# Patient Record
Sex: MALE | Race: WHITE | Employment: OTHER | URBAN - METROPOLITAN AREA
[De-identification: names, ages, dates, MRNs, and addresses within clinical notes are randomized per-mention and may not be internally consistent; named-entity substitution may affect disease eponyms.]

---

## 2017-06-18 PROBLEM — L02.91 ABSCESS: Status: ACTIVE | Noted: 2017-06-18

## 2017-06-20 PROBLEM — L02.512 ABSCESS OF LEFT HAND: Status: ACTIVE | Noted: 2017-06-20

## 2017-06-23 ENCOUNTER — TELEPHONE (OUTPATIENT)
Dept: ORTHOPEDIC SURGERY | Age: 57
End: 2017-06-23

## 2017-06-28 ENCOUNTER — OFFICE VISIT (OUTPATIENT)
Dept: ORTHOPEDIC SURGERY | Age: 57
End: 2017-06-28

## 2017-06-28 VITALS
BODY MASS INDEX: 35.12 KG/M2 | WEIGHT: 265 LBS | DIASTOLIC BLOOD PRESSURE: 72 MMHG | HEIGHT: 73 IN | HEART RATE: 82 BPM | SYSTOLIC BLOOD PRESSURE: 120 MMHG

## 2017-06-28 DIAGNOSIS — L02.512 ABSCESS OF LEFT HAND: Primary | ICD-10-CM

## 2017-06-28 PROCEDURE — 99024 POSTOP FOLLOW-UP VISIT: CPT | Performed by: ORTHOPAEDIC SURGERY

## 2017-07-06 ENCOUNTER — OFFICE VISIT (OUTPATIENT)
Dept: ORTHOPEDIC SURGERY | Age: 57
End: 2017-07-06

## 2017-07-06 VITALS — HEIGHT: 73 IN | WEIGHT: 265 LBS | BODY MASS INDEX: 35.12 KG/M2

## 2017-07-06 DIAGNOSIS — L02.512 ABSCESS OF LEFT HAND: Primary | ICD-10-CM

## 2017-07-06 PROCEDURE — 99024 POSTOP FOLLOW-UP VISIT: CPT | Performed by: ORTHOPAEDIC SURGERY

## 2017-07-20 ENCOUNTER — OFFICE VISIT (OUTPATIENT)
Dept: ORTHOPEDIC SURGERY | Age: 57
End: 2017-07-20

## 2017-07-20 VITALS — HEIGHT: 73 IN | WEIGHT: 265 LBS | BODY MASS INDEX: 35.12 KG/M2

## 2017-07-20 DIAGNOSIS — L02.512 ABSCESS OF LEFT HAND: Primary | ICD-10-CM

## 2017-07-20 PROCEDURE — 99024 POSTOP FOLLOW-UP VISIT: CPT | Performed by: ORTHOPAEDIC SURGERY

## 2017-08-10 ENCOUNTER — OFFICE VISIT (OUTPATIENT)
Dept: ORTHOPEDIC SURGERY | Age: 57
End: 2017-08-10

## 2017-08-10 VITALS — HEIGHT: 73 IN | WEIGHT: 264.99 LBS | BODY MASS INDEX: 35.12 KG/M2

## 2017-08-10 DIAGNOSIS — L02.512 ABSCESS OF LEFT HAND: Primary | ICD-10-CM

## 2017-08-10 PROCEDURE — 99024 POSTOP FOLLOW-UP VISIT: CPT | Performed by: ORTHOPAEDIC SURGERY

## 2021-08-30 ENCOUNTER — APPOINTMENT (OUTPATIENT)
Dept: GENERAL RADIOLOGY | Age: 61
DRG: 853 | End: 2021-08-30
Payer: MEDICARE

## 2021-08-30 ENCOUNTER — HOSPITAL ENCOUNTER (EMERGENCY)
Age: 61
Discharge: HOME | DRG: 853 | End: 2021-08-30
Attending: EMERGENCY MEDICINE
Payer: MEDICARE

## 2021-08-30 VITALS
DIASTOLIC BLOOD PRESSURE: 57 MMHG | RESPIRATION RATE: 16 BRPM | OXYGEN SATURATION: 95 % | HEART RATE: 70 BPM | TEMPERATURE: 98.6 F | WEIGHT: 240 LBS | HEIGHT: 73 IN | BODY MASS INDEX: 31.81 KG/M2 | SYSTOLIC BLOOD PRESSURE: 113 MMHG

## 2021-08-30 DIAGNOSIS — J02.9 ACUTE PHARYNGITIS, UNSPECIFIED ETIOLOGY: Primary | ICD-10-CM

## 2021-08-30 LAB
A/G RATIO: 0.9 (ref 1.1–2.2)
ALBUMIN SERPL-MCNC: 3.6 G/DL (ref 3.4–5)
ALP BLD-CCNC: 138 U/L (ref 40–129)
ALT SERPL-CCNC: 27 U/L (ref 10–40)
ANION GAP SERPL CALCULATED.3IONS-SCNC: 14 MMOL/L (ref 3–16)
AST SERPL-CCNC: 17 U/L (ref 15–37)
BACTERIA: ABNORMAL /HPF
BASOPHILS ABSOLUTE: 0.1 K/UL (ref 0–0.2)
BASOPHILS RELATIVE PERCENT: 0.6 %
BILIRUB SERPL-MCNC: 0.9 MG/DL (ref 0–1)
BILIRUBIN URINE: NEGATIVE
BLOOD, URINE: ABNORMAL
BUN BLDV-MCNC: 35 MG/DL (ref 7–20)
CALCIUM SERPL-MCNC: 9.3 MG/DL (ref 8.3–10.6)
CELLULAR CASTS: ABNORMAL /LPF
CHLORIDE BLD-SCNC: 104 MMOL/L (ref 99–110)
CLARITY: CLEAR
CO2: 19 MMOL/L (ref 21–32)
COLOR: YELLOW
CREAT SERPL-MCNC: 1.7 MG/DL (ref 0.8–1.3)
EOSINOPHILS ABSOLUTE: 0 K/UL (ref 0–0.6)
EOSINOPHILS RELATIVE PERCENT: 0.2 %
EPITHELIAL CELLS, UA: ABNORMAL /HPF (ref 0–5)
GFR AFRICAN AMERICAN: 49
GFR NON-AFRICAN AMERICAN: 41
GLOBULIN: 3.8 G/DL
GLUCOSE BLD-MCNC: 286 MG/DL (ref 70–99)
GLUCOSE URINE: >=1000 MG/DL
HCT VFR BLD CALC: 34.5 % (ref 40.5–52.5)
HEMOGLOBIN: 11.7 G/DL (ref 13.5–17.5)
KETONES, URINE: NEGATIVE MG/DL
LEUKOCYTE ESTERASE, URINE: NEGATIVE
LYMPHOCYTES ABSOLUTE: 1.1 K/UL (ref 1–5.1)
LYMPHOCYTES RELATIVE PERCENT: 6.2 %
MCH RBC QN AUTO: 31.6 PG (ref 26–34)
MCHC RBC AUTO-ENTMCNC: 34 G/DL (ref 31–36)
MCV RBC AUTO: 92.9 FL (ref 80–100)
MICROSCOPIC EXAMINATION: YES
MONOCYTES ABSOLUTE: 1.1 K/UL (ref 0–1.3)
MONOCYTES RELATIVE PERCENT: 6.2 %
NEUTROPHILS ABSOLUTE: 15.1 K/UL (ref 1.7–7.7)
NEUTROPHILS RELATIVE PERCENT: 86.8 %
NITRITE, URINE: NEGATIVE
PDW BLD-RTO: 14.2 % (ref 12.4–15.4)
PH UA: 5.5 (ref 5–8)
PLATELET # BLD: 215 K/UL (ref 135–450)
PMV BLD AUTO: 10.2 FL (ref 5–10.5)
POTASSIUM REFLEX MAGNESIUM: 4.4 MMOL/L (ref 3.5–5.1)
PROTEIN UA: 100 MG/DL
RBC # BLD: 3.71 M/UL (ref 4.2–5.9)
RBC UA: ABNORMAL /HPF (ref 0–4)
SARS-COV-2, NAAT: NOT DETECTED
SODIUM BLD-SCNC: 137 MMOL/L (ref 136–145)
SPECIFIC GRAVITY UA: 1.02 (ref 1–1.03)
TOTAL PROTEIN: 7.4 G/DL (ref 6.4–8.2)
URINE TYPE: ABNORMAL
UROBILINOGEN, URINE: 0.2 E.U./DL
WBC # BLD: 17.4 K/UL (ref 4–11)
WBC UA: ABNORMAL /HPF (ref 0–5)

## 2021-08-30 PROCEDURE — 87635 SARS-COV-2 COVID-19 AMP PRB: CPT

## 2021-08-30 PROCEDURE — 85025 COMPLETE CBC W/AUTO DIFF WBC: CPT

## 2021-08-30 PROCEDURE — 81001 URINALYSIS AUTO W/SCOPE: CPT

## 2021-08-30 PROCEDURE — 99283 EMERGENCY DEPT VISIT LOW MDM: CPT

## 2021-08-30 PROCEDURE — 80053 COMPREHEN METABOLIC PANEL: CPT

## 2021-08-30 PROCEDURE — 71046 X-RAY EXAM CHEST 2 VIEWS: CPT

## 2021-08-30 NOTE — ED PROVIDER NOTES
[facility]  ED    CHIEF COMPLAINT  Fever (x 5 days. up to 102. denies any chest pain or SOB)       HISTORY OF PRESENT ILLNESS  Gene Kitty Ramírez is a 59 y.o. male who presents to the ED with complaint of fever. Fever off and on for 5 days. Tmax 102. Complains of mild sore throat and occasional nonproductive cough. Denies chest pain, SOB, nausea, vomiting, diarrhea, abdominal pain, dysuria, hematuria, rash. No sick contacts or recent travel. Not vaccinated for COVID19. Denies change in taste or smell. No other complaints, modifying factors or associated symptoms.      I have reviewed the following from the nursing documentation:    Past Medical History:   Diagnosis Date    Arthritis     Chronic low back pain     Dental bridge present     upper    Diabetes ([address]     Diabetes mellitus ([address]     Emphysema of lung ([address]     Fractures     Hypertension     Neuropathy     Osteoarthritis     Prostate troubles      Past Surgical History:   Procedure Laterality Date    FOOT SURGERY      HAND SURGERY Left 06/19/2017    LEFT HAND DEBRIDEMENT INCISION AND DRAINAGE    NECK SURGERY      33s year    TOE AMPUTATION Left 2009    WRIST FRACTURE SURGERY       Family History   Problem Relation Age of Onset    Heart Failure Mother     Diabetes Mother     Cancer Father         throat cancer    Asthma Daughter         Sports induced as a child    Emphysema Neg Hx     Hypertension Neg Hx      Social History     Socioeconomic History    Marital status:      Spouse name: Not on file    Number of children: Not on file    Years of education: Not on file    Highest education level: Not on file   Occupational History    Not on file   Tobacco Use    Smoking status: Never Smoker    Smokeless tobacco: Never Used   Substance and Sexual Activity    Alcohol use: No     Alcohol/week: 0.0 standard drinks    Drug use: No    Sexual activity: Not on file   Other Topics Concern    Not on file   Social History Narrative    Not on file     Social Determinants of Health     Financial Resource Strain:     Difficulty of Paying Living Expenses:    Food Insecurity:     Worried About Running Out of Food in the Last Year:     [address] in the Last Year:    Transportation Needs:     Lack of Transportation (Medical):  Lack of Transportation (Non-Medical):    Physical Activity:     Days of Exercise per Week:     Minutes of Exercise per Session:    Stress:     Feeling of Stress :    Social Connections:     Frequency of Communication with Friends and Family:     Frequency of Social Gatherings with Friends and Family:     Attends Congregation Services:     Active Member of Clubs or Organizations:     Attends Club or Organization Meetings:     Marital Status:    Intimate Partner Violence:     Fear of Current or Ex-Partner:     Emotionally Abused:     Physically Abused:     Sexually Abused:      No current facility-administered medications for this encounter. Current Outpatient Medications   Medication Sig Dispense Refill    HYDROcodone-acetaminophen (NORCO) 5-325 MG per tablet Take 1 tablet by mouth every 6 hours as needed for Pain . 24 tablet 0    amLODIPine (NORVASC) 10 MG tablet Take 1 tablet by mouth daily 30 tablet 2    chlorthalidone (HYGROTON) 25 MG tablet Take 1 tablet by mouth daily 30 tablet 3    rivaroxaban (XARELTO) 10 MG TABS tablet Take 10 mg by mouth daily (with breakfast)      Misc.  Devices (PULSE OXIMETER) MISC 1 Device by Does not apply route daily as needed 1 each 0    mometasone-formoterol (DULERA) 200-5 MCG/ACT inhaler Inhale 2 puffs into the lungs 2 times daily 1 Inhaler 1    insulin glargine (LANTUS) 100 UNIT/ML injection vial Inject 35 Units into the skin nightly 1 vial 3    insulin lispro (HUMALOG) 100 UNIT/ML injection vial Inject 12 Units into the skin 3 times daily (before meals) 1 vial 3    pantoprazole (PROTONIX) 40 MG tablet Take 1 tablet by mouth every morning (before breakfast) 30 tablet 3    Omega-3 Fatty Acids (FISH OIL) 1000 MG CAPS Take 3,000 mg by mouth nightly.  metFORMIN (GLUCOPHAGE) 500 MG tablet TAKE TWO TABLETS BY MOUTH TWICE A DAY      Multiple Vitamins-Minerals (MULTIVITAMIN PO) Take  by mouth daily.  tamsulosin (FLOMAX) 0.4 MG capsule Take 0.4 mg by mouth daily. No Known Allergies    REVIEW OF SYSTEMS  10 systems reviewed, pertinent positives and negatives per HPI, otherwise noted to be negative. PHYSICAL EXAM  ED Triage Vitals [08/30/21 1736]   BP Temp Temp Source Pulse Resp SpO2 Height Weight   (!) 153/70 98.6 °F (37 °C) Oral 90 18 94 % 6' 1\" (1.854 m) 240 lb (108.9 kg)     General appearance: Awake and alert. Cooperative. No acute distress. HENT: Normocephalic. Atraumatic. Mucous membranes are moist.  Oropharynx is clear. No tonsillar exudate or uvular deviation. No focal erythema. Managing secretions easily. Neck: Supple. No meningismus. Eyes: PERRL. EOMI. Heart/Chest: RRR. No murmurs. Lungs: Respirations unlabored. CTAB. Good air exchange. Speaking comfortably in full sentences. Abdomen: Soft. Non-tender. Non-distended. No rebound or guarding. Musculoskeletal: No extremity edema. No deformity. No tenderness in the extremities. All extremities neurovascularly intact. Skin: Warm and dry. No acute rashes. Neurological: Alert and oriented. CN II-XII intact. Strength 5/5 bilateral upper and lower extremities. Sensation intact to light touch. Gait normal.  Psychiatric: Mood/affect: normal      LABS  I have reviewed all labs for this visit.    Results for orders placed or performed during the hospital encounter of 08/30/21   COVID-19, Rapid    Specimen: Nasopharyngeal Swab; Mouth   Result Value Ref Range    SARS-CoV-2, NAAT Not Detected Not Detected   CBC Auto Differential   Result Value Ref Range    WBC 17.4 (H) 4.0 - 11.0 K/uL    RBC 3.71 (L) 4.20 - 5.90 M/uL    Hemoglobin 11.7 (L) 13.5 - 17.5 g/dL    Hematocrit 34.5 (L) 40.5 - 52.5 %    MCV 92.9 80.0 - 100.0 fL    MCH 31.6 26.0 - 34.0 pg    MCHC 34.0 31.0 - 36.0 g/dL    RDW 14.2 12.4 - 15.4 %    Platelets 989 108 - 781 K/uL    MPV 10.2 5.0 - 10.5 fL    Neutrophils % 86.8 %    Lymphocytes % 6.2 %    Monocytes % 6.2 %    Eosinophils % 0.2 %    Basophils % 0.6 %    Neutrophils Absolute 15.1 (H) 1.7 - 7.7 K/uL    Lymphocytes Absolute 1.1 1.0 - 5.1 K/uL    Monocytes Absolute 1.1 0.0 - 1.3 K/uL    Eosinophils Absolute 0.0 0.0 - 0.6 K/uL    Basophils Absolute 0.1 0.0 - 0.2 K/uL   Comprehensive Metabolic Panel w/ Reflex to MG   Result Value Ref Range    Sodium 137 136 - 145 mmol/L    Potassium reflex Magnesium 4.4 3.5 - 5.1 mmol/L    Chloride 104 99 - 110 mmol/L    CO2 19 (L) 21 - 32 mmol/L    Anion Gap 14 3 - 16    Glucose 286 (H) 70 - 99 mg/dL    BUN 35 (H) 7 - 20 mg/dL    CREATININE 1.7 (H) 0.8 - 1.3 mg/dL    GFR Non- 41 (A) >60    GFR  49 (A) >60    Calcium 9.3 8.3 - 10.6 mg/dL    Total Protein 7.4 6.4 - 8.2 g/dL    Albumin 3.6 3.4 - 5.0 g/dL    Albumin/Globulin Ratio 0.9 (L) 1.1 - 2.2    Total Bilirubin 0.9 0.0 - 1.0 mg/dL    Alkaline Phosphatase 138 (H) 40 - 129 U/L    ALT 27 10 - 40 U/L    AST 17 15 - 37 U/L    Globulin 3.8 g/dL   Urinalysis, reflex to microscopic   Result Value Ref Range    Color, UA Yellow Straw/Yellow    Clarity, UA Clear Clear    Glucose, Ur >=1000 (A) Negative mg/dL    Bilirubin Urine Negative Negative    Ketones, Urine Negative Negative mg/dL    Specific Gravity, UA 1.020 1.005 - 1.030    Blood, Urine TRACE-INTACT (A) Negative    pH, UA 5.5 5.0 - 8.0    Protein,  (A) Negative mg/dL    Urobilinogen, Urine 0.2 <2.0 E.U./dL    Nitrite, Urine Negative Negative    Leukocyte Esterase, Urine Negative Negative    Microscopic Examination YES     Urine Type NotGiven    Microscopic Urinalysis   Result Value Ref Range    Cellular Cast, UA 1-3 WBC (A) None Seen /LPF    WBC, UA 3-5 0 - 5 /HPF    RBC, UA 3-4 0 - 4 /HPF    Epithelial Cells, UA 2-5 0 - 5 /HPF    Bacteria, UA 1+ (A) None Seen /HPF       RADIOLOGY  I have reviewed all radiographic studies for this visit. XR CHEST (2 VW)   Final Result   No acute cardiopulmonary disease. ED COURSE/MDM  Patient seen and evaluated. Old records reviewed. Labs and imaging reviewed and results discussed with patient/family to extent possible. This is a 76-year-old male who presents with fever for the last several days. On arrival the patient is afebrile and nontoxic in appearance. Appears overall well. Cardiac exam no murmur. Lungs clear. Abdomen benign. Chest x-ray nothing acute. CBC does show a leukocytosis 17.4 with a left shift however no clear source of infection can be found. Urinalysis negative. Rapid Covid negative. Patient is hyperglycemic but not in DKA. Creatinine is 1.7, slightly up from baseline of around 1.3. Likely related to mild dehydration. Encourage oral fluids. Given reassuring exam, vital signs, diagnostics believe patient is appropriate for discharged home with further management in the outpatient setting. Advised close follow-up with PCP in several days. Usual strict return cautions for new or worsening symptoms communicated. All questions were answered and the patient/family expressed understanding and agreement with the plan. PROCEDURES  None    CRITICAL CARE  N/A    CLINICAL IMPRESSION  1. Acute pharyngitis, unspecified etiology        DISPOSITION   discharge     Gigi Hutchinson MD    Note: This chart was created using voice recognition dictation software. Efforts were made by me to ensure accuracy, however some errors may be present due to limitations of this technology and occasionally words are not transcribed correctly.         Gigi Hutchinson MD  08/30/21 5038

## 2021-08-31 ENCOUNTER — APPOINTMENT (OUTPATIENT)
Dept: GENERAL RADIOLOGY | Age: 61
DRG: 853 | End: 2021-08-31
Payer: MEDICARE

## 2021-08-31 ENCOUNTER — HOSPITAL ENCOUNTER (INPATIENT)
Age: 61
LOS: 7 days | Discharge: HOME | DRG: 853 | End: 2021-09-07
Attending: EMERGENCY MEDICINE | Admitting: INTERNAL MEDICINE
Payer: MEDICARE

## 2021-08-31 ENCOUNTER — ANESTHESIA EVENT (OUTPATIENT)
Dept: OPERATING ROOM | Age: 61
DRG: 853 | End: 2021-08-31
Payer: MEDICARE

## 2021-08-31 ENCOUNTER — ANESTHESIA (OUTPATIENT)
Dept: OPERATING ROOM | Age: 61
DRG: 853 | End: 2021-08-31
Payer: MEDICARE

## 2021-08-31 VITALS
RESPIRATION RATE: 9 BRPM | SYSTOLIC BLOOD PRESSURE: 87 MMHG | DIASTOLIC BLOOD PRESSURE: 51 MMHG | OXYGEN SATURATION: 100 %

## 2021-08-31 DIAGNOSIS — L03.116 CELLULITIS OF LEFT FOOT: Primary | ICD-10-CM

## 2021-08-31 DIAGNOSIS — N17.9 ACUTE RENAL FAILURE, UNSPECIFIED ACUTE RENAL FAILURE TYPE (HCC): ICD-10-CM

## 2021-08-31 DIAGNOSIS — M72.6 NECROTIZING FASCIITIS (HCC): ICD-10-CM

## 2021-08-31 PROBLEM — A41.9 SEPSIS (HCC): Status: ACTIVE | Noted: 2021-08-31

## 2021-08-31 LAB
AMORPHOUS: ABNORMAL /HPF
ANION GAP SERPL CALCULATED.3IONS-SCNC: 16 MMOL/L (ref 3–16)
ANISOCYTOSIS: ABNORMAL
BACTERIA: ABNORMAL /HPF
BANDED NEUTROPHILS RELATIVE PERCENT: 10 % (ref 0–7)
BASOPHILS ABSOLUTE: 0 K/UL (ref 0–0.2)
BASOPHILS RELATIVE PERCENT: 0 %
BILIRUBIN URINE: NEGATIVE
BLOOD, URINE: ABNORMAL
BUN BLDV-MCNC: 42 MG/DL (ref 7–20)
CALCIUM SERPL-MCNC: 9.5 MG/DL (ref 8.3–10.6)
CHLORIDE BLD-SCNC: 101 MMOL/L (ref 99–110)
CLARITY: CLEAR
CO2: 19 MMOL/L (ref 21–32)
COLOR: YELLOW
CREAT SERPL-MCNC: 2 MG/DL (ref 0.8–1.3)
EOSINOPHILS ABSOLUTE: 0.2 K/UL (ref 0–0.6)
EOSINOPHILS RELATIVE PERCENT: 1 %
EPITHELIAL CELLS, UA: ABNORMAL /HPF (ref 0–5)
GFR AFRICAN AMERICAN: 41
GFR NON-AFRICAN AMERICAN: 34
GLUCOSE BLD-MCNC: 288 MG/DL (ref 70–99)
GLUCOSE BLD-MCNC: 316 MG/DL (ref 70–99)
GLUCOSE URINE: 100 MG/DL
HCT VFR BLD CALC: 36.3 % (ref 40.5–52.5)
HEMOGLOBIN: 12.3 G/DL (ref 13.5–17.5)
KETONES, URINE: NEGATIVE MG/DL
LACTIC ACID, SEPSIS: 1.7 MMOL/L (ref 0.4–1.9)
LEUKOCYTE ESTERASE, URINE: NEGATIVE
LYMPHOCYTES ABSOLUTE: 1.9 K/UL (ref 1–5.1)
LYMPHOCYTES RELATIVE PERCENT: 10 %
MACROCYTES: ABNORMAL
MCH RBC QN AUTO: 31.7 PG (ref 26–34)
MCHC RBC AUTO-ENTMCNC: 33.9 G/DL (ref 31–36)
MCV RBC AUTO: 93.6 FL (ref 80–100)
MICROCYTES: ABNORMAL
MICROSCOPIC EXAMINATION: YES
MONOCYTES ABSOLUTE: 1.2 K/UL (ref 0–1.3)
MONOCYTES RELATIVE PERCENT: 6 %
MUCUS: ABNORMAL /LPF
NEUTROPHILS ABSOLUTE: 15.9 K/UL (ref 1.7–7.7)
NEUTROPHILS RELATIVE PERCENT: 73 %
NITRITE, URINE: NEGATIVE
OVALOCYTES: ABNORMAL
PDW BLD-RTO: 14.5 % (ref 12.4–15.4)
PERFORMED ON: ABNORMAL
PH UA: 5.5 (ref 5–8)
PLATELET # BLD: 224 K/UL (ref 135–450)
PLATELET SLIDE REVIEW: ADEQUATE
PMV BLD AUTO: 9.9 FL (ref 5–10.5)
POIKILOCYTES: ABNORMAL
POLYCHROMASIA: ABNORMAL
POTASSIUM REFLEX MAGNESIUM: 4.9 MMOL/L (ref 3.5–5.1)
PROTEIN UA: 100 MG/DL
RBC # BLD: 3.88 M/UL (ref 4.2–5.9)
RBC UA: ABNORMAL /HPF (ref 0–4)
RENAL EPITHELIAL, UA: ABNORMAL /HPF (ref 0–1)
SCHISTOCYTES: ABNORMAL
SLIDE REVIEW: ABNORMAL
SODIUM BLD-SCNC: 136 MMOL/L (ref 136–145)
SPECIFIC GRAVITY UA: 1.02 (ref 1–1.03)
TEAR DROP CELLS: ABNORMAL
URINE REFLEX TO CULTURE: ABNORMAL
URINE TYPE: ABNORMAL
UROBILINOGEN, URINE: 0.2 E.U./DL
WBC # BLD: 19.2 K/UL (ref 4–11)
WBC UA: ABNORMAL /HPF (ref 0–5)

## 2021-08-31 PROCEDURE — 2500000003 HC RX 250 WO HCPCS: Performed by: EMERGENCY MEDICINE

## 2021-08-31 PROCEDURE — 99284 EMERGENCY DEPT VISIT MOD MDM: CPT

## 2021-08-31 PROCEDURE — 87070 CULTURE OTHR SPECIMN AEROBIC: CPT

## 2021-08-31 PROCEDURE — 3600000012 HC SURGERY LEVEL 2 ADDTL 15MIN: Performed by: PODIATRIST

## 2021-08-31 PROCEDURE — 3600000002 HC SURGERY LEVEL 2 BASE: Performed by: PODIATRIST

## 2021-08-31 PROCEDURE — 87077 CULTURE AEROBIC IDENTIFY: CPT

## 2021-08-31 PROCEDURE — 96367 TX/PROPH/DG ADDL SEQ IV INF: CPT

## 2021-08-31 PROCEDURE — 87186 SC STD MICRODIL/AGAR DIL: CPT

## 2021-08-31 PROCEDURE — 2580000003 HC RX 258: Performed by: PODIATRIST

## 2021-08-31 PROCEDURE — 96365 THER/PROPH/DIAG IV INF INIT: CPT

## 2021-08-31 PROCEDURE — 87206 SMEAR FLUORESCENT/ACID STAI: CPT

## 2021-08-31 PROCEDURE — 7100000001 HC PACU RECOVERY - ADDTL 15 MIN: Performed by: PODIATRIST

## 2021-08-31 PROCEDURE — 3700000001 HC ADD 15 MINUTES (ANESTHESIA): Performed by: PODIATRIST

## 2021-08-31 PROCEDURE — 1200000000 HC SEMI PRIVATE

## 2021-08-31 PROCEDURE — 80048 BASIC METABOLIC PNL TOTAL CA: CPT

## 2021-08-31 PROCEDURE — 83605 ASSAY OF LACTIC ACID: CPT

## 2021-08-31 PROCEDURE — 7100000000 HC PACU RECOVERY - FIRST 15 MIN: Performed by: PODIATRIST

## 2021-08-31 PROCEDURE — 87205 SMEAR GRAM STAIN: CPT

## 2021-08-31 PROCEDURE — 87075 CULTR BACTERIA EXCEPT BLOOD: CPT

## 2021-08-31 PROCEDURE — 96375 TX/PRO/DX INJ NEW DRUG ADDON: CPT

## 2021-08-31 PROCEDURE — 87076 CULTURE ANAEROBE IDENT EACH: CPT

## 2021-08-31 PROCEDURE — 6360000002 HC RX W HCPCS: Performed by: ANESTHESIOLOGY

## 2021-08-31 PROCEDURE — 81001 URINALYSIS AUTO W/SCOPE: CPT

## 2021-08-31 PROCEDURE — 87116 MYCOBACTERIA CULTURE: CPT

## 2021-08-31 PROCEDURE — 6370000000 HC RX 637 (ALT 250 FOR IP): Performed by: EMERGENCY MEDICINE

## 2021-08-31 PROCEDURE — 2580000003 HC RX 258: Performed by: ANESTHESIOLOGY

## 2021-08-31 PROCEDURE — 0JBR0ZZ EXCISION OF LEFT FOOT SUBCUTANEOUS TISSUE AND FASCIA, OPEN APPROACH: ICD-10-PCS | Performed by: INTERNAL MEDICINE

## 2021-08-31 PROCEDURE — 87040 BLOOD CULTURE FOR BACTERIA: CPT

## 2021-08-31 PROCEDURE — 2709999900 HC NON-CHARGEABLE SUPPLY: Performed by: PODIATRIST

## 2021-08-31 PROCEDURE — 85025 COMPLETE CBC W/AUTO DIFF WBC: CPT

## 2021-08-31 PROCEDURE — 2580000003 HC RX 258: Performed by: EMERGENCY MEDICINE

## 2021-08-31 PROCEDURE — 87015 SPECIMEN INFECT AGNT CONCNTJ: CPT

## 2021-08-31 PROCEDURE — 73630 X-RAY EXAM OF FOOT: CPT

## 2021-08-31 PROCEDURE — 86403 PARTICLE AGGLUT ANTBDY SCRN: CPT

## 2021-08-31 PROCEDURE — 3700000000 HC ANESTHESIA ATTENDED CARE: Performed by: PODIATRIST

## 2021-08-31 PROCEDURE — 2500000003 HC RX 250 WO HCPCS: Performed by: ANESTHESIOLOGY

## 2021-08-31 PROCEDURE — 6360000002 HC RX W HCPCS: Performed by: EMERGENCY MEDICINE

## 2021-08-31 RX ORDER — ONDANSETRON 2 MG/ML
4 INJECTION INTRAMUSCULAR; INTRAVENOUS PRN
Status: DISCONTINUED | OUTPATIENT
Start: 2021-08-31 | End: 2021-08-31 | Stop reason: HOSPADM

## 2021-08-31 RX ORDER — LISINOPRIL 10 MG/1
5 TABLET ORAL DAILY
COMMUNITY
End: 2021-11-12 | Stop reason: ALTCHOICE

## 2021-08-31 RX ORDER — MORPHINE SULFATE 2 MG/ML
2 INJECTION, SOLUTION INTRAMUSCULAR; INTRAVENOUS EVERY 5 MIN PRN
Status: DISCONTINUED | OUTPATIENT
Start: 2021-08-31 | End: 2021-08-31 | Stop reason: HOSPADM

## 2021-08-31 RX ORDER — MORPHINE SULFATE 2 MG/ML
2 INJECTION, SOLUTION INTRAMUSCULAR; INTRAVENOUS ONCE
Status: COMPLETED | OUTPATIENT
Start: 2021-08-31 | End: 2021-08-31

## 2021-08-31 RX ORDER — OXYCODONE HYDROCHLORIDE AND ACETAMINOPHEN 5; 325 MG/1; MG/1
1 TABLET ORAL PRN
Status: DISCONTINUED | OUTPATIENT
Start: 2021-08-31 | End: 2021-08-31 | Stop reason: HOSPADM

## 2021-08-31 RX ORDER — SODIUM CHLORIDE, SODIUM LACTATE, POTASSIUM CHLORIDE, CALCIUM CHLORIDE 600; 310; 30; 20 MG/100ML; MG/100ML; MG/100ML; MG/100ML
INJECTION, SOLUTION INTRAVENOUS CONTINUOUS PRN
Status: DISCONTINUED | OUTPATIENT
Start: 2021-08-31 | End: 2021-08-31 | Stop reason: SDUPTHER

## 2021-08-31 RX ORDER — LABETALOL HYDROCHLORIDE 5 MG/ML
5 INJECTION, SOLUTION INTRAVENOUS EVERY 10 MIN PRN
Status: DISCONTINUED | OUTPATIENT
Start: 2021-08-31 | End: 2021-08-31 | Stop reason: HOSPADM

## 2021-08-31 RX ORDER — OXYCODONE HYDROCHLORIDE 5 MG/1
5 TABLET ORAL EVERY 4 HOURS PRN
Status: DISCONTINUED | OUTPATIENT
Start: 2021-08-31 | End: 2021-09-08 | Stop reason: HOSPADM

## 2021-08-31 RX ORDER — MORPHINE SULFATE 2 MG/ML
1 INJECTION, SOLUTION INTRAMUSCULAR; INTRAVENOUS EVERY 5 MIN PRN
Status: DISCONTINUED | OUTPATIENT
Start: 2021-08-31 | End: 2021-08-31 | Stop reason: HOSPADM

## 2021-08-31 RX ORDER — LIDOCAINE HYDROCHLORIDE 20 MG/ML
INJECTION, SOLUTION INFILTRATION; PERINEURAL PRN
Status: DISCONTINUED | OUTPATIENT
Start: 2021-08-31 | End: 2021-08-31 | Stop reason: SDUPTHER

## 2021-08-31 RX ORDER — MORPHINE SULFATE 4 MG/ML
4 INJECTION, SOLUTION INTRAMUSCULAR; INTRAVENOUS ONCE
Status: DISCONTINUED | OUTPATIENT
Start: 2021-08-31 | End: 2021-08-31

## 2021-08-31 RX ORDER — ROCURONIUM BROMIDE 10 MG/ML
INJECTION, SOLUTION INTRAVENOUS PRN
Status: DISCONTINUED | OUTPATIENT
Start: 2021-08-31 | End: 2021-08-31 | Stop reason: SDUPTHER

## 2021-08-31 RX ORDER — MORPHINE SULFATE 4 MG/ML
10 INJECTION, SOLUTION INTRAMUSCULAR; INTRAVENOUS ONCE
Status: COMPLETED | OUTPATIENT
Start: 2021-08-31 | End: 2021-08-31

## 2021-08-31 RX ORDER — OXYCODONE HYDROCHLORIDE 5 MG/1
10 TABLET ORAL EVERY 4 HOURS PRN
Status: COMPLETED | OUTPATIENT
Start: 2021-08-31 | End: 2021-09-01

## 2021-08-31 RX ORDER — INSULIN GLARGINE 100 [IU]/ML
20 INJECTION, SOLUTION SUBCUTANEOUS NIGHTLY
Status: DISCONTINUED | OUTPATIENT
Start: 2021-09-01 | End: 2021-09-08 | Stop reason: HOSPADM

## 2021-08-31 RX ORDER — FENTANYL CITRATE 50 UG/ML
INJECTION, SOLUTION INTRAMUSCULAR; INTRAVENOUS PRN
Status: DISCONTINUED | OUTPATIENT
Start: 2021-08-31 | End: 2021-08-31 | Stop reason: SDUPTHER

## 2021-08-31 RX ORDER — PROMETHAZINE HYDROCHLORIDE 25 MG/ML
6.25 INJECTION, SOLUTION INTRAMUSCULAR; INTRAVENOUS
Status: DISCONTINUED | OUTPATIENT
Start: 2021-08-31 | End: 2021-08-31 | Stop reason: HOSPADM

## 2021-08-31 RX ORDER — 0.9 % SODIUM CHLORIDE 0.9 %
1000 INTRAVENOUS SOLUTION INTRAVENOUS ONCE
Status: COMPLETED | OUTPATIENT
Start: 2021-08-31 | End: 2021-08-31

## 2021-08-31 RX ORDER — TAMSULOSIN HYDROCHLORIDE 0.4 MG/1
0.4 CAPSULE ORAL DAILY
Status: DISCONTINUED | OUTPATIENT
Start: 2021-09-01 | End: 2021-09-08 | Stop reason: HOSPADM

## 2021-08-31 RX ORDER — ACETAMINOPHEN 500 MG
1000 TABLET ORAL ONCE
Status: COMPLETED | OUTPATIENT
Start: 2021-08-31 | End: 2021-08-31

## 2021-08-31 RX ORDER — HYDRALAZINE HYDROCHLORIDE 20 MG/ML
5 INJECTION INTRAMUSCULAR; INTRAVENOUS EVERY 10 MIN PRN
Status: DISCONTINUED | OUTPATIENT
Start: 2021-08-31 | End: 2021-08-31 | Stop reason: HOSPADM

## 2021-08-31 RX ORDER — ATORVASTATIN CALCIUM 40 MG/1
40 TABLET, FILM COATED ORAL DAILY
COMMUNITY

## 2021-08-31 RX ORDER — MEPERIDINE HYDROCHLORIDE 50 MG/ML
12.5 INJECTION INTRAMUSCULAR; INTRAVENOUS; SUBCUTANEOUS EVERY 5 MIN PRN
Status: DISCONTINUED | OUTPATIENT
Start: 2021-08-31 | End: 2021-08-31 | Stop reason: HOSPADM

## 2021-08-31 RX ORDER — PROPOFOL 10 MG/ML
INJECTION, EMULSION INTRAVENOUS PRN
Status: DISCONTINUED | OUTPATIENT
Start: 2021-08-31 | End: 2021-08-31 | Stop reason: SDUPTHER

## 2021-08-31 RX ORDER — OXYCODONE HYDROCHLORIDE AND ACETAMINOPHEN 5; 325 MG/1; MG/1
2 TABLET ORAL PRN
Status: DISCONTINUED | OUTPATIENT
Start: 2021-08-31 | End: 2021-08-31 | Stop reason: HOSPADM

## 2021-08-31 RX ORDER — ATORVASTATIN CALCIUM 40 MG/1
40 TABLET, FILM COATED ORAL DAILY
Status: DISCONTINUED | OUTPATIENT
Start: 2021-09-01 | End: 2021-09-08 | Stop reason: HOSPADM

## 2021-08-31 RX ORDER — DULAGLUTIDE 1.5 MG/.5ML
1.5 INJECTION, SOLUTION SUBCUTANEOUS WEEKLY
COMMUNITY
End: 2022-05-16

## 2021-08-31 RX ORDER — CLINDAMYCIN PHOSPHATE 900 MG/50ML
900 INJECTION INTRAVENOUS ONCE
Status: COMPLETED | OUTPATIENT
Start: 2021-08-31 | End: 2021-08-31

## 2021-08-31 RX ORDER — DIPHENHYDRAMINE HYDROCHLORIDE 50 MG/ML
12.5 INJECTION INTRAMUSCULAR; INTRAVENOUS
Status: DISCONTINUED | OUTPATIENT
Start: 2021-08-31 | End: 2021-08-31 | Stop reason: HOSPADM

## 2021-08-31 RX ORDER — MAGNESIUM HYDROXIDE 1200 MG/15ML
LIQUID ORAL CONTINUOUS PRN
Status: COMPLETED | OUTPATIENT
Start: 2021-08-31 | End: 2021-08-31

## 2021-08-31 RX ORDER — CLINDAMYCIN PHOSPHATE 600 MG/50ML
600 INJECTION INTRAVENOUS EVERY 8 HOURS
Status: DISCONTINUED | OUTPATIENT
Start: 2021-09-01 | End: 2021-09-01

## 2021-08-31 RX ADMIN — PROPOFOL 160 MG: 10 INJECTION, EMULSION INTRAVENOUS at 21:43

## 2021-08-31 RX ADMIN — CLINDAMYCIN PHOSPHATE 900 MG: 900 INJECTION, SOLUTION INTRAVENOUS at 21:43

## 2021-08-31 RX ADMIN — SUGAMMADEX 200 MG: 100 INJECTION, SOLUTION INTRAVENOUS at 22:07

## 2021-08-31 RX ADMIN — MORPHINE SULFATE 2 MG: 2 INJECTION, SOLUTION INTRAMUSCULAR; INTRAVENOUS at 19:56

## 2021-08-31 RX ADMIN — PIPERACILLIN SODIUM AND TAZOBACTAM SODIUM 4500 MG: 4; .5 INJECTION, POWDER, LYOPHILIZED, FOR SOLUTION INTRAVENOUS at 15:20

## 2021-08-31 RX ADMIN — LIDOCAINE HYDROCHLORIDE 60 MG: 20 INJECTION, SOLUTION INFILTRATION; PERINEURAL at 21:43

## 2021-08-31 RX ADMIN — ROCURONIUM BROMIDE 50 MG: 10 SOLUTION INTRAVENOUS at 21:43

## 2021-08-31 RX ADMIN — SODIUM CHLORIDE 1000 ML: 9 INJECTION, SOLUTION INTRAVENOUS at 19:54

## 2021-08-31 RX ADMIN — FENTANYL CITRATE 50 MCG: 50 INJECTION INTRAMUSCULAR; INTRAVENOUS at 21:49

## 2021-08-31 RX ADMIN — MORPHINE SULFATE 10 MG: 4 INJECTION, SOLUTION INTRAMUSCULAR; INTRAVENOUS at 15:39

## 2021-08-31 RX ADMIN — ACETAMINOPHEN 1000 MG: 500 TABLET ORAL at 15:39

## 2021-08-31 RX ADMIN — SODIUM CHLORIDE, SODIUM LACTATE, POTASSIUM CHLORIDE, AND CALCIUM CHLORIDE: .6; .31; .03; .02 INJECTION, SOLUTION INTRAVENOUS at 21:38

## 2021-08-31 RX ADMIN — VANCOMYCIN HYDROCHLORIDE 2500 MG: 1 INJECTION, POWDER, LYOPHILIZED, FOR SOLUTION INTRAVENOUS at 18:22

## 2021-08-31 ASSESSMENT — ENCOUNTER SYMPTOMS
VOMITING: 0
COUGH: 0
COLOR CHANGE: 1
SORE THROAT: 1
SHORTNESS OF BREATH: 0
EYE DISCHARGE: 0
ABDOMINAL PAIN: 0

## 2021-08-31 ASSESSMENT — PULMONARY FUNCTION TESTS
PIF_VALUE: 25
PIF_VALUE: 25
PIF_VALUE: 27
PIF_VALUE: 13
PIF_VALUE: 27
PIF_VALUE: 2
PIF_VALUE: 28
PIF_VALUE: 27
PIF_VALUE: 2
PIF_VALUE: 27
PIF_VALUE: 2
PIF_VALUE: 27
PIF_VALUE: 25
PIF_VALUE: 27
PIF_VALUE: 1
PIF_VALUE: 27
PIF_VALUE: 0
PIF_VALUE: 3
PIF_VALUE: 28
PIF_VALUE: 27
PIF_VALUE: 27
PIF_VALUE: 31
PIF_VALUE: 0
PIF_VALUE: 28
PIF_VALUE: 27
PIF_VALUE: 0
PIF_VALUE: 25
PIF_VALUE: 0
PIF_VALUE: 0

## 2021-08-31 ASSESSMENT — PAIN SCALES - GENERAL
PAINLEVEL_OUTOF10: 8
PAINLEVEL_OUTOF10: 4
PAINLEVEL_OUTOF10: 0
PAINLEVEL_OUTOF10: 0
PAINLEVEL_OUTOF10: 7
PAINLEVEL_OUTOF10: 7

## 2021-08-31 ASSESSMENT — PAIN DESCRIPTION - ORIENTATION: ORIENTATION: LEFT

## 2021-08-31 ASSESSMENT — PAIN DESCRIPTION - FREQUENCY: FREQUENCY: CONTINUOUS

## 2021-08-31 ASSESSMENT — PAIN DESCRIPTION - PAIN TYPE
TYPE: ACUTE PAIN
TYPE: ACUTE PAIN;SURGICAL PAIN

## 2021-08-31 ASSESSMENT — PAIN DESCRIPTION - LOCATION: LOCATION: FOOT

## 2021-08-31 ASSESSMENT — PAIN DESCRIPTION - DESCRIPTORS: DESCRIPTORS: ACHING

## 2021-08-31 ASSESSMENT — PAIN DESCRIPTION - ONSET: ONSET: ON-GOING

## 2021-08-31 ASSESSMENT — PAIN - FUNCTIONAL ASSESSMENT: PAIN_FUNCTIONAL_ASSESSMENT: ACTIVITIES ARE NOT PREVENTED

## 2021-08-31 NOTE — ED PROVIDER NOTES
Emergency Department Provider Note  Location: [address] [place] [address]  ED  8/31/2021     Patient Identification  Gene Ira Hope is a 59 y.o. male    Chief Complaint  Foot Laceration (pt hit left foot on his rocking chair. states he has a \"gross\" wound on left foot. ) and Wound Infection (open wound on left foot red and swollen. )      Mode of Arrival  private car    HPI  (History provided by patient and wife)  This is a 59 y.o. male with a PMH significant for DM, left big toe amputation presented today for fever since Thursday and wound on left foot. Patient said he has had fever and chills since Thursday. T-max at home was 102. He did not know why he was having fever so he came to emergency room yesterday. He denies cough but complained of some sore throat. He was tested for COVID and the result was negative. Last night, after he went home, he actually kicked a chair and caused significant pain in the left foot. His wife then took his socks off and take a look and noticed significant redness on his foot, surrounding the previously amputated left big toe. Wife states when she changed patient socks on Sunday, the skin was normal without any redness. ROS  Review of Systems   Constitutional: Positive for chills, fatigue and fever. HENT: Positive for sore throat. Negative for congestion. Eyes: Negative for discharge. Respiratory: Negative for cough and shortness of breath. Cardiovascular: Negative for chest pain. Gastrointestinal: Negative for abdominal pain and vomiting. Genitourinary: Negative for dysuria and frequency. Musculoskeletal: Positive for arthralgias (left foot pain). Skin: Positive for color change and wound. Neurological: Negative for syncope and speech difficulty. Psychiatric/Behavioral: Negative for confusion.        I have reviewed the following nursing documentation:  Allergies: No Known Allergies    Past medical history:  has a past medical history of Arthritis, Historical Provider, MD       Social history:  reports that he has never smoked. He has never used smokeless tobacco. He reports that he does not drink alcohol and does not use drugs. Family history:    Family History   Problem Relation Age of Onset    Heart Failure Mother     Diabetes Mother     Cancer Father         throat cancer    Asthma Daughter         Sports induced as a child    Emphysema Neg Hx     Hypertension Neg Hx        Exam  ED Triage Vitals   BP Temp Temp Source Pulse Resp SpO2 Height Weight   08/31/21 1225 08/31/21 1225 08/31/21 1225 08/31/21 1225 08/31/21 1225 08/31/21 1225 08/31/21 1749 08/31/21 1624   (!) 148/79 99 °F (37.2 °C) Oral 76 17 99 % 6' 1\" (1.854 m) 240 lb (108.9 kg)   Physical Exam  Vitals and nursing note reviewed. Constitutional:       General: He is not in acute distress. Appearance: He is well-developed. He is not diaphoretic. HENT:      Head: Normocephalic and atraumatic. Eyes:      General: No scleral icterus. Right eye: No discharge. Left eye: No discharge. Conjunctiva/sclera: Conjunctivae normal.   Neck:      Trachea: No tracheal deviation. Cardiovascular:      Rate and Rhythm: Normal rate and regular rhythm. Heart sounds: Normal heart sounds. No murmur heard. Comments: Palpable pedal pulses on the left  Pulmonary:      Effort: Pulmonary effort is normal. No respiratory distress. Breath sounds: Normal breath sounds. No stridor. No wheezing. Abdominal:      General: There is no distension. Palpations: Abdomen is soft. Tenderness: There is no abdominal tenderness. There is no guarding or rebound. Musculoskeletal:         General: No deformity. Cervical back: Neck supple. Right lower leg: No edema. Left lower leg: Edema (pitting edema on the left foot and left lower leg) present. Skin:     General: Skin is warm and dry.       Findings: Erythema and wound (on the left foot, at the big toe amputation site) present. No rash. Comments: Lymphangitis noted up the medial aspect of the left lower leg   Neurological:      Mental Status: He is alert and oriented to person, place, and time. Cranial Nerves: No dysarthria or facial asymmetry. Sensory: Sensory deficit (decreased sensation to light touch in the left foot) present. Psychiatric:         Mood and Affect: Mood normal.         Speech: Speech normal.         Behavior: Behavior normal. Behavior is cooperative.          MDM/ED Course  ED Medication Orders (From admission, onward)    Start Ordered     Status Ordering Provider    08/31/21 2015 08/31/21 2003  clindamycin (CLEOCIN) 900 mg in dextrose 5 % 50 mL IVPB  ONCE     Question:  Antimicrobial Indications  Answer:  Skin and Soft Tissue Infection    Last MAR action: Given - by Agustina Vera on 08/31/21 at [facility]    08/31/21 1945 08/31/21 1942  morphine (PF) injection 2 mg  ONCE      Last MAR action: Given - by Adelaide Garg on 08/31/21 at [address] [place] [address]    08/31/21 1945 08/31/21 1942  morphine (PF) injection 2 mg  ONCE      Last MAR action: Given - by Adelaide Garg on 08/31/21 at [address] [place] [address]    08/31/21 1930 08/31/21 1920  0.9 % sodium chloride bolus  ONCE      Last MAR action: Stopped - by Ade Beard on 08/31/21 at [facility]    08/31/21 1730 08/31/21 1447  vancomycin (VANCOCIN) 2,500 mg in dextrose 5 % 500 mL IVPB  ONCE     Question Answer Comment   Antimicrobial Indications Other    Other Abx Indication Suspected Sepsis of Skin or Soft Tissue Origin        Last MAR action: New Bag - by Ade Beard on 08/31/21 at [facility]    08/31/21 1545 08/31/21 1535  acetaminophen (TYLENOL) tablet 1,000 mg  ONCE      Last MAR action: Given - by Ade Beard on 08/31/21 at [facility]    08/31/21 1545 08/31/21 1535  morphine (PF) injection 10 mg  ONCE      Last MAR action: Given - by Ade Beard on projecting in the stump of the great toe. Scattered vascular calcification. Soft tissue swelling with soft tissue gas adjacent to the head of the 1st metatarsal.  No convincing plain film evidence of osteomyelitis. No acute fracture identified. Probable nonacute corner fracture involving the base of the 2nd proximal phalanx.          Labs  Results for orders placed or performed during the hospital encounter of 08/31/21   CBC Auto Differential   Result Value Ref Range    WBC 19.2 (H) 4.0 - 11.0 K/uL    RBC 3.88 (L) 4.20 - 5.90 M/uL    Hemoglobin 12.3 (L) 13.5 - 17.5 g/dL    Hematocrit 36.3 (L) 40.5 - 52.5 %    MCV 93.6 80.0 - 100.0 fL    MCH 31.7 26.0 - 34.0 pg    MCHC 33.9 31.0 - 36.0 g/dL    RDW 14.5 12.4 - 15.4 %    Platelets 269 416 - 275 K/uL    MPV 9.9 5.0 - 10.5 fL    PLATELET SLIDE REVIEW Adequate     SLIDE REVIEW see below     Neutrophils % 73.0 %    Lymphocytes % 10.0 %    Monocytes % 6.0 %    Eosinophils % 1.0 %    Basophils % 0.0 %    Neutrophils Absolute 15.9 (H) 1.7 - 7.7 K/uL    Lymphocytes Absolute 1.9 1.0 - 5.1 K/uL    Monocytes Absolute 1.2 0.0 - 1.3 K/uL    Eosinophils Absolute 0.2 0.0 - 0.6 K/uL    Basophils Absolute 0.0 0.0 - 0.2 K/uL    Bands Relative 10 (H) 0 - 7 %    Anisocytosis 1+ (A)     Macrocytes Occasional (A)     Microcytes Occasional (A)     Polychromasia Occasional (A)     Poikilocytes 1+ (A)     Schistocytes Occasional (A)     Ovalocytes Occasional (A)     Tear Drop Cells Occasional (A)    Basic Metabolic Panel w/ Reflex to MG   Result Value Ref Range    Sodium 136 136 - 145 mmol/L    Potassium reflex Magnesium 4.9 3.5 - 5.1 mmol/L    Chloride 101 99 - 110 mmol/L    CO2 19 (L) 21 - 32 mmol/L    Anion Gap 16 3 - 16    Glucose 316 (H) 70 - 99 mg/dL    BUN 42 (H) 7 - 20 mg/dL    CREATININE 2.0 (H) 0.8 - 1.3 mg/dL    GFR Non- 34 (A) >60    GFR  41 (A) >60    Calcium 9.5 8.3 - 10.6 mg/dL   Lactate, Sepsis   Result Value Ref Range    Lactic Acid, Sepsis 1.7 0.4 - 1.9 mmol/L   Urinalysis Reflex to Culture    Specimen: Urine, clean catch   Result Value Ref Range    Color, UA Yellow Straw/Yellow    Clarity, UA Clear Clear    Glucose, Ur 100 (A) Negative mg/dL    Bilirubin Urine Negative Negative    Ketones, Urine Negative Negative mg/dL    Specific Gravity, UA 1.025 1.005 - 1.030    Blood, Urine TRACE-INTACT (A) Negative    pH, UA 5.5 5.0 - 8.0    Protein,  (A) Negative mg/dL    Urobilinogen, Urine 0.2 <2.0 E.U./dL    Nitrite, Urine Negative Negative    Leukocyte Esterase, Urine Negative Negative    Microscopic Examination YES     Urine Type NotGiven     Urine Reflex to Culture Not Indicated    Microscopic Urinalysis   Result Value Ref Range    Mucus, UA Rare (A) None Seen /LPF    WBC, UA 0-2 0 - 5 /HPF    RBC, UA 0-2 0 - 4 /HPF    Epithelial Cells, UA 0-1 0 - 5 /HPF    Renal Epithelial, UA 0-1 0 - 1 /HPF    Bacteria, UA 2+ (A) None Seen /HPF    Amorphous, UA 1+ /HPF         - Patient seen and evaluated in room 6.  59 y.o. male presented for left foot pain, fever up to 102 since Thursday. Exam showed a open wound on the left foot with significant erythema especially on the ileal aspect of the foot extending towards the ankle and there is also streaking up the medial aspect of the left lower leg. Patient appears to be neurovascularly intact. - Patient was placed on telemetry during his/her ED stay and no malignant dysrhythmia observed. - Pertinent old records reviewed. - Patient was given IVF, and Comycin, and Zosyn in the ED. after reviewing the x-ray result and discussion with hospitalist, I also ordered clindamycin for concern of possible necrotizing fasciitis. - Diagnostic studies reviewed. Patient has elevated white count of 19,200.  10% bandemia noted lactic acid 1.7. Patient's Cr today is 2.0, significantly elevated compared to her prior baseline of 1.3.  - I discussed the results with patient and spouse/SO.    - I spoke with Dr. Erika Fry, podiatry. We discussed the pertinent history, exam, and workup results. Based on that discussion, Dr. Lorenzo Barraza reviewed the image and evaluated the patient in the ED. He decided to take the patient to the OR. I spoke with Dr. Polo Boeck, hospitalist. We thoroughly discussed the history, physical exam, laboratory and imaging studies, as well as, emergency department course. Based upon that discussion, we've decided to admit Drew Banks for further observation and evaluation of Del Hernandez's cellulitis vs necrotizing fasciitis. As I have deemed necessary from their history, physical, and studies, I have considered and evaluated Del Reyna for the following diagnoses: Necrotizing fasciitis, cellulitis, sepsis, acute renal failure, metabolic derangement, urinary tract infection      Clinical Impression:  1. Cellulitis of left foot    2. Necrotizing fasciitis ([address]    3. Acute renal failure, unspecified acute renal failure type [facility])        Disposition:  Patient is going to OR and then med/surg floor. Admitted by hospitalist with podiatry as consultant. Blood pressure (!) 164/94, pulse 72, temperature 99.6 °F (37.6 °C), temperature source Oral, resp. rate 16, height 6' 1\" (1.854 m), weight 240 lb (108.9 kg), SpO2 97 %. Total critical care time is 50 minutes, which excludes separately billable procedures and updating family. Time spent is specifically for management of the presenting complaint and symptoms initially, direct bedside care, reevaluation, review of records, and consultation. There was a high probability of clinically significant life-threatening deterioration in the patient's condition, which required my urgent intervention. This chart was generated in part by using Dragon Dictation system and may contain errors related to that system including errors in grammar, punctuation, and spelling, as well as words and phrases that may be inappropriate.  If there are any questions or concerns please feel free to contact the dictating provider for clarification.      Jearline Fleischer, MD  [facility] Jeremy Collado MD  08/31/21 [id number]

## 2021-08-31 NOTE — ED NOTES
PS Podiatry @ 1943  RE: DM with left foot infection, gas seen on x-ray, concern for necrotizing fasciitis per Dr. Cesar Arreaga called back @ 1943       Sepideh Up  08/31/21 1946

## 2021-09-01 ENCOUNTER — APPOINTMENT (OUTPATIENT)
Dept: GENERAL RADIOLOGY | Age: 61
DRG: 853 | End: 2021-09-01
Payer: MEDICARE

## 2021-09-01 ENCOUNTER — APPOINTMENT (OUTPATIENT)
Dept: VASCULAR LAB | Age: 61
DRG: 853 | End: 2021-09-01
Payer: MEDICARE

## 2021-09-01 ENCOUNTER — APPOINTMENT (OUTPATIENT)
Dept: MRI IMAGING | Age: 61
DRG: 853 | End: 2021-09-01
Payer: MEDICARE

## 2021-09-01 LAB
ANION GAP SERPL CALCULATED.3IONS-SCNC: 13 MMOL/L (ref 3–16)
BUN BLDV-MCNC: 37 MG/DL (ref 7–20)
CALCIUM SERPL-MCNC: 9.2 MG/DL (ref 8.3–10.6)
CHLORIDE BLD-SCNC: 104 MMOL/L (ref 99–110)
CO2: 19 MMOL/L (ref 21–32)
CREAT SERPL-MCNC: 1.9 MG/DL (ref 0.8–1.3)
GFR AFRICAN AMERICAN: 44
GFR NON-AFRICAN AMERICAN: 36
GLUCOSE BLD-MCNC: 310 MG/DL (ref 70–99)
GLUCOSE BLD-MCNC: 351 MG/DL (ref 70–99)
GLUCOSE BLD-MCNC: 362 MG/DL (ref 70–99)
HCT VFR BLD CALC: 31 % (ref 40.5–52.5)
HEMOGLOBIN: 10.5 G/DL (ref 13.5–17.5)
MCH RBC QN AUTO: 31.6 PG (ref 26–34)
MCHC RBC AUTO-ENTMCNC: 33.8 G/DL (ref 31–36)
MCV RBC AUTO: 93.5 FL (ref 80–100)
PDW BLD-RTO: 14.4 % (ref 12.4–15.4)
PERFORMED ON: ABNORMAL
PERFORMED ON: ABNORMAL
PLATELET # BLD: 262 K/UL (ref 135–450)
PMV BLD AUTO: 9.2 FL (ref 5–10.5)
POTASSIUM REFLEX MAGNESIUM: 4.6 MMOL/L (ref 3.5–5.1)
RBC # BLD: 3.32 M/UL (ref 4.2–5.9)
SODIUM BLD-SCNC: 136 MMOL/L (ref 136–145)
VANCOMYCIN RANDOM: 11.4 UG/ML
WBC # BLD: 14.9 K/UL (ref 4–11)

## 2021-09-01 PROCEDURE — 83036 HEMOGLOBIN GLYCOSYLATED A1C: CPT

## 2021-09-01 PROCEDURE — 99223 1ST HOSP IP/OBS HIGH 75: CPT | Performed by: INTERNAL MEDICINE

## 2021-09-01 PROCEDURE — 85027 COMPLETE CBC AUTOMATED: CPT

## 2021-09-01 PROCEDURE — 6360000002 HC RX W HCPCS: Performed by: PODIATRIST

## 2021-09-01 PROCEDURE — 73630 X-RAY EXAM OF FOOT: CPT

## 2021-09-01 PROCEDURE — 1200000000 HC SEMI PRIVATE

## 2021-09-01 PROCEDURE — 6360000002 HC RX W HCPCS: Performed by: INTERNAL MEDICINE

## 2021-09-01 PROCEDURE — 6370000000 HC RX 637 (ALT 250 FOR IP): Performed by: NURSE PRACTITIONER

## 2021-09-01 PROCEDURE — 73590 X-RAY EXAM OF LOWER LEG: CPT

## 2021-09-01 PROCEDURE — 2580000003 HC RX 258: Performed by: PODIATRIST

## 2021-09-01 PROCEDURE — 6370000000 HC RX 637 (ALT 250 FOR IP): Performed by: INTERNAL MEDICINE

## 2021-09-01 PROCEDURE — 36415 COLL VENOUS BLD VENIPUNCTURE: CPT

## 2021-09-01 PROCEDURE — 93925 LOWER EXTREMITY STUDY: CPT

## 2021-09-01 PROCEDURE — 80202 ASSAY OF VANCOMYCIN: CPT

## 2021-09-01 PROCEDURE — 2500000003 HC RX 250 WO HCPCS: Performed by: PODIATRIST

## 2021-09-01 PROCEDURE — 2580000003 HC RX 258: Performed by: INTERNAL MEDICINE

## 2021-09-01 PROCEDURE — 80048 BASIC METABOLIC PNL TOTAL CA: CPT

## 2021-09-01 RX ORDER — CHLORPROMAZINE HYDROCHLORIDE 25 MG/1
25 TABLET, FILM COATED ORAL 3 TIMES DAILY
Status: DISCONTINUED | OUTPATIENT
Start: 2021-09-01 | End: 2021-09-03

## 2021-09-01 RX ORDER — METRONIDAZOLE 250 MG/1
500 TABLET ORAL EVERY 8 HOURS SCHEDULED
Status: DISCONTINUED | OUTPATIENT
Start: 2021-09-01 | End: 2021-09-02

## 2021-09-01 RX ORDER — CHLORAL HYDRATE 500 MG
3000 CAPSULE ORAL NIGHTLY
Status: DISCONTINUED | OUTPATIENT
Start: 2021-09-01 | End: 2021-09-01 | Stop reason: RX

## 2021-09-01 RX ORDER — ACETAMINOPHEN 325 MG/1
650 TABLET ORAL EVERY 4 HOURS PRN
Status: DISCONTINUED | OUTPATIENT
Start: 2021-09-01 | End: 2021-09-08 | Stop reason: HOSPADM

## 2021-09-01 RX ORDER — LISINOPRIL 5 MG/1
5 TABLET ORAL DAILY
Status: DISCONTINUED | OUTPATIENT
Start: 2021-09-02 | End: 2021-09-08 | Stop reason: HOSPADM

## 2021-09-01 RX ORDER — DEXTROSE MONOHYDRATE 25 G/50ML
12.5 INJECTION, SOLUTION INTRAVENOUS PRN
Status: DISCONTINUED | OUTPATIENT
Start: 2021-09-01 | End: 2021-09-08 | Stop reason: HOSPADM

## 2021-09-01 RX ORDER — NICOTINE POLACRILEX 4 MG
15 LOZENGE BUCCAL PRN
Status: DISCONTINUED | OUTPATIENT
Start: 2021-09-01 | End: 2021-09-08 | Stop reason: HOSPADM

## 2021-09-01 RX ORDER — DEXTROSE MONOHYDRATE 50 MG/ML
100 INJECTION, SOLUTION INTRAVENOUS PRN
Status: DISCONTINUED | OUTPATIENT
Start: 2021-09-01 | End: 2021-09-08 | Stop reason: HOSPADM

## 2021-09-01 RX ADMIN — TAMSULOSIN HYDROCHLORIDE 0.4 MG: 0.4 CAPSULE ORAL at 10:52

## 2021-09-01 RX ADMIN — CLINDAMYCIN PHOSPHATE 600 MG: 600 INJECTION, SOLUTION INTRAVENOUS at 06:59

## 2021-09-01 RX ADMIN — Medication 1 LOZENGE: at 15:22

## 2021-09-01 RX ADMIN — INSULIN GLARGINE 20 UNITS: 100 INJECTION, SOLUTION SUBCUTANEOUS at 21:54

## 2021-09-01 RX ADMIN — OXYCODONE 10 MG: 5 TABLET ORAL at 21:58

## 2021-09-01 RX ADMIN — ATORVASTATIN CALCIUM 40 MG: 40 TABLET, FILM COATED ORAL at 10:52

## 2021-09-01 RX ADMIN — Medication 1 LOZENGE: at 00:14

## 2021-09-01 RX ADMIN — CLINDAMYCIN PHOSPHATE 600 MG: 600 INJECTION, SOLUTION INTRAVENOUS at 15:25

## 2021-09-01 RX ADMIN — OXYCODONE 10 MG: 5 TABLET ORAL at 07:45

## 2021-09-01 RX ADMIN — CEFEPIME HYDROCHLORIDE 2000 MG: 2 INJECTION, POWDER, FOR SOLUTION INTRAVENOUS at 07:35

## 2021-09-01 RX ADMIN — INSULIN LISPRO 2 UNITS: 100 INJECTION, SOLUTION INTRAVENOUS; SUBCUTANEOUS at 21:52

## 2021-09-01 RX ADMIN — VANCOMYCIN HYDROCHLORIDE 1250 MG: 10 INJECTION, POWDER, LYOPHILIZED, FOR SOLUTION INTRAVENOUS at 16:19

## 2021-09-01 RX ADMIN — OXYCODONE 10 MG: 5 TABLET ORAL at 18:02

## 2021-09-01 RX ADMIN — ACETAMINOPHEN 650 MG: 325 TABLET ORAL at 11:00

## 2021-09-01 RX ADMIN — CEFEPIME HYDROCHLORIDE 2000 MG: 2 INJECTION, POWDER, FOR SOLUTION INTRAVENOUS at 20:10

## 2021-09-01 RX ADMIN — INSULIN LISPRO 5 UNITS: 100 INJECTION, SOLUTION INTRAVENOUS; SUBCUTANEOUS at 18:04

## 2021-09-01 RX ADMIN — METRONIDAZOLE 500 MG: 250 TABLET ORAL at 21:50

## 2021-09-01 RX ADMIN — Medication 0.5 MG: at 00:14

## 2021-09-01 ASSESSMENT — PAIN DESCRIPTION - LOCATION
LOCATION: FOOT

## 2021-09-01 ASSESSMENT — PAIN DESCRIPTION - DESCRIPTORS
DESCRIPTORS: ACHING;SHOOTING

## 2021-09-01 ASSESSMENT — PAIN SCALES - GENERAL
PAINLEVEL_OUTOF10: 7
PAINLEVEL_OUTOF10: 7
PAINLEVEL_OUTOF10: 8
PAINLEVEL_OUTOF10: 8

## 2021-09-01 ASSESSMENT — PAIN DESCRIPTION - PAIN TYPE
TYPE: ACUTE PAIN;SURGICAL PAIN

## 2021-09-01 ASSESSMENT — ENCOUNTER SYMPTOMS
DIARRHEA: 0
SORE THROAT: 0
SHORTNESS OF BREATH: 0
NAUSEA: 0
TROUBLE SWALLOWING: 0
ABDOMINAL PAIN: 0
COUGH: 0
BACK PAIN: 0
CONSTIPATION: 0
RHINORRHEA: 0
WHEEZING: 0
EYE REDNESS: 0
EYE DISCHARGE: 0

## 2021-09-01 ASSESSMENT — PAIN DESCRIPTION - ORIENTATION
ORIENTATION: LEFT

## 2021-09-01 ASSESSMENT — PAIN DESCRIPTION - FREQUENCY: FREQUENCY: CONTINUOUS

## 2021-09-01 NOTE — PROGRESS NOTES
Patient arrived in PACU at this time and placed on monitor. Report received from [address] [place] [address]  and Dr. Radames Burch. Will continue to monitor.

## 2021-09-01 NOTE — CONSULTS
Department of Podiatric Surgery  Consult Note      Reason for Consult:  Left foot gas gangrene  Requesting Physician:  Dr. Jennifer Bentley:  Left foot infection    HISTORY OF PRESENT ILLNESS:                The patient is a 59 y.o. male with significant past medical history of DM2 with neuropathy, previous history of left foot hallux amputation, HTN. Podiatry was consulted for left foot infection. Patient states this blew up overnight. He always wears socks and never checks his feet. States he did stump his foot on a hard object over the weekend. He came to the ER yesterday with fever, got better with Tylenol, and was sent home, and told to come back if symptoms worsening. He is currently still febrile, with leukocytosis, [id number]. He states his most recent A1c was under 7%. Does follow with a podiatrist currently. No other pedal complaints at this time.     Past Medical History:        Diagnosis Date    Arthritis     Chronic low back pain     Dental bridge present     upper    Diabetes ([address]     Diabetes mellitus ([address]     Emphysema of lung ([address]     Fractures     Hypertension     Neuropathy     Osteoarthritis     Prostate troubles      Past Surgical History:        Procedure Laterality Date    FOOT SURGERY      HAND SURGERY Left 06/19/2017    LEFT HAND DEBRIDEMENT INCISION AND DRAINAGE    NECK SURGERY      30s year    TOE AMPUTATION Left 2009    WRIST FRACTURE SURGERY       Current Medications:    Current Facility-Administered Medications: vancomycin (VANCOCIN) 2,500 mg in dextrose 5 % 500 mL IVPB, 25 mg/kg, IntraVENous, Once  [START ON 9/1/2021] cefepime (MAXIPIME) 2000 mg IVPB minibag, 2,000 mg, IntraVENous, Q12H  clindamycin (CLEOCIN) 900 mg in dextrose 5 % 50 mL IVPB, 900 mg, IntraVENous, Once  [START ON 9/1/2021] clindamycin (CLEOCIN) 600 mg in dextrose 5 % 50 mL IVPB, 600 mg, IntraVENous, Q8H  vancomycin (VANCOCIN) intermittent dosing (placeholder), , Other, RX Placeholder  Allergies: Patient has no known allergies. Family History:       Problem Relation Age of Onset    Heart Failure Mother     Diabetes Mother     Cancer Father         throat cancer    Asthma Daughter         Sports induced as a child    Emphysema Neg Hx     Hypertension Neg Hx      REVIEW OF SYSTEMS:    CONSTITUTIONAL:  positive for  fevers and chills  RESPIRATORY:  negative for  dry cough and wheezing  PHYSICAL EXAM:      Vitals:    BP (!) 161/78   Pulse 76   Temp 100.3 °F (37.9 °C) (Oral)   Resp 16   Ht 6' 1\" (1.854 m)   Wt 240 lb (108.9 kg)   SpO2 96%   BMI 31.66 kg/m²     LABS:   Recent Labs     08/30/21 1938 08/31/21  1449   WBC 17.4* 19.2*   HGB 11.7* 12.3*   HCT 34.5* 36.3*    224     Recent Labs     08/31/21  1520      K 4.9      CO2 19*   BUN 42*   CREATININE 2.0*     Recent Labs     08/30/21 1938   PROT 7.4       LOWER EXTREMITY EXAMINATION   SKIN:      Necrotic ulcer to the medial aspect of the left 1st met head, previous hallux amputation noted. Severe erythema noted to the dorsal forefoot and midfoot, that is extending proximally to the anterior ankle. Purulent drainage noted from the medial left foot ulcer with malodor appreciated. Hemorrhagic callus plantar medial right hallux. NEUROLOGIC:      Protective sensation is absent at the level of the toes. VASCULAR:      DP and PT pulses are faintly palpable left foot. Could be related to current edema. CFT brisk to the toes. MUSCULOSKELETAL:    Muscle strength 5/5 all prime movers b/l lower extremity. Left hallux amputation. Imaging:    Soft tissue swelling with soft tissue gas adjacent to the head of the 1st   metatarsal.  No convincing plain film evidence of osteomyelitis.       No acute fracture identified.  Probable nonacute corner fracture involving   the base of the 2nd proximal phalanx. Assessment:    1. Gas gangrene, left foot  2. DM2 with ulcer  3.  Dm2 with neuropathy    Plan:    - E&M.  - Patient with severe diabetic foot infection and gas gangrene on x-ray. Ascending erythema towards the ankle. - WBC 19,000. Febrile. - IV ABX; IV vancomycin, clindamycin, and cefepime  - Taking patient to OR for emergent I&D tonight.   - NPO. Consent.   - He last ate 3 chicken nuggets 3 hours ago. - I will follow with the patient after surgery. - All questions answered. Thank you for the opportunity to take part in the patient's care. Please feel free to page or call with any questions or concerns.     Rambo Sullivan, [address] [place] [address] or [facility]

## 2021-09-01 NOTE — PROGRESS NOTES
Comprehensive Nutrition Assessment    Type and Reason for Visit:  Initial, Consult (diabetic foot ulcer)    Nutrition Recommendations/Plan:   1. Continue carb control diet  2. Offer Ensure high protein with meals  3. Will monitor nutritional adequacy, nutrition-related labs, weights, BMs, and clinical progress   4. Monitor need for diet education    Nutrition Assessment:  Patient admitted with sepsis status post left foot I and D on 8/31/21. Currently on a regular diet eating % meals. Patient resting quietly at this time. Will offer nutrition supplements with meals and monitor need for education. Malnutrition Assessment:  Malnutrition Status: At risk for malnutrition (Comment)      Estimated Daily Nutrient Needs:  Energy (kcal):  6199-2722; Weight Used for Energy Requirements:  Ideal     Protein (g):  108-125; Weight Used for Protein Requirements:  Ideal (1.3-1.5)        Fluid (ml/day):   ; Method Used for Fluid Requirements:  1 ml/kcal      Nutrition Related Findings:  BUN 38.7, Creat 1.9 this am; last BM on 8/31/21      Wounds:  Diabetic Ulcer       Current Nutrition Therapies:    ADULT DIET; Regular    Anthropometric Measures:  · Height: 6' 1\" (185.4 cm)  · Current Body Weight: 240 lb (108.9 kg)     · Ideal Body Weight: 184 lbs; % Ideal Body Weight     · BMI: 31.7  · BMI Categories: Obese Class 1 (BMI 30.0-34. 9)       Nutrition Diagnosis:   · Increased nutrient needs related to increase demand for energy/nutrients as evidenced by wounds    Nutrition Interventions:   Food and/or Nutrient Delivery:  Continue Current Diet, Start Oral Nutrition Supplement  Nutrition Education/Counseling:  No recommendation at this time   Coordination of Nutrition Care:  Continue to monitor while inpatient    Goals:  Patient will eat 50% or greater of meals       Nutrition Monitoring and Evaluation:   Behavioral-Environmental Outcomes:      Food/Nutrient Intake Outcomes:  Food and Nutrient Intake  Physical Signs/Symptoms Outcomes:  Nutrition Focused Physical Findings, Biochemical Data     Discharge Planning:     Too soon to determine     Electronically signed by Anthony Katz, [address] [place] [address], LD on 9/1/21 at 1:58 PM EDT    Contact: Office: [phone]; David Mobley: [phone]

## 2021-09-01 NOTE — ED NOTES
Report given to EFFIE Corea with OR team at this time. Questions/concerns addressed. Pt denies any concerns at this time. Pt changed into hospital gown. Pts spouse holding onto patients belongings.       Manual EFFIE Molina  08/31/21 [id number]

## 2021-09-01 NOTE — H&P
Hospital Medicine History & Physical      PCP: Chris Vila MD    Date of Admission: 8/31/2021    Date of Service: Pt seen/examined on 8/31/2021  Pt seen/examined face to face on and admitted as inpatient with expected LOS greater than two midnights due to medical therapy  Chief Complaint:    Chief Complaint   Patient presents with    Foot Laceration     pt hit left foot on his rocking chair. states he has a \"gross\" wound on left foot.  Wound Infection     open wound on left foot red and swollen. History Of Present Illness:      59 y.o. male who presented to [facility] with past medical history of hypertension, osteoarthritis, type 2 diabetes, class I obesity presented to the ED with chief complaint of left foot pain and change. Patient reported that he has history of diabetes takes medication time noted that his been having fever today but noted it started worsening since Thursday where his wife was changing his socks that looked normal and then patient noted last night he kicked a chair and caused a lot of pain left foot progressively worsening exacerbated with palpation no alleviating factor associated with redness in the foot and high fever with T-max being 102. Left reported when changing socks she noted that the changes significant he worsening since she last change his socks on Sunday. CODE STATUS cussed and the patient is a full code.         Past Medical History:          Diagnosis Date    Arthritis     Chronic low back pain     Dental bridge present     upper    Diabetes ([address]     Diabetes mellitus ([address]     Emphysema of lung ([address]     Fractures     Hypertension     Neuropathy     Osteoarthritis     Prostate troubles        Past Surgical History:          Procedure Laterality Date    FOOT SURGERY      HAND SURGERY Left 06/19/2017    LEFT HAND DEBRIDEMENT INCISION AND DRAINAGE    NECK SURGERY      30s year    TOE AMPUTATION Left 2009    WRIST FRACTURE SURGERY         Medications Prior to Admission:      Prior to Admission medications    Medication Sig Start Date End Date Taking? Authorizing Provider   insulin glargine (LANTUS) 100 UNIT/ML injection vial Inject 35 Units into the skin nightly 4/6/16   Amanda Valle MD   Omega-3 Fatty Acids (FISH OIL) 1000 MG CAPS Take 3,000 mg by mouth nightly. Historical Provider, MD   metFORMIN (GLUCOPHAGE) 500 MG tablet TAKE TWO TABLETS BY MOUTH TWICE A DAY 10/7/13   Historical Provider, MD   Multiple Vitamins-Minerals (MULTIVITAMIN PO) Take  by mouth daily. Historical Provider, MD   tamsulosin (FLOMAX) 0.4 MG capsule Take 0.4 mg by mouth daily. 11/6/12   Historical Provider, MD       Allergies:  Patient has no known allergies. Social History:          TOBACCO:   reports that he has never smoked. He has never used smokeless tobacco.  ETOH:   reports no history of alcohol use. E-Cigarettes/Vaping Use     Questions Responses    E-Cigarette/Vaping Use     Start Date     Passive Exposure     Quit Date     Counseling Given     Comments             Family History:      Reviewed in detail         Problem Relation Age of Onset    Heart Failure Mother     Diabetes Mother     Cancer Father         throat cancer    Asthma Daughter         Sports induced as a child    Emphysema Neg Hx     Hypertension Neg Hx        REVIEW OF SYSTEMS:     Constitutional: + Fever, No Chills, No Night Sweats  ENT/Mouth:  No Nasal Congestion,  No Hoarseness, No new mouth lesion  Eyes:  No Eye Pain, No Redness, No Discharge  Cardiovascular:  No Chest Pain, No Orthopnea, No Palpitations  Respiratory:  No Cough, No Sputum, No Dyspnea  Gastrointestinal: No Vomiting, No Diarrhea, No abdominal pain  Genitourinary: No Urinary Frequency, No Hematuria, No Urinary pain  Musculoskeletal:  No worsening Arthralgias, No worsening Myalgias  Skin: + New Skin Lesions, + new skin rash  Neuro:  No new weakness, No new numbness.   Psych:  No suicial ideation, No Violence ideation    PHYSICAL EXAM PERFORMED:    BP (!) 161/28   Pulse 76   Temp 98.8 °F (37.1 °C) (Oral)   Resp 18   Ht 6' 1\" (1.854 m)   Wt 240 lb (108.9 kg)   SpO2 92%   BMI 31.66 kg/m²     General appearance:  mild acute distress, appears older than stated age  HEENT:   atraumatic, sclera anicteric, Conjunctivae clear. Neck: Supple,Trachea midline, no goiter  Respiratory:minimal accessory muscle usage, Normal respiratory effort. Clear to auscultation, bilaterally without wheezing  Cardiovascular:  Regular rate and rhythm, capillary refill 2 seconds  Abdomen: Soft, non-tender, non-distended with normal bowel sounds. Musculoskeletal:  No clubbing, cyanosis. Left metatarsal with redness swelling crepitus   skin: turgor normal.  No new rashes or lesions. Neurologic: Alert and oriented x4, no new focal sensory/motor deficits. Labs:     Recent Labs     08/30/21 1938 08/31/21  1449   WBC 17.4* 19.2*   HGB 11.7* 12.3*   HCT 34.5* 36.3*    224     Recent Labs     08/30/21 1938 08/31/21  1520    136   K 4.4 4.9    101   CO2 19* 19*   BUN 35* 42*   CREATININE 1.7* 2.0*   CALCIUM 9.3 9.5     Recent Labs     08/30/21 1938   AST 17   ALT 27   BILITOT 0.9   ALKPHOS 138*     No results for input(s): INR in the last 72 hours. No results for input(s): [place]en [place] in the last 72 hours.     Urinalysis:      Lab Results   Component Value Date    NITRU Negative 08/31/2021    WBCUA 0-2 08/31/2021    BACTERIA 2+ 08/31/2021    RBCUA 0-2 08/31/2021    BLOODU TRACE-INTACT 08/31/2021    SPECGRAV 1.025 08/31/2021    GLUCOSEU 100 08/31/2021       Radiology:     CXR: I have reviewed the CXR with the following interpretation:   No acute process reviewed 08/30/2021  EKG:  I have reviewed the EKG with the following interpretation:   Pending    XR FOOT LEFT (MIN 3 VIEWS)   Final Result   Soft tissue swelling with soft tissue gas adjacent to the head of the 1st   metatarsal.  No convincing plain film evidence of osteomyelitis. No acute fracture identified. Probable nonacute corner fracture involving   the base of the 2nd proximal phalanx. MRI FOOT LEFT WO CONTRAST    (Results Pending)   VL DUP LOWER EXTREMITY ARTERIES BILATERAL    (Results Pending)       ASSESSMENT AND PLAN:    Active Hospital Problems    Diagnosis Date Noted    Sepsis ([facility] [A41.9] 08/31/2021     Severe sepsis tachycardia, leukocytosis with acute renal failure secondary to left foot gas gangrene,  X-ray showing gas  Blood cultures pending, lactic acid 1.7  Inflammatory markers pending  Patient covered with Vanco cefepime and clindamycin    Left foot gas gangrene with suspicion of necrotizing fasciitis and osteo myelitis:  MRI left hindfoot pending  Empiric antibiotic as above for now  Podiatry consulted for assistance, much appreciated  Arterial ultrasound to check for perfusion  Nutrition consultation    Acute on chronic renal failure:  IVF and recheck    Uncontrolled type 2 diabetes: ISS  Normocytic anemia: Hemoccult, type and screen  Essential Hypertension: Held home medication as needed for elevation  Hyperlipidemia: Continue home medication  Class I obesity:Complicating assessment and treatment. Placing patient at risk for multiple co-morbidities as well as early death and contributing to the patient's presentation.  Education, and counseling    Diet: NPO except meds ordered   DVT Prophylaxis: Held    Dispo:   [facility]

## 2021-09-01 NOTE — ED NOTES
Morphine double pulled by Kamala Ness RN as well. This RN witnessed return of total of 4 mg morphine to omnicell.      Gita Teague RN  08/31/21 2001

## 2021-09-01 NOTE — PROGRESS NOTES
Hospitalist Progress Note      PCP: Cruz Humphries MD    Date of Admission: 8/31/2021    Chief Complaint:    Foot Laceration       pt hit left foot on his rocking chair. states he has a \"gross\" wound on left foot.  Wound Infection       open wound on left foot red and swollen. Hospital Course: \"  59 y.o. male who presented to [facility] with past medical history of hypertension, osteoarthritis, type 2 diabetes, class I obesity presented to the ED with chief complaint of left foot pain and change.     Patient reported that he has history of diabetes takes medication time noted that his been having fever today but noted it started worsening since Thursday where his wife was changing his socks that looked normal and then patient noted last night he kicked a chair and caused a lot of pain left foot progressively worsening exacerbated with palpation no alleviating factor associated with redness in the foot and high fever with T-max being 102. reported when changing socks she noted that the changes significant he worsening since she last change his socks on Sunday. \"     The pt was admitted with Podiatry consult and taken to the OR for ID.      Subjective: EMR and notes reviewed pt seen and examined.  C/O hiccups and feels like he has a fever, Temp 98.2 teken by me at bedside       Medications:  Reviewed    Infusion Medications   Scheduled Medications    cefepime  2,000 mg IntraVENous Q12H    clindamycin (CLEOCIN) IV  600 mg IntraVENous Q8H    vancomycin (VANCOCIN) intermittent dosing (placeholder)   Other RX Placeholder    atorvastatin  40 mg Oral Daily    insulin glargine  20 Units SubCUTAneous Nightly    tamsulosin  0.4 mg Oral Daily     PRN Meds: acetaminophen, oxyCODONE, oxyCODONE, benzocaine-menthol      Intake/Output Summary (Last 24 hours) at 9/1/2021 1125  Last data filed at 9/1/2021 0742  Gross per 24 hour   Intake 537 ml   Output 925 ml   Net -388 ml       Physical Exam Performed:    BP (!) 160/70   Pulse 86   Temp 99.7 °F (37.6 °C) (Oral)   Resp 16   Ht 6' 1\" (1.854 m)   Wt 240 lb (108.9 kg)   SpO2 95%   BMI 31.66 kg/m²     General appearance: No apparent distress, appears stated age and cooperative. HEENT: Pupils equal, round, and reactive to light. Conjunctivae/corneas clear. Neck: Supple, with full range of motion. No jugular venous distention. Trachea midline. Respiratory:  Normal respiratory effort. Clear to auscultation, bilaterally without Rales/Wheezes/Rhonchi. Cardiovascular: Regular rate and rhythm with normal S1/S2 without murmurs, rubs or gallops. Abdomen: Soft, non-tender, non-distended with normal bowel sounds. Musculoskeletal: No clubbing, cyanosis or edema bilaterally. Left foot with surgical drsg in place   Skin: Skin color, texture, turgor normal.  No rashes or lesions. Neurologic:  Neurovascularly intact without any focal sensory/motor deficits. Cranial nerves: II-XII intact, grossly non-focal.  Psychiatric: Alert and oriented, thought content appropriate, normal insight  Capillary Refill: Brisk,3 seconds, normal   Peripheral Pulses: +2 palpable, equal bilaterally       Labs:   Recent Labs     08/30/21 1938 08/31/21  1449   WBC 17.4* 19.2*   HGB 11.7* 12.3*   HCT 34.5* 36.3*    224     Recent Labs     08/30/21 1938 08/31/21  1520    136   K 4.4 4.9    101   CO2 19* 19*   BUN 35* 42*   CREATININE 1.7* 2.0*   CALCIUM 9.3 9.5     Recent Labs     08/30/21 1938   AST 17   ALT 27   BILITOT 0.9   ALKPHOS 138*     No results for input(s): INR in the last 72 hours. No results for input(s): Jadene Moh in the last 72 hours.     Urinalysis:      Lab Results   Component Value Date    NITRU Negative 08/31/2021    WBCUA 0-2 08/31/2021    BACTERIA 2+ 08/31/2021    RBCUA 0-2 08/31/2021    BLOODU TRACE-INTACT 08/31/2021    SPECGRAV 1.025 08/31/2021    GLUCOSEU 100 08/31/2021       Radiology:  VL DUP LOWER EXTREMITY ARTERIES BILATERAL

## 2021-09-01 NOTE — OP NOTE
Operative Note      Patient: Yolis Callwoay  YOB: 1960  MRN: [id number]    Date of Procedure: 8/31/2021    Pre-Op Diagnosis: GAS GANGRENE    Post-Op Diagnosis: Same       Procedure(s):  LEFT FOOT DEBRIDEMENT INCISION AND DRAINAGE    Surgeon(s):  Tara Reese DPM    Assistant:   Surgical Assistant: Panchito Alvarez    Anesthesia: Monitor Anesthesia Care    Estimated Blood Loss (mL): Minimal    Complications: None    Specimens:   ID Type Source Tests Collected by Time Destination   1 : LEFT FOOT CULTURE SWAB Specimen Foot CULTURE, SURGICAL, CULTURE WITH SMEAR, ACID FAST Ariane Lema 92 CLARIBEL Armstrong DPM 8/31/2021 1424        Implants:  * No implants in log *      Drains: * No LDAs found *    Findings: Deep space abscess < 10 mL. Poor vascular perfusion. Detailed Description of Procedure:     Patient was seen in the E&D with severe left foot infection, complicated by uncontrolled diabetes and neuropathy. Labs unstable, and x-ray with gas present. It was determined that he needed emergent I&D of the left foot. After hearing risks, benefits, potential complications, and alternatives, the patient was in agreement and we proceeded with the operation below. Patient brought in OR and placed on OR table in supine position. General anesthesia was obtained. Left lower extremity was marked in pre-op, now scrubbed, prepped, and draped in the usual sterile manner. Time out was performed. Longitudinal incision was made along the dorsal and plantar length of the 1st metatarsal. This was carried down to bone. Deep space abscess < 10 mL was encountered. Cultures taken. The incision was checked for any undermining, or tracking, none of which was noted. Bone appeared clinically healthy. Vascular perfusion however was noted to be poor, and no tourniquet was used. The necrotic tissue medial 1st met head was excisionally debrided down to subQ tissue.  A second longitudinal incision was made over the shaft of the 4th

## 2021-09-01 NOTE — BRIEF OP NOTE
Brief Postoperative Note      Patient: Enriqueta Thomas  YOB: 1960  MRN: [id number]    Date of Procedure: 8/31/2021    Pre-Op Diagnosis: GAS GANGRENE    Post-Op Diagnosis: Same       Procedure(s):  LEFT FOOT DEBRIDEMENT INCISION AND DRAINAGE    Surgeon(s):  Zachary Pineda DPM    Assistant:  Surgical Assistant: Khanh Cook    Anesthesia: Monitor Anesthesia Care    Estimated Blood Loss (mL): less than 50     Complications: None    Specimens:   ID Type Source Tests Collected by Time Destination   1 : LEFT FOOT CULTURE SWAB Specimen Foot CULTURE, SURGICAL, CULTURE WITH SMEAR, ACID FAST BACILLIUS Zachary Pineda DPM 8/31/2021 2155        Implants:  * No implants in log *      Drains: * No LDAs found *    Findings: Less than 10 mL pus. Strong malodor appreciated. Vascular perfusion poor. Culture of pus taken. Will need arterial duplex and MRI to rule out bone involvement. Will need a secondary washout in 2-3 days. Possibly further surgery pending MRI.     Electronically signed by Zachary Pineda DPM on 8/31/2021 at 10:10 PM

## 2021-09-01 NOTE — ANESTHESIA PRE PROCEDURE
Department of Anesthesiology  Preprocedure Note       Name:  Rylee Copeland   Age:  59 y.o.  :  1960                                          MRN:  [id number]         Date:  2021      Surgeon: Jasmyne Keenan):  Jennifer Tee DPM    Procedure: Procedure(s):  LEFT FOOT DEBRIDEMENT INCISION AND DRAINAGE    Medications prior to admission:   Prior to Admission medications    Medication Sig Start Date End Date Taking? Authorizing Provider   HYDROcodone-acetaminophen (NORCO) 5-325 MG per tablet Take 1 tablet by mouth every 6 hours as needed for Pain . 17   Venancio Taylor MD   amLODIPine (NORVASC) 10 MG tablet Take 1 tablet by mouth daily 17   Venancio Taylor MD   chlorthalidone (HYGROTON) 25 MG tablet Take 1 tablet by mouth daily 17   Venancio Taylor MD   rivaroxaban (XARELTO) 10 MG TABS tablet Take 10 mg by mouth daily (with breakfast)    Historical Provider, MD   Misc. Devices (PULSE OXIMETER) MISC 1 Device by Does not apply route daily as needed 16   Yan Webb MD   mometasone-formoterol [facility]) 200-5 MCG/ACT inhaler Inhale 2 puffs into the lungs 2 times daily 16   Harsha Nava MD   insulin glargine (LANTUS) 100 UNIT/ML injection vial Inject 35 Units into the skin nightly 16   Harsha Nava MD   insulin lispro (HUMALOG) 100 UNIT/ML injection vial Inject 12 Units into the skin 3 times daily (before meals) 16   Harsha Nava MD   pantoprazole (PROTONIX) 40 MG tablet Take 1 tablet by mouth every morning (before breakfast) 16   Harsha Nava MD   Omega-3 Fatty Acids (FISH OIL) 1000 MG CAPS Take 3,000 mg by mouth nightly. Historical Provider, MD   metFORMIN (GLUCOPHAGE) 500 MG tablet TAKE TWO TABLETS BY MOUTH TWICE A DAY 10/7/13   Historical Provider, MD   Multiple Vitamins-Minerals (MULTIVITAMIN PO) Take  by mouth daily. Historical Provider, MD   tamsulosin (FLOMAX) 0.4 MG capsule Take 0.4 mg by mouth daily.  12   Historical Provider, MD       Current medications:    Current Facility-Administered Medications   Medication Dose Route Frequency Provider Last Rate Last Admin    [START ON 9/1/2021] cefepime (MAXIPIME) 2000 mg IVPB minibag  2,000 mg IntraVENous Q12H Miquel Oliveros DO        clindamycin (CLEOCIN) 900 mg in dextrose 5 % 50 mL IVPB  900 mg IntraVENous Once MD Tammi Cedeñonnie Cowden [START ON 9/1/2021] clindamycin (CLEOCIN) 600 mg in dextrose 5 % 50 mL IVPB  600 mg IntraVENous Q8H Husam Singh MD        vancomycin Sarah Mora) intermittent dosing (placeholder)   Other RX Placeholder Baltazar Sullivan DO         Current Outpatient Medications   Medication Sig Dispense Refill    HYDROcodone-acetaminophen (NORCO) 5-325 MG per tablet Take 1 tablet by mouth every 6 hours as needed for Pain . 24 tablet 0    amLODIPine (NORVASC) 10 MG tablet Take 1 tablet by mouth daily 30 tablet 2    chlorthalidone (HYGROTON) 25 MG tablet Take 1 tablet by mouth daily 30 tablet 3    rivaroxaban (XARELTO) 10 MG TABS tablet Take 10 mg by mouth daily (with breakfast)      Misc. Devices (PULSE OXIMETER) MISC 1 Device by Does not apply route daily as needed 1 each 0    mometasone-formoterol (DULERA) 200-5 MCG/ACT inhaler Inhale 2 puffs into the lungs 2 times daily 1 Inhaler 1    insulin glargine (LANTUS) 100 UNIT/ML injection vial Inject 35 Units into the skin nightly 1 vial 3    insulin lispro (HUMALOG) 100 UNIT/ML injection vial Inject 12 Units into the skin 3 times daily (before meals) 1 vial 3    pantoprazole (PROTONIX) 40 MG tablet Take 1 tablet by mouth every morning (before breakfast) 30 tablet 3    Omega-3 Fatty Acids (FISH OIL) 1000 MG CAPS Take 3,000 mg by mouth nightly.  metFORMIN (GLUCOPHAGE) 500 MG tablet TAKE TWO TABLETS BY MOUTH TWICE A DAY      Multiple Vitamins-Minerals (MULTIVITAMIN PO) Take  by mouth daily.  tamsulosin (FLOMAX) 0.4 MG capsule Take 0.4 mg by mouth daily.          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    PNA (pneumonia) J18.9    Diabetes mellitus ([address] E11.9    Hypertension I10    BPH (benign prostatic hyperplasia) N40.0    Abscess L02.91    Abscess of left hand L02.512    Sepsis (HCC) A41.9       Past Medical History:        Diagnosis Date    Arthritis     Chronic low back pain     Dental bridge present     upper    Diabetes ([address]     Diabetes mellitus ([address]     Emphysema of lung ([address]     Fractures     Hypertension     Neuropathy     Osteoarthritis     Prostate troubles        Past Surgical History:        Procedure Laterality Date    FOOT SURGERY      HAND SURGERY Left 06/19/2017    LEFT HAND DEBRIDEMENT INCISION AND DRAINAGE    NECK SURGERY      33s year    TOE AMPUTATION Left 2009    WRIST FRACTURE SURGERY         Social History:    Social History     Tobacco Use    Smoking status: Never Smoker    Smokeless tobacco: Never Used   Substance Use Topics    Alcohol use: No     Alcohol/week: 0.0 standard drinks                                Counseling given: Not Answered      Vital Signs (Current):   Vitals:    08/31/21 1749 08/31/21 1919 08/31/21 1922 08/31/21 2023   BP:  (!) 156/73 (!) 156/73 (!) 161/78   Pulse:  76  76   Resp:  16  16   Temp:    100.3 °F (37.9 °C)   TempSrc:    Oral   SpO2:  97%  96%   Weight:       Height: 6' 1\" (1.854 m)                                                 BP Readings from Last 3 Encounters:   08/31/21 (!) 161/78   08/30/21 (!) 113/57   06/28/17 120/72       NPO Status:                                                                                 BMI:   Wt Readings from Last 3 Encounters:   08/31/21 240 lb (108.9 kg)   08/30/21 240 lb (108.9 kg)   08/10/17 264 lb 15.9 oz (120.2 kg)     Body mass index is 31.66 kg/m².     CBC:   Lab Results   Component Value Date    WBC 19.2 08/31/2021    RBC 3.88 08/31/2021    HGB 12.3 08/31/2021    HCT 36.3 08/31/2021    MCV 93.6 08/31/2021    RDW 14.5 08/31/2021     08/31/2021       CMP:   Lab Results   Component Value Date     08/31/2021    K 4.9 08/31/2021     08/31/2021    CO2 19 08/31/2021    BUN 42 08/31/2021    CREATININE 2.0 08/31/2021    GFRAA 41 08/31/2021    AGRATIO 0.9 08/30/2021    LABGLOM 34 08/31/2021    GLUCOSE 316 08/31/2021    PROT 7.4 08/30/2021    CALCIUM 9.5 08/31/2021    BILITOT 0.9 08/30/2021    ALKPHOS 138 08/30/2021    AST 17 08/30/2021    ALT 27 08/30/2021       POC Tests:   Recent Labs     08/31/21 1945   POCGLU 288*       Coags:   Lab Results   Component Value Date    PROTIME 10.7 06/18/2017    INR 0.95 06/18/2017    APTT 33.6 04/06/2016       HCG (If Applicable): No results found for: PREGTESTUR, PREGSERUM, HCG, HCGQUANT     ABGs:   Lab Results   Component Value Date    PHART 7.358 03/31/2016    PO2ART 65.2 03/31/2016    WNS1TVR 37.2 03/31/2016    JHW5FBJ 20.4 03/31/2016    BEART -4.4 03/31/2016    A0MHEVYW 92.1 03/31/2016        Type & Screen (If Applicable):  No results found for: LABABO, LABRH    Drug/Infectious Status (If Applicable):  No results found for: HIV, HEPCAB    COVID-19 Screening (If Applicable):   Lab Results   Component Value Date    COVID19 Not Detected 08/30/2021           Anesthesia Evaluation  Patient summary reviewed and Nursing notes reviewed  Airway: Mallampati: III  TM distance: <3 FB   Neck ROM: limited  Mouth opening: > = 3 FB Dental: normal exam         Pulmonary:Negative Pulmonary ROS and normal exam  breath sounds clear to auscultation  (+) pneumonia:  COPD:                             Cardiovascular:    (+) hypertension:,         Rhythm: regular  Rate: normal                    Neuro/Psych:   Negative Neuro/Psych ROS              GI/Hepatic/Renal: Neg GI/Hepatic/Renal ROS            Endo/Other:    (+) DiabetesType II DM, , .                 Abdominal:   (+) obese,           Vascular:   + PVD, aortic or cerebral, .        Other Findings:             Anesthesia Plan      general ASA 3 - emergent       Induction: intravenous. MIPS: Postoperative opioids intended and Prophylactic antiemetics administered. Anesthetic plan and risks discussed with patient.                       Marilu Wilson MD   8/31/2021

## 2021-09-01 NOTE — CONSULTS
Pharmacy Note  Vancomycin Consult    Gene Hallie Zimmer is a 59 y.o. male started on Vancomycin for necrotizing fasciitis ; consult received from Dr. Lory Hewitt to manage therapy. Also receiving the following antibiotics: Clindamycin. Allergies:  Patient has no known allergies. Tmax: 100.3    Recent Labs     08/30/21  1938 08/31/21  1520   CREATININE 1.7* 2.0*       Recent Labs     08/30/21  1938 08/31/21  1449   WBC 17.4* 19.2*       Estimated Creatinine Clearance: 49 mL/min (A) (based on SCr of 2 mg/dL (H)). No intake or output data in the 24 hours ending 08/31/21 2035    Wt Readings from Last 1 Encounters:   08/31/21 240 lb (108.9 kg)         Body mass index is 31.66 kg/m². Loading dose (critically ill or in ICU, require dialysis or renal replacement therapy): Vancomycin 25 mg/kg IVPB x 1 (maximum 3000 mg). Maintenance dose: 15 mg/kg (maximum: 2000 mg/dose and 4500 mg/day) starting at the next dosing interval determined by renal function  Pulse dose: fluctuating renal function, CAMRYN, ESRD   Goal Vancomycin trough: 10-15 mcg/mL or 15-20 mcg/mL   Goal Vancomycin AUC: 400-600     Assessment/Plan:  Will initiate Vancomycin with a one time loading dose of 2500 mg x1. Given elevated Scr, will order vanc random for tomorrow 1300. Timing of trough level will be determined based on culture results, renal function, and clinical response. Thank you for the consult. Chadwick Mayer, PharmD  8/31/2021 at 8:42 PM    9/1/21  Vancomycin Day ___2___  Recent Labs     09/01/21  1253   VANCORANDOM 11.4     Recent Labs     08/30/21  1938 08/31/21  1449 08/31/21  1520 09/01/21  1253   WBC 17.4* 19.2*  --  14.9*   CREATININE 1.7*  --  2.0* 1.9*   Estimated Creatinine Clearance: 52 mL/min (A) (based on SCr of 1.9 mg/dL (H)). - Vancomycin 1,250 mg IVBP q24h. - Vancomycin trough level ordered for 9/3 at 1330. - Timing of future drug levels will be determined based on culture results, renal function, and clinical response.

## 2021-09-01 NOTE — CONSULTS
Infectious Diseases   Consult Note        Admission Date: 8/31/2021  Hospital Day: Hospital Day: 2   Attending: Lori Mora MD  Date of service: 9/1/21     Reason for admission: Cellulitis of left foot [L03.116]  Sepsis ([address] [A41.9]  Acute renal failure, unspecified acute renal failure type [facility]) [N17.9]    Chief complaint/ Reason for consult: Complicated left diabetic foot infection    Microbiology:        I have reviewed allavailable micro lab data and cultures    · Blood culture (2/2) - collected on 8/31/2021: In process  · Left foot surgical culture  - collected on 8/31/2021: In process      Antibiotics and immunizations:       Current antibiotics: All antibiotics and their doses were reviewed by me    Recent Abx Admin                   vancomycin (VANCOCIN) 1,250 mg in dextrose 5 % 250 mL IVPB (mg) 1,250 mg New Bag 09/01/21 1619    clindamycin (CLEOCIN) 600 mg in dextrose 5 % 50 mL IVPB (mg) 600 mg New Bag 09/01/21 1525     600 mg New Bag  0659    cefepime (MAXIPIME) 2000 mg IVPB minibag (mg) 2,000 mg New Bag 09/01/21 0735    clindamycin (CLEOCIN) 900 mg in dextrose 5 % 50 mL IVPB (mg) 900 mg Given 08/31/21 2143    vancomycin (VANCOCIN) 2,500 mg in dextrose 5 % 500 mL IVPB (mg) 2,500 mg New Bag 08/31/21 1822                  Immunization History: All immunization history was reviewed by me today. There is no immunization history on file for this patient. Known drug allergies: All allergies were reviewed and updated    No Known Allergies    Social history:     Social History:  All social andepidemiologic history was reviewed and updated by me today as needed. · Tobacco use:   reports that he has never smoked. He has never used smokeless tobacco.  · Alcohol use:   reports no history of alcohol use. · Currently lives in: [address] [address]  ·  reports no history of drug use.      COVID VACCINATION AND LAB RESULT RECORDS:     Internal Administration   First Dose      Second Dose Last COVID Lab SARS-CoV-2, NAAT (no units)   Date Value   08/30/2021 Not Detected            Assessment:     The patient is a 59 y.o. old male who  has a past medical history of Arthritis, Chronic low back pain, Dental bridge present, Diabetes ([address] Diabetes mellitus ([address] Emphysema of lung ([address] Fractures, Hypertension, Neuropathy, Osteoarthritis, and Prostate troubles. with following problems:    · Sepsis on admission with high fever, leukocytosis, acute kidney injury  · Complicated left diabetic foot infection  · S/p left foot surgical I&D for gas gangrene on 8/13/2021  · Elevated   · Negative COVID-19 rapid test on 8/30/2021  · Essential hypertension  · Lung emphysema  · Type 2 diabetes mellitus  · Osteoarthritis      Discussion:      The patient had a high fever up to 100.5 earlier today. White cell count was up to 19,200 yesterday. He is on empiric IV vancomycin and cefepime and clindamycin. Blood cultures and left foot wound cultures are in process. COVID-19 rapid test done in the ER was negative. Serum creatinine is 1.9      Plan:     Diagnostic Workup:    · Agree with blood cultures  · Follow-up on left foot wound culture and surgical cultures  · Continue to follow fever curve, WBC count and blood cultures  · Follow up on liverand renal functions closely    Antimicrobials:    · Will continue IV vancomycin  Check Vancomycin trough before the 5th dose. Target vancomycin trough level of 15-20  mg/L or vancomycin area under curve (AUC) of 400-600 mg*h/L by Bayesian modeling method. If dosing vancomycin based on trough levels, keep vancomycin trough below 20 at all times. Avoid increasing the dose of vancomycin above a total of 4 grams in a 24-hour period in patients younger than 45 years and above 3 grams in a 24-hour period in a patient of age 39 years or older. Continue to monitor serum creatinine and Vanco levels closely, while the patient is on I/v Vancomycin.    · We will continue IV cefepime 2 g every 12 hours  · We will stop IV clindamycin due to hypotension of causing C. difficile  · Will order p.o. Flagyl 500 mg every 8 hour for anaerobic coverage  · We will follow up on the culture results and clinical progress and will make further recommendations accordingly. · Continue close vitals monitoring. · Maintain good glycemic control. · Fall precautions. Aspiration precautions. · Continue to watch for new fever or diarrhea. · DVT prophylaxis. · Discussed all above with patient and RN. Drug Monitoring:    · Continue serial monitoring for antibiotic toxicity as follows: Vancomycin trough, CBC, CMP  · Continue to watch for following: new or worsening fever, hypotension, hives, lip swelling and redness or purulence at vascular access sites. I/v access Management:    · Continue to monitor i.v access sites for erythema, induration, discharge or tenderness. · As always, continue efforts to minimizetubes/lines/drains as clinically appropriate to reduce chances of line associated infections. Current isolation precautions: There are no current isolations documented for this patient. Weight loss counseling:    Extensive weight loss counseling was done. It is important to set a realistic weight loss goal. First goal should be to avoid gaining more weight and staying at current weight (or within 5 percent). People at high risk of developing diabetes who are able to lose 5 percent of their body weight and maintain this weight will reduce their risk of developing diabetes by about 50 percent and reduce their blood pressure. Losing more than 15 percent of  body weight and staying at this weight is an extremely good result, even if you never reach your \"dream\" or \"ideal\" weight.     Lifestyle changes including changing eating habits, substituting excess carbohydrates with proteins, stress reduction, using self-help programs like Weight Watchers®, Overeaters Anonymous®, and Take Off Pounds Sensibly (TOPS)© , following DASH diet and increasing exercise or walking briskly daily for half hour to and hour 5-7 days a week was suggested among other measures. Information was given about various weight loss education programs and their websites like www.cdc.gov/healthyweight, www.choosemyplate.gov and www.health.gov/dietaryguidelines/    Diabetes mellitus education and counseling:    Patient was educated in detail about the importance of keeping diabetes under control to improve the cure rate of infection and prevent future infections. Patient was advised to check blood glucose level regularly and to stay compliant with the diabetes medications. Patient was advised to the trim the toe nails carefully, wear shoes or slippers at all times and check both feet everyday before going to bed to look for any cuts, blisters, swelling or redness. Importance of washing the feet everyday with soap and water and keeping them dry, and seeking prompt medical attention in case of a non-healing wound or ulcer on the feet was also highlighted. Level of complexity of consult: High     Risk of Complications/Morbidity: High     · Illness(es)/ Infection present that pose threat to bodily function. · There is potential for severe exacerbation of infection/side effects of treatment. · Therapy requires intensive monitoring for antimicrobial agent toxicity. Thank you for involving me in the care of your patient. I will continue to follow. If you have any additional questions, please do not hesitate to contact me. Subjective:     Presenting complaint in ER:     Chief Complaint   Patient presents with    Foot Laceration     pt hit left foot on his rocking chair. states he has a \"gross\" wound on left foot.  Wound Infection     open wound on left foot red and swollen.          HPI: Rico Sarabia is a 59 y.o. male patient, who was seen at the request of Dr. Shakila Carmona MD.    History was obtained from chart review and the patient. The patient was admitted on 8/31/2021. I have been consulted to see the patient for above mentioned reason(s). The patient has multiple medical comorbidities, and presented to the ER for fever and chills at home and worsening left foot infection. In the ER, patient had elevated white cell count of 17,400 that went up to 19,200 the next day. The patient had a low-grade fever of 100.5. The patient was admitted. Blood cultures were sent. Left foot wound cultures were ordered    I have been asked for my opinion for management for this patient. Past Medical History: All past medical history reviewed today. Past Medical History:   Diagnosis Date    Arthritis     Chronic low back pain     Dental bridge present     upper    Diabetes ([address]     Diabetes mellitus ([address]     Emphysema of lung ([address]     Fractures     Hypertension     Neuropathy     Osteoarthritis     Prostate troubles          Past Surgical History: All pastsurgical history was reviewed today. Past Surgical History:   Procedure Laterality Date    FOOT SURGERY      HAND SURGERY Left 06/19/2017    LEFT HAND DEBRIDEMENT INCISION AND DRAINAGE    NECK SURGERY      33s year    TOE AMPUTATION Left 2009    WRIST FRACTURE SURGERY           Family History: All family history was reviewed today. Problem Relation Age of Onset    Heart Failure Mother     Diabetes Mother     Cancer Father         throat cancer    Asthma Daughter         Sports induced as a child    Emphysema Neg Hx     Hypertension Neg Hx          Medications: All current and past medications were reviewed.     Medications Prior to Admission: lisinopril (PRINIVIL;ZESTRIL) 10 MG tablet, Take 5 mg by mouth daily  Dulaglutide (TRULICITY) 1.5 LR/5.2QM SOPN, Inject 1.5 mg into the skin once a week  atorvastatin (LIPITOR) 40 MG tablet, Take 40 mg by mouth daily  insulin glargine (LANTUS) 100 UNIT/ML injection vial, Inject 35 Units into the skin nightly (Patient taking differently: Inject 45 Units into the skin nightly )  Omega-3 Fatty Acids (FISH OIL) 1000 MG CAPS, Take 3,000 mg by mouth nightly. metFORMIN (GLUCOPHAGE) 500 MG tablet, TAKE TWO TABLETS BY MOUTH TWICE A DAY  Multiple Vitamins-Minerals (MULTIVITAMIN PO), Take  by mouth daily. tamsulosin (FLOMAX) 0.4 MG capsule, Take 0.4 mg by mouth daily. [DISCONTINUED] HYDROcodone-acetaminophen (NORCO) 5-325 MG per tablet, Take 1 tablet by mouth every 6 hours as needed for Pain . [DISCONTINUED] amLODIPine (NORVASC) 10 MG tablet, Take 1 tablet by mouth daily  [DISCONTINUED] chlorthalidone (HYGROTON) 25 MG tablet, Take 1 tablet by mouth daily  [DISCONTINUED] rivaroxaban (XARELTO) 10 MG TABS tablet, Take 10 mg by mouth daily (with breakfast)  [DISCONTINUED] Misc. Devices (PULSE OXIMETER) MISC, 1 Device by Does not apply route daily as needed  [DISCONTINUED] mometasone-formoterol (DULERA) 200-5 MCG/ACT inhaler, Inhale 2 puffs into the lungs 2 times daily  [DISCONTINUED] insulin lispro (HUMALOG) 100 UNIT/ML injection vial, Inject 12 Units into the skin 3 times daily (before meals)  [DISCONTINUED] pantoprazole (PROTONIX) 40 MG tablet, Take 1 tablet by mouth every morning (before breakfast)     vancomycin  1,250 mg IntraVENous Q24H    insulin lispro  0-6 Units SubCUTAneous TID WC    insulin lispro  0-3 Units SubCUTAneous Nightly    metroNIDAZOLE  500 mg Oral 3 times per day    cefepime  2,000 mg IntraVENous Q12H    vancomycin (VANCOCIN) intermittent dosing (placeholder)   Other RX Placeholder    atorvastatin  40 mg Oral Daily    insulin glargine  20 Units SubCUTAneous Nightly    tamsulosin  0.4 mg Oral Daily          REVIEW OF SYSTEMS:       Review of Systems   Constitutional: Negative for chills, diaphoresis and fever. HENT: Negative for ear discharge, ear pain, rhinorrhea, sore throat and trouble swallowing. Eyes: Negative for discharge and redness.    Respiratory: Negative for cough, shortness of breath and wheezing. Cardiovascular: Negative for chest pain and leg swelling. Gastrointestinal: Negative for abdominal pain, constipation, diarrhea and nausea. Endocrine: Negative for polyuria. Genitourinary: Negative for dysuria, flank pain, frequency, hematuria and urgency. Musculoskeletal: Positive for arthralgias (Postop pain in the left foot). Negative for back pain and myalgias. Skin: Negative for rash. Neurological: Negative for dizziness, seizures and headaches. Hematological: Does not bruise/bleed easily. Psychiatric/Behavioral: Negative for hallucinations and suicidal ideas. All other systems reviewed and are negative. Objective:       PHYSICAL EXAM:      Vitals:   Vitals:    08/31/21 2322 09/01/21 0335 09/01/21 0739 09/01/21 1045   BP: (!) 161/28 (!) 160/79 (!) 150/68 (!) 160/70   Pulse: 76 87 91 86   Resp: 18 16 16 16   Temp: 98.8 °F (37.1 °C) 99.5 °F (37.5 °C) 100.5 °F (38.1 °C) 99.7 °F (37.6 °C)   TempSrc: Oral Oral Oral Oral   SpO2: 92% 92% 98% 95%   Weight:       Height: 6' 1\" (1.854 m)          Physical Exam  Vitals and nursing note reviewed. Constitutional:       Appearance: Normal appearance. He is well-developed. HENT:      Head: Normocephalic and atraumatic. Right Ear: External ear normal.      Left Ear: External ear normal.      Nose: Nose normal. No congestion or rhinorrhea. Mouth/Throat:      Mouth: Mucous membranes are moist.      Pharynx: No oropharyngeal exudate or posterior oropharyngeal erythema. Eyes:      General: No scleral icterus. Right eye: No discharge. Left eye: No discharge. Conjunctiva/sclera: Conjunctivae normal.      Pupils: Pupils are equal, round, and reactive to light. Cardiovascular:      Rate and Rhythm: Normal rate and regular rhythm. Pulses: Normal pulses. Heart sounds: No murmur heard. No friction rub.    Pulmonary:      Effort: Pulmonary effort is normal. No respiratory distress. Breath sounds: Normal breath sounds. No stridor. No wheezing, rhonchi or rales. Abdominal:      General: Bowel sounds are normal.      Palpations: Abdomen is soft. Tenderness: There is no abdominal tenderness. There is no right CVA tenderness, left CVA tenderness, guarding or rebound. Musculoskeletal:         General: Tenderness present. No swelling. Normal range of motion. Cervical back: Normal range of motion and neck supple. No rigidity. No muscular tenderness. Comments: Surgical dressing on left foot   Lymphadenopathy:      Cervical: No cervical adenopathy. Skin:     General: Skin is warm and dry. Coloration: Skin is not jaundiced. Findings: No erythema or rash. Neurological:      General: No focal deficit present. Mental Status: He is alert and oriented to person, place, and time. Mental status is at baseline. Motor: No abnormal muscle tone. Psychiatric:         Mood and Affect: Mood normal.         Behavior: Behavior normal.         Thought Content: Thought content normal.           Lines and drains: All vascular access sites are healthy with no local erythema, discharge or tenderness. Intake and output:     I/O last 3 completed shifts: In: 611 [P.O.:237; I.V.:300]  Out: 925 [Urine:925]    Lab Data:   All available labs were reviewed by me today.      CBC:   Recent Labs     08/30/21 1938 08/31/21  1449 09/01/21  1253   WBC 17.4* 19.2* 14.9*   RBC 3.71* 3.88* 3.32*   HGB 11.7* 12.3* 10.5*   HCT 34.5* 36.3* 31.0*    224 262   MCV 92.9 93.6 93.5   MCH 31.6 31.7 31.6   MCHC 34.0 33.9 33.8   RDW 14.2 14.5 14.4   BANDSPCT  --  10*  --         BMP:  Recent Labs     08/30/21 1938 08/31/21  1520 09/01/21  1253    136 136   K 4.4 4.9 4.6    101 104   CO2 19* 19* 19*   BUN 35* 42* 37*   CREATININE 1.7* 2.0* 1.9*   CALCIUM 9.3 9.5 9.2   GLUCOSE 286* 316* 351*        Hepatic FunctionPanel:   Lab Results   Component Value Date    ALKPHOS 138 08/30/2021    ALT 27 08/30/2021    AST 17 08/30/2021    PROT 7.4 08/30/2021    BILITOT 0.9 08/30/2021    LABALBU 3.6 08/30/2021       CPK: No results found for: CKTOTAL  ESR:   Lab Results   Component Value Date    SEDRATE 79 (H) 06/18/2017     CRP:   Lab Results   Component Value Date    .0 (H) 06/18/2017         Imaging: All pertinent images and reports for the current visit were reviewed by meduring this visit. XR FOOT LEFT (MIN 3 VIEWS)   Final Result   Findings as above are likely postoperative. In the absence of interval   intervention necrotizing infection must be excluded. XR TIBIA FIBULA LEFT (2 VIEWS)   Final Result   No acute osseous abnormality of the left tib-fib. No soft tissue gas         VL DUP LOWER EXTREMITY ARTERIES BILATERAL         XR FOOT LEFT (MIN 3 VIEWS)   Final Result   Soft tissue swelling with soft tissue gas adjacent to the head of the 1st   metatarsal.  No convincing plain film evidence of osteomyelitis. No acute fracture identified. Probable nonacute corner fracture involving   the base of the 2nd proximal phalanx. MRI FOOT LEFT WO CONTRAST    (Results Pending)       Outside records:    Labs, Microbiology, Radiology and pertinent results from Care everywhere, if available, were reviewed as a part ofthe consultation.       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

## 2021-09-02 ENCOUNTER — APPOINTMENT (OUTPATIENT)
Dept: MRI IMAGING | Age: 61
DRG: 853 | End: 2021-09-02
Payer: MEDICARE

## 2021-09-02 ENCOUNTER — APPOINTMENT (OUTPATIENT)
Dept: VASCULAR LAB | Age: 61
DRG: 853 | End: 2021-09-02
Payer: MEDICARE

## 2021-09-02 LAB
ANION GAP SERPL CALCULATED.3IONS-SCNC: 13 MMOL/L (ref 3–16)
BUN BLDV-MCNC: 40 MG/DL (ref 7–20)
CALCIUM SERPL-MCNC: 9.2 MG/DL (ref 8.3–10.6)
CHLORIDE BLD-SCNC: 105 MMOL/L (ref 99–110)
CO2: 19 MMOL/L (ref 21–32)
CREAT SERPL-MCNC: 2 MG/DL (ref 0.8–1.3)
ESTIMATED AVERAGE GLUCOSE: 137 MG/DL
GFR AFRICAN AMERICAN: 41
GFR NON-AFRICAN AMERICAN: 34
GLUCOSE BLD-MCNC: 271 MG/DL (ref 70–99)
GLUCOSE BLD-MCNC: 282 MG/DL (ref 70–99)
GLUCOSE BLD-MCNC: 343 MG/DL (ref 70–99)
GLUCOSE BLD-MCNC: 350 MG/DL (ref 70–99)
GLUCOSE BLD-MCNC: 377 MG/DL (ref 70–99)
HBA1C MFR BLD: 6.4 %
HCT VFR BLD CALC: 29.9 % (ref 40.5–52.5)
HEMOGLOBIN: 10.1 G/DL (ref 13.5–17.5)
MCH RBC QN AUTO: 31.3 PG (ref 26–34)
MCHC RBC AUTO-ENTMCNC: 33.9 G/DL (ref 31–36)
MCV RBC AUTO: 92.3 FL (ref 80–100)
PDW BLD-RTO: 14.4 % (ref 12.4–15.4)
PERFORMED ON: ABNORMAL
PLATELET # BLD: 276 K/UL (ref 135–450)
PMV BLD AUTO: 9.5 FL (ref 5–10.5)
POTASSIUM REFLEX MAGNESIUM: 4.9 MMOL/L (ref 3.5–5.1)
RBC # BLD: 3.24 M/UL (ref 4.2–5.9)
SODIUM BLD-SCNC: 137 MMOL/L (ref 136–145)
WBC # BLD: 13.7 K/UL (ref 4–11)

## 2021-09-02 PROCEDURE — 6370000000 HC RX 637 (ALT 250 FOR IP): Performed by: INTERNAL MEDICINE

## 2021-09-02 PROCEDURE — 73718 MRI LOWER EXTREMITY W/O DYE: CPT

## 2021-09-02 PROCEDURE — 99222 1ST HOSP IP/OBS MODERATE 55: CPT | Performed by: SURGERY

## 2021-09-02 PROCEDURE — 6360000002 HC RX W HCPCS: Performed by: NURSE PRACTITIONER

## 2021-09-02 PROCEDURE — 93923 UPR/LXTR ART STDY 3+ LVLS: CPT

## 2021-09-02 PROCEDURE — 2580000003 HC RX 258: Performed by: PODIATRIST

## 2021-09-02 PROCEDURE — 6370000000 HC RX 637 (ALT 250 FOR IP): Performed by: NURSE PRACTITIONER

## 2021-09-02 PROCEDURE — 6360000002 HC RX W HCPCS: Performed by: PODIATRIST

## 2021-09-02 PROCEDURE — 80048 BASIC METABOLIC PNL TOTAL CA: CPT

## 2021-09-02 PROCEDURE — 2580000003 HC RX 258: Performed by: INTERNAL MEDICINE

## 2021-09-02 PROCEDURE — 99232 SBSQ HOSP IP/OBS MODERATE 35: CPT | Performed by: INTERNAL MEDICINE

## 2021-09-02 PROCEDURE — 36415 COLL VENOUS BLD VENIPUNCTURE: CPT

## 2021-09-02 PROCEDURE — 1200000000 HC SEMI PRIVATE

## 2021-09-02 PROCEDURE — 85027 COMPLETE CBC AUTOMATED: CPT

## 2021-09-02 PROCEDURE — 6360000002 HC RX W HCPCS: Performed by: INTERNAL MEDICINE

## 2021-09-02 RX ORDER — LABETALOL HYDROCHLORIDE 5 MG/ML
5 INJECTION, SOLUTION INTRAVENOUS EVERY 6 HOURS PRN
Status: DISCONTINUED | OUTPATIENT
Start: 2021-09-02 | End: 2021-09-08 | Stop reason: HOSPADM

## 2021-09-02 RX ADMIN — CHLORPROMAZINE HYDROCHLORIDE 25 MG: 25 TABLET, FILM COATED ORAL at 07:51

## 2021-09-02 RX ADMIN — OXYCODONE 5 MG: 5 TABLET ORAL at 20:38

## 2021-09-02 RX ADMIN — CHLORPROMAZINE HYDROCHLORIDE 25 MG: 25 TABLET, FILM COATED ORAL at 14:43

## 2021-09-02 RX ADMIN — ACETAMINOPHEN 650 MG: 325 TABLET ORAL at 03:04

## 2021-09-02 RX ADMIN — INSULIN LISPRO 5 UNITS: 100 INJECTION, SOLUTION INTRAVENOUS; SUBCUTANEOUS at 17:50

## 2021-09-02 RX ADMIN — CHLORPROMAZINE HYDROCHLORIDE 25 MG: 25 TABLET, FILM COATED ORAL at 00:46

## 2021-09-02 RX ADMIN — INSULIN GLARGINE 20 UNITS: 100 INJECTION, SOLUTION SUBCUTANEOUS at 20:25

## 2021-09-02 RX ADMIN — ACETAMINOPHEN 650 MG: 325 TABLET ORAL at 17:56

## 2021-09-02 RX ADMIN — ENOXAPARIN SODIUM 40 MG: 40 INJECTION SUBCUTANEOUS at 12:01

## 2021-09-02 RX ADMIN — ACETAMINOPHEN 650 MG: 325 TABLET ORAL at 11:58

## 2021-09-02 RX ADMIN — INSULIN LISPRO 3 UNITS: 100 INJECTION, SOLUTION INTRAVENOUS; SUBCUTANEOUS at 20:26

## 2021-09-02 RX ADMIN — ATORVASTATIN CALCIUM 40 MG: 40 TABLET, FILM COATED ORAL at 07:46

## 2021-09-02 RX ADMIN — CHLORPROMAZINE HYDROCHLORIDE 25 MG: 25 TABLET, FILM COATED ORAL at 20:25

## 2021-09-02 RX ADMIN — CEFEPIME HYDROCHLORIDE 2000 MG: 2 INJECTION, POWDER, FOR SOLUTION INTRAVENOUS at 06:38

## 2021-09-02 RX ADMIN — INSULIN LISPRO 4 UNITS: 100 INJECTION, SOLUTION INTRAVENOUS; SUBCUTANEOUS at 11:58

## 2021-09-02 RX ADMIN — ENOXAPARIN SODIUM 40 MG: 40 INJECTION SUBCUTANEOUS at 20:24

## 2021-09-02 RX ADMIN — TAMSULOSIN HYDROCHLORIDE 0.4 MG: 0.4 CAPSULE ORAL at 07:46

## 2021-09-02 RX ADMIN — METRONIDAZOLE 500 MG: 250 TABLET ORAL at 14:43

## 2021-09-02 RX ADMIN — ENOXAPARIN SODIUM 40 MG: 40 INJECTION SUBCUTANEOUS at 00:46

## 2021-09-02 RX ADMIN — METRONIDAZOLE 500 MG: 250 TABLET ORAL at 06:37

## 2021-09-02 RX ADMIN — PIPERACILLIN AND TAZOBACTAM 3375 MG: 3; .375 INJECTION, POWDER, LYOPHILIZED, FOR SOLUTION INTRAVENOUS at 16:38

## 2021-09-02 RX ADMIN — INSULIN LISPRO 3 UNITS: 100 INJECTION, SOLUTION INTRAVENOUS; SUBCUTANEOUS at 09:29

## 2021-09-02 RX ADMIN — LISINOPRIL 5 MG: 5 TABLET ORAL at 07:46

## 2021-09-02 ASSESSMENT — PAIN SCALES - GENERAL
PAINLEVEL_OUTOF10: 0
PAINLEVEL_OUTOF10: 5

## 2021-09-02 NOTE — PROGRESS NOTES
Infectious Disease Follow up Notes    CC :   Foot infection      Antibiotics:   Cefepime 2g q12  Flagyl 500 po TID  vanc 1250 q24     Admit Date:   8/31/2021  Hospital Day: 3    Subjective:   Continued low grade fever   Feels ok, tired. Not much sensation, no pain in the L foot. No other focal complaints     Objective:     Patient Vitals for the past 8 hrs:   BP Temp Temp src Pulse Resp SpO2   09/02/21 1411 (!) 143/75 100.8 °F (38.2 °C) Oral 85  (!) 89 %   09/02/21 1144 (!) 176/82 100.6 °F (38.1 °C) Oral 72  (!) 89 %   09/02/21 0733 (!) 197/88 98.3 °F (36.8 °C) Oral 81 18 94 %       EXAM:  General:  Alert, oriented, NAD. Obese    HEENT:  NCAT, PERRL, sclera anicteric   LUNGS:  Non-labored breathing   ABD: Soft, NT.  +BS  EXT: L foot dressed. +Odor.   +trace edema leg     SKIN: No acute rash           LINE: PIV site ok        Scheduled Meds:   vancomycin  1,250 mg IntraVENous Q24H    insulin lispro  0-6 Units SubCUTAneous TID WC    insulin lispro  0-3 Units SubCUTAneous Nightly    metroNIDAZOLE  500 mg Oral 3 times per day    chlorproMAZINE  25 mg Oral TID    lisinopril  5 mg Oral Daily    enoxaparin  40 mg SubCUTAneous BID    cefepime  2,000 mg IntraVENous Q12H    vancomycin (VANCOCIN) intermittent dosing (placeholder)   Other RX Placeholder    atorvastatin  40 mg Oral Daily    insulin glargine  20 Units SubCUTAneous Nightly    tamsulosin  0.4 mg Oral Daily       Continuous Infusions:   dextrose            Data Review:    Lab Results   Component Value Date    WBC 13.7 (H) 09/02/2021    HGB 10.1 (L) 09/02/2021    HCT 29.9 (L) 09/02/2021    MCV 92.3 09/02/2021     09/02/2021     Lab Results   Component Value Date    CREATININE 2.0 (H) 09/02/2021    BUN 40 (H) 09/02/2021     09/02/2021    K 4.9 09/02/2021     09/02/2021    CO2 19 (L) 09/02/2021       Hepatic Function Panel:   Lab Results   Component Value Date    ALKPHOS 138 08/30/2021    ALT 27 08/30/2021    AST 17 08/30/2021    PROT 7.4 08/30/2021    BILITOT 0.9 08/30/2021    LABALBU 3.6 08/30/2021         MICRO:  8/31 BC x2 NGTD   Surgical culture mod growth MSSA       IMAGING:  MRI L foot 9/2/21  Impression   Postsurgical changes related to incision and drainage procedure along the   medial aspect of the 1st digit.  No residual abscess is identified within the   foot.       Adjacent to the surgical defect is an area of mild increased T2 and decreased   T1 signal that is nonspecific in the absence of IV contrast, but suspicious   for early osteomyelitis given the proximity to the surgical wound and known   infectious process.       Additional area of T2 signal hyperintensity with decreased T1 signal along   the metatarsal-sesamoid joints, medial greater than lateral.  These findings   are favored degenerative in nature, with osteomyelitis considered less likely   but difficult to exclude.       Diffuse edema and atrophy of the intrinsic foot musculature, suggesting   sequela of diabetic neuropathy.        Assessment:     Patient Active Problem List    Diagnosis Date Noted    Acute kidney injury superimposed on chronic kidney disease ([address]     Gas gangrene of foot ([address]     Elevated C-reactive protein (CRP)     Osteoarthritis     Pulmonary emphysema (HCC)     Class 1 obesity due to excess calories with body mass index (BMI) of 31.0 to 31.9 in adult     Weight loss counseling, encounter for     Diabetes education, encounter for     Sepsis ([address] 08/31/2021    Abscess of left hand 06/20/2017    Abscess 06/18/2017    PNA (pneumonia) 03/28/2016    Type 2 diabetes mellitus with left diabetic foot infection ([address] 03/28/2016    Hypertension 03/28/2016    BPH (benign prostatic hyperplasia) 03/28/2016    Adhesive capsulitis of shoulder 11/13/2014       History of DM    Prior L hallux amp     Admitted 8/31/21 with L foot infection at [address] [place] [address], with fever, sepsis  L foot I&D 8/31/21  MSSA in surgical culture  MRI suggesting OM in the 1st met head     CAMRYN, resolved     NKDA     Plan:   Leukocytosis is resolving though fever persists  Plan for further debridement, noted   Consolidate abx to Zosyn        Discussed with patient/family, all questions answered        Tellis Sicard, MD  Phone: [phone]   Fax : [phone]

## 2021-09-02 NOTE — PROGRESS NOTES
Page sent to Cheo Rowell NP:   Rj Birch RM: [id number] Patient has high blood sugars. 200-350's. can I change him to medium dose algorithm for Humalog SS? \"      Awaiting response.

## 2021-09-02 NOTE — PROGRESS NOTES
Q4H PRN  benzocaine-menthol (CEPACOL SORE THROAT) lozenge 1 lozenge, 1 lozenge, Oral, Q2H PRN  atorvastatin (LIPITOR) tablet 40 mg, 40 mg, Oral, Daily  insulin glargine (LANTUS) injection vial 20 Units, 20 Units, SubCUTAneous, Nightly  tamsulosin (FLOMAX) capsule 0.4 mg, 0.4 mg, Oral, Daily  Allergies:   Patient has no known allergies. Family History:       Problem Relation Age of Onset    Heart Failure Mother     Diabetes Mother     Cancer Father         throat cancer    Asthma Daughter         Sports induced as a child    Emphysema Neg Hx     Hypertension Neg Hx      REVIEW OF SYSTEMS:    CONSTITUTIONAL:  positive for  fevers and chills  RESPIRATORY:  negative for  dry cough and wheezing  PHYSICAL EXAM:      Vitals:    BP (!) 143/75   Pulse 85   Temp 98 °F (36.7 °C) (Oral)   Resp 18   Ht 6' 1\" (1.854 m)   Wt 240 lb (108.9 kg)   SpO2 (!) 89%   BMI 31.66 kg/m²     LABS:   Recent Labs     09/01/21  1253 09/02/21  0610   WBC 14.9* 13.7*   HGB 10.5* 10.1*   HCT 31.0* 29.9*    276     Recent Labs     09/02/21  0610      K 4.9      CO2 19*   BUN 40*   CREATININE 2.0*     Recent Labs     08/30/21  1938   PROT 7.4       LOWER EXTREMITY EXAMINATION   SKIN:      Open surgical incision along the 1st ray done to bone and over the 4th metatarsal shaft. Deep tissue is not bleeding well, and is thickened and unhealthy in appearance; medial flap from incision over the 1st metatarsal is turning dusky. No active purulence expressed today. Erythema to the dorsal left foot mildly improved in intensity. No pain. No odor appreciated today. Hemorrhagic callus plantar medial right hallux. NEUROLOGIC:      Protective sensation is absent at the level of the toes. VASCULAR:      DP and PT pulses are faintly palpable left foot. Could be related to current edema. CFT brisk to the toes. MUSCULOSKELETAL:    Muscle strength 5/5 all prime movers b/l lower extremity.  Left hallux amputation. Imaging:    Soft tissue swelling with soft tissue gas adjacent to the head of the 1st   metatarsal.  No convincing plain film evidence of osteomyelitis.       No acute fracture identified.  Probable nonacute corner fracture involving   the base of the 2nd proximal phalanx. MRI left foot:     Postsurgical changes related to incision and drainage procedure along the   medial aspect of the 1st digit.  No residual abscess is identified within the   foot.       Adjacent to the surgical defect is an area of mild increased T2 and decreased   T1 signal that is nonspecific in the absence of IV contrast, but suspicious   for early osteomyelitis given the proximity to the surgical wound and known   infectious process.       Additional area of T2 signal hyperintensity with decreased T1 signal along   the metatarsal-sesamoid joints, medial greater than lateral.  These findings   are favored degenerative in nature, with osteomyelitis considered less likely   but difficult to exclude.       Diffuse edema and atrophy of the intrinsic foot musculature, suggesting   sequela of diabetic neuropathy. Assessment:    1. Gas gangrene, left foot  2. DM2 with ulcer  3. DM2 with neuropathy    Plan:    - E&M. S/P 2 days emergent I&D. - Patient with severe diabetic foot infection and gas gangrene on x-ray. Ascending erythema towards the ankle. - WBC 19,000 on admission. Trending down, 13,700.    - Still running some fevers over last 24 hours. - IV ABX per ID; Zosyn. Appreciate recs. 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 Laser doppler with adequate healing potentials. Appreciate vascular recs. - Reviewed new x-rays.   - Reviewed MRI. - Plan for staged I&D tomorrow afternoon; partial 1st ray amputation with possible delayed primary closure. - Will send 1st met head for culture and path. - Concern for some soft tissue necrosis medially.   - NPO 9 AM   - Consent.   - New sterile dressing placed. - All questions answered.       Dalila Layman, [facility] or [facility]

## 2021-09-02 NOTE — PROGRESS NOTES
Hospitalist Progress Note      PCP: Cortney Salgado MD    Date of Admission: 8/31/2021    Chief Complaint:    Foot Laceration       pt hit left foot on his rocking chair. states he has a \"gross\" wound on left foot.  Wound Infection       open wound on left foot red and swollen. Hospital Course: \"  59 y.o. male who presented to [facility] with past medical history of hypertension, osteoarthritis, type 2 diabetes, class I obesity presented to the ED with chief complaint of left foot pain and change.     Patient reported that he has history of diabetes takes medication time noted that his been having fever today but noted it started worsening since Thursday where his wife was changing his socks that looked normal and then patient noted last night he kicked a chair and caused a lot of pain left foot progressively worsening exacerbated with palpation no alleviating factor associated with redness in the foot and high fever with T-max being 102. reported when changing socks she noted that the changes significant he worsening since she last change his socks on Sunday. \"     The pt was admitted with Podiatry consult and taken to the OR for ID.      Subjective: EMR and notes reviewed pt seen and examined. Hiccups resolved. Pain controlled.  Awaiting vascular input        Medications:  Reviewed    Infusion Medications    dextrose       Scheduled Medications    piperacillin-tazobactam  3,375 mg IntraVENous Q8H    insulin lispro  0-6 Units SubCUTAneous TID WC    insulin lispro  0-3 Units SubCUTAneous Nightly    chlorproMAZINE  25 mg Oral TID    lisinopril  5 mg Oral Daily    enoxaparin  40 mg SubCUTAneous BID    atorvastatin  40 mg Oral Daily    insulin glargine  20 Units SubCUTAneous Nightly    tamsulosin  0.4 mg Oral Daily     PRN Meds: labetalol, acetaminophen, glucose, dextrose, glucagon (rDNA), dextrose, oxyCODONE, benzocaine-menthol      Intake/Output Summary (Last 24 hours) at 9/2/2021 1530  Last data filed at 9/2/2021 1411  Gross per 24 hour   Intake 240 ml   Output 400 ml   Net -160 ml       Physical Exam Performed:    BP (!) 143/75   Pulse 85   Temp 100.8 °F (38.2 °C) (Oral)   Resp 18   Ht 6' 1\" (1.854 m)   Wt 240 lb (108.9 kg)   SpO2 (!) 89%   BMI 31.66 kg/m²     General appearance: No apparent distress, appears stated age and cooperative. HEENT: Pupils equal, round, and reactive to light. Conjunctivae/corneas clear. Neck: Supple, with full range of motion. No jugular venous distention. Trachea midline. Respiratory:  Normal respiratory effort. Clear to auscultation, bilaterally without Rales/Wheezes/Rhonchi. Cardiovascular: Regular rate and rhythm with normal S1/S2 without murmurs, rubs or gallops. Abdomen: Soft, non-tender, non-distended with normal bowel sounds. Musculoskeletal: No clubbing, cyanosis or edema bilaterally. Left foot with surgical drsg in place   Skin: Skin color, texture, turgor normal.  No rashes or lesions. Neurologic:  Neurovascularly intact without any focal sensory/motor deficits. Cranial nerves: II-XII intact, grossly non-focal.  Psychiatric: Alert and oriented, thought content appropriate, normal insight  Capillary Refill: Brisk,3 seconds, normal   Peripheral Pulses: +2 palpable, equal bilaterally       Labs:   Recent Labs     08/31/21  1449 09/01/21  1253 09/02/21  0610   WBC 19.2* 14.9* 13.7*   HGB 12.3* 10.5* 10.1*   HCT 36.3* 31.0* 29.9*    262 276     Recent Labs     08/31/21  1520 09/01/21  1253 09/02/21  0610    136 137   K 4.9 4.6 4.9    104 105   CO2 19* 19* 19*   BUN 42* 37* 40*   CREATININE 2.0* 1.9* 2.0*   CALCIUM 9.5 9.2 9.2     Recent Labs     08/30/21  1938   AST 17   ALT 27   BILITOT 0.9   ALKPHOS 138*     No results for input(s): INR in the last 72 hours. No results for input(s): Renetta Luis in the last 72 hours.     Urinalysis:      Lab Results   Component Value Date    NITRU Negative 08/31/2021 WBCUA 0-2 08/31/2021    BACTERIA 2+ 08/31/2021    RBCUA 0-2 08/31/2021    BLOODU TRACE-INTACT 08/31/2021    SPECGRAV 1.025 08/31/2021    GLUCOSEU 100 08/31/2021       Radiology:  MRI FOOT LEFT WO CONTRAST   Preliminary Result   Postsurgical changes related to incision and drainage procedure along the   medial aspect of the 1st digit. No residual abscess is identified within the   foot. Adjacent to the surgical defect is an area of mild increased T2 and decreased   T1 signal that is nonspecific in the absence of IV contrast, but suspicious   for early osteomyelitis given the proximity to the surgical wound and known   infectious process. Additional area of T2 signal hyperintensity with decreased T1 signal along   the metatarsal-sesamoid joints, medial greater than lateral.  These findings   are favored degenerative in nature, with osteomyelitis considered less likely   but difficult to exclude. Diffuse edema and atrophy of the intrinsic foot musculature, suggesting   sequela of diabetic neuropathy. LASER SKIN PERFUSION PRESSURE STUDY   Final Result      XR FOOT LEFT (MIN 3 VIEWS)   Final Result   Findings as above are likely postoperative. In the absence of interval   intervention necrotizing infection must be excluded. XR TIBIA FIBULA LEFT (2 VIEWS)   Final Result   No acute osseous abnormality of the left tib-fib. No soft tissue gas         VL DUP LOWER EXTREMITY ARTERIES BILATERAL   Final Result      XR FOOT LEFT (MIN 3 VIEWS)   Final Result   Soft tissue swelling with soft tissue gas adjacent to the head of the 1st   metatarsal.  No convincing plain film evidence of osteomyelitis. No acute fracture identified. Probable nonacute corner fracture involving   the base of the 2nd proximal phalanx.                  Assessment/Plan:    Active Hospital Problems    Diagnosis     Acute kidney injury superimposed on chronic kidney disease ([facility] [N17.9, N18.9]     Gas gangrene of foot ([facility] [A48.0]     Elevated C-reactive protein (CRP) [R79.82]     Osteoarthritis [M19.90]     Pulmonary emphysema (HCC) [J43.9]     Class 1 obesity due to excess calories with body mass index (BMI) of 31.0 to 31.9 in adult [E66.09, Z68.31]     Weight loss counseling, encounter for [Z71.3]     Diabetes education, encounter for [Z71.89]     Sepsis ([facility] [A41.9]     Type 2 diabetes mellitus with left diabetic foot infection ([facility] [E51.316, L08.9]      Sepsis POA in the setting of left gangrene foot/DM foot   - met criteria with fever, leucocytosis and CAMRYN  - lactic was 1.7   - pan cultures and empiric abx started     Gas Gangrene/ DM foot   -  Pod consulted   - S/P ID and cultures sent pre [facility]  - IV ABX   - ID consult -   Atrial duplex and MRI have been ordered and pending   - trend and follow labs   - WB status per POD, suspect will need PT/OT when WB can be established     DM - last AiC 4/26 7.8  - at home mgt combo of Trulicity and metformin   - here will use basal bolus with SSI, Carb control diet   - fSBS ACHS     HTN- poorly controlled- home meds not previously ordered will reorder meds     HLD- continue statin     Neuropathy - int he setting DM    BPH- cont flomax     Morbid Obesity - BMI 31 Complicating assessment and treatment. Placing patient at risk for multiple co-morbidities as well as early death and contributing to the patient's presentation. Counseled on weight loss. DVT Prophylaxis: lovenox  Diet: ADULT DIET; Regular  Adult Oral Nutrition Supplement;  Low Calorie/High Protein Oral Supplement  Code Status: Prior    PT/OT Eval Status: will need WB clarification from [address] - likely here several days     Malgorzata Bonner, APRN - CNP

## 2021-09-02 NOTE — CONSULTS
(FISH OIL) 1000 MG CAPS Take 3,000 mg by mouth nightly. Yes Historical Provider, MD   metFORMIN (GLUCOPHAGE) 500 MG tablet TAKE TWO TABLETS BY MOUTH TWICE A DAY 10/7/13  Yes Historical Provider, MD   Multiple Vitamins-Minerals (MULTIVITAMIN PO) Take  by mouth daily. Yes Historical Provider, MD   tamsulosin (FLOMAX) 0.4 MG capsule Take 0.4 mg by mouth daily. 11/6/12  Yes Historical Provider, MD        Facility Administered Medications:    vancomycin  1,250 mg IntraVENous Q24H    insulin lispro  0-6 Units SubCUTAneous TID WC    insulin lispro  0-3 Units SubCUTAneous Nightly    metroNIDAZOLE  500 mg Oral 3 times per day    chlorproMAZINE  25 mg Oral TID    lisinopril  5 mg Oral Daily    enoxaparin  40 mg SubCUTAneous BID    cefepime  2,000 mg IntraVENous Q12H    vancomycin (VANCOCIN) intermittent dosing (placeholder)   Other RX Placeholder    atorvastatin  40 mg Oral Daily    insulin glargine  20 Units SubCUTAneous Nightly    tamsulosin  0.4 mg Oral Daily       Allergies:  Patient has no known allergies. Social History:      Social History     Socioeconomic History    Marital status:      Spouse name: Not on file    Number of children: Not on file    Years of education: Not on file    Highest education level: Not on file   Occupational History    Not on file   Tobacco Use    Smoking status: Never Smoker    Smokeless tobacco: Never Used   Substance and Sexual Activity    Alcohol use: No     Alcohol/week: 0.0 standard drinks    Drug use: No    Sexual activity: Not on file   Other Topics Concern    Not on file   Social History Narrative    Not on file     Social Determinants of Health     Financial Resource Strain:     Difficulty of Paying Living Expenses:    Food Insecurity:     Worried About Running Out of Food in the Last Year:     [address] in the Last Year:    Transportation Needs:     Lack of Transportation (Medical):      Lack of Transportation (Non-Medical): Physical Activity:     Days of Exercise per Week:     Minutes of Exercise per Session:    Stress:     Feeling of Stress :    Social Connections:     Frequency of Communication with Friends and Family:     Frequency of Social Gatherings with Friends and Family:     Attends Yarsanism Services:     Active Member of Clubs or Organizations:     Attends Club or Organization Meetings:     Marital Status:    Intimate Partner Violence:     Fear of Current or Ex-Partner:     Emotionally Abused:     Physically Abused:     Sexually Abused:        Family History:        Problem Relation Age of Onset    Heart Failure Mother     Diabetes Mother     Cancer Father         throat cancer    Asthma Daughter         Sports induced as a child    Emphysema Neg Hx     Hypertension Neg Hx        Review of Systems:  A 14 point review of systems was completed. Pertinent positives identified in the HPI, all other review of systems negative. Physical Examination:    BP (!) 197/88   Pulse 81   Temp 98.3 °F (36.8 °C) (Oral)   Resp 18   Ht 6' 1\" (1.854 m)   Wt 240 lb (108.9 kg)   SpO2 (!) 89%   BMI 31.66 kg/m²        Admission Weight: 240 lb (108.9 kg)       General appearance: NAD  Eyes: PERRLA  Neck: no JVD, no lymphadenopathy. Respiratory: effort is unlabored, no crackles, wheezes or rubs. Cardiovascular: regular, no murmur. No carotid bruits. No edema or varicosities. Pulses:    femoral DP PT   RIGHT 2 Multiphasic doppler Monophasic doppler   LEFT 2 Multiphasic doppler Monophasic doppler   GI: abdomen soft, nondistended, no organomegaly. Musculoskeletal: strength and tone normal.  Extremities: warm, erythema Left foot. Multiple incisions, absent Hallux- foul odor  Neuro/psychiatric: grossly intact. MEDICAL DECISION MAKING/TESTING    Diagnostic studies below personally reviewed.      Arterial duplex:       Impressions   Right Impression   The right ankle/brachial index is 0.93(the DP is non compressible, the PT is   170mmHg). There is normal multiphasic flow at the common femoral artery indicating no   significant aorto-iliac inflow disease. The posterior tibial artery appears to be occluded. <50% stenosis is noted in the superficial femoral, popliteal, and anterior   tibial arteries. Left Impression   The left ankle/brachial index is 0.93(the DP was not obtained due to bandage,   the PT is 170mmHg). There is normal multiphasic flow at the common femoral artery indicating no   significant aorto-iliac inflow disease. The distal posterior tibial artery appears to be occluded. <50% stenosis is noted in the superficial femoral, popliteal, and anterior   tibial arteries. There is evidence of enlarged lymph nodes involving the left groin area. There is no previous exam for comparison.       Conclusions        Summary        1. There is mild resting arterial insufficiency within the bilateral lower    extremities.    2. The bilateral posterior tibial arteries appears are occluded. Laser skin perfusion:  Impressions   Right Impression   Laser Skin Perfusion Pressure study at the right medial plantar area of the   foot is 107 ; wound healing likely . This is considered to have a good   probability for healing. Laser Skin Perfusion Pressure study at the right lateral plantar area of the   foot is 67 ; wound healing likely . This is considered to have a good   probability for healing. Laser Skin Perfusion Pressure study at the right dorsum of the foot is 74 ;   wound healing likely . This is considered to have a good probability for   healing. Laser Skin Perfusion Pressure study at the right lateral ankle is 94 ; wound   healing likely . This is considered to have a good probability for healing. Pulse Volume Recording at the ankle level reveals normal waveforms. Pulse Volume Recording at the right foot level reveals normal waveforms.    Left Impression   Laser Skin Perfusion Pressure study at the left medial plantar area of the   foot is 90 ; wound healing likely . This is considered to have a good   probability for healing. Laser Skin Perfusion Pressure study at the left lateral plantar area of the   foot is 136 ; wound healing likely . This is considered to have a good   probability for healing. Laser Skin Perfusion Pressure study at the left dorsum of the foot is 105 ;   wound healing likely . This is considered to have a good probability for   healing. Laser Skin Perfusion Pressure study at the left lateral ankle is 100 ; wound   healing likely . This is considered to have a good probability for healing. Pulse Volume Recording at the ankle level reveals normal waveforms. Pulse Volume Recording at the left foot level reveals normal waveforms. There is no previous exam for comparison.       Conclusions        Summary        Based upon the laser perfusion pressures, there is good probability of wound    healing within the bilateral ankles. Labs:   CBC:   Recent Labs     08/31/21  1449 09/01/21  1253 09/02/21  0610   WBC 19.2* 14.9* 13.7*   HGB 12.3* 10.5* 10.1*   HCT 36.3* 31.0* 29.9*   MCV 93.6 93.5 92.3    262 276     BMP:   Recent Labs     08/31/21  1520 09/01/21  1253 09/02/21  0610    136 137   K 4.9 4.6 4.9    104 105   CO2 19* 19* 19*   BUN 42* 37* 40*   CREATININE 2.0* 1.9* 2.0*   CALCIUM 9.5 9.2 9.2     Cardiac Enzymes: No results for input(s): CKTOTAL, CKMB, CKMBINDEX, TROPONINI in the last 72 hours. PT/INR: No results for input(s): PROTIME, INR in the last 72 hours. APTT: No results for input(s): APTT in the last 72 hours.   Liver Profile:  Lab Results   Component Value Date    AST 17 08/30/2021    ALT 27 08/30/2021    BILITOT 0.9 08/30/2021    ALKPHOS 138 08/30/2021   No results found for: CHOL, HDL, TRIG  TSH:  No results found for: TSH  UA:   Lab Results   Component Value Date    COLORU Yellow 08/31/2021    PHUR 5.5 08/31/2021 WBCUA 0-2 08/31/2021    RBCUA 0-2 08/31/2021    MUCUS Rare 08/31/2021    BACTERIA 2+ 08/31/2021    CLARITYU Clear 08/31/2021    SPECGRAV 1.025 08/31/2021    LEUKOCYTESUR Negative 08/31/2021    UROBILINOGEN 0.2 08/31/2021    BILIRUBINUR Negative 08/31/2021    BLOODU TRACE-INTACT 08/31/2021    GLUCOSEU 100 08/31/2021    AMORPHOUS 1+ 08/31/2021         Assessment:  Diabetic foot infection. After seeing patient I ordered the above laser skin perfusion study. This has been reviewed and shows excellent perfusion to foot. Recommendations:  Further debridement of foot per Podiatry as necessary. No indication for further arterial testing or treatment. Limb salvage dependent on degree of tissue loss.

## 2021-09-02 NOTE — ANESTHESIA POSTPROCEDURE EVALUATION
Department of Anesthesiology  Postprocedure Note    Patient: Sujit Flores  MRN: [id number]  YOB: 1960  Date of evaluation: 9/2/2021  Time:  9:14 AM     Procedure Summary     Date: 08/31/21 Room / Location: [facility]    Anesthesia Start: 2138 Anesthesia Stop: 2220    Procedure: LEFT FOOT DEBRIDEMENT INCISION AND DRAINAGE (Left Foot) Diagnosis: (GAS GANGRENE)    Surgeons: Jose Mariano DPM Responsible Provider: Marthena Burkitt, MD    Anesthesia Type: general ASA Status: 3 - Emergent          Anesthesia Type: general    Jon Phase I: Jon Score: 10    Jon Phase II:      Last vitals: Reviewed and per EMR flowsheets.        Anesthesia Post Evaluation    Patient location during evaluation: PACU  Patient participation: complete - patient participated  Level of consciousness: awake and alert  Pain score: 0  Airway patency: patent  Nausea & Vomiting: no nausea and no vomiting  Complications: no  Cardiovascular status: blood pressure returned to baseline  Respiratory status: acceptable  Hydration status: stable

## 2021-09-02 NOTE — PROGRESS NOTES
Vascular    Full consult to follow  Duplex shows PT occlusion but no other significant stenosis/occlusion. Palp Popliteal and multiphasic DP signal.   Likely has inframalleolar diabetic disease.    Will obtain Laser perfusion to assess healing potential.

## 2021-09-03 ENCOUNTER — ANESTHESIA (OUTPATIENT)
Dept: OPERATING ROOM | Age: 61
DRG: 853 | End: 2021-09-03
Payer: MEDICARE

## 2021-09-03 ENCOUNTER — ANESTHESIA EVENT (OUTPATIENT)
Dept: OPERATING ROOM | Age: 61
DRG: 853 | End: 2021-09-03
Payer: MEDICARE

## 2021-09-03 VITALS
OXYGEN SATURATION: 95 % | SYSTOLIC BLOOD PRESSURE: 100 MMHG | RESPIRATION RATE: 7 BRPM | DIASTOLIC BLOOD PRESSURE: 55 MMHG

## 2021-09-03 LAB
ANAEROBIC CULTURE: ABNORMAL
ANION GAP SERPL CALCULATED.3IONS-SCNC: 13 MMOL/L (ref 3–16)
BUN BLDV-MCNC: 37 MG/DL (ref 7–20)
CALCIUM SERPL-MCNC: 9.3 MG/DL (ref 8.3–10.6)
CHLORIDE BLD-SCNC: 104 MMOL/L (ref 99–110)
CO2: 19 MMOL/L (ref 21–32)
CREAT SERPL-MCNC: 1.8 MG/DL (ref 0.8–1.3)
CULTURE SURGICAL: ABNORMAL
GFR AFRICAN AMERICAN: 46
GFR NON-AFRICAN AMERICAN: 38
GLUCOSE BLD-MCNC: 229 MG/DL (ref 70–99)
GLUCOSE BLD-MCNC: 292 MG/DL (ref 70–99)
GLUCOSE BLD-MCNC: 308 MG/DL (ref 70–99)
GLUCOSE BLD-MCNC: 340 MG/DL (ref 70–99)
GLUCOSE BLD-MCNC: 351 MG/DL (ref 70–99)
GLUCOSE BLD-MCNC: 396 MG/DL (ref 70–99)
GRAM STAIN RESULT: ABNORMAL
HCT VFR BLD CALC: 29.5 % (ref 40.5–52.5)
HEMOGLOBIN: 10.1 G/DL (ref 13.5–17.5)
MCH RBC QN AUTO: 31.2 PG (ref 26–34)
MCHC RBC AUTO-ENTMCNC: 34.3 G/DL (ref 31–36)
MCV RBC AUTO: 90.9 FL (ref 80–100)
ORGANISM: ABNORMAL
ORGANISM: ABNORMAL
PDW BLD-RTO: 14.5 % (ref 12.4–15.4)
PERFORMED ON: ABNORMAL
PLATELET # BLD: 315 K/UL (ref 135–450)
PMV BLD AUTO: 9.2 FL (ref 5–10.5)
POTASSIUM REFLEX MAGNESIUM: 4.7 MMOL/L (ref 3.5–5.1)
RBC # BLD: 3.24 M/UL (ref 4.2–5.9)
SODIUM BLD-SCNC: 136 MMOL/L (ref 136–145)
WBC # BLD: 14.7 K/UL (ref 4–11)

## 2021-09-03 PROCEDURE — 36415 COLL VENOUS BLD VENIPUNCTURE: CPT

## 2021-09-03 PROCEDURE — 3600000002 HC SURGERY LEVEL 2 BASE: Performed by: PODIATRIST

## 2021-09-03 PROCEDURE — 6360000002 HC RX W HCPCS

## 2021-09-03 PROCEDURE — 2580000003 HC RX 258

## 2021-09-03 PROCEDURE — 80048 BASIC METABOLIC PNL TOTAL CA: CPT

## 2021-09-03 PROCEDURE — 2500000003 HC RX 250 WO HCPCS: Performed by: NURSE PRACTITIONER

## 2021-09-03 PROCEDURE — 2709999900 HC NON-CHARGEABLE SUPPLY: Performed by: PODIATRIST

## 2021-09-03 PROCEDURE — 2500000003 HC RX 250 WO HCPCS: Performed by: PODIATRIST

## 2021-09-03 PROCEDURE — 3700000001 HC ADD 15 MINUTES (ANESTHESIA): Performed by: PODIATRIST

## 2021-09-03 PROCEDURE — 6370000000 HC RX 637 (ALT 250 FOR IP): Performed by: NURSE PRACTITIONER

## 2021-09-03 PROCEDURE — 2580000003 HC RX 258: Performed by: INTERNAL MEDICINE

## 2021-09-03 PROCEDURE — 2500000003 HC RX 250 WO HCPCS

## 2021-09-03 PROCEDURE — 6360000002 HC RX W HCPCS: Performed by: NURSE PRACTITIONER

## 2021-09-03 PROCEDURE — 2700000000 HC OXYGEN THERAPY PER DAY

## 2021-09-03 PROCEDURE — 6360000002 HC RX W HCPCS: Performed by: PODIATRIST

## 2021-09-03 PROCEDURE — 6360000002 HC RX W HCPCS: Performed by: INTERNAL MEDICINE

## 2021-09-03 PROCEDURE — 6370000000 HC RX 637 (ALT 250 FOR IP): Performed by: INTERNAL MEDICINE

## 2021-09-03 PROCEDURE — 7100000000 HC PACU RECOVERY - FIRST 15 MIN: Performed by: PODIATRIST

## 2021-09-03 PROCEDURE — 0Y6N0Z9 DETACHMENT AT LEFT FOOT, PARTIAL 1ST RAY, OPEN APPROACH: ICD-10-PCS | Performed by: INTERNAL MEDICINE

## 2021-09-03 PROCEDURE — 3700000000 HC ANESTHESIA ATTENDED CARE: Performed by: PODIATRIST

## 2021-09-03 PROCEDURE — 7100000001 HC PACU RECOVERY - ADDTL 15 MIN: Performed by: PODIATRIST

## 2021-09-03 PROCEDURE — 94761 N-INVAS EAR/PLS OXIMETRY MLT: CPT

## 2021-09-03 PROCEDURE — 99232 SBSQ HOSP IP/OBS MODERATE 35: CPT | Performed by: INTERNAL MEDICINE

## 2021-09-03 PROCEDURE — 1200000000 HC SEMI PRIVATE

## 2021-09-03 PROCEDURE — 88305 TISSUE EXAM BY PATHOLOGIST: CPT

## 2021-09-03 PROCEDURE — 88311 DECALCIFY TISSUE: CPT

## 2021-09-03 PROCEDURE — 3600000012 HC SURGERY LEVEL 2 ADDTL 15MIN: Performed by: PODIATRIST

## 2021-09-03 PROCEDURE — 85027 COMPLETE CBC AUTOMATED: CPT

## 2021-09-03 PROCEDURE — 2580000003 HC RX 258: Performed by: PODIATRIST

## 2021-09-03 RX ORDER — LIDOCAINE HYDROCHLORIDE 20 MG/ML
INJECTION, SOLUTION EPIDURAL; INFILTRATION; INTRACAUDAL; PERINEURAL PRN
Status: DISCONTINUED | OUTPATIENT
Start: 2021-09-03 | End: 2021-09-03 | Stop reason: SDUPTHER

## 2021-09-03 RX ORDER — DEXAMETHASONE SODIUM PHOSPHATE 4 MG/ML
INJECTION, SOLUTION INTRA-ARTICULAR; INTRALESIONAL; INTRAMUSCULAR; INTRAVENOUS; SOFT TISSUE PRN
Status: DISCONTINUED | OUTPATIENT
Start: 2021-09-03 | End: 2021-09-03 | Stop reason: SDUPTHER

## 2021-09-03 RX ORDER — MORPHINE SULFATE 2 MG/ML
2 INJECTION, SOLUTION INTRAMUSCULAR; INTRAVENOUS EVERY 5 MIN PRN
Status: DISCONTINUED | OUTPATIENT
Start: 2021-09-03 | End: 2021-09-03 | Stop reason: HOSPADM

## 2021-09-03 RX ORDER — MORPHINE SULFATE 2 MG/ML
1 INJECTION, SOLUTION INTRAMUSCULAR; INTRAVENOUS EVERY 5 MIN PRN
Status: DISCONTINUED | OUTPATIENT
Start: 2021-09-03 | End: 2021-09-03 | Stop reason: HOSPADM

## 2021-09-03 RX ORDER — FENTANYL CITRATE 50 UG/ML
INJECTION, SOLUTION INTRAMUSCULAR; INTRAVENOUS PRN
Status: DISCONTINUED | OUTPATIENT
Start: 2021-09-03 | End: 2021-09-03 | Stop reason: SDUPTHER

## 2021-09-03 RX ORDER — PROMETHAZINE HYDROCHLORIDE 25 MG/ML
6.25 INJECTION, SOLUTION INTRAMUSCULAR; INTRAVENOUS
Status: DISCONTINUED | OUTPATIENT
Start: 2021-09-03 | End: 2021-09-03 | Stop reason: HOSPADM

## 2021-09-03 RX ORDER — LABETALOL HYDROCHLORIDE 5 MG/ML
5 INJECTION, SOLUTION INTRAVENOUS EVERY 10 MIN PRN
Status: DISCONTINUED | OUTPATIENT
Start: 2021-09-03 | End: 2021-09-03 | Stop reason: HOSPADM

## 2021-09-03 RX ORDER — OXYCODONE HYDROCHLORIDE AND ACETAMINOPHEN 5; 325 MG/1; MG/1
1 TABLET ORAL PRN
Status: DISCONTINUED | OUTPATIENT
Start: 2021-09-03 | End: 2021-09-03 | Stop reason: HOSPADM

## 2021-09-03 RX ORDER — MEPERIDINE HYDROCHLORIDE 50 MG/ML
12.5 INJECTION INTRAMUSCULAR; INTRAVENOUS; SUBCUTANEOUS EVERY 5 MIN PRN
Status: DISCONTINUED | OUTPATIENT
Start: 2021-09-03 | End: 2021-09-03 | Stop reason: HOSPADM

## 2021-09-03 RX ORDER — VANCOMYCIN HYDROCHLORIDE 1 G/20ML
INJECTION, POWDER, LYOPHILIZED, FOR SOLUTION INTRAVENOUS PRN
Status: DISCONTINUED | OUTPATIENT
Start: 2021-09-03 | End: 2021-09-03 | Stop reason: ALTCHOICE

## 2021-09-03 RX ORDER — PROPOFOL 10 MG/ML
INJECTION, EMULSION INTRAVENOUS PRN
Status: DISCONTINUED | OUTPATIENT
Start: 2021-09-03 | End: 2021-09-03 | Stop reason: SDUPTHER

## 2021-09-03 RX ORDER — ONDANSETRON 2 MG/ML
INJECTION INTRAMUSCULAR; INTRAVENOUS PRN
Status: DISCONTINUED | OUTPATIENT
Start: 2021-09-03 | End: 2021-09-03 | Stop reason: SDUPTHER

## 2021-09-03 RX ORDER — HYDRALAZINE HYDROCHLORIDE 25 MG/1
25 TABLET, FILM COATED ORAL EVERY 8 HOURS SCHEDULED
Status: DISCONTINUED | OUTPATIENT
Start: 2021-09-03 | End: 2021-09-04

## 2021-09-03 RX ORDER — ONDANSETRON 2 MG/ML
4 INJECTION INTRAMUSCULAR; INTRAVENOUS PRN
Status: DISCONTINUED | OUTPATIENT
Start: 2021-09-03 | End: 2021-09-03 | Stop reason: HOSPADM

## 2021-09-03 RX ORDER — HYDRALAZINE HYDROCHLORIDE 20 MG/ML
5 INJECTION INTRAMUSCULAR; INTRAVENOUS EVERY 10 MIN PRN
Status: DISCONTINUED | OUTPATIENT
Start: 2021-09-03 | End: 2021-09-03 | Stop reason: HOSPADM

## 2021-09-03 RX ORDER — OXYCODONE HYDROCHLORIDE AND ACETAMINOPHEN 5; 325 MG/1; MG/1
2 TABLET ORAL PRN
Status: DISCONTINUED | OUTPATIENT
Start: 2021-09-03 | End: 2021-09-03 | Stop reason: HOSPADM

## 2021-09-03 RX ORDER — MIDAZOLAM HYDROCHLORIDE 1 MG/ML
INJECTION INTRAMUSCULAR; INTRAVENOUS PRN
Status: DISCONTINUED | OUTPATIENT
Start: 2021-09-03 | End: 2021-09-03 | Stop reason: SDUPTHER

## 2021-09-03 RX ORDER — SODIUM CHLORIDE, SODIUM LACTATE, POTASSIUM CHLORIDE, CALCIUM CHLORIDE 600; 310; 30; 20 MG/100ML; MG/100ML; MG/100ML; MG/100ML
INJECTION, SOLUTION INTRAVENOUS CONTINUOUS PRN
Status: DISCONTINUED | OUTPATIENT
Start: 2021-09-03 | End: 2021-09-03 | Stop reason: SDUPTHER

## 2021-09-03 RX ORDER — DIPHENHYDRAMINE HYDROCHLORIDE 50 MG/ML
12.5 INJECTION INTRAMUSCULAR; INTRAVENOUS
Status: DISCONTINUED | OUTPATIENT
Start: 2021-09-03 | End: 2021-09-03 | Stop reason: HOSPADM

## 2021-09-03 RX ORDER — PHENYLEPHRINE HCL IN 0.9% NACL 1 MG/10 ML
SYRINGE (ML) INTRAVENOUS PRN
Status: DISCONTINUED | OUTPATIENT
Start: 2021-09-03 | End: 2021-09-03 | Stop reason: SDUPTHER

## 2021-09-03 RX ADMIN — LISINOPRIL 5 MG: 5 TABLET ORAL at 08:03

## 2021-09-03 RX ADMIN — INSULIN LISPRO 1 UNITS: 100 INJECTION, SOLUTION INTRAVENOUS; SUBCUTANEOUS at 22:05

## 2021-09-03 RX ADMIN — PIPERACILLIN AND TAZOBACTAM 3375 MG: 3; .375 INJECTION, POWDER, LYOPHILIZED, FOR SOLUTION INTRAVENOUS at 08:28

## 2021-09-03 RX ADMIN — ATORVASTATIN CALCIUM 40 MG: 40 TABLET, FILM COATED ORAL at 08:03

## 2021-09-03 RX ADMIN — MIDAZOLAM HYDROCHLORIDE 2 MG: 2 INJECTION, SOLUTION INTRAMUSCULAR; INTRAVENOUS at 17:10

## 2021-09-03 RX ADMIN — LABETALOL HYDROCHLORIDE 5 MG: 5 INJECTION INTRAVENOUS at 03:35

## 2021-09-03 RX ADMIN — Medication 1 LOZENGE: at 11:51

## 2021-09-03 RX ADMIN — PIPERACILLIN AND TAZOBACTAM 3375 MG: 3; .375 INJECTION, POWDER, LYOPHILIZED, FOR SOLUTION INTRAVENOUS at 01:11

## 2021-09-03 RX ADMIN — FENTANYL CITRATE 25 MCG: 50 INJECTION INTRAMUSCULAR; INTRAVENOUS at 17:33

## 2021-09-03 RX ADMIN — DEXAMETHASONE SODIUM PHOSPHATE 4 MG: 4 INJECTION, SOLUTION INTRAMUSCULAR; INTRAVENOUS at 17:23

## 2021-09-03 RX ADMIN — Medication 100 MCG: at 17:46

## 2021-09-03 RX ADMIN — INSULIN GLARGINE 20 UNITS: 100 INJECTION, SOLUTION SUBCUTANEOUS at 22:05

## 2021-09-03 RX ADMIN — HYDRALAZINE HYDROCHLORIDE 25 MG: 25 TABLET, FILM COATED ORAL at 14:52

## 2021-09-03 RX ADMIN — ONDANSETRON 4 MG: 2 INJECTION INTRAMUSCULAR; INTRAVENOUS at 17:23

## 2021-09-03 RX ADMIN — ENOXAPARIN SODIUM 40 MG: 40 INJECTION SUBCUTANEOUS at 22:04

## 2021-09-03 RX ADMIN — CHLORPROMAZINE HYDROCHLORIDE 25 MG: 25 TABLET, FILM COATED ORAL at 08:09

## 2021-09-03 RX ADMIN — HYDRALAZINE HYDROCHLORIDE 25 MG: 25 TABLET, FILM COATED ORAL at 22:04

## 2021-09-03 RX ADMIN — PROPOFOL 200 MG: 10 INJECTION, EMULSION INTRAVENOUS at 17:18

## 2021-09-03 RX ADMIN — TAMSULOSIN HYDROCHLORIDE 0.4 MG: 0.4 CAPSULE ORAL at 08:03

## 2021-09-03 RX ADMIN — ACETAMINOPHEN 650 MG: 325 TABLET ORAL at 23:35

## 2021-09-03 RX ADMIN — INSULIN LISPRO 5 UNITS: 100 INJECTION, SOLUTION INTRAVENOUS; SUBCUTANEOUS at 11:51

## 2021-09-03 RX ADMIN — LIDOCAINE HYDROCHLORIDE 100 MG: 20 INJECTION, SOLUTION EPIDURAL; INFILTRATION; INTRACAUDAL; PERINEURAL at 17:18

## 2021-09-03 RX ADMIN — SODIUM CHLORIDE, SODIUM LACTATE, POTASSIUM CHLORIDE, AND CALCIUM CHLORIDE: .6; .31; .03; .02 INJECTION, SOLUTION INTRAVENOUS at 16:21

## 2021-09-03 RX ADMIN — INSULIN LISPRO 4 UNITS: 100 INJECTION, SOLUTION INTRAVENOUS; SUBCUTANEOUS at 08:04

## 2021-09-03 RX ADMIN — PIPERACILLIN AND TAZOBACTAM 3375 MG: 3; .375 INJECTION, POWDER, LYOPHILIZED, FOR SOLUTION INTRAVENOUS at 17:21

## 2021-09-03 RX ADMIN — PIPERACILLIN AND TAZOBACTAM 3375 MG: 3; .375 INJECTION, POWDER, LYOPHILIZED, FOR SOLUTION INTRAVENOUS at 23:36

## 2021-09-03 RX ADMIN — OXYCODONE 5 MG: 5 TABLET ORAL at 11:51

## 2021-09-03 RX ADMIN — PIPERACILLIN AND TAZOBACTAM 3375 MG: 3; .375 INJECTION, POWDER, LYOPHILIZED, FOR SOLUTION INTRAVENOUS at 16:21

## 2021-09-03 RX ADMIN — FENTANYL CITRATE 75 MCG: 50 INJECTION INTRAMUSCULAR; INTRAVENOUS at 17:49

## 2021-09-03 ASSESSMENT — PULMONARY FUNCTION TESTS
PIF_VALUE: 2
PIF_VALUE: 1
PIF_VALUE: 3
PIF_VALUE: 2
PIF_VALUE: 2
PIF_VALUE: 25
PIF_VALUE: 1
PIF_VALUE: 3
PIF_VALUE: 29
PIF_VALUE: 2
PIF_VALUE: 2
PIF_VALUE: 1
PIF_VALUE: 2
PIF_VALUE: 1
PIF_VALUE: 3
PIF_VALUE: 12
PIF_VALUE: 1
PIF_VALUE: 2
PIF_VALUE: 2
PIF_VALUE: 3
PIF_VALUE: 4
PIF_VALUE: 23
PIF_VALUE: 3
PIF_VALUE: 4
PIF_VALUE: 2
PIF_VALUE: 3
PIF_VALUE: 2
PIF_VALUE: 2
PIF_VALUE: 5
PIF_VALUE: 3
PIF_VALUE: 24
PIF_VALUE: 3
PIF_VALUE: 12
PIF_VALUE: 10
PIF_VALUE: 5
PIF_VALUE: 3
PIF_VALUE: 2
PIF_VALUE: 25
PIF_VALUE: 15
PIF_VALUE: 0
PIF_VALUE: 30
PIF_VALUE: 3
PIF_VALUE: 3
PIF_VALUE: 2
PIF_VALUE: 1
PIF_VALUE: 1
PIF_VALUE: 2
PIF_VALUE: 1

## 2021-09-03 ASSESSMENT — PAIN SCALES - GENERAL
PAINLEVEL_OUTOF10: 0
PAINLEVEL_OUTOF10: 0
PAINLEVEL_OUTOF10: 6
PAINLEVEL_OUTOF10: 8
PAINLEVEL_OUTOF10: 0

## 2021-09-03 NOTE — PROGRESS NOTES
Infectious Disease Follow up Notes    CC :   Foot infection      Antibiotics:   Zosyn 3.375 q8    Admit Date:   8/31/2021  Hospital Day: 4    Subjective:   Fever curve seems to be improved,nothing exceeding 100 degrees in the last 24 hours   No specific complaints. Pain minimal.      Objective:     Patient Vitals for the past 8 hrs:   BP Temp Temp src Pulse Resp SpO2   09/03/21 0958 (!) 156/78   80     09/03/21 0745 (!) 187/82 99.9 °F (37.7 °C) Oral 81 18 94 %   09/03/21 0533 (!) 185/85        09/03/21 0459  98.5 °F (36.9 °C)       09/03/21 0446 (!) 178/86        09/03/21 0401 (!) 186/80    19    09/03/21 0352 (!) 186/83   78     09/03/21 0346 (!) 195/89   78     09/03/21 0340 (!) 201/88   79 19 92 %       EXAM:  General:  Alert, oriented, NAD. Obese    HEENT:  NCAT, PERRL, sclera anicteric   LUNGS:  Non-labored breathing   ABD: Soft, NT.  +BS  EXT: L foot dressed. +Odor.   +trace edema leg     SKIN: No acute rash           LINE: PIV site ok        Scheduled Meds:   piperacillin-tazobactam  3,375 mg IntraVENous Q8H    insulin lispro  0-6 Units SubCUTAneous TID WC    insulin lispro  0-3 Units SubCUTAneous Nightly    chlorproMAZINE  25 mg Oral TID    lisinopril  5 mg Oral Daily    enoxaparin  40 mg SubCUTAneous BID    atorvastatin  40 mg Oral Daily    insulin glargine  20 Units SubCUTAneous Nightly    tamsulosin  0.4 mg Oral Daily       Continuous Infusions:   dextrose            Data Review:    Lab Results   Component Value Date    WBC 14.7 (H) 09/03/2021    HGB 10.1 (L) 09/03/2021    HCT 29.5 (L) 09/03/2021    MCV 90.9 09/03/2021     09/03/2021     Lab Results   Component Value Date    CREATININE 1.8 (H) 09/03/2021    BUN 37 (H) 09/03/2021     09/03/2021    K 4.7 09/03/2021     09/03/2021    CO2 19 (L) 09/03/2021       Hepatic Function Panel:   Lab Results   Component Value Date    ALKPHOS 138 08/30/2021    ALT 27 08/30/2021    AST 17 08/30/2021    PROT 7.4 08/30/2021    BILITOT 0.9 08/30/2021    LABALBU 3.6 08/30/2021         MICRO:  8/31 BC x2 NGTD   Surgical culture mod growth MSSA   Staphylococcus aureus   Antibiotic Interpretation CARLOS Status    clindamycin Sensitive <=0.25 mcg/mL     erythromycin Resistant >=8 mcg/mL     oxacillin Sensitive 2 mcg/mL     tetracycline Sensitive <=1 mcg/mL     trimethoprim-sulfamethoxazole Sensitive <=10 mcg/mL           IMAGING:  MRI L foot 9/2/21  Impression   Postsurgical changes related to incision and drainage procedure along the   medial aspect of the 1st digit.  No residual abscess is identified within the   foot.       Adjacent to the surgical defect is an area of mild increased T2 and decreased   T1 signal that is nonspecific in the absence of IV contrast, but suspicious   for early osteomyelitis given the proximity to the surgical wound and known   infectious process.       Additional area of T2 signal hyperintensity with decreased T1 signal along   the metatarsal-sesamoid joints, medial greater than lateral.  These findings   are favored degenerative in nature, with osteomyelitis considered less likely   but difficult to exclude.       Diffuse edema and atrophy of the intrinsic foot musculature, suggesting   sequela of diabetic neuropathy.        Assessment:     Patient Active Problem List    Diagnosis Date Noted    Acute kidney injury superimposed on chronic kidney disease ([address]     Gas gangrene of foot ([address]     Elevated C-reactive protein (CRP)     Osteoarthritis     Pulmonary emphysema (HCC)     Class 1 obesity due to excess calories with body mass index (BMI) of 31.0 to 31.9 in adult     Weight loss counseling, encounter for     Diabetes education, encounter for     Sepsis ([address] 08/31/2021    Abscess of left hand 06/20/2017    Abscess 06/18/2017    PNA (pneumonia) 03/28/2016    Type 2 diabetes mellitus with left diabetic foot infection ([facility] 03/28/2016    Hypertension 03/28/2016    BPH (benign prostatic hyperplasia) 03/28/2016    Adhesive capsulitis of shoulder 11/13/2014       History of DM    Prior L hallux amp     Admitted 8/31/21 with L foot infection at 1st MT head, with fever, sepsis  L foot I&D 8/31/21  MSSA in surgical culture  MRI suggesting OM in the 1st met head     CAMRYN, resolved     NKDA     Plan:   [facility] for repeat I&D, partial 1st ray amp noted   Continuing to monitor fever, WBC trends  Renal function is stable         Discussed with patient/family, all questions answered        Jordon Michael MD  Phone: [phone]   Fax : [phone]

## 2021-09-03 NOTE — CARE COORDINATION
CM notes DPM note Plan for staged I&D today; partial 1st ray amputation with possible delayed primary closure. Will follow post-op course for DCP needs.  Carmencita Sterling RN

## 2021-09-03 NOTE — OP NOTE
Operative Note      Patient: Christos Hurst  YOB: 1960  MRN: [id number]    Date of Procedure: 9/3/2021    Pre-Op Diagnosis: Gas gangrene left foot    Post-Op Diagnosis: Same       Procedure(s):  STAGED INCISION AND DEBRIDEMENT LEFT FOOT WITH PARTIAL FIRST RAY AMPUTATION    Surgeon(s):  Ramírez Lancaster DPM    Assistant:   Surgical Assistant: Steve Booth    Anesthesia: Choice    Estimated Blood Loss (mL): less than 50     Complications: None    Specimens:   ID Type Source Tests Collected by Time Destination   A : LEFT FOOT FIRST METATARSAL Specimen Foot SURGICAL PATHOLOGY Ramírez Lancaster DPM 9/3/2021 1736        Implants:  * No implants in log *      Drains: * No LDAs found *    Findings: Soft 1st met head. No further deep space abscess identified. Necrotic medial soft tissue flap. Detailed Description of Procedure:   Patient returns to the OR for staged I&D after he presented to the hospital earlier in the week with gas gangrene. After hearing risks, benefits, potential complications, and alternatives, the patient elected to move forward with the procedure. Consent was signed. Left foot was signed in pre-op hold. Patient was brought into the OR and placed on the OR table in supine position. Extremity was scrubbed, prepped, and draped in the usual sterile manner. No tourniquet used. Using a rongeur, any liqufactive or devitalized soft tissue remianing was excised until healthy bleeding tissue was noted. The medial soft tissue flap from the previous surgery was mostly necrotic today wityh no bleeding tissue noted. This flap was excised in its entirety back to a healthy viable edge. From here, a sagittal saw was used to cut and remove the 1st metatarsal head. The distal plantar aspect of the head was soft. The head of the bone was sent to pathology. The sesamoids were removed in their entirety.      Next, pressure was applied to the foot in all directions, and worked towards the surgical site. No purulence was expressed. The site was irrigated with copious saline solution with gentamycin infused. After irrigation, new sterile gloves and draping around the foot was used. Any bleeders were ligated or cauterized as needed. 10 mL of 0.5% Marcaine plain was injected around the surgical site. The site was filled with vancomycin powder. Previous surgical culture was showing Staph aureus. Site then covered with betadine soaked adaptic, packed with sterile 4x4s, ABD pad, webril, and ACE bandage. Patient tolerated anesthesia well. He will go to the PACU for a period of post-operative monitoring, and then back to his room for further recovery. No further surgery planned for now. Patient will need a wound vac to help fill defect secondarily. I will see patient within next 24 hours to change the dressing and evaluate the site. Counts were correct at the end of the procedure.     Electronically signed by Jennifer Tee DPM on 9/3/2021 at 6:17 PM

## 2021-09-03 NOTE — ANESTHESIA POSTPROCEDURE EVALUATION
Department of Anesthesiology  Postprocedure Note    Patient: Jose Antonio Shetty  MRN: [id number]  YOB: 1960  Date of evaluation: 9/3/2021  Time:  7:08 PM     Procedure Summary     Date: 09/03/21 Room / Location: [address] / [facility]    Anesthesia Start: 1712 Anesthesia Stop: 0200    Procedure: STAGED INCISION AND DEBRIDEMENT LEFT FOOT WITH PARTIAL FIRST RAY AMPUTATION (Left Foot) Diagnosis: (cellulitis left foot)    Surgeons: Alisha Christian DPM Responsible Provider: Amrit Mart MD    Anesthesia Type: general ASA Status: 3          Anesthesia Type: general    Jon Phase I: Jon Score: 9    Jon Phase II:      Last vitals: Reviewed and per EMR flowsheets.        Anesthesia Post Evaluation    Comments: Postoperative Anesthesia Note    Name:    Jose Antonio Shetty  MRN:      [id number]    Patient Vitals in the past 12 hrs:  09/03/21 1905, BP:(!) 172/87, Temp:97.6 °F (36.4 °C), Temp src:Oral, Pulse:67, Resp:20, SpO2:93 %  09/03/21 1840, BP:(!) 146/76, Pulse:68, Resp:23, SpO2:92 %  09/03/21 1835, BP:130/71, Pulse:69, Resp:12, SpO2:93 %  09/03/21 1830, BP:135/65, Temp:97.9 °F (36.6 °C), Temp src:Temporal, Pulse:81, Resp:22, SpO2:95 %  09/03/21 1825, BP:134/69, Pulse:70, Resp:12, SpO2:95 %  09/03/21 1820, Pulse:71, Resp:12, SpO2:94 %  09/03/21 1815, BP:122/66, Pulse:70, Resp:12, SpO2:93 %  09/03/21 1810, BP:121/65, Temp:97.5 °F (36.4 °C), Temp src:Temporal, Pulse:70, Resp:12, SpO2:94 %  09/03/21 1432, BP:(!) 169/80, Temp:98.7 °F (37.1 °C), Pulse:79, SpO2:95 %  09/03/21 1145, BP:(!) 179/90, Pulse:74  09/03/21 0958, BP:(!) 156/78, Pulse:80  09/03/21 0745, BP:(!) 187/82, Temp:99.9 °F (37.7 °C), Temp src:Oral, Pulse:81, Resp:18, SpO2:94 %     LABS:    CBC  Lab Results       Component                Value               Date/Time                  WBC                      14.7 (H)            09/03/2021 05:14 AM        HGB                      10.1 (L)            09/03/2021 05:14 AM        HCT 29.5 (L)            09/03/2021 05:14 AM        PLT                      315                 09/03/2021 05:14 AM   RENAL  Lab Results       Component                Value               Date/Time                  NA                       136                 09/03/2021 05:14 AM        K                        4.7                 09/03/2021 05:14 AM        CL                       104                 09/03/2021 05:14 AM        CO2                      19 (L)              09/03/2021 05:14 AM        BUN                      37 (H)              09/03/2021 05:14 AM        CREATININE               1.8 (H)             09/03/2021 05:14 AM        GLUCOSE                  351 (H)             09/03/2021 05:14 AM   COAGS  Lab Results       Component                Value               Date/Time                  PROTIME                  10.7                06/18/2017 10:17 PM        INR                      0.95                06/18/2017 10:17 PM        APTT                     33.6                04/06/2016 06:48 AM     Intake & Output:  @04MZXM@    Nausea & Vomiting:  No    Level of Consciousness:  Awake    Pain Assessment:  Adequate analgesia    Anesthesia Complications:  No apparent anesthetic complications    SUMMARY      Vital signs stable  OK to discharge from Stage I post anesthesia care.   Care transferred from Anesthesiology department on discharge from perioperative area

## 2021-09-03 NOTE — PROGRESS NOTES
Pt arrived back to floor from PACU. Pt alert, VSS, no complaints of pain. No further needs at this time.

## 2021-09-03 NOTE — INTERVAL H&P NOTE
Update History & Physical    The patient's History and Physical of August 31, 2021 was reviewed with the patient and I examined the patient. There was no change. The surgical site was confirmed by the patient and me. Plan: The risks, benefits, expected outcome, and alternative to the recommended procedure have been discussed with the patient. Patient understands and wants to proceed with the procedure.      Electronically signed by Zachary Pinead DPM on 9/3/2021 at 4:48 PM

## 2021-09-03 NOTE — PROGRESS NOTES
Patient blood sugar 292. Anaesthesiologist notified. Okay to have RN cover blood sugar on the floor. Mary Ann Manzanares RN

## 2021-09-03 NOTE — PROGRESS NOTES
Hospitalist Progress Note      PCP: Mariah Virgen MD    Date of Admission: 8/31/2021    Chief Complaint:    Foot Laceration       pt hit left foot on his rocking chair. states he has a \"gross\" wound on left foot.  Wound Infection       open wound on left foot red and swollen. Hospital Course: \"  59 y.o. male who presented to [facility] with past medical history of hypertension, osteoarthritis, type 2 diabetes, class I obesity presented to the ED with chief complaint of left foot pain and change.     Patient reported that he has history of diabetes takes medication time noted that his been having fever today but noted it started worsening since Thursday where his wife was changing his socks that looked normal and then patient noted last night he kicked a chair and caused a lot of pain left foot progressively worsening exacerbated with palpation no alleviating factor associated with redness in the foot and high fever with T-max being 102. reported when changing socks she noted that the changes significant he worsening since she last change his socks on Sunday. \"     The pt was admitted with Podiatry consult and taken to the OR for ID.      Subjective: EMR and notes reviewed pt seen and examined. No acute issues.  Awaiting repeat ID     Medications:  Reviewed    Infusion Medications    dextrose       Scheduled Medications    hydrALAZINE  25 mg Oral 3 times per day    piperacillin-tazobactam  3,375 mg IntraVENous Q8H    insulin lispro  0-6 Units SubCUTAneous TID WC    insulin lispro  0-3 Units SubCUTAneous Nightly    lisinopril  5 mg Oral Daily    enoxaparin  40 mg SubCUTAneous BID    atorvastatin  40 mg Oral Daily    insulin glargine  20 Units SubCUTAneous Nightly    tamsulosin  0.4 mg Oral Daily     PRN Meds: labetalol, acetaminophen, glucose, dextrose, glucagon (rDNA), dextrose, oxyCODONE, benzocaine-menthol      Intake/Output Summary (Last 24 hours) at 9/3/2021 1229  Last data filed at 9/3/2021 0609  Gross per 24 hour   Intake 340 ml   Output    Net 340 ml       Physical Exam Performed:    BP (!) 179/90   Pulse 74   Temp 99.9 °F (37.7 °C) (Oral)   Resp 18   Ht 6' 1\" (1.854 m)   Wt 240 lb (108.9 kg)   SpO2 94%   BMI 31.66 kg/m²     General appearance: No apparent distress, appears stated age and cooperative. HEENT: Pupils equal, round, and reactive to light. Conjunctivae/corneas clear. Neck: Supple, with full range of motion. No jugular venous distention. Trachea midline. Respiratory:  Normal respiratory effort. Clear to auscultation, bilaterally without Rales/Wheezes/Rhonchi. Cardiovascular: Regular rate and rhythm with normal S1/S2 without murmurs, rubs or gallops. Abdomen: Soft, non-tender, non-distended with normal bowel sounds. Musculoskeletal: No clubbing, cyanosis or edema bilaterally. Left foot with surgical drsg in place   Skin: Skin color, texture, turgor normal.  No rashes or lesions. Neurologic:  Neurovascularly intact without any focal sensory/motor deficits. Cranial nerves: II-XII intact, grossly non-focal.  Psychiatric: Alert and oriented, thought content appropriate, normal insight  Capillary Refill: Brisk,3 seconds, normal   Peripheral Pulses: +2 palpable, equal bilaterally       Labs:   Recent Labs     09/01/21  1253 09/02/21  0610 09/03/21  0514   WBC 14.9* 13.7* 14.7*   HGB 10.5* 10.1* 10.1*   HCT 31.0* 29.9* 29.5*    276 315     Recent Labs     09/01/21  1253 09/02/21  0610 09/03/21  0514    137 136   K 4.6 4.9 4.7    105 104   CO2 19* 19* 19*   BUN 37* 40* 37*   CREATININE 1.9* 2.0* 1.8*   CALCIUM 9.2 9.2 9.3     No results for input(s): AST, ALT, BILIDIR, BILITOT, ALKPHOS in the last 72 hours. No results for input(s): INR in the last 72 hours. No results for input(s): [place]alo Gladys in the last 72 hours.     Urinalysis:      Lab Results   Component Value Date    NITRU Negative 08/31/2021    WBCUA 0-2 08/31/2021    BACTERIA 2+ 08/31/2021    RBCUA 0-2 08/31/2021    BLOODU TRACE-INTACT 08/31/2021    SPECGRAV 1.025 08/31/2021    GLUCOSEU 100 08/31/2021       Radiology:  MRI FOOT LEFT WO CONTRAST   Final Result   Postsurgical changes related to incision and drainage procedure along the   medial aspect of the 1st digit. No residual abscess is identified within the   foot. Adjacent to the surgical defect is an area of subtle, mild increased T2 and   decreased T1 signal that is nonspecific in the absence of IV contrast, but   suspicious for early osteomyelitis given the proximity to the surgical wound   and known infectious process. Additional area of T2 signal hyperintensity with decreased T1 signal along   the metatarsal-sesamoid joints, medial greater than lateral.  These findings   are favored degenerative in nature, with osteomyelitis considered less likely   but difficult to exclude. Diffuse edema and atrophy of the intrinsic foot musculature, suggesting   sequela of diabetic neuropathy. LASER SKIN PERFUSION PRESSURE STUDY   Final Result      XR FOOT LEFT (MIN 3 VIEWS)   Final Result   Findings as above are likely postoperative. In the absence of interval   intervention necrotizing infection must be excluded. XR TIBIA FIBULA LEFT (2 VIEWS)   Final Result   No acute osseous abnormality of the left tib-fib. No soft tissue gas         VL DUP LOWER EXTREMITY ARTERIES BILATERAL   Final Result      XR FOOT LEFT (MIN 3 VIEWS)   Final Result   Soft tissue swelling with soft tissue gas adjacent to the head of the 1st   metatarsal.  No convincing plain film evidence of osteomyelitis. No acute fracture identified. Probable nonacute corner fracture involving   the base of the 2nd proximal phalanx.                  Assessment/Plan:    Active Hospital Problems    Diagnosis     Acute kidney injury superimposed on chronic kidney disease ([address] [N17.9, N18.9]     Gas gangrene of foot ([address] [A48.0]     Elevated C-reactive protein (CRP) [R79.82]     Osteoarthritis [M19.90]     Pulmonary emphysema (HCC) [J43.9]     Class 1 obesity due to excess calories with body mass index (BMI) of 31.0 to 31.9 in adult [E66.09, Z68.31]     Weight loss counseling, encounter for [Z71.3]     Diabetes education, encounter for [Z71.89]     Sepsis ([facility] [A41.9]     Type 2 diabetes mellitus with left diabetic foot infection ([facility] [C50.569, L08.9]      Sepsis POA in the setting of left gangrene foot/DM foot   - met criteria with fever, leucocytosis and CAMRYN  - lactic was 1.7   - pan cultures and empiric abx started     Gas Gangrene/ DM foot   -  Pod consulted   - S/P ID and cultures sent pre sanders GPCStaphylococcus aureus   - IV ABX   - ID consult -   Atrial duplex and MRI reviewed No need for vascular intervention   - trend and follow labs   - WB status per POD, suspect will need PT/OT when WB can be established     DM - last AiC 4/26 7.8  - at home mgt combo of Trulicity and metformin   - here will use basal bolus with SSI, Carb control diet   - fSBS ACHS     HTN- poorly controlled- home meds not previously ordered will reorder meds     HLD- continue statin     Neuropathy - int he setting DM    BPH- cont flomax     Morbid Obesity - BMI 31 Complicating assessment and treatment. Placing patient at risk for multiple co-morbidities as well as early death and contributing to the patient's presentation. Counseled on weight loss. DVT Prophylaxis: lovenox  Diet: ADULT DIET; Regular  Adult Oral Nutrition Supplement;  Low Calorie/High Protein Oral Supplement  Code Status: Prior    PT/OT Eval Status: will need WB clarification from [address] [place] - likely here several days     Ellen Medrano, APRN - CNP

## 2021-09-03 NOTE — ANESTHESIA PRE PROCEDURE
Department of Anesthesiology  Preprocedure Note       Name:  Elton Walton   Age:  59 y.o.  :  1960                                          MRN:  [id number]         Date:  9/3/2021      Surgeon: Cassie Holbrook):  Tony Ruvalcaba DPM    Procedure: Procedure(s):  STAGED INCISION AND DEBRIDEMENT LEFT FOOT WITH PARTIAL FIRST RAY AMPUTATION    Medications prior to admission:   Prior to Admission medications    Medication Sig Start Date End Date Taking? Authorizing Provider   lisinopril (PRINIVIL;ZESTRIL) 10 MG tablet Take 5 mg by mouth daily   Yes Historical Provider, MD   Dulaglutide (TRULICITY) 1.5 OX/1.6GU SOPN Inject 1.5 mg into the skin once a week   Yes Historical Provider, MD   atorvastatin (LIPITOR) 40 MG tablet Take 40 mg by mouth daily   Yes Historical Provider, MD   insulin glargine (LANTUS) 100 UNIT/ML injection vial Inject 35 Units into the skin nightly  Patient taking differently: Inject 45 Units into the skin nightly  16  Yes Amanda Valle MD   Omega-3 Fatty Acids (FISH OIL) 1000 MG CAPS Take 3,000 mg by mouth nightly. Yes Historical Provider, MD   metFORMIN (GLUCOPHAGE) 500 MG tablet TAKE TWO TABLETS BY MOUTH TWICE A DAY 10/7/13  Yes Historical Provider, MD   Multiple Vitamins-Minerals (MULTIVITAMIN PO) Take  by mouth daily. Yes Historical Provider, MD   tamsulosin (FLOMAX) 0.4 MG capsule Take 0.4 mg by mouth daily.  12  Yes Historical Provider, MD       Current medications:    Current Facility-Administered Medications   Medication Dose Route Frequency Provider Last Rate Last Admin    hydrALAZINE (APRESOLINE) tablet 25 mg  25 mg Oral 3 times per day SHAILESH Mendez - CNP   25 mg at 21 1452    labetalol (NORMODYNE;TRANDATE) injection 5 mg  5 mg IntraVENous Q6H PRN SHAILESH Mendez - CNP   5 mg at 21 0335    piperacillin-tazobactam (ZOSYN) 3,375 mg in dextrose 5 % 50 mL IVPB extended infusion (mini-bag)  3,375 mg IntraVENous Maria Del Carmen Kincaid MD   Stopped at 09/03/21 1310    acetaminophen (TYLENOL) tablet 650 mg  650 mg Oral Q4H PRN Deysi Sky, APRN - CNP   650 mg at 09/02/21 1756    glucose (GLUTOSE) 40 % oral gel 15 g  15 g Oral PRN Deysi Sky, APRN - CNP        dextrose 50 % IV solution  12.5 g IntraVENous PRN Deysi Sky, APRN - CNP        glucagon (rDNA) injection 1 mg  1 mg IntraMUSCular PRN Deysi Sky, APRN - CNP        dextrose 5 % solution  100 mL/hr IntraVENous PRN Deysi Sky, APRN - CNP        insulin lispro (HUMALOG) injection vial 0-6 Units  0-6 Units SubCUTAneous TID WC Deysi Sky, APRN - CNP   5 Units at 09/03/21 1151    insulin lispro (HUMALOG) injection vial 0-3 Units  0-3 Units SubCUTAneous Nightly Deysi Sky, APRN - CNP   3 Units at 09/02/21 2026    lisinopril (PRINIVIL;ZESTRIL) tablet 5 mg  5 mg Oral Daily Deysi Sky APRN - CNP   5 mg at 09/03/21 0803    enoxaparin (LOVENOX) injection 40 mg  40 mg SubCUTAneous BID Deysi Sky, APRN - CNP   40 mg at 09/02/21 2024    oxyCODONE (ROXICODONE) immediate release tablet 5 mg  5 mg Oral Q4H PRN Jasminmad A Domo, DO   5 mg at 09/03/21 1151    benzocaine-menthol (CEPACOL SORE THROAT) lozenge 1 lozenge  1 lozenge Oral Q2H PRN Jasminmad CLARIBEL Domo, DO   1 lozenge at 09/03/21 1151    atorvastatin (LIPITOR) tablet 40 mg  40 mg Oral Daily Ahmad A Domo, DO   40 mg at 09/03/21 0803    insulin glargine (LANTUS) injection vial 20 Units  20 Units SubCUTAneous Nightly Ahmad A Domo, DO   20 Units at 09/02/21 2025    tamsulosin (FLOMAX) capsule 0.4 mg  0.4 mg Oral Daily Ahmad A Domo, DO   0.4 mg at 09/03/21 0803       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    PNA (pneumonia) J18.9    Type 2 diabetes mellitus with left diabetic foot infection ([facility] E11.628, L08.9    Hypertension I10    BPH (benign prostatic hyperplasia) N40.0    Abscess L02.91    Abscess of left hand L02.512    Sepsis ([address] A41.9    Acute kidney injury superimposed on chronic kidney disease (HCC) N17.9, N18.9    Gas gangrene of foot (HCC) 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    Elevated C-reactive protein (CRP) R79.82    Osteoarthritis M19.90    Pulmonary emphysema (HCC) J43.9    Class 1 obesity due to excess calories with body mass index (BMI) of 31.0 to 31.9 in adult E66.09, Z68.31    Weight loss counseling, encounter for Z71.3    Diabetes education, encounter for Z71.89       Past Medical History:        Diagnosis Date    Arthritis     Chronic low back pain     Dental bridge present     upper    Diabetes mellitus ([address]     Emphysema of lung ([address]     Fractures     Hypertension     Neuropathy     Osteoarthritis     Prostate troubles        Past Surgical History:        Procedure Laterality Date    FOOT DEBRIDEMENT Left 8/31/2021    LEFT FOOT DEBRIDEMENT INCISION AND DRAINAGE performed by Pam Preciado DPM at [address] [place] [address] HAND SURGERY Left 06/19/2017    LEFT HAND DEBRIDEMENT INCISION AND DRAINAGE    NECK SURGERY      30s year    TOE AMPUTATION Left 2009    WRIST FRACTURE SURGERY         Social History:    Social History     Tobacco Use    Smoking status: Never Smoker    Smokeless tobacco: Never Used   Substance Use Topics    Alcohol use: No     Alcohol/week: 0.0 standard drinks                                Counseling given: Not Answered      Vital Signs (Current):   Vitals:    09/03/21 0745 09/03/21 0958 09/03/21 1145 09/03/21 1432   BP: (!) 187/82 (!) 156/78 (!) 179/90 (!) 169/80   Pulse: 81 80 74 79   Resp: 18      Temp: 99.9 °F (37.7 °C)   98.7 °F (37.1 °C)   TempSrc: Oral      SpO2: 94%   95%   Weight:       Height:                                                  BP Readings from Last 3 Encounters:   09/03/21 (!) 169/80   08/31/21 (!) 87/51   08/30/21 (!) 113/57       NPO Status:                                                                                 BMI:   Wt Readings from Last 3 Encounters:   08/31/21 240 lb (108.9 kg)   08/30/21 240 lb (108.9 kg)   08/10/17 264 lb 15.9 oz (120.2 kg)     Body mass index is 31.66 kg/m².     CBC:   Lab Results   Component Value Date    WBC 14.7 09/03/2021    RBC 3.24 09/03/2021    HGB 10.1 09/03/2021    HCT 29.5 09/03/2021    MCV 90.9 09/03/2021    RDW 14.5 09/03/2021     09/03/2021       CMP:   Lab Results   Component Value Date     09/03/2021    K 4.7 09/03/2021     09/03/2021    CO2 19 09/03/2021    BUN 37 09/03/2021    CREATININE 1.8 09/03/2021    GFRAA 46 09/03/2021    AGRATIO 0.9 08/30/2021    LABGLOM 38 09/03/2021    GLUCOSE 351 09/03/2021    PROT 7.4 08/30/2021    CALCIUM 9.3 09/03/2021    BILITOT 0.9 08/30/2021    ALKPHOS 138 08/30/2021    AST 17 08/30/2021    ALT 27 08/30/2021       POC Tests:   Recent Labs     09/03/21  1602   POCGLU 308*       Coags:   Lab Results   Component Value Date    PROTIME 10.7 06/18/2017    INR 0.95 06/18/2017    APTT 33.6 04/06/2016       HCG (If Applicable): No results found for: PREGTESTUR, PREGSERUM, HCG, HCGQUANT     ABGs:   Lab Results   Component Value Date    PHART 7.358 03/31/2016    PO2ART 65.2 03/31/2016    KCA9DSQ 37.2 03/31/2016    BRO5PZX 20.4 03/31/2016    BEART -4.4 03/31/2016    E3RNCVAM 92.1 03/31/2016        Type & Screen (If Applicable):  No results found for: LABABO, LABRH    Drug/Infectious Status (If Applicable):  No results found for: HIV, HEPCAB    COVID-19 Screening (If Applicable):   Lab Results   Component Value Date    COVID19 Not Detected 08/30/2021           Anesthesia Evaluation  Patient summary reviewed and Nursing notes reviewed no history of anesthetic complications:   Airway: Mallampati: III     Neck ROM: full   Dental:          Pulmonary:Negative Pulmonary ROS and normal exam    (+) pneumonia:  COPD:                             Cardiovascular:Negative CV ROS    (+) hypertension:,                   Neuro/Psych:   Negative Neuro/Psych ROS GI/Hepatic/Renal: Neg GI/Hepatic/Renal ROS       (-) hiatal hernia and GERD       Endo/Other: Negative Endo/Other ROS   (+) Diabetes, : arthritis:., .                 Abdominal:             Vascular: Other Findings:             Anesthesia Plan      general     ASA 3     (I discussed with the patient the risks and benefits of PIV, general anesthesia, IV Narcotics, PACU. All questions were answered the patient agrees with the plan and wishes to proceed.  )  Induction: intravenous. Pre-Operative Diagnosis: Cellulitis of left foot [L03.116]; Sepsis ([address] [A41.9]; Acute renal failure, unspecified acute renal failure type ([address] [N17.9]    59 y.o.   BMI:  Body mass index is 31.66 kg/m².      Vitals:    09/03/21 0745 09/03/21 0958 09/03/21 1145 09/03/21 1432   BP: (!) 187/82 (!) 156/78 (!) 179/90 (!) 169/80   Pulse: 81 80 74 79   Resp: 18      Temp: 99.9 °F (37.7 °C)   98.7 °F (37.1 °C)   TempSrc: Oral      SpO2: 94%   95%   Weight:       Height:           No Known Allergies    Social History     Tobacco Use    Smoking status: Never Smoker    Smokeless tobacco: Never Used   Substance Use Topics    Alcohol use: No     Alcohol/week: 0.0 standard drinks       LABS:    CBC  Lab Results   Component Value Date/Time    WBC 14.7 (H) 09/03/2021 05:14 AM    HGB 10.1 (L) 09/03/2021 05:14 AM    HCT 29.5 (L) 09/03/2021 05:14 AM     09/03/2021 05:14 AM     RENAL  Lab Results   Component Value Date/Time     09/03/2021 05:14 AM    K 4.7 09/03/2021 05:14 AM     09/03/2021 05:14 AM    CO2 19 (L) 09/03/2021 05:14 AM    BUN 37 (H) 09/03/2021 05:14 AM    CREATININE 1.8 (H) 09/03/2021 05:14 AM    GLUCOSE 351 (H) 09/03/2021 05:14 AM     COAGS  Lab Results   Component Value Date/Time    PROTIME 10.7 06/18/2017 10:17 PM    INR 0.95 06/18/2017 10:17 PM    APTT 33.6 04/06/2016 06:48 AM         Nicol Jordan MD   9/3/2021

## 2021-09-03 NOTE — PROGRESS NOTES
Patient admitted from OR to PACU. Bedside report received. Patient immediately hooked up to heart monitor. Neeraj Hernandez RN

## 2021-09-03 NOTE — BRIEF OP NOTE
Brief Postoperative Note      Patient: Gayle Vallejo  YOB: 1960  MRN: [id number]    Date of Procedure: 9/3/2021    Pre-Op Diagnosis: Gas gangrene left foot    Post-Op Diagnosis: Same       Procedure(s):  STAGED INCISION AND DEBRIDEMENT LEFT FOOT WITH PARTIAL FIRST RAY AMPUTATION    Surgeon(s):  Tyron Bustamante DPM    Assistant:  Surgical Assistant: Karen Abernathy    Anesthesia: Choice    Estimated Blood Loss (mL): less than 50     Complications: None    Specimens:   ID Type Source Tests Collected by Time Destination   A : LEFT FOOT FIRST METATARSAL Specimen Foot SURGICAL PATHOLOGY Tyron Bustamante DPM 9/3/2021 1736        Implants:  * No implants in log *      Drains: * No LDAs found *    Findings: Soft 1st metatarsal head; sent to pathology. No further deep space abscess encountered, but moderate cellulitis dorsal foot still present. Medial flap from previous incision was again necrotic and excised in its entirety. Site was left open to continue to drain. Plan on wound vac for secondary healing. Continue IV ABX.     Electronically signed by Tyron Bustamante DPM on 9/3/2021 at 5:59 PM

## 2021-09-04 ENCOUNTER — APPOINTMENT (OUTPATIENT)
Dept: GENERAL RADIOLOGY | Age: 61
DRG: 853 | End: 2021-09-04
Payer: MEDICARE

## 2021-09-04 LAB
ANION GAP SERPL CALCULATED.3IONS-SCNC: 13 MMOL/L (ref 3–16)
BLOOD CULTURE, ROUTINE: NORMAL
BUN BLDV-MCNC: 41 MG/DL (ref 7–20)
CALCIUM SERPL-MCNC: 9.1 MG/DL (ref 8.3–10.6)
CHLORIDE BLD-SCNC: 102 MMOL/L (ref 99–110)
CO2: 19 MMOL/L (ref 21–32)
CREAT SERPL-MCNC: 1.7 MG/DL (ref 0.8–1.3)
CULTURE, BLOOD 2: NORMAL
GFR AFRICAN AMERICAN: 49
GFR NON-AFRICAN AMERICAN: 41
GLUCOSE BLD-MCNC: 340 MG/DL (ref 70–99)
GLUCOSE BLD-MCNC: 371 MG/DL (ref 70–99)
GLUCOSE BLD-MCNC: 382 MG/DL (ref 70–99)
GLUCOSE BLD-MCNC: 429 MG/DL (ref 70–99)
GLUCOSE BLD-MCNC: 461 MG/DL (ref 70–99)
HCT VFR BLD CALC: 29.4 % (ref 40.5–52.5)
HEMOGLOBIN: 10.1 G/DL (ref 13.5–17.5)
MCH RBC QN AUTO: 31.5 PG (ref 26–34)
MCHC RBC AUTO-ENTMCNC: 34.3 G/DL (ref 31–36)
MCV RBC AUTO: 92 FL (ref 80–100)
PDW BLD-RTO: 14.4 % (ref 12.4–15.4)
PERFORMED ON: ABNORMAL
PLATELET # BLD: 349 K/UL (ref 135–450)
PMV BLD AUTO: 9.5 FL (ref 5–10.5)
POTASSIUM REFLEX MAGNESIUM: 5.1 MMOL/L (ref 3.5–5.1)
RBC # BLD: 3.2 M/UL (ref 4.2–5.9)
SODIUM BLD-SCNC: 134 MMOL/L (ref 136–145)
WBC # BLD: 14.9 K/UL (ref 4–11)

## 2021-09-04 PROCEDURE — 97162 PT EVAL MOD COMPLEX 30 MIN: CPT

## 2021-09-04 PROCEDURE — 6370000000 HC RX 637 (ALT 250 FOR IP): Performed by: INTERNAL MEDICINE

## 2021-09-04 PROCEDURE — 99223 1ST HOSP IP/OBS HIGH 75: CPT | Performed by: HOSPITALIST

## 2021-09-04 PROCEDURE — 73630 X-RAY EXAM OF FOOT: CPT

## 2021-09-04 PROCEDURE — 97116 GAIT TRAINING THERAPY: CPT

## 2021-09-04 PROCEDURE — 6370000000 HC RX 637 (ALT 250 FOR IP): Performed by: HOSPITALIST

## 2021-09-04 PROCEDURE — 6360000002 HC RX W HCPCS: Performed by: INTERNAL MEDICINE

## 2021-09-04 PROCEDURE — 97530 THERAPEUTIC ACTIVITIES: CPT

## 2021-09-04 PROCEDURE — 85027 COMPLETE CBC AUTOMATED: CPT

## 2021-09-04 PROCEDURE — C1751 CATH, INF, PER/CENT/MIDLINE: HCPCS

## 2021-09-04 PROCEDURE — 76937 US GUIDE VASCULAR ACCESS: CPT

## 2021-09-04 PROCEDURE — 6370000000 HC RX 637 (ALT 250 FOR IP): Performed by: NURSE PRACTITIONER

## 2021-09-04 PROCEDURE — 80048 BASIC METABOLIC PNL TOTAL CA: CPT

## 2021-09-04 PROCEDURE — 97535 SELF CARE MNGMENT TRAINING: CPT

## 2021-09-04 PROCEDURE — 2580000003 HC RX 258: Performed by: NURSE PRACTITIONER

## 2021-09-04 PROCEDURE — 1200000000 HC SEMI PRIVATE

## 2021-09-04 PROCEDURE — 6360000002 HC RX W HCPCS: Performed by: NURSE PRACTITIONER

## 2021-09-04 PROCEDURE — 36415 COLL VENOUS BLD VENIPUNCTURE: CPT

## 2021-09-04 PROCEDURE — 97110 THERAPEUTIC EXERCISES: CPT

## 2021-09-04 PROCEDURE — 2580000003 HC RX 258: Performed by: INTERNAL MEDICINE

## 2021-09-04 PROCEDURE — 97165 OT EVAL LOW COMPLEX 30 MIN: CPT

## 2021-09-04 RX ORDER — NIFEDIPINE 30 MG/1
30 TABLET, EXTENDED RELEASE ORAL DAILY
Status: DISCONTINUED | OUTPATIENT
Start: 2021-09-04 | End: 2021-09-05

## 2021-09-04 RX ORDER — CARVEDILOL 6.25 MG/1
6.25 TABLET ORAL 2 TIMES DAILY WITH MEALS
Status: DISCONTINUED | OUTPATIENT
Start: 2021-09-04 | End: 2021-09-08 | Stop reason: HOSPADM

## 2021-09-04 RX ORDER — SODIUM CHLORIDE 9 MG/ML
25 INJECTION, SOLUTION INTRAVENOUS PRN
Status: DISCONTINUED | OUTPATIENT
Start: 2021-09-04 | End: 2021-09-08 | Stop reason: HOSPADM

## 2021-09-04 RX ORDER — SODIUM CHLORIDE 0.9 % (FLUSH) 0.9 %
5-40 SYRINGE (ML) INJECTION EVERY 12 HOURS SCHEDULED
Status: DISCONTINUED | OUTPATIENT
Start: 2021-09-04 | End: 2021-09-08 | Stop reason: HOSPADM

## 2021-09-04 RX ORDER — HYDRALAZINE HYDROCHLORIDE 50 MG/1
50 TABLET, FILM COATED ORAL EVERY 8 HOURS SCHEDULED
Status: DISCONTINUED | OUTPATIENT
Start: 2021-09-04 | End: 2021-09-06

## 2021-09-04 RX ORDER — LIDOCAINE HYDROCHLORIDE 10 MG/ML
5 INJECTION, SOLUTION INFILTRATION; PERINEURAL ONCE
Status: DISCONTINUED | OUTPATIENT
Start: 2021-09-04 | End: 2021-09-08 | Stop reason: HOSPADM

## 2021-09-04 RX ORDER — SODIUM CHLORIDE 0.9 % (FLUSH) 0.9 %
5-40 SYRINGE (ML) INJECTION PRN
Status: DISCONTINUED | OUTPATIENT
Start: 2021-09-04 | End: 2021-09-08 | Stop reason: HOSPADM

## 2021-09-04 RX ADMIN — ENOXAPARIN SODIUM 40 MG: 40 INJECTION SUBCUTANEOUS at 21:30

## 2021-09-04 RX ADMIN — PIPERACILLIN AND TAZOBACTAM 3375 MG: 3; .375 INJECTION, POWDER, LYOPHILIZED, FOR SOLUTION INTRAVENOUS at 07:54

## 2021-09-04 RX ADMIN — SODIUM CHLORIDE 25 ML: 9 INJECTION, SOLUTION INTRAVENOUS at 16:20

## 2021-09-04 RX ADMIN — INSULIN LISPRO 2 UNITS: 100 INJECTION, SOLUTION INTRAVENOUS; SUBCUTANEOUS at 21:35

## 2021-09-04 RX ADMIN — LABETALOL HYDROCHLORIDE 5 MG: 5 INJECTION INTRAVENOUS at 04:54

## 2021-09-04 RX ADMIN — HYDRALAZINE HYDROCHLORIDE 50 MG: 50 TABLET, FILM COATED ORAL at 14:39

## 2021-09-04 RX ADMIN — TAMSULOSIN HYDROCHLORIDE 0.4 MG: 0.4 CAPSULE ORAL at 07:59

## 2021-09-04 RX ADMIN — CARVEDILOL 6.25 MG: 6.25 TABLET, FILM COATED ORAL at 17:23

## 2021-09-04 RX ADMIN — INSULIN LISPRO 5 UNITS: 100 INJECTION, SOLUTION INTRAVENOUS; SUBCUTANEOUS at 16:24

## 2021-09-04 RX ADMIN — PIPERACILLIN AND TAZOBACTAM 3375 MG: 3; .375 INJECTION, POWDER, LYOPHILIZED, FOR SOLUTION INTRAVENOUS at 16:23

## 2021-09-04 RX ADMIN — Medication 10 ML: at 21:34

## 2021-09-04 RX ADMIN — HYDRALAZINE HYDROCHLORIDE 50 MG: 50 TABLET, FILM COATED ORAL at 21:30

## 2021-09-04 RX ADMIN — INSULIN GLARGINE 20 UNITS: 100 INJECTION, SOLUTION SUBCUTANEOUS at 21:35

## 2021-09-04 RX ADMIN — ENOXAPARIN SODIUM 40 MG: 40 INJECTION SUBCUTANEOUS at 07:59

## 2021-09-04 RX ADMIN — LISINOPRIL 5 MG: 5 TABLET ORAL at 07:59

## 2021-09-04 RX ADMIN — ATORVASTATIN CALCIUM 40 MG: 40 TABLET, FILM COATED ORAL at 07:59

## 2021-09-04 RX ADMIN — NIFEDIPINE 30 MG: 30 TABLET, FILM COATED, EXTENDED RELEASE ORAL at 17:23

## 2021-09-04 RX ADMIN — INSULIN LISPRO 6 UNITS: 100 INJECTION, SOLUTION INTRAVENOUS; SUBCUTANEOUS at 12:26

## 2021-09-04 RX ADMIN — INSULIN LISPRO 5 UNITS: 100 INJECTION, SOLUTION INTRAVENOUS; SUBCUTANEOUS at 07:59

## 2021-09-04 RX ADMIN — HYDRALAZINE HYDROCHLORIDE 25 MG: 25 TABLET, FILM COATED ORAL at 05:41

## 2021-09-04 ASSESSMENT — PAIN SCALES - GENERAL: PAINLEVEL_OUTOF10: 0

## 2021-09-04 NOTE — DISCHARGE INSTR - COC
-----------------------------------------------------------------------------------------------------------------------------------------------------------------------    [facility] Continuity of Care Form    Patient Name: Rico Sarabia   :  1960    MRN:  [id number]    Admit date:  2021      Discharge date:  2021    Code Status Order: Prior     Advance Directives: No    Admitting Physician:  Karl Bautista DO  PCP: Lory Ellison MD    Discharging Nurse: [facility] Unit/Room#: 9691/9742-90  Discharging Unit Phone Number: ***    Emergency Contact:        CASE MANAGEMENT/SOCIAL WORK SECTION    Inpatient Status Date: ***    [facility] Readmission Risk Assessment Score:      (Score > 14= high risk for readmission)    Rehab Potential (if transferring to Rehab): Good      / signature: Electronically signed by Domenica Jimenez RN on 21 at 5:29 PM EDT      NURSING SECTION    Immunization History: There is no immunization history on file for this patient.     Isolation/Infection:   Isolation            No Isolation          Patient Infection Status       Infection Onset Added Last Indicated Last Indicated By Review Planned Expiration Resolved Resolved By    None active    Resolved    COVID-19 Rule Out 21 COVID-19, Rapid (Ordered)   21 Rule-Out Test Resulted            Nurse Assessment:  Last Vital Signs: BP (!) 206/94   Pulse 67   Temp 98 °F (36.7 °C) (Oral)   Resp 16   Ht 6' 1\" (1.854 m)   Wt 240 lb (108.9 kg)   SpO2 91%   BMI 31.66 kg/m²     Last documented pain score (0-10 scale): Pain Level: 0  Last Weight:   Wt Readings from Last 1 Encounters:   21 240 lb (108.9 kg)     Mental Status:  alert     IV Access:  - PICC - site  LISBET Fry, insertion date: 21    Nursing Mobility/ADLs:  Walking   Independent  Transfer  [facility] Attached edges; Defined edges 09/07/21 1935   Number of days: 0      Left dorsal proximal foot:           Wound 09/07/21 Foot Left;Lateral purple (Active)   Wound Image   09/07/21 1935   Wound Etiology Arterial 09/07/21 1935   Dressing Status New dressing applied 09/07/21 1935   Wound Cleansed Cleansed with saline 09/07/21 1935   Dressing/Treatment Hydrocolloid 09/07/21 1935   Offloading for Diabetic Foot Ulcers Other (comment) 09/07/21 1935   Dressing Change Due 09/10/21 09/07/21 1935   Wound Length (cm) 4 cm 09/07/21 1935   Wound Width (cm) 2 cm 09/07/21 1935   Wound Depth (cm) 0 cm 09/07/21 1935   Wound Surface Area (cm^2) 8 cm^2 09/07/21 1935   Wound Volume (cm^3) 0 cm^3 09/07/21 1935   Distance Tunneling (cm) 0 cm 09/07/21 1935   Tunneling Position ___ O'Clock 0 09/07/21 1935   Undermining Starts ___ O'Clock 0 09/07/21 1935   Undermining Ends___ O'Clock 0 09/07/21 1935   Undermining Maxium Distance (cm) 0 09/07/21 1935   Wound Assessment Purple/maroon 09/07/21 1935   Drainage Amount None 09/07/21 1935   Drainage Description Serosanguinous 09/07/21 1935   Odor None 09/07/21 1935   Margins Attached edges; Defined edges 09/07/21 1935   Number of days: 0     Left dorsal foot:          Incision 09/03/21 Foot Left (Active)   Wound Image   09/07/21 1935   Dressing Status New dressing applied 09/07/21 1935   Dressing Change Due 09/10/21 09/07/21 1935   Incision Cleansed Cleansed with saline 09/07/21 1935   Dressing/Treatment Barrier film;Negative pressure wound therapy; Roll gauze; Ace wrap 09/07/21 1935   Incision Length (cm) 8 09/07/21 1935   Incision Width (cm) 5.5 cm 09/07/21 1935   Incision Depth (cm) 3.5 cm 09/07/21 1935   Margins Approximated 09/07/21 1935   Incision Assessment Dry;Erythema;Eschar 09/07/21 1935   Drainage Amount Small 09/07/21 1935   Drainage Description Serosanguinous 09/07/21 1935   Odor Mild 09/07/21 1935   Elicia-incision Assessment Dry/flaky;Edematous;Fragile 09/07/21 1935   Number of days: 4 Left lateral foot:           Incision 09/07/21 Foot Left;Dorsal (Active)   Wound Image   09/07/21 1935   Dressing Status New dressing applied 09/07/21 1935   Dressing Change Due 09/10/21 09/07/21 1935   Incision Cleansed Cleansed with saline 09/07/21 1935   Dressing/Treatment Hydrocolloid 09/07/21 1935   Incision Length (cm) 3 09/07/21 1935   Incision Width (cm) 1.5 cm 09/07/21 1935   Incision Depth (cm) 0.2 cm 09/07/21 1935   Margins Approximated 09/07/21 1935   Incision Assessment Erythema 09/07/21 1935   Drainage Amount Scant 09/07/21 1935   Drainage Description Serosanguinous 09/07/21 1935   Odor None 09/07/21 1935   Elicia-incision Assessment Dry/flaky;Edematous; Blanchable erythema 09/07/21 1935   Number of days: 0     Left dorsal foot base of 5th toe:      Supplies sent home with patient:  4x4 guaze  Roll guaze  Normal saline  Hydrocolloid dressing  Paste hydrocolloid strip  Scissors  Barrier film       Elimination:  Continence:   · Bowel: No  · Bladder: No  Urinary Catheter: None   Colostomy/Ileostomy/Ileal Conduit: No    Date of Last BM: ***    Intake/Output Summary (Last 24 hours) at 9/4/2021 1118  Last data filed at 9/4/2021 0624  Gross per 24 hour   Intake 700 ml   Output    Net 700 ml     I/O last 3 completed shifts: In: 0 [P.O.:150; I.V.:550]  Out: -     Safety Concerns:     None    Impairments/Disabilities:      None    Nutrition Therapy:  Current Nutrition Therapy:   - Oral Diet:  General    Routes of Feeding: None  Liquids: No Restrictions  Daily Fluid Restriction: no  Last Modified Barium Swallow with Video (Video Swallowing Test): not done    Treatments at the Time of Hospital Discharge:   Respiratory Treatments: ***  Oxygen Therapy:  is not on home oxygen therapy.   Ventilator:    - No ventilator support    Rehab Therapies: Physical Therapy, Occupational Therapy and Nurse  Weight Bearing Status/Restrictions: Non-weight bearing on left leg  Other Medical Equipment (for information only, NOT a DME order):  walker  Other Treatments: ***    Discharge home with :  [address]   [address] [facility]   [phone]     [employer] for infusion supplies  Phone: [phone]. [employer]: Wound Vac  Phone: [phone]      Patient's personal belongings (please select all that are sent with patient):  Valuables  Dentures: None  Vision - Corrective Lenses: None  Hearing Aid: None  Jewelry: Ring  Body Piercings Removed: N/A  Clothing: None  Were All Patient Medications Collected?: Not Applicable  Other Valuables: Cell phone  Valuables Given To: Patient     RN SIGNATURE:  Electronically signed by Yudy Winter RN on 9/7/21 at 8:03 PM EDT      PHYSICIAN SECTION    Active Problems:  Active Problems:    Type 2 diabetes mellitus with left diabetic foot infection ([address]    Sepsis ([address]    Acute kidney injury superimposed on chronic kidney disease ([address]    Gas gangrene of foot ([address]    Elevated C-reactive protein (CRP)    Osteoarthritis    Pulmonary emphysema (HCC)    Class 1 obesity due to excess calories with body mass index (BMI) of 31.0 to 31.9 in adult    Weight loss counseling, encounter for    Diabetes education, encounter for  Resolved Problems:    * No resolved hospital problems. *      Prognosis: Good    Condition at Discharge: Stable    Update Admission H&P: No change in H&P      Recommended Follow-up, Labs or Other Treatments After Discharge:      ID INFUSION ORDERS  Rocephin 2g IV q24 continued through 10/7/21  Weekly CBC diff, CMP, CRP faxed to [phone]   Routine PICC care  See me in 3-4 weeks, call for appt  [phone]   Sridhar Martins MD    Wound care:  Negative pressure wound therapy to left medial amputation foot incision. Connect to 125 mmHg low continuous suction with Medella negative pressure wound care pump supplied by [employer] [facility]. Nydrocolloid dressing to purple areas dorsal and lateral foot and dorsal foot incision.   Change dressings 3 times a week.  Follow up with Dr Mack Hou. Call for appointment [phone]. Physician Certification: I certify the above orders, information, and transfer of Tia De Luna  is necessary for the continuing treatment of the diagnosis listed and that he requires [address] for less 30 days.      PHYSICIAN SIGNATURE:  Electronically signed by Duran Mcmanus MD on 9/7/21 at 2:18 PM EDT    ---------------------------------------------------------------------------------------------------------------------------------------------------------------------    Patient Discharge Instructions:  ***

## 2021-09-04 NOTE — CARE COORDINATION
Instructions to fill out prescription for either standard wound vac or veraflow wound vac per [facility]    \"To get an order started for standard VAC at home please fax them to the main [employer] # listed on the VTIAF. To release an order they will need    Facesheet, H&P, Op Note, Wound or progress note, Rx, 2nd page 5a-5c of the VTIAF, [facility]. For a skilled nursing facility, they are welcome to fill out the Rx, select SNF, and write in [employer]flo instructions. Or they can write and order for the SNF as usual and specify #M/[facility] Veraflo DO NOT SUBSTITUTE. Also whichever Veraflo foam they want them to use can be noted as well. \"

## 2021-09-04 NOTE — CARE COORDINATION
Made aware by Nora MAGAÑA that podiatry recs pt will need veraflow wound vac with abx infusion and IV abx with PICC at discharge (ID following). Called and left vm with [facility] Hernandez asking if they carry this 3M product and if she can send prescription for same if so. Spoke to pt spouse who is at bedside. She states that pt will in no way be interested in SNF even if rec'd. Has used [address] in past and agreeable to [facility] for IV/wound vac (?) abx. Pt currently on zosyn 3375 mg Q 8- ecoc placed on chart for d/c abx orders. Per podiatry pt will need  \"veraflow wound vac. Vancomycin 1 gram per liter of saline. Instill for 15 seconds. Dwell 10 mins every 4 hours. 125 mmHg\" Pt and spouse teachable. Referral made to [address] and [facility] in [address] (left vm for [employer] for [employer] to review Tuesday, will need ID orders, sangeetha placed)    Addendum: Per John Lagos at [facility], veraflow wound vac NOT available for home use at this time. They do provide veraflow wound vacs but only to SNFs that have been trained. If pt must have veraflow will have to agree to SNF and have SNF stay approved for same. Otherwise if podiatry agrees pt could utilize regular wound vac at home. Moon sending writer prescription for regular wound vac, veraflow wound vac, and SNF list that have been trained on veraflow wound vac. These have been placed on chart for MD gamboa/wound care RN and copy provided to CHRIS GUZMAN (on Shahla's desk). Pt spouse states podiatrist told her pt is NWB (or heel touch) with L foot and pending possible order for PT/OT.

## 2021-09-04 NOTE — PROGRESS NOTES
Department of Podiatric Surgery  Progress Note      CHIEF COMPLAINT:  Left foot infection    HISTORY OF PRESENT ILLNESS:                The patient is a 59 y.o. male s/p 1 day staged I&D with p[artial 1st ray amputation. No fevers overnight. No pain in the foot. Otherwise resting comfortably.     Past Medical History:        Diagnosis Date    Arthritis     Chronic low back pain     Dental bridge present     upper    Diabetes mellitus ([address]     Emphysema of lung ([address]     Fractures     Hypertension     Neuropathy     Osteoarthritis     Prostate troubles      Past Surgical History:        Procedure Laterality Date    FOOT DEBRIDEMENT Left 8/31/2021    LEFT FOOT DEBRIDEMENT INCISION AND DRAINAGE performed by Jennifer Tee DPM at [address] [place] [address] HAND SURGERY Left 06/19/2017    LEFT HAND DEBRIDEMENT INCISION AND DRAINAGE    NECK SURGERY      30s year    TOE AMPUTATION Left 2009    WRIST FRACTURE SURGERY       Current Medications:    Current Facility-Administered Medications: hydrALAZINE (APRESOLINE) tablet 25 mg, 25 mg, Oral, 3 times per day  labetalol (NORMODYNE;TRANDATE) injection 5 mg, 5 mg, IntraVENous, Q6H PRN  piperacillin-tazobactam (ZOSYN) 3,375 mg in dextrose 5 % 50 mL IVPB extended infusion (mini-bag), 3,375 mg, IntraVENous, Q8H  acetaminophen (TYLENOL) tablet 650 mg, 650 mg, Oral, Q4H PRN  glucose (GLUTOSE) 40 % oral gel 15 g, 15 g, Oral, PRN  dextrose 50 % IV solution, 12.5 g, IntraVENous, PRN  glucagon (rDNA) injection 1 mg, 1 mg, IntraMUSCular, PRN  dextrose 5 % solution, 100 mL/hr, IntraVENous, PRN  insulin lispro (HUMALOG) injection vial 0-6 Units, 0-6 Units, SubCUTAneous, TID WC  insulin lispro (HUMALOG) injection vial 0-3 Units, 0-3 Units, SubCUTAneous, Nightly  lisinopril (PRINIVIL;ZESTRIL) tablet 5 mg, 5 mg, Oral, Daily  enoxaparin (LOVENOX) injection 40 mg, 40 mg, SubCUTAneous, BID  oxyCODONE (ROXICODONE) immediate release tablet 5 mg, 5 mg, Oral, Q4H PRN  benzocaine-menthol (CEPACOL SORE THROAT) lozenge 1 lozenge, 1 lozenge, Oral, Q2H PRN  atorvastatin (LIPITOR) tablet 40 mg, 40 mg, Oral, Daily  insulin glargine (LANTUS) injection vial 20 Units, 20 Units, SubCUTAneous, Nightly  tamsulosin (FLOMAX) capsule 0.4 mg, 0.4 mg, Oral, Daily  Allergies:   Patient has no known allergies. Family History:       Problem Relation Age of Onset    Heart Failure Mother     Diabetes Mother     Cancer Father         throat cancer    Asthma Daughter         Sports induced as a child    Emphysema Neg Hx     Hypertension Neg Hx      REVIEW OF SYSTEMS:    CONSTITUTIONAL:  positive for  fevers and chills  RESPIRATORY:  negative for  dry cough and wheezing  PHYSICAL EXAM:      Vitals:    BP (!) 206/94   Pulse 67   Temp 98 °F (36.7 °C) (Oral)   Resp 16   Ht 6' 1\" (1.854 m)   Wt 240 lb (108.9 kg)   SpO2 91%   BMI 31.66 kg/m²     LABS:   Recent Labs     09/03/21  0514 09/04/21  0635   WBC 14.7* 14.9*   HGB 10.1* 10.1*   HCT 29.5* 29.4*    349     Recent Labs     09/04/21  0635   *   K 5.1      CO2 19*   BUN 41*   CREATININE 1.7*     No results for input(s): PROT, INR, APTT in the last 72 hours. LOWER EXTREMITY EXAMINATION   SKIN:      Open surgical incision along the 1st ray down to bone and over the 4th metatarsal shaft. Lateral incision is filling in. Deep tissue 50/50 fibro-granular. 1st met bone exposed at the osteotomy site. No active purulence expressed today. Erythema to the dorsal left foot mildly improved in intensity. No pain. No odor appreciated today. Hemorrhagic callus plantar medial right hallux. NEUROLOGIC:      Protective sensation is absent at the level of the toes. VASCULAR:      DP and PT pulses are faintly palpable left foot. Could be related to current edema. CFT brisk to the toes. MUSCULOSKELETAL:    Muscle strength 5/5 all prime movers b/l lower extremity. Left hallux amputation.     Imaging:    Soft tissue swelling with soft tissue gas adjacent to the head of the 1st   metatarsal.  No convincing plain film evidence of osteomyelitis.       No acute fracture identified.  Probable nonacute corner fracture involving   the base of the 2nd proximal phalanx. MRI left foot:     Postsurgical changes related to incision and drainage procedure along the   medial aspect of the 1st digit.  No residual abscess is identified within the   foot.       Adjacent to the surgical defect is an area of mild increased T2 and decreased   T1 signal that is nonspecific in the absence of IV contrast, but suspicious   for early osteomyelitis given the proximity to the surgical wound and known   infectious process.       Additional area of T2 signal hyperintensity with decreased T1 signal along   the metatarsal-sesamoid joints, medial greater than lateral.  These findings   are favored degenerative in nature, with osteomyelitis considered less likely   but difficult to exclude.       Diffuse edema and atrophy of the intrinsic foot musculature, suggesting   sequela of diabetic neuropathy. Assessment:    1. Gas gangrene, left foot  2. DM2 with ulcer  3. DM2 with neuropathy    Plan:    - E&M. S/P 1 day staged I&D with partial 1st ray amputation. Site left open again after medial skin flap was necrosed and erythema to the dorsal foot was still moderate.  - Patient with severe diabetic foot infection and gas gangrene on x-ray. Ascending erythema towards the ankle. - WBC 19,000 on admission. Trending down, 14,900.    - No fevers the last 24 hours. - IV ABX per ID; Zosyn. Appreciate recs. 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 Laser doppler with adequate healing potentials. Appreciate vascular recs. - Ordered new x-rays.   - Reviewed MRI. - New sterile dressing placed. - No further podiatric surgery planned at this time. Site left open to continue to drain and decompress. Patient will need wound vac. Continue IV ABX.  Recommend [facility] wound care at [facility] after discharge. Will likely be here through the holiday weekend, due to outpatient wound vac approval. Patient still at risk for further amputation, if healing does not occur, or infection worsens.  - All questions answered. - I will continue to follow while in house.       Dalila Catherine, [facility] or [facility]

## 2021-09-04 NOTE — PROGRESS NOTES
Nurse collecting vitals found patient to be at 79% on RA. Placed on 10 liters high flow humidified oxygen and is now 94%. Patient has no SOB. All other VSS. Patient continues to have no complaints at this time. Respiratory and MD made aware.  Awaiting further instruction from MD.

## 2021-09-04 NOTE — PROGRESS NOTES
Occupational Therapy   Occupational Therapy Initial Assessment/Treatment  Date: 2021   Patient Name: Leonie Amaro  MRN: [id number]     : 1960    Date of Service: 2021    Discharge Recommendations:  Home with Home health OT, 24 hour supervision or assist  OT Equipment Recommendations  Equipment Needed: Yes  Mobility Devices: ADL Assistive Devices; Ash Guzman: Rolling  ADL Assistive Devices: Shower Chair with back (vs. TTB)    Assessment   Performance deficits / Impairments: Decreased functional mobility ; Decreased ADL status; Decreased safe awareness;Decreased cognition;Decreased sensation;Decreased balance;Decreased high-level IADLs    Assessment: Pt is a 60 yo male with deficits in the areas listed above. (I) PLOF. Pt is NWB for LLE expext pt is allowed to WB through L heel for t/fs only. Pt required CGA and RW and max vc's fof WB status and safety during t/fs and functional mobility today. Pt performed seated ADLS with set up but would require more assistance for standing ADLS d/t WB status. Pt would continue to benefit from skilled OT services to increase safety, WB adherence during ADLs and independence with ADLS and functional mobility. Prognosis: Good  Decision Making: Low Complexity  OT Education: OT Role;Plan of Care;ADL Adaptive Strategies;Precautions;Transfer Training;Orientation; Family Education  Patient Education: disease specific: DME, safety during hospitalization, WB status, prevention of complications of bedrest, importance of OOB mobility, RW use, safe t/fs and mobility, bathing strategies, ADL strategies. Pt and wife verbalized understanding. Pt will require reinforcement. REQUIRES OT FOLLOW UP: Yes  Activity Tolerance  Activity Tolerance: Patient Tolerated treatment well  Activity Tolerance: /79, O2 95%, HR 67; End of session O2 95% and HR 71  Safety Devices  Safety Devices in place: Yes  Type of devices: Nurse notified;Gait belt;Call light within reach; Left in chair (RN notified that pt refused alarm. Pt educated on calling nursing for assistance before getting back to bed.)           Patient Diagnosis(es): The primary encounter diagnosis was Cellulitis of left foot. Diagnoses of Necrotizing fasciitis ([facility] and Acute renal failure, unspecified acute renal failure type [facility]) were also pertinent to this visit. has a past medical history of Arthritis, Chronic low back pain, Dental bridge present, Diabetes mellitus ([facility] Emphysema of lung ([facility] Fractures, Hypertension, Neuropathy, Osteoarthritis, and Prostate troubles. has a past surgical history that includes Foot surgery; Wrist fracture surgery; Toe amputation (Left, 2009); Neck surgery; Hand surgery (Left, 06/19/2017); and Foot Debridement (Left, 8/31/2021). Restrictions  Restrictions/Precautions  Restrictions/Precautions: Weight Bearing, General Precautions, Fall Risk  Lower Extremity Weight Bearing Restrictions  Left Lower Extremity Weight Bearing: Non Weight Bearing  Position Activity Restriction  Other position/activity restrictions: Therapy spoke directly with patient's podiatrist Dr. Axel Dockery over the phone on 9/4/21. Per Dr. Dmitriy Navarro, the patient is allowed to put weight through his left heel but only for stand pivot transfers. Per Dr. Dmitriy Navarro patient has to remain non-weight bearing for ambulation.     Subjective   General  Chart Reviewed: Yes  Patient assessed for rehabilitation services?: Yes  Additional Pertinent Hx: Per chart, \"History of DM Prior L hallux amp,  Admitted 8/31/21 with L foot infection at [address] [place] [address], with fever, sepsis, L foot I&D 8/31/21, MSSA in surgical culture\"  Response to previous treatment: Patient with no complaints from previous session  Family / Caregiver Present: Yes (Spouse)  Referring Practitioner: SHAILESH Carter CNP  Diagnosis: Sepsis POA in the setting of left gangrene foot/DM foot, s/p 9/3 POD #1 staged I&D with partial 1st ray amputation  Subjective  Subjective: Pt agreeable to therapy. General Comment  Comments: RN approved therapy. Vital Signs  Temp: 98.7 °F (37.1 °C)  Temp Source: Oral  Heart Rate Source: Monitor  Resp: 16  BP: (!) 163/79  BP Location: Left upper arm  Patient Position: Semi fowlers  Oxygen Therapy  O2 Device: None (Room air)   Pain: Denies    Social/Functional History  Social/Functional History  Lives With: Spouse  Type of Home: House  Home Layout: One level  Home Access: Level entry  Bathroom Shower/Tub: Tub/Shower unit  Bathroom Toilet: Handicap height  Bathroom Equipment: Grab bars in shower, Grab bars around toilet, Hand-held shower  Home Equipment: Cane, Standard walker, Grab bars, Crutches  ADL Assistance: Independent  Homemaking Responsibilities: Yes  Meal Prep Responsibility: Secondary  Laundry Responsibility: No  Cleaning Responsibility: No  Bill Paying/Finance Responsibility: No  Shopping Responsibility: No  Ambulation Assistance: Independent  Transfer Assistance: Independent  Active : Yes  Occupation: Retired  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 Additional Comments: Pt reports no falls in the last 6 months. Objective   Vision: Impaired  Vision Exceptions: Wears glasses for reading  Hearing: Exceptions to [facility]    Orientation  Overall Orientation Status: Impaired  Orientation Level: Oriented to place; Disoriented to time;Oriented to situation;Oriented to person  Observation/Palpation  Posture: Good  Balance  Sitting Balance: Supervision  Standing Balance: Contact guard assistance  Standing Balance  Time: ~90sx2  Activity: in room functional mobility to prepare for household distances. Comment: RW used  Functional Mobility  Functional - Mobility Device: Rolling Walker  Activity: Other (in room functional mobility to prepare for household distances.)  Assist Level: Contact guard assistance  Functional Mobility Comments: vc's for RW use and WB status and positioning of LE.   ADL  UE Dressing: Setup (seated EOB to doff gown and don shirt.)  Tone RUE  RUE Tone: Normotonic  Tone LUE  LUE Tone: Normotonic  Coordination  Movements Are Fluid And Coordinated: Yes     Bed mobility  Supine to Sit: Supervision (HOB elevated.)  Sit to Supine: Unable to assess  Scooting: Unable to assess  Comment: Pt up in chair at end of session. Transfers  Sit to stand: Contact guard assistance  Stand to sit: Contact guard assistance  Transfer Comments: RW and vc's for safe t/fs. Cognition  Overall Cognitive Status: Exceptions  Arousal/Alertness: Appropriate responses to stimuli  Following Commands: Follows multistep commands with increased time; Follows multistep commands with repitition  Attention Span: Attends with cues to redirect  Memory: Decreased short term memory  Safety Judgement: Decreased awareness of need for safety;Decreased awareness of need for assistance  Problem Solving: Decreased awareness of errors;Assistance required to identify errors made  Insights: Decreased awareness of deficits  Initiation: Does not require cues  Sequencing: Does not require cues  Cognition Comment: patient is slightly impulsive.         Sensation  Overall Sensation Status: Impaired (numbness in bilateral feet/hands)        LUE AROM (degrees)  LUE AROM : WFL  Left Hand AROM (degrees)  Left Hand AROM: WFL  RUE AROM (degrees)  RUE AROM : WFL  Right Hand AROM (degrees)  Right Hand AROM: WFL  LUE Strength  Gross LUE Strength: WFL  L Hand General: 5/5  RUE Strength  Gross RUE Strength: WFL  R Hand General: 5/5                   Plan   Plan  Times per week: 3-5x/week  Current Treatment Recommendations: Balance Training, Functional Mobility Training, Endurance Training, Safety Education & Training, Patient/Caregiver Education & Training, Equipment Evaluation, Education, & procurement, Self-Care / ADL    AM-[facility] Score        -[facility] Inpatient Daily Activity Raw Score: 19 (09/04/21 6172)  -PAC Inpatient ADL T-Scale Score : 40.22 (09/04/21 1649)  ADL Inpatient CMS 0-100% Score: 42.8 (09/04/21 1649)  ADL Inpatient CMS G-Code Modifier : CK (09/04/21 1649)    Goals  Short term goals  Time Frame for Short term goals: 1 week (9/10) unless stated otherwise. Short term goal 1: Pt will perform functional and toilet t/fs following WB with no vc's and with supervision (9/8). Short term goal 2: Pt will perform bathroom mobility with AD following WB precautions with supervision. Short term goal 3: Pt will LB dress with mod I. Short term goal 4: Pt will toilet with mod I. Patient Goals   Patient goals : \"to put a shirt on\"       Therapy Time   Individual Concurrent Group Co-treatment   Time In 0479         Time Out 1517         Minutes 38         Timed Code Treatment Minutes: 28 Minutes (10min)       Bhavna Carreon OTR/L  If pt discharges prior to next session, this note will serve as discharge summary. See case management note for discharge disposition.

## 2021-09-04 NOTE — PROGRESS NOTES
Physical Therapy    Facility/Department: [facility] C5 - MED SURG/ORTHO  Initial Assessment and Treatment    NAME: Jorge L Jimenez  : 1960  MRN: [id number]    Date of Service: 2021    Discharge Recommendations:  24 hour supervision or assist, Home with Home health PT   PT Equipment Recommendations  Equipment Needed: Yes  Mobility Devices: Rohit Bruins: Rolling  If pt is unable to be seen after this session, please let this note serve as discharge summary. Please see case management note for discharge disposition. Thank you. Assessment   Body structures, Functions, Activity limitations: Decreased functional mobility ; Decreased sensation;Decreased posture;Decreased balance;Decreased strength;Decreased safe awareness;Decreased cognition;Decreased endurance;Decreased ROM  Assessment: Patient is a 59year old male who was admitted to [facility] on 21 with gas gangrene of left foot. Patient received 1st ray amputation on 9/3/21. Patient today was able to ambulate up to 10 feet with a rolling walker and CGA. Patient presented with the therapy deficits listed above. PT recommends home PT to address these deficits. Patient is safe for home, when medically stable, with continued  assistance, home PT and a rolling walker. PT to continue to follow for skilled therapy while patient is in the hospital.  Treatment Diagnosis: Decreased independence with functional mobility  Specific instructions for Next Treatment: progress mobility as tolerated  Prognosis: Good  Decision Making: Medium Complexity  PT Education: General Safety;Goals;Gait Training;Disease Specific Education;PT Role;Functional Mobility Training;Plan of Care;Home Exercise Program;Pressure Relief; Injury Prevention;Weight-bearing Education;Precautions;Transfer Training  Patient Education: Patient provided with extensive education regarding his NWB LLE status, safety with RW, benefits of increased mobility/exercise.  patient will need education reinforcement. Barriers to Learning: slightly impulsive  REQUIRES PT FOLLOW UP: Yes  Activity Tolerance  Activity Tolerance: Patient Tolerated treatment well  Activity Tolerance: Vitals: 187/87 67 BPM 95% room air. Patient Diagnosis(es): The primary encounter diagnosis was Cellulitis of left foot. Diagnoses of Necrotizing fasciitis ([facility] and Acute renal failure, unspecified acute renal failure type [facility]) were also pertinent to this visit. has a past medical history of Arthritis, Chronic low back pain, Dental bridge present, Diabetes mellitus ([facility] Emphysema of lung ([facility] Fractures, Hypertension, Neuropathy, Osteoarthritis, and Prostate troubles. has a past surgical history that includes Foot surgery; Wrist fracture surgery; Toe amputation (Left, 2009); Neck surgery; Hand surgery (Left, 06/19/2017); and Foot Debridement (Left, 8/31/2021). Restrictions  Restrictions/Precautions  Restrictions/Precautions: Weight Bearing, General Precautions, Fall Risk  Lower Extremity Weight Bearing Restrictions  Left Lower Extremity Weight Bearing: Non Weight Bearing  Position Activity Restriction  Other position/activity restrictions: Therapy spoke directly with patient's podiatrist Dr. Beatrice Gao over the phone on 9/4/21. Per Dr. Claudia Renner, the patient is allowed to put weight through his left heel but only for stand pivot transfers. Per Dr. Claudia Renner patient has to remain non-weight bearing for ambulation.   Vision/Hearing  Vision: Impaired  Vision Exceptions: Wears glasses for reading  Hearing: Exceptions to [facility]     Subjective  General  Chart Reviewed: Yes  Patient assessed for rehabilitation services?: Yes  Additional Pertinent Hx: 9/03 STAGED INCISION AND DEBRIDEMENT LEFT FOOT WITH PARTIAL FIRST RAY AMPUTATION  Response To Previous Treatment: Not applicable  Family / Caregiver Present: Yes (wife)  Referring Practitioner: SHAILESH Smith CNP  Referral Date : 09/04/21  General Comment  Comments: Supine in bed upon entry of therapy staff. Cleared for therapy by EFFIE Pepe. Subjective  Subjective: Patient agreed to participate. Pain Screening  Patient Currently in Pain: Denies  Vital Signs  Patient Currently in Pain: Denies  Pre Treatment Pain Screening  Intervention List: Patient able to continue with treatment    Orientation  Orientation  Overall Orientation Status: Impaired  Orientation Level: Oriented to person;Oriented to place; Disoriented to time;Oriented to situation  Social/Functional History  Social/Functional History  Lives With: Spouse  Type of Home: House  Home Layout: One level  Home Access: Level entry  Bathroom Shower/Tub: Tub/Shower unit  Bathroom Toilet: Handicap height  Bathroom Equipment: Grab bars in shower, Grab bars around toilet, Hand-held shower  Home Equipment: Cane, Standard walker, Grab bars, Crutches  ADL Assistance: Independent  Homemaking Responsibilities: Yes  Meal Prep Responsibility: Secondary  Laundry Responsibility: No  Cleaning Responsibility: No  Bill Paying/Finance Responsibility: No  Shopping Responsibility: No  Ambulation Assistance: Independent  Transfer Assistance: Independent  Active : Yes  Occupation: Retired  Type of occupation: Worked on [employer] Additional Comments: Pt reports no falls in the last 6 months. Cognition   Cognition  Overall Cognitive Status: Exceptions  Arousal/Alertness: Appropriate responses to stimuli  Following Commands: Follows multistep commands with increased time; Follows multistep commands with repitition  Attention Span: Attends with cues to redirect  Memory: Decreased short term memory  Safety Judgement: Decreased awareness of need for safety;Decreased awareness of need for assistance  Problem Solving: Decreased awareness of errors;Assistance required to identify errors made  Insights: Decreased awareness of deficits  Initiation: Does not require cues  Sequencing: Does not require cues  Cognition Comment: patient is slightly impulsive. Objective     Observation/Palpation  Posture: Good    PROM RLE (degrees)  RLE PROM: WFL  AROM RLE (degrees)  RLE AROM: WFL  PROM LLE (degrees)  LLE General PROM: WFL except not assessed for left ankle/foot due to recent surgery/bandages  AROM LLE (degrees)  LLE General AROM: decreased left ankle dorsiflexion/plantarflexion. (Patient said that this is a chronic problem for him). Strength RLE  Comment: grossly 4/5 strength  Strength LLE  Comment: not tested for left ankle due to NWB status. otherwise ~4/5 strength     Sensation  Overall Sensation Status: Impaired (numbness in bilateral feet/hands)  Bed mobility  Supine to Sit: Supervision  Sit to Supine: Unable to assess  Scooting: Unable to assess  Comment: patient in chair at end of therapy evaluation  Transfers  Sit to Stand: Contact guard assistance  Stand to sit: Contact guard assistance  Bed to Chair: Contact guard assistance  Comment: NWB LLE with RW. cueing for safe hand placement. Ambulation  Ambulation?: Yes  WB Status: NWB LLE (per Dr. Demi Bedoya patient may only bear weight through his heel for transfers. NWB LLE for ambulation). More Ambulation?: No  Ambulation 1  Surface: level tile  Device: Rolling Walker  Assistance: Contact guard assistance  Quality of Gait: hop gait pattern. cueing to maintain base of support within rolling walker. cueing to maintain NWB LLE status. Gait Deviations: Slow Clover;Decreased step length;Decreased step height  Distance: 10 feet  Comments: No complaints of shortness of breath, chest pain or dizziness. No loss of balance. Stairs/Curb  Stairs?: No     Balance  Posture: Fair  Sitting - Static: Good  Sitting - Dynamic: Good  Standing - Static: Fair  Standing - Dynamic: Fair  Comments: with RW  Exercises  Hip Flexion: 1 x 10  Knee Long Arc Quad: 1 x 10  Ankle Pumps: 1 x 10  Upper Extremity: see OT evaluation  Comments: cueing for sequencing and technique.   Other exercises  Other exercises?: No     Plan Plan  Times per week: 3-5/week  Plan weeks: 1 week 9/11/21  Specific instructions for Next Treatment: progress mobility as tolerated  Current Treatment Recommendations: Strengthening, Home Exercise Program, Safety Education & Training, Balance Training, Endurance Training, Patient/Caregiver Education & Training, Functional Mobility Training, Equipment Evaluation, Education, & procurement, Transfer Training, Gait Training, ADL/Self-care Training, Pain Management, Positioning  Safety Devices  Type of devices: All fall risk precautions in place, Call light within reach, Left in chair, Gait belt, Patient at risk for falls, Nurse notified (alarm off upon entry. patient's RN Daphne Castro made aware that patient is refusing a chair alarm. )    AM-PAC Score     AM-PAC Inpatient Mobility without Stair Climbing Raw Score : 15 (09/04/21 1656)  AM-PAC Inpatient without Stair Climbing T-Scale Score : 43.03 (09/04/21 1656)  Mobility Inpatient CMS 0-100% Score: 47.43 (09/04/21 1656)  Mobility Inpatient without Stair CMS G-Code Modifier : CK (09/04/21 1656)       Goals  Short term goals  Time Frame for Short term goals: 1 week 9/11/21 NWB LLE for ambulation goals. Patient may heel weight bear for transfers per Dr. Petra Green. Short term goal 1: Supine <> sit with mod I  Short term goal 2: Sit <> stand with mod I  Short term goal 3: Bed <> chair with LRAD and mod I  Short term goal 4: Ambulate 20 feet with LRAD and mod I  Short term goal 5: By 9/7/21: Tolerate 12-15 reps of his exercises to maximize his strength/endurance. Patient Goals   Patient goals : To become stronger.        Therapy Time   Individual Concurrent Group Co-treatment   Time In 3918         Time Out 1517         Minutes 39         Timed Code Treatment Minutes: 29 Minutes (10 minute evaluation.)       Ellen Islas, PT

## 2021-09-04 NOTE — PROGRESS NOTES
Hospitalist Progress Note      PCP: Lory Ellison MD    Date of Admission: 8/31/2021    Chief Complaint:    Foot Laceration       pt hit left foot on his rocking chair. states he has a \"gross\" wound on left foot.  Wound Infection       open wound on left foot red and swollen. Hospital Course: \"  59 y.o. male who presented to [facility] with past medical history of hypertension, osteoarthritis, type 2 diabetes, class I obesity presented to the ED with chief complaint of left foot pain and change.     Patient reported that he has history of diabetes takes medication time noted that his been having fever today but noted it started worsening since Thursday where his wife was changing his socks that looked normal and then patient noted last night he kicked a chair and caused a lot of pain left foot progressively worsening exacerbated with palpation no alleviating factor associated with redness in the foot and high fever with T-max being 102. reported when changing socks she noted that the changes significant he worsening since she last change his socks on Sunday. \"     The pt was admitted with Podiatry consult and taken to the OR for ID.      Subjective: EMR and notes reviewed pt seen and examined. No acute issues.      Medications:  Reviewed    Infusion Medications    dextrose       Scheduled Medications    hydrALAZINE  50 mg Oral 3 times per day    piperacillin-tazobactam  3,375 mg IntraVENous Q8H    insulin lispro  0-6 Units SubCUTAneous TID WC    insulin lispro  0-3 Units SubCUTAneous Nightly    lisinopril  5 mg Oral Daily    enoxaparin  40 mg SubCUTAneous BID    atorvastatin  40 mg Oral Daily    insulin glargine  20 Units SubCUTAneous Nightly    tamsulosin  0.4 mg Oral Daily     PRN Meds: labetalol, acetaminophen, glucose, dextrose, glucagon (rDNA), dextrose, oxyCODONE, benzocaine-menthol      Intake/Output Summary (Last 24 hours) at 9/4/2021 1135  Last data filed at 9/4/2021 0845  Gross per 24 hour   Intake 700 ml   Output    Net 700 ml       Physical Exam Performed:    BP (!) 206/94   Pulse 67   Temp 98 °F (36.7 °C) (Oral)   Resp 16   Ht 6' 1\" (1.854 m)   Wt 240 lb (108.9 kg)   SpO2 91%   BMI 31.66 kg/m²     General appearance: No apparent distress, appears stated age and cooperative. HEENT: Pupils equal, round, and reactive to light. Conjunctivae/corneas clear. Neck: Supple, with full range of motion. No jugular venous distention. Trachea midline. Respiratory:  Normal respiratory effort. Clear to auscultation, bilaterally without Rales/Wheezes/Rhonchi. Cardiovascular: Regular rate and rhythm with normal S1/S2 without murmurs, rubs or gallops. Abdomen: Soft, non-tender, non-distended with normal bowel sounds. Musculoskeletal: No clubbing, cyanosis or edema bilaterally. Left foot with surgical drsg in place   Skin: Skin color, texture, turgor normal.  No rashes or lesions. Neurologic:  Neurovascularly intact without any focal sensory/motor deficits. Cranial nerves: II-XII intact, grossly non-focal.  Psychiatric: Alert and oriented, thought content appropriate, normal insight  Capillary Refill: Brisk,3 seconds, normal   Peripheral Pulses: +2 palpable, equal bilaterally       Labs:   Recent Labs     09/02/21  0610 09/03/21  0514 09/04/21  0635   WBC 13.7* 14.7* 14.9*   HGB 10.1* 10.1* 10.1*   HCT 29.9* 29.5* 29.4*    315 349     Recent Labs     09/02/21  0610 09/03/21  0514 09/04/21  0635    136 134*   K 4.9 4.7 5.1    104 102   CO2 19* 19* 19*   BUN 40* 37* 41*   CREATININE 2.0* 1.8* 1.7*   CALCIUM 9.2 9.3 9.1     No results for input(s): AST, ALT, BILIDIR, BILITOT, ALKPHOS in the last 72 hours. No results for input(s): INR in the last 72 hours. No results for input(s): Jadene Moh in the last 72 hours.     Urinalysis:      Lab Results   Component Value Date    NITRU Negative 08/31/2021    WBCUA 0-2 08/31/2021    BACTERIA 2+ 08/31/2021    RBCUA 0-2 08/31/2021    BLOODU TRACE-INTACT 08/31/2021    SPECGRAV 1.025 08/31/2021    GLUCOSEU 100 08/31/2021       Radiology:  XR FOOT LEFT (MIN 3 VIEWS)   Final Result   Postsurgical foot radiographs as discussed. No acute abnormality. MRI FOOT LEFT WO CONTRAST   Final Result   Postsurgical changes related to incision and drainage procedure along the   medial aspect of the 1st digit. No residual abscess is identified within the   foot. Adjacent to the surgical defect is an area of subtle, mild increased T2 and   decreased T1 signal that is nonspecific in the absence of IV contrast, but   suspicious for early osteomyelitis given the proximity to the surgical wound   and known infectious process. Additional area of T2 signal hyperintensity with decreased T1 signal along   the metatarsal-sesamoid joints, medial greater than lateral.  These findings   are favored degenerative in nature, with osteomyelitis considered less likely   but difficult to exclude. Diffuse edema and atrophy of the intrinsic foot musculature, suggesting   sequela of diabetic neuropathy. LASER SKIN PERFUSION PRESSURE STUDY   Final Result      XR FOOT LEFT (MIN 3 VIEWS)   Final Result   Findings as above are likely postoperative. In the absence of interval   intervention necrotizing infection must be excluded. XR TIBIA FIBULA LEFT (2 VIEWS)   Final Result   No acute osseous abnormality of the left tib-fib. No soft tissue gas         VL DUP LOWER EXTREMITY ARTERIES BILATERAL   Final Result      XR FOOT LEFT (MIN 3 VIEWS)   Final Result   Soft tissue swelling with soft tissue gas adjacent to the head of the 1st   metatarsal.  No convincing plain film evidence of osteomyelitis. No acute fracture identified. Probable nonacute corner fracture involving   the base of the 2nd proximal phalanx.                  Assessment/Plan:    Active Hospital Problems    Diagnosis     [facility] kidney [facility] superimposed [facility] chronic [facility] disease [facility] [N17.9, N18.9]     Gas gangrene of foot ([facility] [A48.0]     Elevated C-reactive protein (CRP) [R79.82]     Osteoarthritis [M19.90]     Pulmonary emphysema (HCC) [J43.9]     Class 1 obesity due to excess calories with body mass index (BMI) of 31.0 to 31.9 in adult [E66.09, Z68.31]     Weight loss counseling, encounter for [Z71.3]     Diabetes education, encounter for [Z71.89]     Sepsis ([facility] [A41.9]     Type 2 diabetes mellitus with left diabetic foot infection ([facility] [F77.522, L08.9]      Sepsis POA in the setting of left gangrene foot/DM foot   - met criteria with fever, leucocytosis and CAMRYN  - lactic was 1.7   - pan cultures and empiric abx started     Gas Gangrene/ DM foot   -  Pod consulted   - S/P ID and cultures sent pre sanders GPCStaphylococcus aureus   - IV ABX   - ID consult -   Atrial duplex and MRI reviewed No need for vascular intervention   - trend and follow labs   - WB status per POD, suspect will need PT/OT when WB can be established   - PICC ordered for LTABX, Nephology consulted for permission     DM - last AiC 4/26 7.8  - at home mgt combo of Trulicity and metformin   - here will use basal bolus with SSI, Carb control diet   - fSBS ACHS     HTN- poorly controlled- home meds not previously ordered will reorder meds     HLD- continue statin     Neuropathy - int he setting DM    BPH- cont flomax     Morbid Obesity - BMI 31 Complicating assessment and treatment. Placing patient at risk for multiple co-morbidities as well as early death and contributing to the patient's presentation. Counseled on weight loss. DVT Prophylaxis: lovenox  Diet: Adult Oral Nutrition Supplement; Low Calorie/High Protein Oral Supplement  ADULT DIET;  Regular  Code Status: Prior    PT/OT Eval Status: will need WB clarification from [facility] - likely here several days     Daljit Velásquez, APRN - CNP

## 2021-09-04 NOTE — PROGRESS NOTES
Dr. Vanessa Villaseñor called back he will see patient today. Called consult to Dr. Vanessa Villaseñor with [facility].  Waiting on a response on the consult for PICC line approval

## 2021-09-04 NOTE — CONSULTS
Office: [phone]       Fax: [phone]      Nephrology Initial Consult Note        Patient's Name: Dahlia Clark Date: 8/31/2021  Date of Visit: 9/4/2021    Reason for Consult:  CAMRYN, PICC  Requesting Physician:  Cesar Zuluaga MD  PCP: Saeed William MD    Chief Complaint:  Foot infection     History of Present Illness:      Yolis Calloway is a 59 y.o. male with PMHx of hypertension, DM>20 yr, COPD who was hospitalized on 8/31/2021 with complaints of left foot infection  Per pt , he was told that he had kidney problems but stable  No hematuria  No history of renal stone. No inc frequency  Denies history of DR  NSAID use: ibuprofen before admission  IV contrast: None recent   Home meds reviewed    Past Medical History:   Diagnosis Date    Arthritis     Chronic low back pain     Dental bridge present     upper    Diabetes mellitus ([address]     Emphysema of lung ([address]     Fractures     Hypertension     Neuropathy     Osteoarthritis     Prostate troubles        Past Surgical History:   Procedure Laterality Date    FOOT DEBRIDEMENT Left 8/31/2021    LEFT FOOT DEBRIDEMENT INCISION AND DRAINAGE performed by Tara Reese DPM at [address] [place] [address] HAND SURGERY Left 06/19/2017    LEFT HAND DEBRIDEMENT INCISION AND DRAINAGE    NECK SURGERY      35s year    TOE AMPUTATION Left 2009    WRIST FRACTURE SURGERY         Family History   Problem Relation Age of Onset    Heart Failure Mother     Diabetes Mother     Cancer Father         throat cancer    Asthma Daughter         Sports induced as a child    Emphysema Neg Hx     Hypertension Neg Hx         reports that he has never smoked. He has never used smokeless tobacco. He reports that he does not drink alcohol and does not use drugs. Medications:    Allergies:  Patient has no known allergies. Scheduled Meds:   hydrALAZINE  50 mg Oral 3 times per day    lidocaine 1 % injection  5 mL IntraDERmal Once    sodium chloride flush  5-40 mL IntraVENous 2 times per day    piperacillin-tazobactam  3,375 mg IntraVENous Q8H    insulin lispro  0-6 Units SubCUTAneous TID WC    insulin lispro  0-3 Units SubCUTAneous Nightly    lisinopril  5 mg Oral Daily    enoxaparin  40 mg SubCUTAneous BID    atorvastatin  40 mg Oral Daily    insulin glargine  20 Units SubCUTAneous Nightly    tamsulosin  0.4 mg Oral Daily     Continuous Infusions:   sodium chloride 25 mL (09/04/21 1620)    dextrose         Labs:  CBC:   Recent Labs     09/02/21  0610 09/03/21  0514 09/04/21  0635   WBC 13.7* 14.7* 14.9*   HGB 10.1* 10.1* 10.1*    315 349     Ca/Mg/Phos:   Recent Labs     09/02/21  0610 09/03/21  0514 09/04/21  0635   CALCIUM 9.2 9.3 9.1     UA:No results for input(s): Angel Naik, GLUCOSEU, Kanu Deirdre, BLOODU, PHUR, PROTEINU, UROBILINOGEN, NITRU, LEUKOCYTESUR, Kirke Ape in the last 72 hours. Urine Microscopic: No results for input(s): LABCAST, BACTERIA, COMU, HYALCAST, WBCUA, RBCUA, EPIU in the last 72 hours. Urine Chemistry: No results for input(s): Eugune Raimundo, PROTEINUR, NAUR in the last 72 hours.       ROS:     All systems reviewed and negative except as in HPI    Objective:     Vitals: BP (!) 163/79   Pulse 67   Temp 98.7 °F (37.1 °C) (Oral)   Resp 16   Ht 6' 1\" (1.854 m)   Wt 240 lb (108.9 kg)   SpO2 95%   BMI 31.66 kg/m²    Wt Readings from Last 3 Encounters:   08/31/21 240 lb (108.9 kg)   08/30/21 240 lb (108.9 kg)   08/10/17 264 lb 15.9 oz (120.2 kg)      24HR INTAKE/OUTPUT:      Intake/Output Summary (Last 24 hours) at 9/4/2021 1635  Last data filed at 9/4/2021 0624  Gross per 24 hour   Intake 700 ml   Output    Net 700 ml     Constitutional:  awake, NAD  HEENT:  MMM, No icterus  Neck: no bruits, No JVD  Cardiovascular:  reg rhythm  Respiratory: CTA, no crackles  Abdomen:  +BS, soft, NT, ND  Ext: trace left  lower extremity edema, foot dressed  Psychiatric: mood and affect appropriate  Skin: dry/intact  CNS: alert, no agitation    IMAGING:  XR FOOT LEFT (MIN 3 VIEWS)   Final Result   Postsurgical foot radiographs as discussed. No acute abnormality. MRI FOOT LEFT WO CONTRAST   Final Result   Postsurgical changes related to incision and drainage procedure along the   medial aspect of the 1st digit. No residual abscess is identified within the   foot. Adjacent to the surgical defect is an area of subtle, mild increased T2 and   decreased T1 signal that is nonspecific in the absence of IV contrast, but   suspicious for early osteomyelitis given the proximity to the surgical wound   and known infectious process. Additional area of T2 signal hyperintensity with decreased T1 signal along   the metatarsal-sesamoid joints, medial greater than lateral.  These findings   are favored degenerative in nature, with osteomyelitis considered less likely   but difficult to exclude. Diffuse edema and atrophy of the intrinsic foot musculature, suggesting   sequela of diabetic neuropathy. LASER SKIN PERFUSION PRESSURE STUDY   Final Result      XR FOOT LEFT (MIN 3 VIEWS)   Final Result   Findings as above are likely postoperative. In the absence of interval   intervention necrotizing infection must be excluded. XR TIBIA FIBULA LEFT (2 VIEWS)   Final Result   No acute osseous abnormality of the left tib-fib. No soft tissue gas         VL DUP LOWER EXTREMITY ARTERIES BILATERAL   Final Result      XR FOOT LEFT (MIN 3 VIEWS)   Final Result   Soft tissue swelling with soft tissue gas adjacent to the head of the 1st   metatarsal.  No convincing plain film evidence of osteomyelitis. No acute fracture identified. Probable nonacute corner fracture involving   the base of the 2nd proximal phalanx.              Assessment :     1. CAMRYN on CKD3  -Non-Oliguric  -Baseline creat: ?1.5 Now 2-->1.7  -UA: prot +++  -Volume: Euvolemic  -Electrolytes: Hyperkalemia mild  -Acid-Base: NAGMA and AGMA    Recent Labs     09/02/21  0610 09/03/21  0514 09/04/21  0635   BUN 40* 37* 41*   CREATININE 2.0* 1.8* 1.7*     Recent Labs     09/02/21  0610 09/03/21  0514 09/04/21  0635    136 134*   K 4.9 4.7 5.1   CO2 19* 19* 19*         2. HTN  -Blood pressure high    BP Readings from Last 1 Encounters:   09/04/21 (!) 163/79       3. left foot infection  status post INCISION AND DEBRIDEMENT LEFT FOOT WITH PARTIAL FIRST RAY AMPUTATION (Left Foot)    4. DM    Plan:     - PICC line to be placed weighing benefit risk. Pt likely at risk for CKD progression and future need for RRT. - UPC  - optimize BP meds  - sod bicarb  - low K diet  - may continue Lisinopril for now  - Monitor BMP    -Monitor I/O, UOP  -Maintain MAP>65  -Avoid nephrotoxin, if able. -Dose meds to current eGFR    Thank you for allowing us to participate in care of [address] [place] [address] . We will continue to follow. Feel free to contact me with any questions.       Joshua Bernardo MD  9/4/2021    [facility] [address]  Office : [phone]  Fax :[phone]

## 2021-09-05 LAB
ANION GAP SERPL CALCULATED.3IONS-SCNC: 12 MMOL/L (ref 3–16)
BUN BLDV-MCNC: 35 MG/DL (ref 7–20)
CALCIUM SERPL-MCNC: 9.1 MG/DL (ref 8.3–10.6)
CHLORIDE BLD-SCNC: 101 MMOL/L (ref 99–110)
CO2: 21 MMOL/L (ref 21–32)
CREAT SERPL-MCNC: 1.5 MG/DL (ref 0.8–1.3)
GFR AFRICAN AMERICAN: 57
GFR NON-AFRICAN AMERICAN: 47
GLUCOSE BLD-MCNC: 300 MG/DL (ref 70–99)
GLUCOSE BLD-MCNC: 309 MG/DL (ref 70–99)
GLUCOSE BLD-MCNC: 326 MG/DL (ref 70–99)
GLUCOSE BLD-MCNC: 345 MG/DL (ref 70–99)
GLUCOSE BLD-MCNC: 377 MG/DL (ref 70–99)
HCT VFR BLD CALC: 29.3 % (ref 40.5–52.5)
HEMOGLOBIN: 10.1 G/DL (ref 13.5–17.5)
MCH RBC QN AUTO: 31.5 PG (ref 26–34)
MCHC RBC AUTO-ENTMCNC: 34.4 G/DL (ref 31–36)
MCV RBC AUTO: 91.6 FL (ref 80–100)
PDW BLD-RTO: 14.3 % (ref 12.4–15.4)
PERFORMED ON: ABNORMAL
PLATELET # BLD: 364 K/UL (ref 135–450)
PMV BLD AUTO: 9.8 FL (ref 5–10.5)
POTASSIUM REFLEX MAGNESIUM: 5 MMOL/L (ref 3.5–5.1)
RBC # BLD: 3.2 M/UL (ref 4.2–5.9)
SODIUM BLD-SCNC: 134 MMOL/L (ref 136–145)
WBC # BLD: 13.8 K/UL (ref 4–11)

## 2021-09-05 PROCEDURE — 6370000000 HC RX 637 (ALT 250 FOR IP): Performed by: INTERNAL MEDICINE

## 2021-09-05 PROCEDURE — 80048 BASIC METABOLIC PNL TOTAL CA: CPT

## 2021-09-05 PROCEDURE — 6360000002 HC RX W HCPCS: Performed by: NURSE PRACTITIONER

## 2021-09-05 PROCEDURE — 1200000000 HC SEMI PRIVATE

## 2021-09-05 PROCEDURE — 36415 COLL VENOUS BLD VENIPUNCTURE: CPT

## 2021-09-05 PROCEDURE — 2580000003 HC RX 258: Performed by: NURSE PRACTITIONER

## 2021-09-05 PROCEDURE — 6370000000 HC RX 637 (ALT 250 FOR IP): Performed by: NURSE PRACTITIONER

## 2021-09-05 PROCEDURE — 6370000000 HC RX 637 (ALT 250 FOR IP): Performed by: HOSPITALIST

## 2021-09-05 PROCEDURE — 6360000002 HC RX W HCPCS: Performed by: INTERNAL MEDICINE

## 2021-09-05 PROCEDURE — 99233 SBSQ HOSP IP/OBS HIGH 50: CPT | Performed by: HOSPITALIST

## 2021-09-05 PROCEDURE — 2580000003 HC RX 258: Performed by: INTERNAL MEDICINE

## 2021-09-05 PROCEDURE — 85027 COMPLETE CBC AUTOMATED: CPT

## 2021-09-05 RX ORDER — DEXTROSE MONOHYDRATE 50 MG/ML
100 INJECTION, SOLUTION INTRAVENOUS PRN
Status: DISCONTINUED | OUTPATIENT
Start: 2021-09-05 | End: 2021-09-08 | Stop reason: HOSPADM

## 2021-09-05 RX ORDER — NICOTINE POLACRILEX 4 MG
15 LOZENGE BUCCAL PRN
Status: DISCONTINUED | OUTPATIENT
Start: 2021-09-05 | End: 2021-09-08 | Stop reason: HOSPADM

## 2021-09-05 RX ORDER — NIFEDIPINE 30 MG/1
30 TABLET, EXTENDED RELEASE ORAL ONCE
Status: COMPLETED | OUTPATIENT
Start: 2021-09-05 | End: 2021-09-05

## 2021-09-05 RX ORDER — NIFEDIPINE 30 MG/1
60 TABLET, EXTENDED RELEASE ORAL DAILY
Status: DISCONTINUED | OUTPATIENT
Start: 2021-09-06 | End: 2021-09-08 | Stop reason: HOSPADM

## 2021-09-05 RX ORDER — DEXTROSE MONOHYDRATE 25 G/50ML
12.5 INJECTION, SOLUTION INTRAVENOUS PRN
Status: DISCONTINUED | OUTPATIENT
Start: 2021-09-05 | End: 2021-09-08 | Stop reason: HOSPADM

## 2021-09-05 RX ADMIN — INSULIN LISPRO 6 UNITS: 100 INJECTION, SOLUTION INTRAVENOUS; SUBCUTANEOUS at 21:22

## 2021-09-05 RX ADMIN — NIFEDIPINE 30 MG: 30 TABLET, FILM COATED, EXTENDED RELEASE ORAL at 07:50

## 2021-09-05 RX ADMIN — PIPERACILLIN AND TAZOBACTAM 3375 MG: 3; .375 INJECTION, POWDER, LYOPHILIZED, FOR SOLUTION INTRAVENOUS at 08:01

## 2021-09-05 RX ADMIN — INSULIN GLARGINE 20 UNITS: 100 INJECTION, SOLUTION SUBCUTANEOUS at 21:22

## 2021-09-05 RX ADMIN — ENOXAPARIN SODIUM 40 MG: 40 INJECTION SUBCUTANEOUS at 07:51

## 2021-09-05 RX ADMIN — ENOXAPARIN SODIUM 40 MG: 40 INJECTION SUBCUTANEOUS at 21:22

## 2021-09-05 RX ADMIN — CARVEDILOL 6.25 MG: 6.25 TABLET, FILM COATED ORAL at 07:50

## 2021-09-05 RX ADMIN — PIPERACILLIN AND TAZOBACTAM 3375 MG: 3; .375 INJECTION, POWDER, LYOPHILIZED, FOR SOLUTION INTRAVENOUS at 16:18

## 2021-09-05 RX ADMIN — CARVEDILOL 6.25 MG: 6.25 TABLET, FILM COATED ORAL at 17:32

## 2021-09-05 RX ADMIN — Medication 10 ML: at 07:51

## 2021-09-05 RX ADMIN — Medication 10 ML: at 21:23

## 2021-09-05 RX ADMIN — ATORVASTATIN CALCIUM 40 MG: 40 TABLET, FILM COATED ORAL at 07:50

## 2021-09-05 RX ADMIN — TAMSULOSIN HYDROCHLORIDE 0.4 MG: 0.4 CAPSULE ORAL at 07:50

## 2021-09-05 RX ADMIN — PIPERACILLIN AND TAZOBACTAM 3375 MG: 3; .375 INJECTION, POWDER, LYOPHILIZED, FOR SOLUTION INTRAVENOUS at 00:59

## 2021-09-05 RX ADMIN — NIFEDIPINE 30 MG: 30 TABLET, FILM COATED, EXTENDED RELEASE ORAL at 12:02

## 2021-09-05 RX ADMIN — HYDRALAZINE HYDROCHLORIDE 50 MG: 50 TABLET, FILM COATED ORAL at 14:48

## 2021-09-05 RX ADMIN — SODIUM CHLORIDE 25 ML: 9 INJECTION, SOLUTION INTRAVENOUS at 23:10

## 2021-09-05 RX ADMIN — LISINOPRIL 5 MG: 5 TABLET ORAL at 07:50

## 2021-09-05 RX ADMIN — PIPERACILLIN AND TAZOBACTAM 3375 MG: 3; .375 INJECTION, POWDER, LYOPHILIZED, FOR SOLUTION INTRAVENOUS at 23:11

## 2021-09-05 RX ADMIN — INSULIN LISPRO 12 UNITS: 100 INJECTION, SOLUTION INTRAVENOUS; SUBCUTANEOUS at 17:33

## 2021-09-05 RX ADMIN — HYDRALAZINE HYDROCHLORIDE 50 MG: 50 TABLET, FILM COATED ORAL at 06:52

## 2021-09-05 RX ADMIN — INSULIN LISPRO 4 UNITS: 100 INJECTION, SOLUTION INTRAVENOUS; SUBCUTANEOUS at 07:53

## 2021-09-05 RX ADMIN — HYDRALAZINE HYDROCHLORIDE 50 MG: 50 TABLET, FILM COATED ORAL at 21:23

## 2021-09-05 ASSESSMENT — PAIN SCALES - GENERAL
PAINLEVEL_OUTOF10: 0
PAINLEVEL_OUTOF10: 5

## 2021-09-05 NOTE — PROGRESS NOTES
Office: [phone]       Fax: [phone]      Nephrology Progress Note        Patient's Name: Nik Perez Date: 8/31/2021  Date of Visit: 9/5/2021    Reason for Consult:  CAMRYN, PICC  Requesting Physician:  Taz Ansari MD  PCP: Sharyle Drew, MD    Chief Complaint:  Foot infection     History of Present Illness:      Jose Antonio Shetty is a 59 y.o. male with PMHx of hypertension, DM>20 yr, COPD who was hospitalized on 8/31/2021 with complaints of left foot infection  Per pt , he was told that he had kidney problems but stable  No hematuria  No history of renal stone. No inc frequency  Denies history of DR  NSAID use: ibuprofen before admission  IV contrast: None recent   Home meds reviewed    INTERVAL HISTORY    Feels better  Shortness of breath: No   UOP: Fair  Creat: trending down       Past Surgical History:   Procedure Laterality Date    FOOT DEBRIDEMENT Left 8/31/2021    LEFT FOOT DEBRIDEMENT INCISION AND DRAINAGE performed by Alisha Christian DPM at [address] [place] [address] HAND SURGERY Left 06/19/2017    LEFT HAND DEBRIDEMENT INCISION AND DRAINAGE    NECK SURGERY      35s year    TOE AMPUTATION Left 2009    WRIST FRACTURE SURGERY         Family History   Problem Relation Age of Onset    Heart Failure Mother     Diabetes Mother     Cancer Father         throat cancer    Asthma Daughter         Sports induced as a child    Emphysema Neg Hx     Hypertension Neg Hx         reports that he has never smoked. He has never used smokeless tobacco. He reports that he does not drink alcohol and does not use drugs. Medications: Allergies:  Patient has no known allergies.     Scheduled Meds:   hydrALAZINE  50 mg Oral 3 times per day    lidocaine 1 % injection  5 mL IntraDERmal Once    sodium chloride flush  5-40 mL IntraVENous 2 times per day    carvedilol  6.25 mg Oral BID WC    NIFEdipine  30 mg Oral Daily    piperacillin-tazobactam  3,375 mg IntraVENous Q8H    lisinopril  5 mg Oral Daily    enoxaparin  40 mg SubCUTAneous BID    atorvastatin  40 mg Oral Daily    insulin glargine  20 Units SubCUTAneous Nightly    tamsulosin  0.4 mg Oral Daily     Continuous Infusions:   sodium chloride 100 mL/hr at 09/05/21 0800    dextrose         Labs:  CBC:   Recent Labs     09/03/21  0514 09/04/21  0635 09/05/21  0716   WBC 14.7* 14.9* 13.8*   HGB 10.1* 10.1* 10.1*    349 364     Ca/Mg/Phos:   Recent Labs     09/03/21  0514 09/04/21  0635 09/05/21  0716   CALCIUM 9.3 9.1 9.1     UA:    Objective:     Vitals: BP (!) 169/80   Pulse 69   Temp 98.1 °F (36.7 °C) (Oral)   Resp 16   Ht 6' 1\" (1.854 m)   Wt 240 lb (108.9 kg)   SpO2 93%   BMI 31.66 kg/m²    Wt Readings from Last 3 Encounters:   08/31/21 240 lb (108.9 kg)   08/30/21 240 lb (108.9 kg)   08/10/17 264 lb 15.9 oz (120.2 kg)      24HR INTAKE/OUTPUT:      Intake/Output Summary (Last 24 hours) at 9/5/2021 1118  Last data filed at 9/5/2021 7128  Gross per 24 hour   Intake 900 ml   Output    Net 900 ml     Constitutional:  awake, NAD  HEENT:  MMM, No icterus  Neck: no bruits, No JVD  Cardiovascular:  reg rhythm  Respiratory: CTA, no crackles  Abdomen:  +BS, soft, NT, ND  Ext: trace left  lower extremity edema, foot dressed  CNS: alert, no agitation    IMAGING:  XR FOOT LEFT (MIN 3 VIEWS)   Final Result   Postsurgical foot radiographs as discussed. No acute abnormality. MRI FOOT LEFT WO CONTRAST   Final Result   Postsurgical changes related to incision and drainage procedure along the   medial aspect of the 1st digit. No residual abscess is identified within the   foot.       Adjacent to the surgical defect is an area of subtle, mild increased T2 and   decreased T1 signal that is nonspecific in the absence of IV contrast, but   suspicious for early osteomyelitis given the proximity to the surgical wound   and known infectious process. Additional area of T2 signal hyperintensity with decreased T1 signal along   the metatarsal-sesamoid joints, medial greater than lateral.  These findings   are favored degenerative in nature, with osteomyelitis considered less likely   but difficult to exclude. Diffuse edema and atrophy of the intrinsic foot musculature, suggesting   sequela of diabetic neuropathy. LASER SKIN PERFUSION PRESSURE STUDY   Final Result      XR FOOT LEFT (MIN 3 VIEWS)   Final Result   Findings as above are likely postoperative. In the absence of interval   intervention necrotizing infection must be excluded. XR TIBIA FIBULA LEFT (2 VIEWS)   Final Result   No acute osseous abnormality of the left tib-fib. No soft tissue gas         VL DUP LOWER EXTREMITY ARTERIES BILATERAL   Final Result      XR FOOT LEFT (MIN 3 VIEWS)   Final Result   Soft tissue swelling with soft tissue gas adjacent to the head of the 1st   metatarsal.  No convincing plain film evidence of osteomyelitis. No acute fracture identified. Probable nonacute corner fracture involving   the base of the 2nd proximal phalanx. Assessment :     1. CAMRYN on CKD3  -Non-Oliguric  -Baseline creat: ?1.5 Now 2-->1.7->1.5  -UA: prot +++  -Volume: Euvolemic  -Electrolytes: Hyperkalemia mild  -Acid-Base: NAGMA and AGMA    Recent Labs     09/03/21  0514 09/04/21  0635 09/05/21  0716   BUN 37* 41* 35*   CREATININE 1.8* 1.7* 1.5*     Recent Labs     09/03/21  0514 09/04/21  0635 09/05/21  0716    134* 134*   K 4.7 5.1 5.0   CO2 19* 19* 21         2. HTN  -Blood pressure high    BP Readings from Last 1 Encounters:   09/05/21 (!) 169/80       3. left foot infection  status post INCISION AND DEBRIDEMENT LEFT FOOT WITH PARTIAL FIRST RAY AMPUTATION (Left Foot)    4. DM    Plan:     - PICC line to be placed weighing benefit risk.  Pt likely at risk for CKD progression and future need for RRT.  - UPC  - optimize BP meds: inc Nifedipine  - sod bicarb  - low K diet  - may continue Lisinopril for now  - Monitor BMP    -Monitor I/O, UOP  -Maintain MAP>65  -Avoid nephrotoxin, if able. -Dose meds to current eGFR    Thank you for allowing us to participate in care of [address] [place] [address] . We will continue to follow. Feel free to contact me with any questions.       Bella Bedolla MD  9/5/2021    [facility] [address] [place] [address] S  Office : [phone]  Fax :[phone]

## 2021-09-05 NOTE — PROGRESS NOTES
Upon arrival to place PICC line assessed chart for issues related to picc placement, check for consent, and did time out with RN Cecilie Rinne. Pt. Tolerated PICC placement well, no difficulty accessing basilic vein and 3CG technology used to verify PICC tip placement. Positive P wave with no negative deflection. Printed wave form and placed In chart.  Reported off to [employer]

## 2021-09-06 LAB
ANION GAP SERPL CALCULATED.3IONS-SCNC: 12 MMOL/L (ref 3–16)
BUN BLDV-MCNC: 36 MG/DL (ref 7–20)
CALCIUM SERPL-MCNC: 9 MG/DL (ref 8.3–10.6)
CHLORIDE BLD-SCNC: 101 MMOL/L (ref 99–110)
CO2: 23 MMOL/L (ref 21–32)
CREAT SERPL-MCNC: 1.6 MG/DL (ref 0.8–1.3)
CREATININE URINE: 113.6 MG/DL (ref 39–259)
GFR AFRICAN AMERICAN: 53
GFR NON-AFRICAN AMERICAN: 44
GLUCOSE BLD-MCNC: 260 MG/DL (ref 70–99)
GLUCOSE BLD-MCNC: 264 MG/DL (ref 70–99)
GLUCOSE BLD-MCNC: 267 MG/DL (ref 70–99)
GLUCOSE BLD-MCNC: 270 MG/DL (ref 70–99)
GLUCOSE BLD-MCNC: 285 MG/DL (ref 70–99)
HCT VFR BLD CALC: 30.2 % (ref 40.5–52.5)
HEMOGLOBIN: 10.3 G/DL (ref 13.5–17.5)
MCH RBC QN AUTO: 31.3 PG (ref 26–34)
MCHC RBC AUTO-ENTMCNC: 34.1 G/DL (ref 31–36)
MCV RBC AUTO: 91.7 FL (ref 80–100)
PDW BLD-RTO: 14.3 % (ref 12.4–15.4)
PERFORMED ON: ABNORMAL
PLATELET # BLD: 392 K/UL (ref 135–450)
PMV BLD AUTO: 8.8 FL (ref 5–10.5)
POTASSIUM REFLEX MAGNESIUM: 4.6 MMOL/L (ref 3.5–5.1)
PROTEIN PROTEIN: 109 MG/DL
PROTEIN/CREAT RATIO: 1 MG/DL
RBC # BLD: 3.29 M/UL (ref 4.2–5.9)
SODIUM BLD-SCNC: 136 MMOL/L (ref 136–145)
WBC # BLD: 12.9 K/UL (ref 4–11)

## 2021-09-06 PROCEDURE — 6370000000 HC RX 637 (ALT 250 FOR IP): Performed by: INTERNAL MEDICINE

## 2021-09-06 PROCEDURE — 6370000000 HC RX 637 (ALT 250 FOR IP): Performed by: NURSE PRACTITIONER

## 2021-09-06 PROCEDURE — 6360000002 HC RX W HCPCS: Performed by: NURSE PRACTITIONER

## 2021-09-06 PROCEDURE — 84156 ASSAY OF PROTEIN URINE: CPT

## 2021-09-06 PROCEDURE — 36415 COLL VENOUS BLD VENIPUNCTURE: CPT

## 2021-09-06 PROCEDURE — 82570 ASSAY OF URINE CREATININE: CPT

## 2021-09-06 PROCEDURE — 80048 BASIC METABOLIC PNL TOTAL CA: CPT

## 2021-09-06 PROCEDURE — 6370000000 HC RX 637 (ALT 250 FOR IP): Performed by: HOSPITALIST

## 2021-09-06 PROCEDURE — 1200000000 HC SEMI PRIVATE

## 2021-09-06 PROCEDURE — 2580000003 HC RX 258: Performed by: NURSE PRACTITIONER

## 2021-09-06 PROCEDURE — 6360000002 HC RX W HCPCS: Performed by: INTERNAL MEDICINE

## 2021-09-06 PROCEDURE — 2580000003 HC RX 258: Performed by: INTERNAL MEDICINE

## 2021-09-06 PROCEDURE — 85027 COMPLETE CBC AUTOMATED: CPT

## 2021-09-06 PROCEDURE — 99233 SBSQ HOSP IP/OBS HIGH 50: CPT | Performed by: HOSPITALIST

## 2021-09-06 RX ORDER — HYDRALAZINE HYDROCHLORIDE 50 MG/1
50 TABLET, FILM COATED ORAL ONCE
Status: DISCONTINUED | OUTPATIENT
Start: 2021-09-06 | End: 2021-09-08 | Stop reason: HOSPADM

## 2021-09-06 RX ORDER — HYDRALAZINE HYDROCHLORIDE 50 MG/1
100 TABLET, FILM COATED ORAL EVERY 8 HOURS SCHEDULED
Status: DISCONTINUED | OUTPATIENT
Start: 2021-09-06 | End: 2021-09-08 | Stop reason: HOSPADM

## 2021-09-06 RX ADMIN — ENOXAPARIN SODIUM 40 MG: 40 INJECTION SUBCUTANEOUS at 09:11

## 2021-09-06 RX ADMIN — NIFEDIPINE 60 MG: 30 TABLET, FILM COATED, EXTENDED RELEASE ORAL at 09:11

## 2021-09-06 RX ADMIN — HYDRALAZINE HYDROCHLORIDE 100 MG: 50 TABLET, FILM COATED ORAL at 21:23

## 2021-09-06 RX ADMIN — INSULIN GLARGINE 20 UNITS: 100 INJECTION, SOLUTION SUBCUTANEOUS at 21:27

## 2021-09-06 RX ADMIN — INSULIN LISPRO 9 UNITS: 100 INJECTION, SOLUTION INTRAVENOUS; SUBCUTANEOUS at 13:13

## 2021-09-06 RX ADMIN — PIPERACILLIN AND TAZOBACTAM 3375 MG: 3; .375 INJECTION, POWDER, LYOPHILIZED, FOR SOLUTION INTRAVENOUS at 16:35

## 2021-09-06 RX ADMIN — INSULIN LISPRO 9 UNITS: 100 INJECTION, SOLUTION INTRAVENOUS; SUBCUTANEOUS at 09:17

## 2021-09-06 RX ADMIN — Medication 10 ML: at 16:31

## 2021-09-06 RX ADMIN — CARVEDILOL 6.25 MG: 6.25 TABLET, FILM COATED ORAL at 17:28

## 2021-09-06 RX ADMIN — INSULIN LISPRO 9 UNITS: 100 INJECTION, SOLUTION INTRAVENOUS; SUBCUTANEOUS at 18:40

## 2021-09-06 RX ADMIN — HYDRALAZINE HYDROCHLORIDE 100 MG: 50 TABLET, FILM COATED ORAL at 14:21

## 2021-09-06 RX ADMIN — LISINOPRIL 5 MG: 5 TABLET ORAL at 09:11

## 2021-09-06 RX ADMIN — INSULIN LISPRO 5 UNITS: 100 INJECTION, SOLUTION INTRAVENOUS; SUBCUTANEOUS at 21:25

## 2021-09-06 RX ADMIN — TAMSULOSIN HYDROCHLORIDE 0.4 MG: 0.4 CAPSULE ORAL at 09:11

## 2021-09-06 RX ADMIN — Medication 10 ML: at 09:15

## 2021-09-06 RX ADMIN — PIPERACILLIN AND TAZOBACTAM 3375 MG: 3; .375 INJECTION, POWDER, LYOPHILIZED, FOR SOLUTION INTRAVENOUS at 09:16

## 2021-09-06 RX ADMIN — CARVEDILOL 6.25 MG: 6.25 TABLET, FILM COATED ORAL at 09:11

## 2021-09-06 RX ADMIN — ENOXAPARIN SODIUM 40 MG: 40 INJECTION SUBCUTANEOUS at 21:23

## 2021-09-06 RX ADMIN — ATORVASTATIN CALCIUM 40 MG: 40 TABLET, FILM COATED ORAL at 09:11

## 2021-09-06 RX ADMIN — HYDRALAZINE HYDROCHLORIDE 50 MG: 50 TABLET, FILM COATED ORAL at 05:23

## 2021-09-06 ASSESSMENT — PAIN SCALES - GENERAL
PAINLEVEL_OUTOF10: 0
PAINLEVEL_OUTOF10: 0

## 2021-09-06 NOTE — PROGRESS NOTES
Pt educated he is to be non weight bearing on L foot. Pt stated that he was told he was able to place weight on his heel and not his toes.      Reinforced education

## 2021-09-06 NOTE — PROGRESS NOTES
Office: [phone]       Fax: [phone]      Nephrology Progress Note        Patient's Name: Doreen Gunn Date: 8/31/2021  Date of Visit: 9/6/2021    Reason for Consult:  CAMRYN, PICC  Requesting Physician:  Sony Molina MD  PCP: Alana Yanez MD    Chief Complaint:  Foot infection     History of Present Illness:      Janeal Dance is a 59 y.o. male with PMHx of hypertension, DM>20 yr, COPD who was hospitalized on 8/31/2021 with complaints of left foot infection  Per pt , he was told that he had kidney problems but stable  No hematuria  No history of renal stone. No inc frequency  Denies history of DR  NSAID use: ibuprofen before admission  IV contrast: None recent   Home meds reviewed    INTERVAL HISTORY    Feels better  Shortness of breath: No   UOP: Fair  Creat: stable       Past Surgical History:   Procedure Laterality Date    FOOT DEBRIDEMENT Left 8/31/2021    LEFT FOOT DEBRIDEMENT INCISION AND DRAINAGE performed by Praful Dumont DPM at [address] [place] [address] HAND SURGERY Left 06/19/2017    LEFT HAND DEBRIDEMENT INCISION AND DRAINAGE    NECK SURGERY      35s year    TOE AMPUTATION Left 2009    WRIST FRACTURE SURGERY         Family History   Problem Relation Age of Onset    Heart Failure Mother     Diabetes Mother     Cancer Father         throat cancer    Asthma Daughter         Sports induced as a child    Emphysema Neg Hx     Hypertension Neg Hx         reports that he has never smoked. He has never used smokeless tobacco. He reports that he does not drink alcohol and does not use drugs. Medications: Allergies:  Patient has no known allergies.     Scheduled Meds:   NIFEdipine  60 mg Oral Daily    insulin lispro  0-18 Units SubCUTAneous TID WC    insulin lispro  0-9 Units SubCUTAneous Nightly    hydrALAZINE  50 mg Oral 3 times per day    lidocaine 1 % injection  5 mL IntraDERmal Once    sodium chloride flush  5-40 mL IntraVENous 2 times per day    carvedilol  6.25 mg Oral BID WC    piperacillin-tazobactam  3,375 mg IntraVENous Q8H    lisinopril  5 mg Oral Daily    enoxaparin  40 mg SubCUTAneous BID    atorvastatin  40 mg Oral Daily    insulin glargine  20 Units SubCUTAneous Nightly    tamsulosin  0.4 mg Oral Daily     Continuous Infusions:   dextrose      sodium chloride 100 mL/hr at 09/06/21 0914    dextrose         Labs:  CBC:   Recent Labs     09/04/21  0635 09/05/21  0716 09/06/21  0620   WBC 14.9* 13.8* 12.9*   HGB 10.1* 10.1* 10.3*    364 392     Ca/Mg/Phos:   Recent Labs     09/04/21  0635 09/05/21  0716 09/06/21  0620   CALCIUM 9.1 9.1 9.0     UA:    Objective:     Vitals: BP (!) 175/81   Pulse 68   Temp 97.5 °F (36.4 °C) (Oral)   Resp 18   Ht 6' 1\" (1.854 m)   Wt 240 lb (108.9 kg)   SpO2 95%   BMI 31.66 kg/m²    Wt Readings from Last 3 Encounters:   08/31/21 240 lb (108.9 kg)   08/30/21 240 lb (108.9 kg)   08/10/17 264 lb 15.9 oz (120.2 kg)      24HR INTAKE/OUTPUT:      Intake/Output Summary (Last 24 hours) at 9/6/2021 6977  Last data filed at 9/6/2021 9073  Gross per 24 hour   Intake 1345.6 ml   Output    Net 1345.6 ml     Constitutional:  awake, NAD  HEENT:  MMM, No icterus  Neck: no bruits, No JVD  Cardiovascular:  reg rhythm  Respiratory: CTA, no crackles  Abdomen:  +BS, soft, NT, ND  Ext: trace left  lower extremity edema, foot dressed  CNS: alert, no agitation    IMAGING:  XR FOOT LEFT (MIN 3 VIEWS)   Final Result   Postsurgical foot radiographs as discussed. No acute abnormality. MRI FOOT LEFT WO CONTRAST   Final Result   Postsurgical changes related to incision and drainage procedure along the   medial aspect of the 1st digit. No residual abscess is identified within the   foot.       Adjacent to the surgical defect is an area of subtle, mild increased T2 and   decreased T1 signal that is nonspecific in the absence of IV contrast, but   suspicious for early osteomyelitis given the proximity to the surgical wound   and known infectious process. Additional area of T2 signal hyperintensity with decreased T1 signal along   the metatarsal-sesamoid joints, medial greater than lateral.  These findings   are favored degenerative in nature, with osteomyelitis considered less likely   but difficult to exclude. Diffuse edema and atrophy of the intrinsic foot musculature, suggesting   sequela of diabetic neuropathy. LASER SKIN PERFUSION PRESSURE STUDY   Final Result      XR FOOT LEFT (MIN 3 VIEWS)   Final Result   Findings as above are likely postoperative. In the absence of interval   intervention necrotizing infection must be excluded. XR TIBIA FIBULA LEFT (2 VIEWS)   Final Result   No acute osseous abnormality of the left tib-fib. No soft tissue gas         VL DUP LOWER EXTREMITY ARTERIES BILATERAL   Final Result      XR FOOT LEFT (MIN 3 VIEWS)   Final Result   Soft tissue swelling with soft tissue gas adjacent to the head of the 1st   metatarsal.  No convincing plain film evidence of osteomyelitis. No acute fracture identified. Probable nonacute corner fracture involving   the base of the 2nd proximal phalanx. Assessment :     1. CAMRYN on CKD3  -Non-Oliguric  -Baseline creat: ?1.5 Now 2-->1.7->1.5  -UA: prot +++  -Volume: Euvolemic  -Electrolytes: Hyperkalemia mild  -Acid-Base: NAGMA and AGMA    Recent Labs     09/04/21  0635 09/05/21  0716 09/06/21  0620   BUN 41* 35* 36*   CREATININE 1.7* 1.5* 1.6*     Recent Labs     09/04/21  0635 09/05/21  0716 09/06/21  0620   * 134* 136   K 5.1 5.0 4.6   CO2 19* 21 23         2. HTN  -Blood pressure high    BP Readings from Last 1 Encounters:   09/06/21 (!) 175/81       3. left foot infection  status post INCISION AND DEBRIDEMENT LEFT FOOT WITH PARTIAL FIRST RAY AMPUTATION (Left Foot)    4. DM    Plan:       - UPC  - optimize BP meds: inc Hydralazine    - low K diet  - may continue Lisinopril for now  - Monitor BMP    -Monitor I/O, UOP  -Maintain MAP>65  -Avoid nephrotoxin, if able. -Dose meds to current eGFR    Thank you for allowing us to participate in care of [address] [place] [address] . We will continue to follow. Feel free to contact me with any questions.       Jeffery May MD  9/6/2021    [facility] [address] [place] [address] S  Office : [phone]  Fax :[phone]

## 2021-09-06 NOTE — PROGRESS NOTES
Hospitalist Progress Note      PCP: Lux Fernandez MD    Date of Admission: 8/31/2021    Chief Complaint: Foot Laceration/Infection    Subjective: no new c/o. Medications:  Reviewed    Infusion Medications    dextrose      sodium chloride 100 mL/hr at 09/06/21 0914    dextrose       Scheduled Medications    NIFEdipine  60 mg Oral Daily    insulin lispro  0-18 Units SubCUTAneous TID WC    insulin lispro  0-9 Units SubCUTAneous Nightly    hydrALAZINE  50 mg Oral 3 times per day    lidocaine 1 % injection  5 mL IntraDERmal Once    sodium chloride flush  5-40 mL IntraVENous 2 times per day    carvedilol  6.25 mg Oral BID WC    piperacillin-tazobactam  3,375 mg IntraVENous Q8H    lisinopril  5 mg Oral Daily    enoxaparin  40 mg SubCUTAneous BID    atorvastatin  40 mg Oral Daily    insulin glargine  20 Units SubCUTAneous Nightly    tamsulosin  0.4 mg Oral Daily     PRN Meds: glucose, dextrose, glucagon (rDNA), dextrose, sodium chloride flush, sodium chloride, labetalol, acetaminophen, glucose, dextrose, glucagon (rDNA), dextrose, oxyCODONE, benzocaine-menthol      Intake/Output Summary (Last 24 hours) at 9/6/2021 0921  Last data filed at 9/6/2021 0527  Gross per 24 hour   Intake 1345.6 ml   Output    Net 1345.6 ml       Physical Exam Performed:    BP (!) 175/81   Pulse 68   Temp 97.5 °F (36.4 °C) (Oral)   Resp 18   Ht 6' 1\" (1.854 m)   Wt 240 lb (108.9 kg)   SpO2 95%   BMI 31.66 kg/m²     General appearance: No apparent distress, appears stated age and cooperative. HEENT: Pupils equal, round, and reactive to light. Conjunctivae/corneas clear. Neck: Supple, with full range of motion. No jugular venous distention. Trachea midline. Respiratory:  Normal respiratory effort. Clear to auscultation, bilaterally without Rales/Wheezes/Rhonchi. Cardiovascular: Regular rate and rhythm with normal S1/S2 without murmurs, rubs or gallops.   Abdomen: Soft, non-tender, non-distended with leucocytosis and CAMRYN  - lactic was 1.7   - pan cultures and empiric abx started      Gas Gangrene/ DM foot   -  Pod consulted   - S/P ID and cultures sent pre sanders GPCStaphylococcus aureus   - IV ABX   - ID consult -   Atrial duplex and MRI reviewed No need for vascular intervention   - trend and follow labs   - WB status per POD, suspect will need PT/OT when WB can be established   - PICC ordered for LTABX, Nephology consulted for permission      DM - last AiC 4/26 7.8  - at home mgt combo of Trulicity and metformin   - here will use basal bolus with SSI, Carb control diet   - fSBS ACHS   Neuropathy - in the setting of DM     HTN - w/out known CAD and no evidence of active signs/sxs of ischemia/failure. Currently controlled on home meds w/ vitals reviewed and documented as above. HyperLipidemia - controlled on home Statin. Continue, w/ f/u and med adjustment w/ PCP      BPH - w/out acute obstructive Uropathy, controlled on home medications as ordered - continued.      Obesity -  With Body mass index is 31.66 kg/m². Complicating assessment and treatment. Placing patient at risk for multiple co-morbidities as well as early death and contributing to the patient's presentation. Counseled on weight loss.        DVT Prophylaxis: LMWH     Recent Labs     09/04/21  0635 09/05/21  0716 09/06/21  0620    364 392     Diet: Adult Oral Nutrition Supplement; Low Calorie/High Protein Oral Supplement  ADULT DIET; Regular  Code Status: Prior      PT/OT Eval Status: not yet ordered. Dispo - discharge date unclear.      Stan Cantu MD

## 2021-09-06 NOTE — CARE COORDINATION
Referral received to check IV Benefits. Patients benefit information is as follows: Therapy:Zosyn 3.375g Q8  Co-Insurance %: 80/20  OOP:$ 4900.00        Amt Met:$ 0  Pt.  Copay:  $16/week for Zosyn, $12/day for per tong    Electronically signed by Alisia Smith on 9/6/2021 at 6:00 PM   Cell # [phone], Office # [phone]

## 2021-09-06 NOTE — PROGRESS NOTES
Department of Podiatric Surgery  Progress Note      CHIEF COMPLAINT:  Left foot infection    HISTORY OF PRESENT ILLNESS:                The patient is a 59 y.o. male s/p staged I&D with partial 1st ray amputation. No fevers overnight. No pain in the foot. Otherwise resting comfortably.     Past Medical History:        Diagnosis Date    Arthritis     Chronic low back pain     Dental bridge present     upper    Diabetes mellitus ([address]     Emphysema of lung ([address]     Fractures     Hypertension     Neuropathy     Osteoarthritis     Prostate troubles      Past Surgical History:        Procedure Laterality Date    FOOT DEBRIDEMENT Left 8/31/2021    LEFT FOOT DEBRIDEMENT INCISION AND DRAINAGE performed by Erlinda Castro DPM at [address] [place] [address] HAND SURGERY Left 06/19/2017    LEFT HAND DEBRIDEMENT INCISION AND DRAINAGE    NECK SURGERY      30s year    TOE AMPUTATION Left 2009    WRIST FRACTURE SURGERY       Current Medications:    Current Facility-Administered Medications: NIFEdipine (PROCARDIA XL) extended release tablet 60 mg, 60 mg, Oral, Daily  glucose (GLUTOSE) 40 % oral gel 15 g, 15 g, Oral, PRN  dextrose 50 % IV solution, 12.5 g, IntraVENous, PRN  glucagon (rDNA) injection 1 mg, 1 mg, IntraMUSCular, PRN  dextrose 5 % solution, 100 mL/hr, IntraVENous, PRN  insulin lispro (HUMALOG) injection vial 0-18 Units, 0-18 Units, SubCUTAneous, TID WC  insulin lispro (HUMALOG) injection vial 0-9 Units, 0-9 Units, SubCUTAneous, Nightly  hydrALAZINE (APRESOLINE) tablet 50 mg, 50 mg, Oral, 3 times per day  lidocaine 1 % injection 5 mL, 5 mL, IntraDERmal, Once  sodium chloride flush 0.9 % injection 5-40 mL, 5-40 mL, IntraVENous, 2 times per day  sodium chloride flush 0.9 % injection 5-40 mL, 5-40 mL, IntraVENous, PRN  0.9 % sodium chloride infusion, 25 mL, IntraVENous, PRN  carvedilol (COREG) tablet 6.25 mg, 6.25 mg, Oral, BID WC  labetalol (NORMODYNE;TRANDATE) injection 5 mg, 5 mg, IntraVENous, Q6H PRN  piperacillin-tazobactam (ZOSYN) 3,375 mg in dextrose 5 % 50 mL IVPB extended infusion (mini-bag), 3,375 mg, IntraVENous, Q8H  acetaminophen (TYLENOL) tablet 650 mg, 650 mg, Oral, Q4H PRN  glucose (GLUTOSE) 40 % oral gel 15 g, 15 g, Oral, PRN  dextrose 50 % IV solution, 12.5 g, IntraVENous, PRN  glucagon (rDNA) injection 1 mg, 1 mg, IntraMUSCular, PRN  dextrose 5 % solution, 100 mL/hr, IntraVENous, PRN  lisinopril (PRINIVIL;ZESTRIL) tablet 5 mg, 5 mg, Oral, Daily  enoxaparin (LOVENOX) injection 40 mg, 40 mg, SubCUTAneous, BID  oxyCODONE (ROXICODONE) immediate release tablet 5 mg, 5 mg, Oral, Q4H PRN  benzocaine-menthol (CEPACOL SORE THROAT) lozenge 1 lozenge, 1 lozenge, Oral, Q2H PRN  atorvastatin (LIPITOR) tablet 40 mg, 40 mg, Oral, Daily  insulin glargine (LANTUS) injection vial 20 Units, 20 Units, SubCUTAneous, Nightly  tamsulosin (FLOMAX) capsule 0.4 mg, 0.4 mg, Oral, Daily  Allergies:   Patient has no known allergies. Family History:       Problem Relation Age of Onset    Heart Failure Mother     Diabetes Mother     Cancer Father         throat cancer    Asthma Daughter         Sports induced as a child    Emphysema Neg Hx     Hypertension Neg Hx      REVIEW OF SYSTEMS:    CONSTITUTIONAL:  positive for  fevers and chills  RESPIRATORY:  negative for  dry cough and wheezing  PHYSICAL EXAM:      Vitals:    BP (!) 175/81   Pulse 68   Temp 97.5 °F (36.4 °C) (Oral)   Resp 18   Ht 6' 1\" (1.854 m)   Wt 240 lb (108.9 kg)   SpO2 95%   BMI 31.66 kg/m²     LABS:   Recent Labs     09/05/21  0716 09/06/21  0620   WBC 13.8* 12.9*   HGB 10.1* 10.3*   HCT 29.3* 30.2*    392     Recent Labs     09/06/21  0620      K 4.6      CO2 23   BUN 36*   CREATININE 1.6*     No results for input(s): PROT, INR, APTT in the last 72 hours. LOWER EXTREMITY EXAMINATION   SKIN:      Open surgical incision along the 1st ray down to bone and over the 4th metatarsal shaft. Lateral incision is filling in.  Deep tissue 50/50 fibro-granular. 1st met bone exposed at the osteotomy site. No active purulence expressed today. Erythema to the dorsal left foot moderately improved in intensity. No pain. No odor appreciated today. Hemorrhagic callus plantar medial right hallux. NEUROLOGIC:      Protective sensation is absent at the level of the toes. VASCULAR:      DP and PT pulses are faintly palpable left foot. Could be related to current edema. CFT brisk to the toes. MUSCULOSKELETAL:    Muscle strength 5/5 all prime movers b/l lower extremity. Left hallux amputation. Imaging:    Soft tissue swelling with soft tissue gas adjacent to the head of the 1st   metatarsal.  No convincing plain film evidence of osteomyelitis.       No acute fracture identified.  Probable nonacute corner fracture involving   the base of the 2nd proximal phalanx. MRI left foot:     Postsurgical changes related to incision and drainage procedure along the   medial aspect of the 1st digit.  No residual abscess is identified within the   foot.       Adjacent to the surgical defect is an area of mild increased T2 and decreased   T1 signal that is nonspecific in the absence of IV contrast, but suspicious   for early osteomyelitis given the proximity to the surgical wound and known   infectious process.       Additional area of T2 signal hyperintensity with decreased T1 signal along   the metatarsal-sesamoid joints, medial greater than lateral.  These findings   are favored degenerative in nature, with osteomyelitis considered less likely   but difficult to exclude.       Diffuse edema and atrophy of the intrinsic foot musculature, suggesting   sequela of diabetic neuropathy. Assessment:    1. Gas gangrene, left foot  2. DM2 with ulcer  3. DM2 with neuropathy    Plan:    - E&M. S/P staged I&D with partial 1st ray amputation. Site left open again after medial skin flap was necrosed and erythema to the dorsal foot was still moderate.   - Patient with severe diabetic foot infection and gas gangrene on x-ray. Ascending erythema towards the ankle. - WBC 19,000 on admission. Trending down, 12,900. - Afebrile. - IV ABX per ID; Zosyn. Appreciate recs. 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 Laser doppler with adequate healing potentials. Appreciate vascular recs. - New sterile dressing placed. - No further podiatric surgery planned at this time. Site left open to continue to drain and decompress. Patient will need wound vac. Continue IV ABX. Recommend HBO wound care at [facility] after discharge. Will likely be here through the holiday weekend, due to outpatient wound vac approval. Patient still at risk for further amputation, if healing does not occur, or infection worsens.  - All questions answered. - I will continue to follow while in house.       Tara Reese, [facility] or [facility]

## 2021-09-07 VITALS
OXYGEN SATURATION: 96 % | SYSTOLIC BLOOD PRESSURE: 154 MMHG | TEMPERATURE: 98 F | BODY MASS INDEX: 31.81 KG/M2 | RESPIRATION RATE: 16 BRPM | HEART RATE: 67 BPM | DIASTOLIC BLOOD PRESSURE: 74 MMHG | HEIGHT: 73 IN | WEIGHT: 240 LBS

## 2021-09-07 LAB
ANION GAP SERPL CALCULATED.3IONS-SCNC: 12 MMOL/L (ref 3–16)
BUN BLDV-MCNC: 34 MG/DL (ref 7–20)
CALCIUM SERPL-MCNC: 9 MG/DL (ref 8.3–10.6)
CHLORIDE BLD-SCNC: 103 MMOL/L (ref 99–110)
CO2: 23 MMOL/L (ref 21–32)
CREAT SERPL-MCNC: 1.5 MG/DL (ref 0.8–1.3)
GFR AFRICAN AMERICAN: 57
GFR NON-AFRICAN AMERICAN: 47
GLUCOSE BLD-MCNC: 228 MG/DL (ref 70–99)
GLUCOSE BLD-MCNC: 229 MG/DL (ref 70–99)
GLUCOSE BLD-MCNC: 233 MG/DL (ref 70–99)
GLUCOSE BLD-MCNC: 255 MG/DL (ref 70–99)
HCT VFR BLD CALC: 29.6 % (ref 40.5–52.5)
HEMOGLOBIN: 10.2 G/DL (ref 13.5–17.5)
MCH RBC QN AUTO: 31.4 PG (ref 26–34)
MCHC RBC AUTO-ENTMCNC: 34.4 G/DL (ref 31–36)
MCV RBC AUTO: 91.2 FL (ref 80–100)
PDW BLD-RTO: 14.5 % (ref 12.4–15.4)
PERFORMED ON: ABNORMAL
PLATELET # BLD: 392 K/UL (ref 135–450)
PMV BLD AUTO: 9 FL (ref 5–10.5)
POTASSIUM REFLEX MAGNESIUM: 4.5 MMOL/L (ref 3.5–5.1)
RBC # BLD: 3.24 M/UL (ref 4.2–5.9)
SODIUM BLD-SCNC: 138 MMOL/L (ref 136–145)
WBC # BLD: 11.8 K/UL (ref 4–11)

## 2021-09-07 PROCEDURE — 80048 BASIC METABOLIC PNL TOTAL CA: CPT

## 2021-09-07 PROCEDURE — 97110 THERAPEUTIC EXERCISES: CPT

## 2021-09-07 PROCEDURE — 6370000000 HC RX 637 (ALT 250 FOR IP): Performed by: HOSPITALIST

## 2021-09-07 PROCEDURE — 6360000002 HC RX W HCPCS: Performed by: INTERNAL MEDICINE

## 2021-09-07 PROCEDURE — 6370000000 HC RX 637 (ALT 250 FOR IP): Performed by: NURSE PRACTITIONER

## 2021-09-07 PROCEDURE — 85027 COMPLETE CBC AUTOMATED: CPT

## 2021-09-07 PROCEDURE — 97116 GAIT TRAINING THERAPY: CPT

## 2021-09-07 PROCEDURE — 6370000000 HC RX 637 (ALT 250 FOR IP): Performed by: INTERNAL MEDICINE

## 2021-09-07 PROCEDURE — 1200000000 HC SEMI PRIVATE

## 2021-09-07 PROCEDURE — 2580000003 HC RX 258: Performed by: INTERNAL MEDICINE

## 2021-09-07 PROCEDURE — 2580000003 HC RX 258: Performed by: NURSE PRACTITIONER

## 2021-09-07 PROCEDURE — 99232 SBSQ HOSP IP/OBS MODERATE 35: CPT | Performed by: INTERNAL MEDICINE

## 2021-09-07 PROCEDURE — 97530 THERAPEUTIC ACTIVITIES: CPT

## 2021-09-07 PROCEDURE — 99232 SBSQ HOSP IP/OBS MODERATE 35: CPT | Performed by: HOSPITALIST

## 2021-09-07 RX ORDER — CARVEDILOL 6.25 MG/1
6.25 TABLET ORAL 2 TIMES DAILY WITH MEALS
Qty: 60 TABLET | Refills: 0 | Status: SHIPPED | OUTPATIENT
Start: 2021-09-07 | End: 2021-09-16

## 2021-09-07 RX ORDER — METRONIDAZOLE 500 MG/1
500 TABLET ORAL 3 TIMES DAILY
Qty: 90 TABLET | Refills: 0 | Status: SHIPPED | OUTPATIENT
Start: 2021-09-07 | End: 2021-09-27

## 2021-09-07 RX ORDER — NIFEDIPINE 60 MG/1
60 TABLET, FILM COATED, EXTENDED RELEASE ORAL DAILY
Qty: 30 TABLET | Refills: 0 | Status: SHIPPED | OUTPATIENT
Start: 2021-09-08 | End: 2021-09-16

## 2021-09-07 RX ADMIN — Medication 10 ML: at 08:16

## 2021-09-07 RX ADMIN — HYDRALAZINE HYDROCHLORIDE 100 MG: 50 TABLET, FILM COATED ORAL at 15:43

## 2021-09-07 RX ADMIN — HYDRALAZINE HYDROCHLORIDE 100 MG: 50 TABLET, FILM COATED ORAL at 06:27

## 2021-09-07 RX ADMIN — CARVEDILOL 6.25 MG: 6.25 TABLET, FILM COATED ORAL at 18:34

## 2021-09-07 RX ADMIN — TAMSULOSIN HYDROCHLORIDE 0.4 MG: 0.4 CAPSULE ORAL at 08:17

## 2021-09-07 RX ADMIN — CEFTRIAXONE SODIUM 2000 MG: 2 INJECTION, POWDER, FOR SOLUTION INTRAMUSCULAR; INTRAVENOUS at 15:49

## 2021-09-07 RX ADMIN — CARVEDILOL 6.25 MG: 6.25 TABLET, FILM COATED ORAL at 08:17

## 2021-09-07 RX ADMIN — ATORVASTATIN CALCIUM 40 MG: 40 TABLET, FILM COATED ORAL at 08:17

## 2021-09-07 RX ADMIN — NIFEDIPINE 60 MG: 30 TABLET, FILM COATED, EXTENDED RELEASE ORAL at 08:17

## 2021-09-07 RX ADMIN — PIPERACILLIN AND TAZOBACTAM 3375 MG: 3; .375 INJECTION, POWDER, LYOPHILIZED, FOR SOLUTION INTRAVENOUS at 08:27

## 2021-09-07 RX ADMIN — INSULIN LISPRO 9 UNITS: 100 INJECTION, SOLUTION INTRAVENOUS; SUBCUTANEOUS at 18:00

## 2021-09-07 RX ADMIN — PIPERACILLIN AND TAZOBACTAM 3375 MG: 3; .375 INJECTION, POWDER, LYOPHILIZED, FOR SOLUTION INTRAVENOUS at 00:00

## 2021-09-07 RX ADMIN — INSULIN LISPRO 6 UNITS: 100 INJECTION, SOLUTION INTRAVENOUS; SUBCUTANEOUS at 08:19

## 2021-09-07 RX ADMIN — INSULIN LISPRO 6 UNITS: 100 INJECTION, SOLUTION INTRAVENOUS; SUBCUTANEOUS at 12:45

## 2021-09-07 RX ADMIN — LISINOPRIL 5 MG: 5 TABLET ORAL at 08:17

## 2021-09-07 NOTE — FLOWSHEET NOTE
Pt anticipating home discharge today. Wife present at bedside. She also dealing with personal health issues (leukemia). Extended reflection on life story and health challenges. But positive perspective. 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 Children and grandchildren later, they support each other's health need. Welcome 's spiritual encouragement and prayer. 09/07/21 1558   Encounter Summary   Services provided to: Patient and family together  (Wife present)   Referral/Consult From: [address] [place] [address] Family members   Continue Visiting   (9/7- Spiritual support, life story, prayer)   Complexity of Encounter Low   Length of Encounter 45 minutes   Spiritual Assessment Completed Yes   Spiritual/Yarsanism   Type Spiritual support   Assessment Calm; Approachable   Intervention Explored feelings, thoughts, concerns;Nurtured hope;Prayer   Outcome Expressed gratitude;Engaged in conversation; Shared life review;Expressed feelings/needs/concerns;Encouraged; Hopeful;Receptive

## 2021-09-07 NOTE — PROGRESS NOTES
Physical Therapy  Facility/Department: [facility] C5 - MED SURG/ORTHO  Daily Treatment Note  NAME: Leonie Amaro  : 1960  MRN: [id number]    Date of Service: 2021    Discharge Recommendations:  24 hour supervision or assist, Home with Home health PT   PT Equipment Recommendations  Equipment Needed: Yes  Mobility Devices: Ash Guzman: Rolling    Assessment   Body structures, Functions, Activity limitations: Decreased functional mobility ; Decreased sensation;Decreased posture;Decreased balance;Decreased strength;Decreased safe awareness;Decreased cognition;Decreased endurance;Decreased ROM  Assessment: Patient is a 59year old male who was admitted to [facility] on 21 with gas gangrene of left foot. Patient received 1st ray amputation on 9/3/21. Patient today was able to ambulate up to 10 feet with a rolling walker and CGA. Patient presented with the therapy deficits listed above. PT recommends home PT to address these deficits. Patient is safe for home, when medically stable, with continued 24/7 assistance, home PT and a rolling walker. PT to continue to follow for skilled therapy while patient is in the hospital.  Treatment Diagnosis: Decreased independence with functional mobility  Prognosis: Good  Decision Making: Medium Complexity  Patient Education: Patient provided with extensive education regarding his NWB LLE status, safety with RW, benefits of increased mobility/exercise. patient will need education reinforcement. Barriers to Learning: slightly impulsive  Activity Tolerance  Activity Tolerance: /80  HR 61  O2 95% RA     Patient Diagnosis(es): The primary encounter diagnosis was Cellulitis of left foot. Diagnoses of Necrotizing fasciitis ([facility] and Acute renal failure, unspecified acute renal failure type [facility]) were also pertinent to this visit.      has a past medical history of Arthritis, Chronic low back pain, Dental bridge present, Diabetes mellitus ([address] Emphysema of lung ([facility] Fractures, Hypertension, Neuropathy, Osteoarthritis, and Prostate troubles. has a past surgical history that includes Foot surgery; Wrist fracture surgery; Toe amputation (Left, 2009); Neck surgery; Hand surgery (Left, 06/19/2017); Foot Debridement (Left, 8/31/2021); and Foot Debridement (Left, 9/3/2021). Restrictions  Restrictions/Precautions  Restrictions/Precautions: Weight Bearing, General Precautions, Fall Risk  Lower Extremity Weight Bearing Restrictions  Left Lower Extremity Weight Bearing: Non Weight Bearing  Position Activity Restriction  Other position/activity restrictions: Therapy spoke directly with patient's podiatrist Dr. Rebel Monteiro over the phone on 9/4/21. Per Dr. Elana Arreaga, the patient is allowed to put weight through his left heel but only for stand pivot transfers. Per Dr. Elana Arreaga patient has to remain non-weight bearing for ambulation. Subjective   General  Chart Reviewed: Yes  Additional Pertinent Hx: 9/03 STAGED INCISION AND DEBRIDEMENT LEFT FOOT WITH PARTIAL FIRST RAY AMPUTATION  Response To Previous Treatment: Patient with no complaints from previous session. Family / Caregiver Present: Yes (wife)  Referring Practitioner: SHAILESH Galvez CNP  Subjective  Subjective: Patient agreed to participate. General Comment  Comments: Supine in bed upon entry of therapy staff. Cleared for therapy by EFFIE Reyes. Pain Screening  Patient Currently in Pain: Denies  Vital Signs  Patient Currently in Pain: Denies       Orientation  Orientation  Overall Orientation Status: Within Functional Limits  Cognition      Objective   Bed mobility  Supine to Sit: Supervision  Transfers  Sit to Stand: Contact guard assistance  Stand to sit: Contact guard assistance  Bed to Chair: Contact guard assistance (sw)  Ambulation  Ambulation?: Yes  WB Status: NWB LLE (per Dr. Elana Arreaga patient may only bear weight through his heel for transfers. NWB LLE for ambulation).   Ambulation 1  Surface: level tile  Device: Rolling Walker  Assistance: Contact guard assistance  Quality of Gait: hop gait pattern. cueing to maintain base of support within rolling walker. cueing to maintain NWB LLE status. Gait Deviations: Slow Clover;Decreased step length;Decreased step height  Distance: 10 feet x2. Pt attempted to ambulate w/ LLE WBing through heel. Explained to pt  is allowed to put weight through his left heel but only for stand pivot transfers. Per Dr. William Cintron patient has to remain non-weight bearing for ambulation     Balance  Posture: Fair  Sitting - Static: Good  Sitting - Dynamic: Good  Standing - Static: Fair;+  Standing - Dynamic: Fair (sw)  Exercises  Hip Flexion: 1 x 10  Knee Long Arc Quad: 1 x 10  Ankle Pumps: 1 x 10             AM-PAC Score     AM-PAC Inpatient Mobility without Stair Climbing Raw Score : 15 (09/07/21 1602)  AM-PAC Inpatient without Stair Climbing T-Scale Score : 43.03 (09/07/21 1602)  Mobility Inpatient CMS 0-100% Score: 47.43 (09/07/21 1602)  Mobility Inpatient without Stair CMS G-Code Modifier : CK (09/07/21 1602)       Goals  Short term goals  Time Frame for Short term goals: 1 week 9/11/21 NWB LLE for ambulation goals. Patient may heel weight bear for transfers per Dr. William Cintron. Short term goal 1: Supine <> sit with mod I  -9/07 not met  Short term goal 2: Sit <> stand with mod I   -9/07 not met  Short term goal 3: Bed <> chair with LRAD and mod I   -9/07 not met  Short term goal 4: Ambulate 20 feet with LRAD and mod I   -9/07 not met  Short term goal 5: By 9/7/21: Tolerate 12-15 reps of his exercises to maximize his strength/endurance.   -9/07 , initiated  Patient Goals   Patient goals : To become stronger.     Plan    Plan  Times per week: 3-5/week  Plan weeks: 1 week 9/11/21  Specific instructions for Next Treatment: progress mobility as tolerated  Current Treatment Recommendations: Strengthening, Home Exercise Program, Safety Education & Training, Balance Training, Endurance Training, Patient/Caregiver Education & Training, Functional Mobility Training, Equipment Evaluation, Education, & procurement, Transfer Training, Gait Training, ADL/Self-care Training, Pain Management, Positioning  Safety Devices  Type of devices:  All fall risk precautions in place, Call light within reach, Left in chair, Gait belt, Patient at risk for falls, Nurse notified     Therapy Time   Individual Concurrent Group Co-treatment   Time In 1250         Time Out 1313         Minutes 23         Timed Code Treatment Minutes: [address]

## 2021-09-07 NOTE — PROGRESS NOTES
Hospitalist Progress Note      PCP: Nestor Pagan MD    Date of Admission: 8/31/2021    Chief Complaint: Foot Laceration/Infection    Subjective: no new c/o. Medications:  Reviewed    Infusion Medications    dextrose      sodium chloride 100 mL/hr at 09/06/21 1633    dextrose       Scheduled Medications    hydrALAZINE  100 mg Oral 3 times per day    hydrALAZINE  50 mg Oral Once    NIFEdipine  60 mg Oral Daily    insulin lispro  0-18 Units SubCUTAneous TID WC    insulin lispro  0-9 Units SubCUTAneous Nightly    lidocaine 1 % injection  5 mL IntraDERmal Once    sodium chloride flush  5-40 mL IntraVENous 2 times per day    carvedilol  6.25 mg Oral BID WC    piperacillin-tazobactam  3,375 mg IntraVENous Q8H    lisinopril  5 mg Oral Daily    enoxaparin  40 mg SubCUTAneous BID    atorvastatin  40 mg Oral Daily    insulin glargine  20 Units SubCUTAneous Nightly    tamsulosin  0.4 mg Oral Daily     PRN Meds: glucose, dextrose, glucagon (rDNA), dextrose, sodium chloride flush, sodium chloride, labetalol, acetaminophen, glucose, dextrose, glucagon (rDNA), dextrose, oxyCODONE, benzocaine-menthol      Intake/Output Summary (Last 24 hours) at 9/7/2021 0838  Last data filed at 9/6/2021 1201  Gross per 24 hour   Intake 120 ml   Output 300 ml   Net -180 ml       Physical Exam Performed:    BP (!) 154/85   Pulse 64   Temp 98.1 °F (36.7 °C) (Oral)   Resp 17   Ht 6' 1\" (1.854 m)   Wt 240 lb (108.9 kg)   SpO2 96%   BMI 31.66 kg/m²     General appearance: No apparent distress, appears stated age and cooperative. HEENT: Pupils equal, round, and reactive to light. Conjunctivae/corneas clear. Neck: Supple, with full range of motion. No jugular venous distention. Trachea midline. Respiratory:  Normal respiratory effort. Clear to auscultation, bilaterally without Rales/Wheezes/Rhonchi. Cardiovascular: Regular rate and rhythm with normal S1/S2 without murmurs, rubs or gallops.   Abdomen: Soft, non-tender, non-distended with normal bowel sounds. Musculoskeletal: No clubbing, cyanosis or edema bilaterally. Full range of motion without deformity. Skin: Skin color, texture, turgor normal.  No rashes or lesions. Neurologic:  Neurovascularly intact without any focal sensory/motor deficits. Cranial nerves: II-XII intact, grossly non-focal.  Psychiatric: Alert and oriented, thought content appropriate, normal insight  Capillary Refill: Brisk,< 3 seconds   Peripheral Pulses: +2 palpable, equal bilaterally       Labs:   Recent Labs     09/05/21 0716 09/06/21 0620 09/07/21 0615   WBC 13.8* 12.9* 11.8*   HGB 10.1* 10.3* 10.2*   HCT 29.3* 30.2* 29.6*    392 392     Recent Labs     09/05/21 0716 09/06/21 0620 09/07/21 0615   * 136 138   K 5.0 4.6 4.5    101 103   CO2 21 23 23   BUN 35* 36* 34*   CREATININE 1.5* 1.6* 1.5*   CALCIUM 9.1 9.0 9.0     No results for input(s): AST, ALT, BILIDIR, BILITOT, ALKPHOS in the last 72 hours. No results for input(s): INR in the last 72 hours. No results for input(s): Carmen Bro in the last 72 hours. Urinalysis:      Lab Results   Component Value Date    NITRU Negative 08/31/2021    WBCUA 0-2 08/31/2021    BACTERIA 2+ 08/31/2021    RBCUA 0-2 08/31/2021    BLOODU TRACE-INTACT 08/31/2021    SPECGRAV 1.025 08/31/2021    GLUCOSEU 100 08/31/2021       Consults:    IP CONSULT TO PODIATRY  PHARMACY TO DOSE VANCOMYCIN  IP CONSULT TO INFECTIOUS DISEASES  IP CONSULT TO DIETITIAN  IP CONSULT TO VASCULAR SURGERY  IP CONSULT TO PHARMACY  IP CONSULT TO SOCIAL WORK  IP CONSULT TO NEPHROLOGY      Assessment/Plan:    Active Hospital Problems    Diagnosis     Acute kidney injury superimposed on chronic kidney disease ([facility] [N17.9, N18.9]     Gas gangrene of foot ([facility] [A48.0]     Osteoarthritis [M19.90]     Obesity [E66.9]     Sepsis ([facility] [A41.9]     DM (diabetes mellitus) ([facility] [E11.9]        Foot infection - diabetic w/ associated Gas Gangrene. Podiatry consulted and appreciated s/p I&D w/ Cx demonstrating GPC/Staphylococcus aureus . Infectious disease consulted and appreciated on ABX as written. Atrial duplex and MRI reviewed w/ no need for vascular intervention. WB status per Podiatry - suspect will need PT/OT when WB status established. PICC ordered for long-term outpt IVABX.     Sepsis - w/ Leukocytosis/Tachycardia/Fever POArrival 2nd to above infection. Continue IVF as appropriate and monitor clinical response w/ ABX as written. DM2 - controlled on home oral antiGlycemics - held. Started on Lantus and follow FSBS/SSI high regimen. Last HbA1c 6.4% dated Sept 2021. Anticipate resuming/continuing home regimen at discharge. Neuropathy - in the setting of DM     HTN - w/out known CAD and no evidence of active signs/sxs of ischemia/failure. Currently controlled on home meds w/ vitals reviewed and documented as above. HyperLipidemia - controlled on home Statin. Continue, w/ f/u and med adjustment w/ PCP      BPH - w/out acute obstructive Uropathy, controlled on home medications as ordered - continued.      Obesity -  With Body mass index is 31.66 kg/m². Complicating assessment and treatment. Placing patient at risk for multiple co-morbidities as well as early death and contributing to the patient's presentation. Counseled on weight loss.        DVT Prophylaxis: LMWH     Recent Labs     09/05/21  0716 09/06/21  0620 09/07/21  0615    392 392     Diet: ADULT DIET; Regular  Adult Oral Nutrition Supplement; Diabetic Oral Supplement  Code Status: Prior      PT/OT Eval Status: not yet ordered. Dispo - discharge date unclear. Possibly Tues/Wed 7/8 Sept pending clinical course and subspecialty recs.       Gracie Petersen MD

## 2021-09-07 NOTE — PROGRESS NOTES
Infectious Disease Follow up Notes    CC :   Foot infection      Antibiotics:   Zosyn 3.375 q8    Admit Date:   8/31/2021  Hospital Day: 8    Subjective:   Fever resolved     He is feeling well, eager to go home     Objective:     Patient Vitals for the past 8 hrs:   BP Temp Temp src Pulse Resp SpO2   09/07/21 0815 (!) 154/85 98.1 °F (36.7 °C) Oral 64 17 96 %       EXAM:  General:  Alert, oriented, NAD. Obese    HEENT:  NCAT, PERRL, sclera anicteric   LUNGS:  Non-labored breathing   ABD: Soft, NT.  +BS  EXT: L foot dressed.       SKIN: No acute rash           LINE: PICC in place, site ok        Scheduled Meds:   hydrALAZINE  100 mg Oral 3 times per day    hydrALAZINE  50 mg Oral Once    NIFEdipine  60 mg Oral Daily    insulin lispro  0-18 Units SubCUTAneous TID WC    insulin lispro  0-9 Units SubCUTAneous Nightly    lidocaine 1 % injection  5 mL IntraDERmal Once    sodium chloride flush  5-40 mL IntraVENous 2 times per day    carvedilol  6.25 mg Oral BID WC    piperacillin-tazobactam  3,375 mg IntraVENous Q8H    lisinopril  5 mg Oral Daily    enoxaparin  40 mg SubCUTAneous BID    atorvastatin  40 mg Oral Daily    insulin glargine  20 Units SubCUTAneous Nightly    tamsulosin  0.4 mg Oral Daily       Continuous Infusions:   dextrose      sodium chloride 100 mL/hr at 09/06/21 1633    dextrose            Data Review:    Lab Results   Component Value Date    WBC 11.8 (H) 09/07/2021    HGB 10.2 (L) 09/07/2021    HCT 29.6 (L) 09/07/2021    MCV 91.2 09/07/2021     09/07/2021     Lab Results   Component Value Date    CREATININE 1.5 (H) 09/07/2021    BUN 34 (H) 09/07/2021     09/07/2021    K 4.5 09/07/2021     09/07/2021    CO2 23 09/07/2021       Hepatic Function Panel:   Lab Results   Component Value Date    ALKPHOS 138 08/30/2021    ALT 27 08/30/2021    AST 17 08/30/2021    PROT 7.4 08/30/2021    BILITOT 0.9 08/30/2021    LABALBU 3.6 08/30/2021         MICRO:  8/31 BC x2 NGTD   Surgical culture mod growth MSSA   Staphylococcus aureus   Antibiotic Interpretation CARLOS Status    clindamycin Sensitive <=0.25 mcg/mL     erythromycin Resistant >=8 mcg/mL     oxacillin Sensitive 2 mcg/mL     tetracycline Sensitive <=1 mcg/mL     trimethoprim-sulfamethoxazole Sensitive <=10 mcg/mL           IMAGING:  MRI L foot 9/2/21  Impression   Postsurgical changes related to incision and drainage procedure along the   medial aspect of the 1st digit.  No residual abscess is identified within the   foot.       Adjacent to the surgical defect is an area of mild increased T2 and decreased   T1 signal that is nonspecific in the absence of IV contrast, but suspicious   for early osteomyelitis given the proximity to the surgical wound and known   infectious process.       Additional area of T2 signal hyperintensity with decreased T1 signal along   the metatarsal-sesamoid joints, medial greater than lateral.  These findings   are favored degenerative in nature, with osteomyelitis considered less likely   but difficult to exclude.       Diffuse edema and atrophy of the intrinsic foot musculature, suggesting   sequela of diabetic neuropathy.        Assessment:     Patient Active Problem List    Diagnosis Date Noted    Acute kidney injury superimposed on chronic kidney disease ([address]     Gas gangrene of foot ([address]     Elevated C-reactive protein (CRP)     Osteoarthritis     Pulmonary emphysema ([address]     Obesity     Weight loss counseling, encounter for     Diabetes education, encounter for     Sepsis ([address] 08/31/2021    Abscess of left hand 06/20/2017    Abscess 06/18/2017    PNA (pneumonia) 03/28/2016    DM (diabetes mellitus) ([address] 03/28/2016    Hypertension 03/28/2016    BPH (benign prostatic hyperplasia) 03/28/2016    Adhesive capsulitis of shoulder 11/13/2014       History of DM    Prior L hallux amp     Admitted 8/31/21 with L foot infection at 1st MT head, with fever, sepsis  L foot I&D 8/31/21  MSSA in surgical culture  MRI suggesting OM in the 1st met head   S/p repeat I&D and partial 1st ray amp 9/3/21    CAMRYN, resolved     NKDA     Plan:      Change to rocephin IV along with flagyl po   SUYAPA completed  Rx flagyl sent to [facility] pharmacy  [facility] [place]     He can follow-up with me       Discussed with patient/family, all questions answered        Darian Oneil MD  Phone: [phone]   Fax : [phone]

## 2021-09-07 NOTE — PROGRESS NOTES
Office: [phone]       Fax: [phone]      Nephrology Progress Note        Patient's Name: Kulwant Addison Date: 8/31/2021  Date of Visit: 9/7/2021    Reason for Consult:  CAMRYN, PICC  Requesting Physician:  David Bentley MD  PCP: Mary Machuca MD    Chief Complaint:  Foot infection     History of Present Illness:      Asher Mcneal is a 59 y.o. male with PMHx of hypertension, DM>20 yr, COPD who was hospitalized on 8/31/2021 with complaints of left foot infection  Per pt , he was told that he had kidney problems but stable  No hematuria  No history of renal stone. No inc frequency  Denies history of DR  NSAID use: ibuprofen before admission  IV contrast: None recent   Home meds reviewed    INTERVAL HISTORY      Serum cr stable    Feels better  Shortness of breath: No   UOP: Fair  Creat: stable       Past Surgical History:   Procedure Laterality Date    FOOT DEBRIDEMENT Left 8/31/2021    LEFT FOOT DEBRIDEMENT INCISION AND DRAINAGE [facility] 9/[facility]/2021    STAGED INCISION AND DEBRIDEMENT LEFT FOOT WITH PARTIAL FIRST RAY AMPUTATION performed by Jeramie Rea DPM at [address]      HAND SURGERY Left 06/19/2017    LEFT HAND DEBRIDEMENT INCISION AND DRAINAGE    NECK SURGERY      35s year    TOE AMPUTATION Left 2009    WRIST FRACTURE SURGERY         Family History   Problem Relation Age of Onset    Heart Failure Mother     Diabetes Mother     Cancer Father         throat cancer    Asthma Daughter         Sports induced as a child    Emphysema Neg Hx     Hypertension Neg Hx         reports that he has never smoked. He has never used smokeless tobacco. He reports that he does not drink alcohol and does not use drugs. Medications:    Allergies:  Patient has no known allergies. Scheduled Meds:   hydrALAZINE  100 mg Oral 3 times per day    hydrALAZINE  50 mg Oral Once    NIFEdipine  60 mg Oral Daily    insulin lispro  0-18 Units SubCUTAneous TID WC    insulin lispro  0-9 Units SubCUTAneous Nightly    lidocaine 1 % injection  5 mL IntraDERmal Once    sodium chloride flush  5-40 mL IntraVENous 2 times per day    carvedilol  6.25 mg Oral BID WC    piperacillin-tazobactam  3,375 mg IntraVENous Q8H    lisinopril  5 mg Oral Daily    enoxaparin  40 mg SubCUTAneous BID    atorvastatin  40 mg Oral Daily    insulin glargine  20 Units SubCUTAneous Nightly    tamsulosin  0.4 mg Oral Daily     Continuous Infusions:   dextrose      sodium chloride 100 mL/hr at 09/06/21 1633    dextrose         Labs:  CBC:   Recent Labs     09/05/21  0716 09/06/21  0620 09/07/21  0615   WBC 13.8* 12.9* 11.8*   HGB 10.1* 10.3* 10.2*    392 392     Ca/Mg/Phos:   Recent Labs     09/05/21  0716 09/06/21  0620 09/07/21  0615   CALCIUM 9.1 9.0 9.0     UA:    Objective:     Vitals: BP (!) 154/85   Pulse 64   Temp 98.1 °F (36.7 °C) (Oral)   Resp 17   Ht 6' 1\" (1.854 m)   Wt 240 lb (108.9 kg)   SpO2 96%   BMI 31.66 kg/m²    Wt Readings from Last 3 Encounters:   08/31/21 240 lb (108.9 kg)   08/30/21 240 lb (108.9 kg)   08/10/17 264 lb 15.9 oz (120.2 kg)      24HR INTAKE/OUTPUT:    No intake or output data in the 24 hours ending 09/07/21 1255  Constitutional:  awake, NAD  HEENT:  MMM, No icterus  Neck: no bruits, No JVD  Cardiovascular:  reg rhythm  Respiratory: CTA, no crackles  Abdomen:  +BS, soft, NT, ND  Ext: trace left  lower extremity edema, foot dressed  CNS: alert, no agitation    IMAGING:  XR FOOT LEFT (MIN 3 VIEWS)   Final Result   Postsurgical foot radiographs as discussed. No acute abnormality. MRI FOOT LEFT WO CONTRAST   Final Result   Postsurgical changes related to incision and drainage procedure along the   medial aspect of the 1st digit.   No residual abscess is identified within the   foot. Adjacent to the surgical defect is an area of subtle, mild increased T2 and   decreased T1 signal that is nonspecific in the absence of IV contrast, but   suspicious for early osteomyelitis given the proximity to the surgical wound   and known infectious process. Additional area of T2 signal hyperintensity with decreased T1 signal along   the metatarsal-sesamoid joints, medial greater than lateral.  These findings   are favored degenerative in nature, with osteomyelitis considered less likely   but difficult to exclude. Diffuse edema and atrophy of the intrinsic foot musculature, suggesting   sequela of diabetic neuropathy. LASER SKIN PERFUSION PRESSURE STUDY   Final Result      XR FOOT LEFT (MIN 3 VIEWS)   Final Result   Findings as above are likely postoperative. In the absence of interval   intervention necrotizing infection must be excluded. XR TIBIA FIBULA LEFT (2 VIEWS)   Final Result   No acute osseous abnormality of the left tib-fib. No soft tissue gas         VL DUP LOWER EXTREMITY ARTERIES BILATERAL   Final Result      XR FOOT LEFT (MIN 3 VIEWS)   Final Result   Soft tissue swelling with soft tissue gas adjacent to the head of the 1st   metatarsal.  No convincing plain film evidence of osteomyelitis. No acute fracture identified. Probable nonacute corner fracture involving   the base of the 2nd proximal phalanx. Assessment :     1. CAMRYN on CKD3  -Non-Oliguric  -Baseline creat: ?1.5 Now 2-->1.7->1.5  -UA: prot +++  -Volume: Euvolemic  -Electrolytes: Hyperkalemia mild  -Acid-Base: NAGMA and AGMA    Recent Labs     09/05/21  0716 09/06/21  0620 09/07/21  0615   BUN 35* 36* 34*   CREATININE 1.5* 1.6* 1.5*     Recent Labs     09/05/21  0716 09/06/21  0620 09/07/21  0615   * 136 138   K 5.0 4.6 4.5   CO2 21 23 23         2. HTN  -Blood pressure high    BP Readings from Last 1 Encounters:   09/07/21 (!) 154/85       3.  left foot infection  status post INCISION AND DEBRIDEMENT LEFT FOOT WITH PARTIAL FIRST RAY AMPUTATION (Left Foot)    4. DM    Plan:       - UPC  - optimize BP meds: inc Hydralazine    - low K diet  - may continue Lisinopril for now  - Monitor BMP    -Monitor I/O, UOP  -Maintain MAP>65  -Avoid nephrotoxin, if able. -Dose meds to current eGFR    - follow up in Kidney clinic in 2 weeks    Thank you for allowing us to participate in care of [address] [place] [address] . We will continue to follow. Feel free to contact me with any questions.       Louis Greco MD  9/7/2021    [facility]  Office : [phone]  Fax :[phone]

## 2021-09-07 NOTE — CARE COORDINATION
[facility]    Referral received from  to follow for home care services.    [facility] unable to staff until Friday    Referral sent to Alternate . Caroline Simmons 112 accepted by [facility] RN, BSN CTN  [facility] [phone]

## 2021-09-07 NOTE — CONSULTS
[facility] Nurse  Consult Note       NAME:  Juju Tran  MEDICAL RECORD NUMBER:  [id number]  AGE: 59 y.o. GENDER: male  : 1960  TODAY'S DATE:  2021    Subjective   Reason for WOCN Evaluation and Assessment: Apply negative pressure wound therapy with home NPWT machine. Juju Tran is a 59 y.o. male referred by:   [x] Physician  [] Nursing  [] Other:     Wound Identification:  Wound Type: Surgical incisions + decrease circulation purple areas  Contributing Factors: edema, diabetes, decreased mobility and obesity    Wound History: 59 y.o. male who presented to [facility] with past medical history of hypertension, osteoarthritis, type 2 diabetes, class I obesity presented to the ED with chief complaint of left foot pain and change. Had gas gangrene. Went to OR by Dr Tabitha Santoyo on 21 for left foot incision and debridement. Returned to OR for staged I & D on 9/3/21 for staged I & D of left foot with partial first ray amputation. Current Wound Care Treatment:  Moist dressing and ace wrap.     Patient Goal of Care:  [x] Wound Healing  [] Odor Control  [] Palliative Care  [] Pain Control   [] Other:         PAST MEDICAL HISTORY        Diagnosis Date    Arthritis     Chronic low back pain     Dental bridge present     upper    Diabetes mellitus ([address]     Emphysema of lung ([address]     Fractures     Hypertension     Neuropathy     Osteoarthritis     Prostate troubles        PAST SURGICAL HISTORY    Past Surgical History:   Procedure Laterality Date    FOOT DEBRIDEMENT Left 2021    LEFT FOOT DEBRIDEMENT INCISION AND DRAINAGE performed by Kurt Hoff DPM at [address] [place] [address] Left 9/3/2021    STAGED INCISION AND DEBRIDEMENT LEFT FOOT WITH PARTIAL FIRST RAY AMPUTATION performed by Kurt Hoff DPM at [address] [place] [address] HAND SURGERY Left 2017    LEFT HAND DEBRIDEMENT INCISION AND DRAINAGE    NECK SURGERY      30s year    TOE AMPUTATION Left 2009    WRIST FRACTURE SURGERY         FAMILY HISTORY    Family History   Problem Relation Age of Onset    Heart Failure Mother     Diabetes Mother     Cancer Father         throat cancer    Asthma Daughter         Sports induced as a child    Emphysema Neg Hx     Hypertension Neg Hx        SOCIAL HISTORY    Social History     Tobacco Use    Smoking status: Never Smoker    Smokeless tobacco: Never Used   Substance Use Topics    Alcohol use: No     Alcohol/week: 0.0 standard drinks    Drug use: No       ALLERGIES    No Known Allergies    MEDICATIONS    No current facility-administered medications on file prior to encounter. Current Outpatient Medications on File Prior to Encounter   Medication Sig Dispense Refill    lisinopril (PRINIVIL;ZESTRIL) 10 MG tablet Take 5 mg by mouth daily      Dulaglutide (TRULICITY) 1.5 HI/9.0BC SOPN Inject 1.5 mg into the skin once a week      atorvastatin (LIPITOR) 40 MG tablet Take 40 mg by mouth daily      insulin glargine (LANTUS) 100 UNIT/ML injection vial Inject 35 Units into the skin nightly (Patient taking differently: Inject 45 Units into the skin nightly ) 1 vial 3    Omega-3 Fatty Acids (FISH OIL) 1000 MG CAPS Take 3,000 mg by mouth nightly.  metFORMIN (GLUCOPHAGE) 500 MG tablet TAKE TWO TABLETS BY MOUTH TWICE A DAY      Multiple Vitamins-Minerals (MULTIVITAMIN PO) Take  by mouth daily.  tamsulosin (FLOMAX) 0.4 MG capsule Take 0.4 mg by mouth daily. Objective  Sitting up in bed. Wife at bed side. Planning to go home after this wound NPWT dressing is applied.     /80   Pulse 61   Temp 97.4 °F (36.3 °C) (Oral)   Resp 16   Ht 6' 1\" (1.854 m)   Wt 240 lb (108.9 kg)   SpO2 96%   BMI 31.66 kg/m²     LABS:  WBC:    Lab Results   Component Value Date    WBC 11.8 09/07/2021     H/H:    Lab Results   Component Value Date    HGB 10.2 09/07/2021    HCT 29.6 09/07/2021     PTT:    Lab Results   Component Value Date APTT 33.6 04/06/2016   [APTT}  PT/INR:    Lab Results   Component Value Date    PROTIME 10.7 06/18/2017    INR 0.95 06/18/2017     HgBA1c:    Lab Results   Component Value Date    LABA1C 6.4 09/01/2021       Assessment  See photos. Amputation site with dry eschar and yellow slough. Some red granulation tissue only in @ 25% of the wound. Few purple areas. Incision dorsal foot at base of 5th toe. See photo's   John Risk Score: John Scale Score: 20    Patient Active Problem List   Diagnosis    Adhesive capsulitis of shoulder    PNA (pneumonia)    DM (diabetes mellitus) ([address]    Hypertension    BPH (benign prostatic hyperplasia)    Abscess    Abscess of left hand    Sepsis ([address]    Acute kidney injury superimposed on chronic kidney disease [facility]    Gas gangrene of foot ([facility]    Elevated C-reactive protein (CRP)    Osteoarthritis    Pulmonary emphysema (HCC)    Obesity    Weight loss counseling, encounter for    Diabetes education, encounter for       Measurements:  Negative Pressure Wound Therapy Foot Anterior;Left;Medial (Active)   Wound Type Surgical 09/07/21 1935   Unit Type Medella home [facility] 09/07/21 1935   Dressing Type Black foam 09/07/21 1935   Number of pieces used 1 09/07/21 1935   Cycle Continuous 09/07/21 1935   Target Pressure (mmHg) 125 09/07/21 1935   Intensity 1 09/07/21 1935   Canister changed?  Yes 09/07/21 1935   Dressing Status Changed 09/07/21 1935   Dressing Changed Changed/New 09/07/21 1935   Drainage Amount Small 09/07/21 1935   Drainage Description Serosanguinous 09/07/21 1935   Dressing Change Due 09/10/21 09/07/21 1935   Wound Assessment Black;Red;Yellow;Slough 09/07/21 1935   Elicia-wound Assessment Dry;Edema;Fragile;Blanchable erythema 09/07/21 1935   Shape irregular 09/07/21 1935   Odor Mild 09/07/21 1935   Number of days: 0           Wound 09/07/21 Foot Left;Proximal;Dorsal purple (Active)   Wound Image   09/07/21 1935   Wound Etiology Arterial 09/07/21 1935   Dressing 09/07/21 1935   Margins Attached edges; Defined edges 09/07/21 1935   Number of days: 0     Left dorsal foot:          Incision 09/03/21 Foot Left (Active)   Wound Image   09/07/21 1935   Dressing Status New dressing applied 09/07/21 1935   Dressing Change Due 09/10/21 09/07/21 1935   Incision Cleansed Cleansed with saline 09/07/21 1935   Dressing/Treatment Barrier film;Negative pressure wound therapy; Roll gauze; Ace wrap 09/07/21 1935   Incision Length (cm) 8 09/07/21 1935   Incision Width (cm) 5.5 cm 09/07/21 1935   Incision Depth (cm) 3.5 cm 09/07/21 1935   Margins Approximated 09/07/21 1935   Incision Assessment Dry;Erythema;Eschar 09/07/21 1935   Drainage Amount Small 09/07/21 1935   Drainage Description Serosanguinous 09/07/21 1935   Odor Mild 09/07/21 1935   Elicia-incision Assessment Dry/flaky;Edematous;Fragile 09/07/21 1935   Number of days: 4      Left lateral foot:           Incision 09/07/21 Foot Left;Dorsal (Active)   Wound Image   09/07/21 1935   Dressing Status New dressing applied 09/07/21 1935   Dressing Change Due 09/10/21 09/07/21 1935   Incision Cleansed Cleansed with saline 09/07/21 1935   Dressing/Treatment Hydrocolloid 09/07/21 1935   Incision Length (cm) 3 09/07/21 1935   Incision Width (cm) 1.5 cm 09/07/21 1935   Incision Depth (cm) 0.2 cm 09/07/21 1935   Margins Approximated 09/07/21 1935   Incision Assessment Erythema 09/07/21 1935   Drainage Amount Scant 09/07/21 1935   Drainage Description Serosanguinous 09/07/21 1935   Odor None 09/07/21 1935   Elicia-incision Assessment Dry/flaky;Edematous; Blanchable erythema 09/07/21 1935   Number of days: 0     Left dorsal foot base of 5th toe:          Response to treatment:  Well tolerated by patient.      Pain Assessment:  Severity:  0 / 10  Quality of pain: N/A  Wound Pain Timing/Severity: none  Premedicated: No    Plan   Plan of Care: Wound 09/07/21 Foot Left;Proximal;Dorsal purple-Dressing/Treatment: Hydrocolloid  Wound 09/07/21 Foot Left;Lateral purple-Dressing/Treatment: Hydrocolloid     Current dressing removed. Incisions cleansed with normal saline. Foot cleansed with bath wipes. Elicia wound prepped with barrier film and VAC drape. Hydrocolloid placed over purple areas and dorsal foot incision . One black sponge placed in wound bed and tracked to dorsal foot. Covered with VAC drape. Connected to 125 mmHg low continuous suction on Medella Machine provided by [address] [place] [address]. SUYAPA updated. Bed side RN notified patient ready for discharge. Supplies for home given to patient for the first dressing change. Supplies sent home with patient:  4x4 guaze  Roll guaze  Normal saline  Hydrocolloid dressing  Paste hydrocolloid strip  Scissors  Barrier film    VAC sponges and canisters. Specialty Bed Required : N/A   [] Low Air Loss   [] Pressure Redistribution  [] Fluid Immersion  [] Bariatric  [] Total Pressure Relief  [] Other:     Current Diet: Adult Oral Nutrition Supplement; Diabetic Oral Supplement  ADULT DIET; Regular; 5 carb choices (75 gm/meal)  Dietician consult:  Yes    Discharge Plan:  Placement for patient upon discharge: skilled nursing    Patient appropriate for Outpatient [address] [facility] [address]: Yes    Referrals:  []  / discharge planner set up home care  [] [facility] [] Supplies  [] Other    Patient/Caregiver Teaching: Instructed wife and patient how to change the cannister. Instructed on managing electric. How ot place in bag.    Level of patient/caregiver understanding able to:   [] Indicates understanding       [] Needs reinforcement  [] Unsuccessful      [x] Verbal Understanding  [] Demonstrated understanding       [] No evidence of learning  [] Refused teaching         [] N/A       Electronically signed by Lotus Francisco RN, MSN, Hina Louise on 9/7/2021 at 7:43 PM

## 2021-09-07 NOTE — PROGRESS NOTES
Occupational Therapy  Facility/Department: [facility] C5 - MED SURG/ORTHO  Daily Treatment Note  NAME: Srinivas Quintero  : 1960  MRN: [id number]    Date of Service: 2021    Discharge Recommendations:  Home with Home health OT, 24 hour supervision or assist  OT Equipment Recommendations  Mobility Devices: ADL Assistive Devices  Walker: Rolling  Other: TTB    Assessment   Performance deficits / Impairments: Decreased functional mobility ; Decreased ADL status; Decreased safe awareness;Decreased cognition;Decreased sensation;Decreased balance;Decreased high-level IADLs  Assessment: Pt continues to demonstrate impaired safety with WB status despite ongoing education. Pt progress to CGA with RW for short mobility. Anticipate safe d/c home with RW and 24hr support from family. OT Education: OT Role;Plan of Care;ADL Adaptive Strategies;Precautions;Transfer Training;Orientation; Family Education  Patient Education: disease specific: DME, safety during hospitalization, WB status, prevention of complications of bedrest, importance of OOB mobility, RW use, safe t/fs and mobility, bathing strategies, ADL strategies. Pt verbalized understanding. Pt will require reinforcement. REQUIRES OT FOLLOW UP: Yes  Activity Tolerance  Activity Tolerance: Patient Tolerated treatment well  Safety Devices  Safety Devices in place: Yes  Type of devices: Nurse notified;Gait belt;Call light within reach; Left in chair; All fall risk precautions in place (pt refused chair alarm)         Patient Diagnosis(es): The primary encounter diagnosis was Cellulitis of left foot. Diagnoses of Necrotizing fasciitis ([facility] [address] and Acute renal failure, unspecified acute renal failure type [facility]) were also pertinent to this visit. has a past medical history of Arthritis, Chronic low back pain, Dental bridge present, Diabetes mellitus ([address] Emphysema of lung ([address] Fractures, Hypertension, Neuropathy, Osteoarthritis, and Prostate troubles.    has a past surgical history that includes Foot surgery; Wrist fracture surgery; Toe amputation (Left, 2009); Neck surgery; Hand surgery (Left, 06/19/2017); Foot Debridement (Left, 8/31/2021); and Foot Debridement (Left, 9/3/2021). Restrictions  Restrictions/Precautions  Restrictions/Precautions: Weight Bearing, General Precautions, Fall Risk  Lower Extremity Weight Bearing Restrictions  Left Lower Extremity Weight Bearing: Non Weight Bearing  Position Activity Restriction  Other position/activity restrictions: Therapy spoke directly with patient's podiatrist Dr. Rebel Monteiro over the phone on 9/4/21. Per Dr. Elana Arreaga, the patient is allowed to put weight through his left heel but only for stand pivot transfers. Per Dr. Elana Arreaga patient has to remain non-weight bearing for ambulation. Subjective     Vitals:    09/07/21    BP: 135/80   Pulse: 61   Resp: 16   Temp: 97.4 °F (36.3 °C)   SpO2: 96%          Objective    ADL  Additional Comments: Pt declined ADLs, stating \"I can do those myself\"        Balance  Sitting Balance: Supervision  Standing Balance: Contact guard assistance  Functional Mobility  Functional - Mobility Device: Rolling Walker  Activity: Other (around bed to chair)  Assist Level: Contact guard assistance  Functional Mobility Comments: Pt uncompliant with WB status; Pt unable to perform NWM with RW during ambulation and noted to be using heel WB  Bed mobility  Comment: Unable to assess  Transfers  Sit to stand: Contact guard assistance  Stand to sit: Contact guard assistance  Transfer Comments: RW and vc's for safe t/fs. Cognition  Overall Cognitive Status: Exceptions  Arousal/Alertness: Appropriate responses to stimuli  Safety Judgement: Decreased awareness of need for safety;Decreased awareness of need for assistance  Cognition Comment: patient is slightly impulsive.  non-compliant with WB                                         Plan   Plan  Times per week: 3-5x/week  Current Treatment Recommendations: Balance Training, Functional Mobility Training, Endurance Training, Safety Education & Training, Patient/Caregiver Education & Training, Equipment Evaluation, Education, & procurement, Self-Care / ADL, Strengthening    AM-PAC Score        AM-PAC Inpatient Daily Activity Raw Score: 19 (09/07/21 1621)  AM-PAC Inpatient ADL T-Scale Score : 40.22 (09/07/21 1621)  ADL Inpatient CMS 0-100% Score: 42.8 (09/07/21 1621)  ADL Inpatient CMS G-Code Modifier : CK (09/07/21 1621)    Goals  Short term goals  Time Frame for Short term goals: 1 week (9/10) unless stated otherwise- ongoing as of 9/7  Short term goal 1: Pt will perform functional and toilet t/fs following WB with no vc's and with supervision (9/8). Short term goal 2: Pt will perform bathroom mobility with AD following WB precautions with supervision. Short term goal 3: Pt will LB dress with mod I. Short term goal 4: Pt will toilet with mod I.   Patient Goals   Patient goals : \"to put a shirt on\"       Therapy Time   Individual Concurrent Group Co-treatment   Time In 2839         Time Out 0915         Minutes 10         Timed Code Treatment Minutes: [address] [place]/L

## 2021-09-07 NOTE — PROGRESS NOTES
Comprehensive Nutrition Assessment    Type and Reason for Visit:  Reassess    Nutrition Recommendations/Plan:   1. Modified diet to carb control for better BG management   2. Discontinue Glucerna   3. Encourage PO intakes and high protein for healing   4. Monitor nutrition adequacy, pertinent labs, bowel habits, wt changes, and clinical progress    Nutrition Assessment:  Follow up: Diet modified to carb control this date d/t elevated BG this admission. Pt reprts good appetite, not consuming all of meals d/t concerns for elevated blood sugars. Not interested in ONS, will discontinue. Encouraged high protein for healing. Pt had several nuts on bedside table. Declined and questions on carb control diet or foods containing carbohydrates. Will continue to monitor. Malnutrition Assessment:  Malnutrition Status: At risk for malnutrition (Comment)      Estimated Daily Nutrient Needs:  Energy (kcal):  8279-2259; Weight Used for Energy Requirements:  Ideal     Protein (g):  108-125; Weight Used for Protein Requirements:  Ideal (1.3-1.5)        Fluid (ml/day):   ; Method Used for Fluid Requirements:  1 ml/kcal      Nutrition Related Findings:  BG elevated. Wounds:  Diabetic Ulcer       Current Nutrition Therapies:    Adult Oral Nutrition Supplement; Diabetic Oral Supplement  ADULT DIET; Regular; 5 carb choices (75 gm/meal)    Anthropometric Measures:  · Height: 6' 1\" (185.4 cm)  · Current Body Weight: 240 lb (108.9 kg)   · Ideal Body Weight: 184 lbs;  · BMI: 31.7  · BMI Categories: Obese Class 1 (BMI 30.0-34. 9)       Nutrition Diagnosis:   · Increased nutrient needs related to increase demand for energy/nutrients as evidenced by wounds      Nutrition Interventions:   Food and/or Nutrient Delivery:  Continue Current Diet, Discontinue Oral Nutrition Supplement  Nutrition Education/Counseling:  Education initiated   Coordination of Nutrition Care:  Continue to monitor while inpatient    Goals:  Patient will eat 50% or greater of meals       Nutrition Monitoring and Evaluation:  Food/Nutrient Intake Outcomes:  Food and Nutrient Intake  Physical Signs/Symptoms Outcomes:  Nutrition Focused Physical Findings, Biochemical Data     Discharge Planning:    Continue current diet     Electronically signed by Philip Almeida MS, RD, LD on 9/7/21 at 3:32 PM EDT    Contact: [phone]

## 2021-09-07 NOTE — PLAN OF CARE
Bed in lowest position, wheels locked, 2/4 side rails up, nonskid footwear on. Bed/ chair check alarm in place, call light within reach. Pt instructed to call out when needing assistance. Pt stated understanding. Nurse will continue to monitor.         Problem: Falls - Risk of:  Goal: Will remain free from falls  Description: Will remain free from falls  Outcome: Ongoing  Goal: Absence of physical injury  Description: Absence of physical injury  Outcome: Ongoing
Increase level of function to baseline.
Increase patients ADLs/functional status to baseline.
Nutrition Problem #1: Increased nutrient needs  Intervention: Food and/or Nutrient Delivery: Continue Current Diet, Start Oral Nutrition Supplement  Nutritional Goals: Patient will eat 50% or greater of meals
Problem: Nutrition  Goal: Optimal nutrition therapy  Outcome: Ongoing  Note: Nutrition Problem #1: Increased nutrient needs  Intervention: Food and/or Nutrient Delivery: Continue Current Diet, Discontinue Oral Nutrition Supplement  Nutritional Goals: Patient will eat 50% or greater of meals
Problem: Pain:  Goal: Pain level will decrease  Description: Pain level will decrease  9/1/2021 1245 by Alejandra Garcia RN  Outcome: Ongoing     Problem: Pain:  Goal: Control of acute pain  Description: Control of acute pain  9/1/2021 1245 by Alejandra Garcia RN  Outcome: Ongoing     Problem: Pain:  Goal: Control of chronic pain  Description: Control of chronic pain  9/1/2021 1245 by Alejandra Garcia RN  Outcome: Ongoing     Problem: SAFETY  Goal: Free from accidental physical injury  9/1/2021 1245 by Alejandra Garcia RN  Outcome: Ongoing     Problem: DAILY CARE  Goal: Daily care needs are met  9/1/2021 1245 by Alejandra Garcia RN  Outcome: Ongoing     Problem: PAIN  Goal: Patient's pain/discomfort is manageable  9/1/2021 1245 by Alejandra Garcia RN  Outcome: Ongoing     Problem: KNOWLEDGE DEFICIT  Goal: Patient/S.O. demonstrates understanding of disease process, treatment plan, medications, and discharge instructions.   9/1/2021 1245 by Alejandra Garcia RN  Outcome: Ongoing     Problem: DISCHARGE BARRIERS  Goal: Patient's continuum of care needs are met  9/1/2021 1245 by Alejandra Garcia RN  Outcome: Ongoing
Problem: Pain:  Goal: Pain level will decrease  Description: Pain level will decrease  Outcome: Ongoing     Problem: Falls - Risk of:  Goal: Will remain free from falls  Description: Will remain free from falls  Outcome: Ongoing     Problem: Skin Integrity:  Goal: Absence of new skin breakdown  Description: Absence of new skin breakdown  Outcome: Ongoing     Problem: Serum Glucose Level - Abnormal:  Goal: Ability to maintain appropriate glucose levels will improve  Description: Ability to maintain appropriate glucose levels will improve  Outcome: Ongoing     Pt educated on the importance of a carb control diet. Monitoring pt's blood glucose AC/HS. Sliding scale insulin given as ordered according to pt's blood glucose. Will continue to monitor.
Problem: Pain:  Goal: Pain level will decrease  Description: Pain level will decrease  Outcome: Ongoing     Problem: SAFETY  Goal: Free from accidental physical injury  Outcome: Ongoing     Problem: PAIN  Goal: Patient's pain/discomfort is manageable  Outcome: Ongoing
Problem: Pain:  Goal: Pain level will decrease  Description: Pain level will decrease  Outcome: Ongoing  Goal: Control of acute pain  Description: Control of acute pain  Outcome: Ongoing  Goal: Control of chronic pain  Description: Control of chronic pain  Outcome: Ongoing     Problem: SAFETY  Goal: Free from accidental physical injury  Outcome: Ongoing     Problem: DAILY CARE  Goal: Daily care needs are met  Outcome: Ongoing     Problem: PAIN  Goal: Patient's pain/discomfort is manageable  Outcome: Met This Shift     Problem: KNOWLEDGE DEFICIT  Goal: Patient/S.O. demonstrates understanding of disease process, treatment plan, medications, and discharge instructions.   Outcome: Ongoing     Problem: DISCHARGE BARRIERS  Goal: Patient's continuum of care needs are met  Outcome: Ongoing
Ability to maintain a stable neurologic state will improve  Outcome: Ongoing

## 2021-09-07 NOTE — CARE COORDINATION
CM met with pt at bedside to discuss DCP. Updated pt on DPM recs for VerFlo wound vac placement at DC. Spoke to Glennie with [facility] and updated that Ver[employer] is not available at home. Pt wanting to discuss with wife on SNF placement for IV ABX and VeraFlo Wound Vac or regular wound vac and dc home. Cm folloiwng-Shahla Menjivar RN         ADDENDUM [id number]: Faxed [facility] wound vac form to [facility] [address] Dr. Sharron Sandhoff for final ID recs for IV ABX. Spoke to [employer] with [facility] and [employer] with [facility]. CM following-Shahla Menjivar RN     Spoke to Glennie with [facility] and they are not in network with [facility] deferred referral [facility]. Spoke to Lunda Snellen with [address] [place] [address] [address] and faxed script back. CM following-Shahla Menjivar RN     ADDENDUM 2259: Spoke to Lunda Snellen with [address] [place], [place] [address] and they are waiting for insurance approval.       ADDENDUM [phone]: Spoke to Lunda Snellen with [address] and will deliver wound vac in the net 45 minutes, updated Daisy CRENSHAW/RN.  CM following-Shahla Menjivar RN

## 2021-09-07 NOTE — ACP (ADVANCE CARE PLANNING)
CASE MANAGEMENT DISCHARGE SUMMARY      Discharge to: Home with [facility]. SN, PT and OT. New Durable Medical Equipment ordered/agency: Wound Vac provided by [facility] on this day. Transportation: family    Confirmed discharge plan with: Met with pt and wife at bedside. Patient: yes     Facility/Agency, name:  [facility] faxed- Spoke to [facility] with [facility].        RN, name: Bhavana Higgins RN

## 2021-09-08 NOTE — PROGRESS NOTES
Patient discharged to home via wheelchair with wife. All his belongings and instructions given to him, patient without questions.

## 2021-09-08 NOTE — DISCHARGE SUMMARY
Hospital Medicine Discharge Summary    Patient ID: Tsering Mayfield      Patient's PCP: Heath Franco MD    Admit Date: 8/31/2021     Discharge Date: 9/7/2021      Admitting Physician: Skinny Trevino DO     Discharge Physician: Hanna Santillan MD     Discharge Diagnoses: Active Hospital Problems    Diagnosis     Acute kidney injury superimposed on chronic kidney disease ([facility] [address] [N17.9, N18.9]     Gas gangrene of foot ([facility] [address] [A48.0]     Osteoarthritis [M19.90]     Obesity [E66.9]     Sepsis ([facility] [address] [A41.9]     DM (diabetes mellitus) ([facility] [E11.9]        The patient was seen and examined on day of discharge and this discharge summary is in conjunction with any daily progress note from day of discharge. Hospital Course: Foot infection - diabetic w/ associated Gas Gangrene. Podiatry consulted and appreciated s/p I&D w/ Cx demonstrating GPC/Staphylococcus aureus. Infectious disease consulted and appreciated on ABX as written. Atrial duplex and MRI reviewed w/ no need for vascular intervention. WB status per Podiatry - suspect will need PT/OT when WB status established. PICC ordered for long-term outpt IV ABX.     Sepsis - w/ Leukocytosis/Tachycardia/Fever POArrival 2nd to above infection. Continue IVF as appropriate and monitor clinical response w/ ABX as written.      DM2 - controlled on home oral antiGlycemics - held. Started on Lantus and follow FSBS/SSI high regimen. Last HbA1c 6.4% dated Sept 2021. Resumed home regimen at discharge. Neuropathy - in the setting of DM     HTN - w/out known CAD and no evidence of active signs/sxs of ischemia/failure. Currently controlled on home meds      HyperLipidemia - controlled on home Statin. Continue, w/ f/u and med adjustment w/ PCP      BPH - w/out acute obstructive Uropathy, controlled on home medications as ordered - continued.      Obesity -  With Body mass index is 31.66 kg/m². Complicating assessment and treatment.  Placing patient at risk for multiple co-morbidities as well as early death and contributing to the patient's presentation. Counseled on weight loss.       Labs: For convenience and continuity at follow-up the following most recent labs are provided:      CBC:    Lab Results   Component Value Date    WBC 11.8 09/07/2021    HGB 10.2 09/07/2021    HCT 29.6 09/07/2021     09/07/2021       Renal:    Lab Results   Component Value Date     09/07/2021    K 4.5 09/07/2021     09/07/2021    CO2 23 09/07/2021    BUN 34 09/07/2021    CREATININE 1.5 09/07/2021    CALCIUM 9.0 09/07/2021    PHOS 2.7 06/21/2017         Significant Diagnostic Studies    Radiology:   XR FOOT LEFT (MIN 3 VIEWS)   Final Result   Postsurgical foot radiographs as discussed. No acute abnormality. MRI FOOT LEFT WO CONTRAST   Final Result   Postsurgical changes related to incision and drainage procedure along the   medial aspect of the 1st digit. No residual abscess is identified within the   foot. Adjacent to the surgical defect is an area of subtle, mild increased T2 and   decreased T1 signal that is nonspecific in the absence of IV contrast, but   suspicious for early osteomyelitis given the proximity to the surgical wound   and known infectious process. Additional area of T2 signal hyperintensity with decreased T1 signal along   the metatarsal-sesamoid joints, medial greater than lateral.  These findings   are favored degenerative in nature, with osteomyelitis considered less likely   but difficult to exclude. Diffuse edema and atrophy of the intrinsic foot musculature, suggesting   sequela of diabetic neuropathy. LASER SKIN PERFUSION PRESSURE STUDY   Final Result      XR FOOT LEFT (MIN 3 VIEWS)   Final Result   Findings as above are likely postoperative. In the absence of interval   intervention necrotizing infection must be excluded.          XR TIBIA FIBULA LEFT (2 VIEWS)   Final Result   No acute osseous abnormality of Vitamins-Minerals (MULTIVITAMIN PO) Take  by mouth daily. tamsulosin (FLOMAX) 0.4 MG capsule Take 0.4 mg by mouth daily. Time Spent on discharge is more than 30 minutes in the examination, evaluation, counseling and review of medications and discharge plan. Signed:    Jolie Lombard, MD   9/8/2021      Thank you Saeed William MD for the opportunity to be involved in this patient's care. If you have any questions or concerns please feel free to contact me at [phone].

## 2021-09-13 ENCOUNTER — HOSPITAL ENCOUNTER (OUTPATIENT)
Age: 61
Setting detail: SPECIMEN
Discharge: HOME | End: 2021-09-13
Payer: MEDICARE

## 2021-09-13 LAB
A/G RATIO: 1 (ref 1.1–2.2)
ALBUMIN SERPL-MCNC: 3.3 G/DL (ref 3.4–5)
ALP BLD-CCNC: 95 U/L (ref 40–129)
ALT SERPL-CCNC: 19 U/L (ref 10–40)
ANION GAP SERPL CALCULATED.3IONS-SCNC: 10 MMOL/L (ref 3–16)
AST SERPL-CCNC: 16 U/L (ref 15–37)
BASOPHILS ABSOLUTE: 0.1 K/UL (ref 0–0.2)
BASOPHILS RELATIVE PERCENT: 2.1 %
BILIRUB SERPL-MCNC: 0.3 MG/DL (ref 0–1)
BUN BLDV-MCNC: 29 MG/DL (ref 7–20)
C-REACTIVE PROTEIN: 14.3 MG/L (ref 0–5.1)
CALCIUM SERPL-MCNC: 9.1 MG/DL (ref 8.3–10.6)
CHLORIDE BLD-SCNC: 106 MMOL/L (ref 99–110)
CO2: 23 MMOL/L (ref 21–32)
CREAT SERPL-MCNC: 1.3 MG/DL (ref 0.8–1.3)
EOSINOPHILS ABSOLUTE: 0.2 K/UL (ref 0–0.6)
EOSINOPHILS RELATIVE PERCENT: 2.4 %
GFR AFRICAN AMERICAN: >60
GFR NON-AFRICAN AMERICAN: 56
GLOBULIN: 3.2 G/DL
GLUCOSE BLD-MCNC: 157 MG/DL (ref 70–99)
HCT VFR BLD CALC: 30.9 % (ref 40.5–52.5)
HEMOGLOBIN: 10.3 G/DL (ref 13.5–17.5)
LYMPHOCYTES ABSOLUTE: 1.6 K/UL (ref 1–5.1)
LYMPHOCYTES RELATIVE PERCENT: 24 %
MCH RBC QN AUTO: 31.4 PG (ref 26–34)
MCHC RBC AUTO-ENTMCNC: 33.3 G/DL (ref 31–36)
MCV RBC AUTO: 94.4 FL (ref 80–100)
MONOCYTES ABSOLUTE: 0.4 K/UL (ref 0–1.3)
MONOCYTES RELATIVE PERCENT: 5.3 %
NEUTROPHILS ABSOLUTE: 4.5 K/UL (ref 1.7–7.7)
NEUTROPHILS RELATIVE PERCENT: 66.2 %
PDW BLD-RTO: 14.9 % (ref 12.4–15.4)
PLATELET # BLD: 458 K/UL (ref 135–450)
PMV BLD AUTO: 10.2 FL (ref 5–10.5)
POTASSIUM SERPL-SCNC: 5.4 MMOL/L (ref 3.5–5.1)
RBC # BLD: 3.27 M/UL (ref 4.2–5.9)
SODIUM BLD-SCNC: 139 MMOL/L (ref 136–145)
TOTAL PROTEIN: 6.5 G/DL (ref 6.4–8.2)
WBC # BLD: 6.8 K/UL (ref 4–11)

## 2021-09-13 PROCEDURE — 85025 COMPLETE CBC W/AUTO DIFF WBC: CPT

## 2021-09-13 PROCEDURE — 86140 C-REACTIVE PROTEIN: CPT

## 2021-09-13 PROCEDURE — 80053 COMPREHEN METABOLIC PANEL: CPT

## 2021-09-13 PROCEDURE — 36415 COLL VENOUS BLD VENIPUNCTURE: CPT

## 2021-09-16 ENCOUNTER — HOSPITAL ENCOUNTER (OUTPATIENT)
Dept: WOUND CARE | Age: 61
Discharge: HOME | End: 2021-09-16
Payer: MEDICARE

## 2021-09-16 VITALS
SYSTOLIC BLOOD PRESSURE: 140 MMHG | WEIGHT: 238 LBS | BODY MASS INDEX: 31.54 KG/M2 | HEIGHT: 73 IN | RESPIRATION RATE: 16 BRPM | DIASTOLIC BLOOD PRESSURE: 80 MMHG | HEART RATE: 69 BPM | TEMPERATURE: 97.1 F

## 2021-09-16 DIAGNOSIS — A49.01 STAPHYLOCOCCUS AUREUS INFECTION: ICD-10-CM

## 2021-09-16 DIAGNOSIS — E11.621 DIABETIC ULCER OF OTHER PART OF LEFT FOOT ASSOCIATED WITH TYPE 2 DIABETES MELLITUS, WITH NECROSIS OF BONE (HCC): ICD-10-CM

## 2021-09-16 DIAGNOSIS — L89.890 UNSTAGEABLE PRESSURE ULCER OF LEFT FOOT (HCC): ICD-10-CM

## 2021-09-16 DIAGNOSIS — M86.172 ACUTE OSTEOMYELITIS OF METATARSAL BONE OF LEFT FOOT (HCC): ICD-10-CM

## 2021-09-16 DIAGNOSIS — E11.42 DIABETIC POLYNEUROPATHY ASSOCIATED WITH TYPE 2 DIABETES MELLITUS (HCC): ICD-10-CM

## 2021-09-16 DIAGNOSIS — T81.89XA NONHEALING SURGICAL WOUND, INITIAL ENCOUNTER: ICD-10-CM

## 2021-09-16 DIAGNOSIS — N18.31 STAGE 3A CHRONIC KIDNEY DISEASE (HCC): ICD-10-CM

## 2021-09-16 DIAGNOSIS — Z97.2 DENTAL BRIDGE PRESENT: ICD-10-CM

## 2021-09-16 DIAGNOSIS — I10 ESSENTIAL HYPERTENSION: Primary | ICD-10-CM

## 2021-09-16 DIAGNOSIS — L84 CALLUS OF FOOT: ICD-10-CM

## 2021-09-16 DIAGNOSIS — L97.524 DIABETIC ULCER OF OTHER PART OF LEFT FOOT ASSOCIATED WITH TYPE 2 DIABETES MELLITUS, WITH NECROSIS OF BONE (HCC): ICD-10-CM

## 2021-09-16 PROBLEM — A41.9 SEPSIS (HCC): Status: RESOLVED | Noted: 2021-08-31 | Resolved: 2021-09-16

## 2021-09-16 PROBLEM — E66.3 OVERWEIGHT: Status: ACTIVE | Noted: 2021-09-16

## 2021-09-16 PROBLEM — R97.20 ELEVATED PSA: Status: ACTIVE | Noted: 2021-09-16

## 2021-09-16 PROBLEM — L02.512 ABSCESS OF LEFT HAND: Status: RESOLVED | Noted: 2017-06-20 | Resolved: 2021-09-16

## 2021-09-16 PROBLEM — E66.3 OVERWEIGHT: Status: RESOLVED | Noted: 2021-09-16 | Resolved: 2021-09-16

## 2021-09-16 PROBLEM — L02.91 ABSCESS: Status: RESOLVED | Noted: 2017-06-18 | Resolved: 2021-09-16

## 2021-09-16 PROBLEM — E78.2 MIXED HYPERLIPIDEMIA: Status: ACTIVE | Noted: 2021-09-16

## 2021-09-16 PROCEDURE — 97597 DBRDMT OPN WND 1ST 20 CM/<: CPT

## 2021-09-16 PROCEDURE — 11043 DBRDMT MUSC&/FSCA 1ST 20/<: CPT

## 2021-09-16 PROCEDURE — 11043 DBRDMT MUSC&/FSCA 1ST 20/<: CPT | Performed by: INTERNAL MEDICINE

## 2021-09-16 PROCEDURE — 99205 OFFICE O/P NEW HI 60 MIN: CPT

## 2021-09-16 PROCEDURE — 97597 DBRDMT OPN WND 1ST 20 CM/<: CPT | Performed by: INTERNAL MEDICINE

## 2021-09-16 PROCEDURE — 99215 OFFICE O/P EST HI 40 MIN: CPT | Performed by: INTERNAL MEDICINE

## 2021-09-16 RX ORDER — CEFTRIAXONE 2 G/1
INJECTION, POWDER, FOR SOLUTION INTRAMUSCULAR; INTRAVENOUS
COMMUNITY
End: 2021-10-11 | Stop reason: ALTCHOICE

## 2021-09-16 RX ORDER — LIDOCAINE HYDROCHLORIDE 40 MG/ML
SOLUTION TOPICAL ONCE
Status: CANCELLED | OUTPATIENT
Start: 2021-09-16 | End: 2021-09-16

## 2021-09-16 RX ORDER — LIDOCAINE 40 MG/G
CREAM TOPICAL ONCE
Status: CANCELLED | OUTPATIENT
Start: 2021-09-16 | End: 2021-09-16

## 2021-09-16 RX ORDER — LIDOCAINE 50 MG/G
OINTMENT TOPICAL ONCE
Status: CANCELLED | OUTPATIENT
Start: 2021-09-16 | End: 2021-09-16

## 2021-09-16 RX ORDER — BACITRACIN ZINC AND POLYMYXIN B SULFATE 500; 1000 [USP'U]/G; [USP'U]/G
OINTMENT TOPICAL ONCE
Status: CANCELLED | OUTPATIENT
Start: 2021-09-16 | End: 2021-09-16

## 2021-09-16 ASSESSMENT — PAIN SCALES - GENERAL: PAINLEVEL_OUTOF10: 0

## 2021-09-16 NOTE — PLAN OF CARE
Patient initial visit to [facility] for wounds on left foot. Patient referred per Dr. Lio Donnelly. Patient consent signed. MD discussed wound care regimen and follow up. MD discussed HBO options. NPWT stopped at this time. Orders faxed to [place] [phone] for wound care orders. Follow up in one week.

## 2021-09-16 NOTE — PLAN OF CARE
pain, drainage or bleeding from that process might be expected, and is normal.     All products for home use, including multiple products for a single wound if applicable, are medically necessary in order to achieve the best chance at timely wound healing. See provider documentation for details if needed. Substituted dressings applied in the [facility] today, if applicable:        New orders for this week (labs, imaging, medications, etc.):    Stop NPWT this week      Additional instructions for specific diagnoses:        F/U Appointment is with Dr. Socorro Thomas  in 1 week , on                                   at                       .     Your nurse  is Esteban Elias RN     If we applied slip-resistant hospital socks today, be sure to remove them at least once a day to inspect your toes or feet, even if you're not changing the wraps or dressings underneath. If you see anything concerning (redness, excess moisture, etc), please call and let us know right away.     Should you experience any significant changes in your wound(s) (including redness, increased warmth, increased pain, increased drainage, odor, or fever) or have questions about your wound care, please contact the [address] [place] [address] at [phone] Monday-Thursday from 8:00 am - 4:30 pm, or Friday from 8:00 am - 2:30 pm.  If you need help with your wound outside these hours and cannot wait until we are again available, contact your home-care company (if applicable), your PCP, or go to the nearest emergency room

## 2021-09-16 NOTE — CONSULTS
[address] [facility] / HBO Consult Note      Del Rivera     : 1960    DATE OF VISIT:  2021    Subjective:     Del Rivera is a 59 y.o. male who has a chief complaint of a  diabetic and n open surgical ulcer located on the foot (left , medial, forefoot). Dr. Bryan Gerardo requested a consultation regarding the possible utility of hyperbaric oxygen therapy for his diagnosis of a Hercules 4 diabetic foot, with recent diagnosis of gas gangrene. Current complaint of pain in this ulcer? yes. Quality of pain: aching, burning and throbbing  Timing: intermittent and stable  Severity: mild-moderate  Associated Signs/Symptoms: swelling, redness, drainage (moderate, serous to serosanguinous), numbness and tingling. Other significant symptoms or pertinent wound history: Mr. Rody Rivera has a long Hx of DM, dense neuropathy, a prior DFU and left hallux amp a number of years ago. He came to the ER on  with a 5-day nonfocal febrile illness. He was diagnosed with a pharyngitis; he doesn't recall having his feet examined in the ER that day, but he and his wife believe they looked normal just a couple of days prior. Later that day back at home, he accidentally struck his foot against a piece of furniture, and then the next morning noticed that his foot was significantly swollen and red, so came back to the ER, from where he was admitted, for a severe foot infection. He had some usual blood work, cultures, foot imaging, and was taken to the OR emergently that day for a gas-producing infection in the foot (not obvious on exam, but demonstrated on initial Xray). Following that he had some more detailed anatomic imaging done, and then a thorough vascular workup, before he was taken back to the OR a few days later for an open partial ray amputation on that left foot.  With a high clinical suspicion for residual osteomyelitis, a PICC was placed, and he was discharged home on combination IV-oral antibiotic therapy, scheduled for another 3 weeks. He also has a wound VAC in place to the sizeable open surgical site on that foot. Feeling ok in general; no current F/C/D, no problems with PICC and VAC. Tolerating ABx well, no sore throat or mouth, no drug rash or pruritus, no PICC pain, no N/V/D, good appetite. Currently offloading with a [employer] shoe on that left foot, and is taking very few steps (has a wheelchair in his home, and only takes a few steps in and out of the bathroom, in and out of his car for appointments, etc). Additional ulcer(s) noted? yes. Small dorsal foot surgical wound from where an incision was made a couple of weeks ago, and a lateral forefoot ulcer that I think looks more like a pressure eschar (from post-op dressing perhaps?). Mr. Marina Woodruff has a past medical history of Abscess of left hand, Acute kidney injury superimposed on chronic kidney disease [facility]), Adhesive capsulitis of shoulder, Fractures, Gas gangrene of foot ([facility] [address] and PNA (pneumonia). He has a past surgical history that includes Wrist fracture surgery; Toe amputation (Left, 2009); Neck surgery; Hand surgery (Left, 06/19/2017); Foot Debridement (Left, 08/31/2021); and Foot Debridement (Left, 09/03/2021). His family history includes Asthma in his daughter; Cancer in his father; Diabetes in his mother; Heart Failure in his mother. Mr. Marina Woodruff reports that he has never smoked. He has never used smokeless tobacco. He reports that he does not drink alcohol and does not use drugs. His current medication list consists of Dulaglutide, Multiple Vitamin, atorvastatin, cefTRIAXone sodium, fish oil, insulin glargine, lisinopril, metFORMIN, metroNIDAZOLE, and tamsulosin. This is POD 16 / 13 of ABx. Allergies: Patient has no known allergies. Pertinent items from the review of systems are discussed in the HPI; the remainder of the ROS was reviewed and is negative.      Objective:     Vitals: 09/16/21 1006   BP: (!) 140/80   Pulse: 69   Resp: 16   Temp: 97.1 °F (36.2 °C)   TempSrc: Oral   Weight: 238 lb (108 kg)   Height: 6' 1\" (1.854 m)     Constitutional:  well-developed, well-nourished, NAD  Psychiatric:  oriented to person, place and time; mood and affect appropriate for the situation (slightly anxious)  Eyes:  pupils equal, round and reactive to light; sclerae anicteric, conjunctivae not pale  ENT: no thrush or oral ulcers; TMs visible, no effusion or erythema  Lungs: clear to auscultation B/L  Cardiovascular:  bilateral pedal pulses palpable, feet warm, brisk cap refill; mild-moderate left lower extremity edema  Lymphatic:  no inguinal or popliteal adenopathy, no angitis, possibly still some residual left forefoot cellulitis (vs reactive erythema related to the wound itself, the VAC drape, edema, etc)  Musculoskeletal:  no clubbing, cyanosis or petechiae; RLE and LLE with no gross effusions, joint misalignment or acute arthritis; slight prominence of right 1st met head, with a longstanding callus there. Neuro: decreased pedal sensation to light touch; no allodynia with exam or debridement (see below). Elicia-ulcer skin: dorsal foot just andrey and indurated; lateral foot some peeling hyperkeratosis and epidermal necrosis; medial forefoot with some andrey erythema, induration, mild maceration. Ulcer(s): dorsal foot just a very small linear wound from a surgical incision, a bit of depth, red, granular, fibrin, biofilm; lateral foot looks like a pressure eschar beneath some of that superficial skin necrosis, feels stable, slightly moist; primary medial forefoot wound with a little bit of granulation tissue, but mostly extensive SQ necrosis still, a few small patches of tendon / fascial necrosis, exposed cut end of the 1st metatarsal, a good deal of fibrin and biofilm, a couple of pockets or tunnels generally in the plantar and lateral direction. Photos also saved in electronic chart.     Today's Wound Measurements:  Wound 09/16/21 #1, left medial forefoot, Diabetic ulcer, jenkins 4, onset 8/25/2021-Wound Length (cm): 3.5 cm  Wound 09/16/21 #2, left dorsal foot, surgical wound, full thickness, onset 8/31/2021-Wound Length (cm): 1.1 cm  Wound 09/16/21 #3, left lateral foot, pressure ulcer, unstageable, onset 9/2021-Wound Length (cm): 5.5 cm    Wound 09/16/21 #1, left medial forefoot, Diabetic ulcer, jenkins 4, onset 8/25/2021-Wound Width (cm): 5 cm  Wound 09/16/21 #2, left dorsal foot, surgical wound, full thickness, onset 8/31/2021-Wound Width (cm): 0.4 cm  Wound 09/16/21 #3, left lateral foot, pressure ulcer, unstageable, onset 9/2021-Wound Width (cm): 1.4 cm    Wound 09/16/21 #1, left medial forefoot, Diabetic ulcer, jenkins 4, onset 8/25/2021-Wound Depth (cm): 2.4 cm  Wound 09/16/21 #2, left dorsal foot, surgical wound, full thickness, onset 8/31/2021-Wound Depth (cm): 0.5 cm  Wound 09/16/21 #3, left lateral foot, pressure ulcer, unstageable, onset 9/2021-Wound Depth (cm): 0.1 cm  _____________________________    Lab Results   Component Value Date    LABALBU 3.3 (L) 09/13/2021     Lab Results   Component Value Date    CREATININE 1.3 09/13/2021     Lab Results   Component Value Date    HGB 10.3 (L) 09/13/2021     Lab Results   Component Value Date    LABA1C 6.4 09/01/2021       Other labs from this week -- cRP 14.3 (no baseline level), alk phos down from 138 to 95, WBC down from 19.2 to 6.8, platelets 585D, 451 Eos.  _____________________________    CXR: Aug 30 -- The cardiomediastinal silhouette is unremarkable.  The lungs are clear.  No infiltrate, pleural fluid or evidence of overt failure.  Incidental note of azygous lobe and fissure on the right.  Multilevel osteoarthritic spurring in the thoracic spine. EKG: none on file.     Other diagnostics:     Aug 31 -- Foot XR -- Previous amputation of the great toe.  Soft tissue swelling with soft tissue gas in the stump adjacent to the head of the 1st metatarsal. Sridharorsa Hightower is no convincing plain film evidence of osteomyelitis. Sridhar Campbell is a corner fracture at the base of the 2nd proximal phalanx laterally which is new.  The fragment appears well corticated suggesting a nonacute injury.  No acute fracture or dislocation is seen.  Lisfranc alignment is maintained.  Degenerative changes in the midfoot with enthesopathy at the tendinous insertions on the calcaneus.  Stable small linear metallic foreign body projecting in the stump of the great toe.  Scattered vascular calcification. Aug 31 -- BCx x 2 negative. Aug 31 -- OR Cx with MSSA and Anaerococcus (with only GPC on Gram stain). Sep 1 -- Arterial Duplex -- The left ankle/brachial index is 0.93 (the DP was not obtained due to bandage, the PT is 170 mmHg). There is normal multiphasic flow at the common femoral artery indicating no significant aorto-iliac inflow disease. The distal posterior tibial artery appears to be occluded. < 50% stenosis is noted in the superficial femoral, popliteal, and anterior tibial arteries. There is evidence of enlarged lymph nodes involving the left groin area. There is no previous exam for comparison. Sep 2 -- MRI left foot -- Postsurgical changes related to incision and drainage procedure along the medial aspect of the 1st digit.  No residual abscess is identified within the foot. Adjacent to the surgical defect is an area of subtle, mild increased T2 and  decreased T1 signal that is nonspecific in the absence of IV contrast, but suspicious for early osteomyelitis given the proximity to the surgical wound and known infectious process. Additional area of T2 signal hyperintensity with decreased T1 signal along the metatarsal-sesamoid joints, medial greater than lateral.  These findings are favored degenerative in nature, with osteomyelitis considered less likely but difficult to exclude.  Diffuse edema and atrophy of the intrinsic foot musculature, suggesting sequela of diabetic neuropathy. Sep 2 -- Laser skin perfusion study -- Laser Skin Perfusion Pressure study at the left medial plantar area of the  foot is 90, at the left lateral plantar area of the foot is 136, at the left dorsum of the foot is 105, and at the left lateral ankle is 100. These are all considered to have a good probability for healing. Pulse Volume Recording at the ankle level reveals normal waveforms. Pulse Volume Recording at the left foot level reveals normal waveforms. There is no previous exam for comparison. Sep 3 -- OR path -- Soft tissue and bone with patchy acute inflammation, including focal acute osteomyelitis. Tissue at the inked edge appears viable and without significant acute inflammation     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 2 diabetes mellitus, with necrosis of bone ([facility]) E11.621, L97.524    Diabetic polyneuropathy associated with type 2 diabetes mellitus (HCC) E11.42    Elevated PSA R97.20    Mixed hyperlipidemia E78.2    Stage 3a chronic kidney disease (HCC) N18.31    Acute osteomyelitis of metatarsal bone of left foot ([facility]) M86.172    Staphylococcus aureus infection (cultures also with Anaerococcus) A49.01    Unstageable pressure ulcer of left foot ([facility]) L89.890    Nonhealing surgical wound, initial encounter (small left dorsal foot wound) T81.89XA    Chronic low back pain M54.5, G89.29    Dental bridge present Z97.2    Callus of foot L84       Assessment of today's active condition(s): well controlled DM2, neuropathy, no signs of PAD (palpable pulses, suggestion of possible PAD by Duplex exam, but reassuring laser skin perfusion pressure values in terms of lack of large-vessel PAD). Presume he does have small vessel ischemic disease on the basis of DM, of course. Amador Tono 4 diabetic foot ulcer that initially presented as a nonfocal fever, which I think must have been a sign of developing deep infection, probably from a very small skin fissure or ulcer not appreciated initially -- then rapid onset of foot swelling and redness, signs of sepsis, gas gangrene, with a deep ulcer including deep soft tissue infection and necrosis, and acute osteomyelitis. POD 16 from emergent I&D for the gas gangrene, and POD 13 from the open partial ray amputation. Also a small dorsal surgical site which ought to do fine, but then also a lateral foot ulcer that looks like it could have been from pressure (maybe from a post-op dressing in combination with his degree of swelling at that time?). On Abx for presumed residual osteo, and soft tissue infection seems to be improving, labs improving, tolerating Abx well. Wound bed not quite ready for NPWT in my opinion, but hopefully soon. . Factors contributing to occurrence and/or persistence of the chronic ulcer include edema, diabetes, shear force and decreased tissue oxygenation. I believe HBOT is indicated as an important immediate therapy in this case because of Mr. Hernandez's refractory condition, to speed healing and to decrease the chance of serious complications including spread of infection, spread of necrosis, and the very real risk of a more proximal amputation, including the possibility of a BKA. Specifically, the goals of HBOT in this case are to help with control of local infection and edema, but then especially to enhance angiogenesis, to speed granulation tissue formation, assist with coverage of exposed deep structures, and ultimately a decrease in wound depth and volume in particular. Due to the potential adverse effects of HBO therapy, I reviewed some particular aspects of Mr. Hernandez's medical history.  There is no history of idiopathic epilepsy, secondary seizures, stroke, CNS surgery or bleeding, recent head trauma, frequent hypoglycemia, untreated cataracts, optic neuritis, bleomycin-associated pulmonary fibrosis or recent bleomycin use, recent pulmonary lobectomy or pneumonectomy, obstructive or bullous lung disease, TB, radiation therapy to the head and neck, otosclerosis surgery, congenital spherocytosis or vitamin E deficiency. In addition there is no current fever, upper respiratory infection or sinus obstruction, tracheostomy, decompensated CHF, decompensated asthma, untreated pneumothorax or untreated hyperthyroidism; no current use of high-doses of narcotic analgesics, corticosteroids, vitamin C or amiodarone; and no current use of any dose of acetazolamide, phenothiazines, digoxin, disulfiram, cis-platinum, doxorubicin or mafenide. There IS a history of current use of IV beta-lactam therapy, which could potentially increase the risk of oxygen-toxicity seizure; our plans regarding this will be to use air breaks after every 30 minutes of oxygen therapy; to educate him on the warning symptoms of seizure; and to dive him to 2.0 STEPHANIE, if possible. We also discussed the inherent risks of HBOT, including confinement anxiety, otic-dental-sinus barotrauma, hypoglycemia in diabetes, seizure, progressive but usually reversible myopia, round- or oval-window blowout during a vigorous Valsalva maneuver, ulnar nerve paresthesias, pulmonary oxygen toxicity if inter-treatment FIO2 is > 40%, gas embolism, respiratory arrest in CO2 retainers, pneumothorax, cardiac arrest, and fire & explosion. Mr. Rahel Bobby was demonstrated various ear-clearing maneuvers, and is  able to actively equalize his middle ear pressures. In addition, sharp debridement is indicated today, based upon the exam findings in the wound(s) above. Procedure note:     Consent obtained. Time out performed per [facility]  policy.     Anesthetic  Anesthetic: 4% Lidocaine Cream     Using a curette, #15 blade scalpel and forceps, I sharply debrided the foot (left , dorsal, lateral) ulcer(s) down through and including the removal of dermis. The type(s) of tissue debrided included fibrin, biofilm and necrotic/eschar. Total Surface Area Debrided: 6 sq cm. Using a curette, #15 blade scalpel and forceps, I sharply debrided the foot (left , medial, forefoot) ulcer(s) down through and including the removal of muscle/fascia. The type(s) of tissue debrided included fibrin, biofilm, slough and necrotic/eschar. Total Surface Area Debrided: 15 sq cm. The ulcers were then irrigated with normal saline solution. The procedure was completed with a moderate amount of bleeding, and hemostasis was with pressure and with silver nitrate stick(s). The patient tolerated the procedure well, with no significant complications. The patient's level of pain during and after the procedure was monitored. Post-debridement measurements, if different from pre-debridement, are in the flowsheet as well. Discharge plan:     Treatment in the wound care center today, per RN documentation: Wound 09/16/21 #1, left medial forefoot, Diabetic ulcer, hercules 4, onset 8/25/2021-Dressing/Treatment: Other (comment) (vashe,traid lexi/wound,opticell rope,4x4,abd,kerlix,tape )  Wound 09/16/21 #2, left dorsal foot, surgical wound, full thickness, onset 8/31/2021-Dressing/Treatment: Other (comment) (vashe,polysporin,mepilex border)  Wound 09/16/21 #3, left lateral foot, pressure ulcer, unstageable, onset 9/2021-Dressing/Treatment: Other (comment) (vashe,triad, 4x4, kerlix ). In terms of HBOT, all of the patient's questions were answered, and I feel that the potential benefit of HBOT outweighs the above risks, and that we should proceed with therapy. His Hercules 4 diabetic foot, in my medical opinion, is a more urgent indication for HBOT than a typical Hercules 3 diabetic foot ulcer.  Though he has not yet had 30 days of standard care, he HAS had all of the elements in place (good arterial perfusion, debridement when indicated, IV and oral antibiotics, offloading, vascular assessment, no smoking, good glucose control, standard dressings), and I believe that to delay HBOT for a couple of additional weeks would be very likely to jeopardize his ability to heal, and would increase the risk of him progressing to a below-the-knee amputation. I will send information to his insurance company asking for an expedited review, and would like to start HBOT early next week. If we start treating promptly, I believe the prognosis for improvement with HBOT is still very good. Therapy will be prescribed for 30 sessions, 1 session per weekday, for 90 minutes each, at 2.5 STEPHANIE, with two 5-minute air breaks. I will plan an in-chamber TCOM study during one of his first few dives, and if the values are adequate, we'll back down to 2.0 STEPHANIE. Will also get a screening EKG at the time of HBO orientation, and assuming that we can start therapy soon, Mr. Zaria Reyna was reminded of the importance of regular and timely attendance at HBOT, both to maximize the effectiveness of treatment, and to make the day's schedule manageable for the other patients and staff. He understands that we may need to turn him away on a given day if he arrives late, and if a delayed start to that day's therapy will adversely affect the center's operation for the rest of the day. Because of the sensitive nature of discussions that we may sometimes have with patients here, he was also reminded to ask any accompanying friends or family members to wait outside of the HBO treatment area itself.   _________________    In terms of the residual osteomyelitis, continue current Abx, for a tentative additional three weeks. I spoke to Dr. Ladonna Castro, and I can just  antibiotic therapy mgmt from here, since he's coming in for wound-care and HBOT as well. Will notify [employer].  Will watch closely for allergic manifestations, Candidiasis, Cdiff, PICC complications, etc; weekly labs ordered (CBC-diff, CMP, cRP), weekly PICC dressings being done.  _________________    Continue good work with glucose control and diabetic diet, as well as offloading. I do think we will be resuming the wound VAC at some point, but probably not for another 1-2 weeks, until we can get the wound bed a bit .   _________________    Home treatment: good handwashing before and after any dressing changes. Cleanse wound with saline or soap & water before dressing change. May use Vaseline (petrolatum), Aquaphor, Aveeno, CeraVe, Cetaphil, Eucerin, Lubriderm, etc for dry skin. Dressing type for home: Vashe antiseptic spray to all wounds; polysporin and a dry dressing to that small dorsal site; Triad and a dry dressing to the lateral wound; Triad, silver alginate, gauze and an ABD to the main wound, then wrap the foot with Kerlix, once daily. Written discharge instructions given to patient. Follow up in 1 week to see me, but ideally, sooner than that for HBOT. Will order some short-term dressing supplies from a local DME company. D/W Dr. Lorenzo Barraza.      Electronically signed by Carlo Rowell MD on 9/16/2021 at 6:55 PM.

## 2021-09-16 NOTE — PLAN OF CARE
[address] [facility]:      [address] f: [phone] f: [phone] p: [phone] [email or web address]     Ordering Center:     Marily [address]  [address] [facility]  [phone]  Dept: [phone]   Fax# [phone]    Patient Information:      Wm Ricor [id number]   [phone]   : 1960  AGE: 59 y.o. GENDER: male   TODAYS DATE:  2021    Insurance:      PRIMARY INSURANCE:  Plan: [employer] PLUS HMO  Coverage: HU[employer] MEDICARE  Effective Date: 2021  Group Number: [unfilled]  Subscriber Number: [id number] - (Medicare Managed)    Payor/Plan Subscr  Sex Relation Sub. Ins. ID Effective Group Num   1.  Sulema Hernandes 1960 Male Self [id number]                                   [address] [address]         Patient Wound Information:     Additional ICD-10 Codes:     Patient Active Problem List   Diagnosis Code    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    PNA (pneumonia) J18.9    DM (diabetes mellitus) ([facility] E11.9    Hypertension I10    BPH (benign prostatic hyperplasia) N40.0    Abscess L02.91    Abscess of left hand L02.512    Sepsis (HCC) A41.9    Acute kidney injury superimposed on chronic kidney disease (HCC) N17.9, N18.9    Gas gangrene of foot (HCC) A48.0    Elevated C-reactive protein (CRP) R79.82    Osteoarthritis M19.90    Pulmonary emphysema (HCC) J43.9    Obesity E66.9    Weight loss counseling, encounter for Z71.3    Diabetes education, encounter for Z71.89    Diabetic ulcer of left foot associated with type 2 diabetes mellitus, with necrosis of bone ([facility] E11.621, L97.524       WOUNDS REQUIRING DRESSING SUPPLIES:     Negative Pressure Wound Therapy Foot Anterior;Left;Medial (Active)   Wound Type Surgical 21 1935   Unit Type [facility] 21   Dressing Type Black foam 21   Number of pieces used Offloading for Diabetic Foot Ulcers Orthowedge/inserts 09/16/21 1210   Wound Length (cm) 1.1 cm 09/16/21 1006   Wound Width (cm) 0.4 cm 09/16/21 1006   Wound Depth (cm) 0.5 cm 09/16/21 1006   Wound Surface Area (cm^2) 0.44 cm^2 09/16/21 1006   Wound Volume (cm^3) 0.22 cm^3 09/16/21 1006   Post-Procedure Length (cm) 1.1 cm 09/16/21 1011   Post-Procedure Width (cm) 0.4 cm 09/16/21 1011   Post-Procedure Depth (cm) 0.5 cm 09/16/21 1011   Post-Procedure Surface Area (cm^2) 0.44 cm^2 09/16/21 1011   Post-Procedure Volume (cm^3) 0.22 cm^3 09/16/21 1011   Distance Tunneling (cm) 0 cm 09/16/21 1011   Tunneling Position ___ O'Clock 0 09/16/21 1011   Undermining Starts ___ O'Clock 0 09/16/21 1011   Undermining Ends___ O'Clock 0 09/16/21 1011   Undermining Maxium Distance (cm) 0 09/16/21 1011   Wound Assessment Pink/red 09/16/21 1006   Drainage Amount Small 09/16/21 1006   Drainage Description Serosanguinous 09/16/21 1006   Odor None 09/16/21 1006   Number of days: 0       Wound 09/16/21 #3, left lateral foot, DFU , jenkins 1, onset 8/31/2021 (Active)   Wound Image   09/16/21 1006   Wound Etiology Diabetic 09/16/21 1006   Dressing Status New dressing applied 09/16/21 1210   Wound Cleansed Irrigated with saline 09/16/21 1006   Dressing/Treatment Other (comment) 09/16/21 1210   Offloading for Diabetic Foot Ulcers Orthowedge/inserts 09/16/21 1210   Wound Length (cm) 5.5 cm 09/16/21 1006   Wound Width (cm) 1.4 cm 09/16/21 1006   Wound Depth (cm) 0.1 cm 09/16/21 1006   Wound Surface Area (cm^2) 7.7 cm^2 09/16/21 1006   Wound Volume (cm^3) 0.77 cm^3 09/16/21 1006   Post-Procedure Length (cm) 5.5 cm 09/16/21 1011   Post-Procedure Width (cm) 1.4 cm 09/16/21 1011   Post-Procedure Depth (cm) 0.1 cm 09/16/21 1011   Post-Procedure Surface Area (cm^2) 7.7 cm^2 09/16/21 1011   Post-Procedure Volume (cm^3) 0.77 cm^3 09/16/21 1011   Tunneling Position ___ O'Clock 0 09/16/21 1006   Undermining Starts ___ O'Clock 0 09/16/21 1006   Undermining Ends___ O'Clock 0 09/16/21 1006   Undermining Maxium Distance (cm) 0 09/16/21 1006   Wound Assessment Purple/maroon 09/16/21 1006   Drainage Amount Small 09/16/21 1006   Drainage Description Serous 09/16/21 1006   Odor None 09/16/21 1006   Number of days: 0          Supplies Requested :      WOUND #: 1   PRIMARY DRESSING:  Vashe, triad,   opticell rope     4x4, ABD, kerlix     FREQUENCY OF DRESSING CHANGES:  daily    Wound Thickness [x] Full   []Partial       WOUND #: 2   PRIMARY DRESSING:    Vashe, bordered gauze        FREQUENCY OF DRESSING CHANGES:  Daily     Wound Thickness [x] Full   [x]Partial       WOUND #:3   PRIMARY DRESSING:    Vashe, triad, 4x4,    kerlix     FREQUENCY OF DRESSING CHANGES:  daily    Wound Thickness [x] Full   []Partial     Patient Wound(s) Debrided: [x] Yes   [] No    Debridement Date: 9/16/2021    Debribement Type: Excisional/Sharp    ADDITIONAL ITEMS:  [] Gloves Small  [x] Gloves Medium [] Gloves Large [] Gloves Asia Kishan  [] Paper Tape 1\" [] Paper Tape 2\" [] Paper Tape 3\"  [] Medipore Tape 3\"  [] Saline  [] Skin Prep   [] Adhesive Remover   [] Cotton Tip Applicators  [] Tubular Stocking   [] Size E  [] Size G  [] Other:    Patient currently being seen by Home Health: [] Yes   [x] No    Duration for needed supplies:  []15  [x]30  []60  []90 Days    Provider Information:      PROVIDER'S NAME/NPI  Dr. Сергей Colunga  NPI [id number]  I give permission to coordinate the care for this patient

## 2021-09-20 ENCOUNTER — TELEPHONE (OUTPATIENT)
Dept: WOUND CARE | Age: 61
End: 2021-09-20

## 2021-09-20 ENCOUNTER — HOSPITAL ENCOUNTER (OUTPATIENT)
Age: 61
Setting detail: SPECIMEN
Discharge: HOME | End: 2021-09-20
Payer: MEDICARE

## 2021-09-20 LAB
A/G RATIO: 1.1 (ref 1.1–2.2)
ALBUMIN SERPL-MCNC: 3.5 G/DL (ref 3.4–5)
ALP BLD-CCNC: 90 U/L (ref 40–129)
ALT SERPL-CCNC: 14 U/L (ref 10–40)
ANION GAP SERPL CALCULATED.3IONS-SCNC: 11 MMOL/L (ref 3–16)
AST SERPL-CCNC: 13 U/L (ref 15–37)
BASOPHILS ABSOLUTE: 0.2 K/UL (ref 0–0.2)
BASOPHILS RELATIVE PERCENT: 3.5 %
BILIRUB SERPL-MCNC: 0.3 MG/DL (ref 0–1)
BUN BLDV-MCNC: 31 MG/DL (ref 7–20)
CALCIUM SERPL-MCNC: 9.3 MG/DL (ref 8.3–10.6)
CHLORIDE BLD-SCNC: 109 MMOL/L (ref 99–110)
CO2: 22 MMOL/L (ref 21–32)
CREAT SERPL-MCNC: 1.4 MG/DL (ref 0.8–1.3)
EOSINOPHILS ABSOLUTE: 0.2 K/UL (ref 0–0.6)
EOSINOPHILS RELATIVE PERCENT: 3.6 %
GFR AFRICAN AMERICAN: >60
GFR NON-AFRICAN AMERICAN: 51
GLOBULIN: 3.1 G/DL
GLUCOSE BLD-MCNC: 98 MG/DL (ref 70–99)
HCT VFR BLD CALC: 34.7 % (ref 40.5–52.5)
HEMOGLOBIN: 11.5 G/DL (ref 13.5–17.5)
LYMPHOCYTES ABSOLUTE: 1.4 K/UL (ref 1–5.1)
LYMPHOCYTES RELATIVE PERCENT: 21.7 %
MCH RBC QN AUTO: 31.4 PG (ref 26–34)
MCHC RBC AUTO-ENTMCNC: 33 G/DL (ref 31–36)
MCV RBC AUTO: 95.1 FL (ref 80–100)
MONOCYTES ABSOLUTE: 0.4 K/UL (ref 0–1.3)
MONOCYTES RELATIVE PERCENT: 6.1 %
NEUTROPHILS ABSOLUTE: 4.3 K/UL (ref 1.7–7.7)
NEUTROPHILS RELATIVE PERCENT: 65.1 %
PDW BLD-RTO: 15.4 % (ref 12.4–15.4)
PLATELET # BLD: 328 K/UL (ref 135–450)
PMV BLD AUTO: 10.3 FL (ref 5–10.5)
POTASSIUM SERPL-SCNC: 5.9 MMOL/L (ref 3.5–5.1)
RBC # BLD: 3.65 M/UL (ref 4.2–5.9)
SODIUM BLD-SCNC: 142 MMOL/L (ref 136–145)
TOTAL PROTEIN: 6.6 G/DL (ref 6.4–8.2)
WBC # BLD: 6.6 K/UL (ref 4–11)

## 2021-09-20 PROCEDURE — 80053 COMPREHEN METABOLIC PANEL: CPT

## 2021-09-20 PROCEDURE — 85025 COMPLETE CBC W/AUTO DIFF WBC: CPT

## 2021-09-20 PROCEDURE — 36415 COLL VENOUS BLD VENIPUNCTURE: CPT

## 2021-09-20 PROCEDURE — 86140 C-REACTIVE PROTEIN: CPT

## 2021-09-20 NOTE — TELEPHONE ENCOUNTER
Called and scheduled patient to start his HBO tomorrow 9/21/21 at 12pm.  He will orient first and then dive. Gave instruction on precautions of no deodorant,  Lotions, jewelry, hair product ect. Patient is to have EKG prior to coming to HBO and was instructed to register at main hospital entrance around 11am then come to HBO.

## 2021-09-21 ENCOUNTER — HOSPITAL ENCOUNTER (OUTPATIENT)
Dept: HYPERBARIC MEDICINE | Age: 61
Discharge: HOME | End: 2021-09-21
Payer: MEDICARE

## 2021-09-21 ENCOUNTER — HOSPITAL ENCOUNTER (OUTPATIENT)
Age: 61
Discharge: HOME | End: 2021-09-21
Payer: MEDICARE

## 2021-09-21 VITALS
HEART RATE: 70 BPM | TEMPERATURE: 98.8 F | RESPIRATION RATE: 16 BRPM | SYSTOLIC BLOOD PRESSURE: 154 MMHG | DIASTOLIC BLOOD PRESSURE: 84 MMHG

## 2021-09-21 DIAGNOSIS — E11.621 DIABETIC ULCER OF OTHER PART OF LEFT FOOT ASSOCIATED WITH TYPE 2 DIABETES MELLITUS, WITH NECROSIS OF BONE (HCC): Primary | ICD-10-CM

## 2021-09-21 DIAGNOSIS — L97.524 DIABETIC ULCER OF OTHER PART OF LEFT FOOT ASSOCIATED WITH TYPE 2 DIABETES MELLITUS, WITH NECROSIS OF BONE (HCC): Primary | ICD-10-CM

## 2021-09-21 LAB
C-REACTIVE PROTEIN: 10.2 MG/L (ref 0–5.1)
GLUCOSE BLD-MCNC: 139 MG/DL (ref 70–99)
GLUCOSE BLD-MCNC: 148 MG/DL (ref 70–99)
PERFORMED ON: ABNORMAL
PERFORMED ON: ABNORMAL

## 2021-09-21 PROCEDURE — G0277 HBOT, FULL BODY CHAMBER, 30M: HCPCS

## 2021-09-21 PROCEDURE — 93005 ELECTROCARDIOGRAM TRACING: CPT | Performed by: FAMILY MEDICINE

## 2021-09-21 PROCEDURE — 99183 HYPERBARIC OXYGEN THERAPY: CPT | Performed by: INTERNAL MEDICINE

## 2021-09-21 RX ORDER — OXYMETAZOLINE HYDROCHLORIDE 0.05 G/100ML
SPRAY NASAL
Qty: 1 BOTTLE | Refills: 0 | COMMUNITY
Start: 2021-09-21 | End: 2021-09-30

## 2021-09-21 ASSESSMENT — PAIN SCALES - GENERAL
PAINLEVEL_OUTOF10: 0
PAINLEVEL_OUTOF10: 0

## 2021-09-21 NOTE — PLAN OF CARE
Pt to the [facility] for HBOT orientation and 1st treatment. Assessment completed and patient cleared for treatment. Pt to 2.5 STEPHANIE at a rate of 1.0 psi. Pt to pressure without complaints. Pt had no trouble clearing ears. Pt watching tv. Nurse at chamber side and will continue to monitor.

## 2021-09-21 NOTE — PROGRESS NOTES
Hyperbaric Oxygen Therapy   Patient Orientation      NAME: Denise Huddleston  MEDICAL RECORD NUMBER:  [id number]  AGE: 59 y.o. GENDER: male  : 1960  EPISODE DATE:  2021    HBO Orientation Completed with: Wife and Gabriel Duckworth RN         INTRODUCTION   Welcome to the [address] [facility]. This guide will assist in answering any questions which you might have concerning your scheduled hyperbaric oxygen treatment and if appropriate, any wound care you might need. During your stay with us you will hear your treatment called a dive. This is just a nickname given to the procedure and does not refer to being in water. You will only be exposed to air pressure changes during your treatments. HYPERBARIC OXYGEN THERAPY  Hyperbaric oxygen treatment is a means of providing additional oxygen to your body tissues. By increasing the oxygen in the tissues, healing is enhanced. Two important effects on difficult wounds are first, to speed new microscopic blood vessel growth into the wound and second, to improve the ability of your white blood cells to kill germs. It is important to know that hyperbaric oxygen is an additional (adjunctive) therapy in addition to the current medical or surgical care you are receiving. It is also essential that you understand that this is not a cure-all but a part of your total medical care. THE STAFF  The [facility] staff consists of fully trained physicians, nursing staff, and chamber operators. There will always be a physician, as well as other staff members with you in the department while you are having your hyperbaric oxygen therapy treatment. Please feel free to ask for help from any of the staff members while you are here. THE CHAMBER  The hyperbaric chamber located at the [address] [place] [address] Road is a monoplace, seamless acrylic pressure chamber designed to treat one patient at a time. You will be lying down with your head slightly elevated for your treatments.   A communication system allows you to speak with the , family members or the physician. You will also be able to watch and listen to television during treatment. The monoplace chamber administers 100% oxygen at a physician prescribed pressure. Because you will be in an oxygen environment, a detailed list of safety precautions and guidelines will be strictly enforced for your safety. TREATMENT SCHEDULE  You will need to arrange for your own transportation. If there is difficulty, we will try to assist in making the necessary arrangements with an appropriate agency. Hyperbaric treatments are given daily, Monday through Friday. Each treatment will last almost two (2) hours. Be prepared to spend approximately three (3) hours at our facility. This allows time for preparing you for your treatment dive and the actual time in the chamber. It is essential that you try to make every scheduled treatment dive possible so that the effects of the hyperbaric oxygen will continue. Any long interruption could cause the healing enhancement to slow or even stop. If at any time you have chills, fever, nausea, vomiting, diarrhea or any problem with your glucose levels, please inform the physician or the nurse. You will be assessed to see if you should be in the chamber that day. You will receive a complete briefing by a staff member. Necessary forms and informed consents (permits) will need to be signed before treatments begin. You will also be given a tour of the area . VISITORS  Visitors are welcome and we encourage patients to bring their families. We ask that once your family has seen the facility that they remain in the waiting room to ensure your privacy, and the privacy of the other patients. Visitors are not allowed in the treatment area unless their presence is needed by the physician. Again, we welcome you to our facility.   Please let us know if there is anything that we can do to assist you during your time with us. GENERAL INFORMATION REVIEWED: Yes      You will need to arrive 30 minutes prior to your visit. Please call us if you are not feeling well the day of your visit, so that we can determine if it will be necessary to reschedule your treatment (before you arrive). Each visit will begin with a staff member asking you a series of questions. These questions may sound repetitive, but please understand, for safety reasons, that we must ask the same questions each and every visit. Only chamber approved clothing may be worn during treatment, therefore special clothing is provided for you at each visit. For safety reasons, chamber operators are required to ask about prohibited items each and every visit. Vital signs (blood pressure, temperature pulse, and respirations) will be taken both before and after each visit. PRESSURE CHANGES INFORMATION REVIEWED: Yes  As the pressure within the chamber changes, you may notice changes to your ears, sinuses or lungs. For example, your ears may \"pop\" as if you are traveling on an airplane or scuba diving. Please notify the chamber  if you are experiencing pain in your ears, sinuses or lungs during your treatment \"dive. \"      To help prevent pain or injury to your ears, you will be taught by the staff and will practice thoroughly a procedure, known as the Valsalva maneuver, as well as other techniques prior to your first treatment \"dive. \"  Although the pressure in the chamber is not felt on the body, you do feel changes in the ears. The eardrum is normally flat while you are at ground level. Pressure changes in the atmosphere around you will usually be felt in the ears. The eardrum tends to bow inwardly and unless you take positive action, fullness or pain will be felt.   In order to avoid this, you will be taught how to force air into the middle ear during the few minutes that it takes to pressurize the chamber. To help prevent pain or injury to your lungs, please make sure to notify a staff member if you have any upper respiratory infection and/or congestion. It is very important not to hold your breath during your treatment \"dive\", just breath normally. PATIENTS WITH DIABETES ONLY Yes  If you have diabetes your blood sugar level will be tested before each and every treatment. If your blood sugar level is too low, we will attempt to help you raise it by providing a diabetic nutritional shake. We will retest your blood sugar shortly thereafter. If your blood sugar level is still low, we may not be able to provide a treatment \"dive\" that day. If your blood sugar levels is too high, we also may not be able to provide a treatment \"dive\" that day. If your blood sugar level continues to be too high or too low, not allowing you to continue with the hyperbaric treatments, you will need to contact your primary physician to help manage your blood sugar more effectively. ABOUT YOUR TREATMENT INFORMATION REVIEWED: Yes   If you are feeling nervous or anxious about these treatments, please let a staff member know. We are here to help you. Before each and every treatment, please let us know of any changes regarding:    your medication, especially cancer medication  any possibility of being pregnant  any new implants or devices    Temporary vision changes may occur during hyperbaric oxygen therapy. Therefore, it is recommended that you not change eyeglass prescriptions during therapy. Changes that occur during therapy should return to pre-treatment baseline within several weeks of the conclusion of therapy.  In the rare instance that vision has not returned to the pre-treatment baseline; it is recommended that you schedule an eye exam with your Opthalmalogist.    It is very important to make the clinical staff aware if you have ever had a collapsed lung    ITEMS TO AVOID INFORMATION REVIEWED: Yes Smoking has a negative effect on treatment and may diminish the benefits you may receive. Smoking within 2 hours prior to your treatment poses additional risk and should be avoided completely. Drinking alcohol or using illicit drugs may also have a negative effect on your treatment and should be avoided. Drinking carbonated beverages such as soda pop within 1 hour of your treatment could cause pains in the stomach during the treatment. Caffeinated beverages or foods containing caffeine should be avoided before your treatment. Please let us know if we can assist you with seeking help to stop smoking, drinking or using recreational drugs. PROHIBITED ITEMS INFORMATION REVIEWED: Yes   No wigs, hair spray, hair oils, hair ornaments, creams, lotions, make-up, after shave, perfumes, colognes, chap stick, lip balms, mustache waxes, petroleum products (e.g. Vaseline), baby oil, deodorant or ointments  No nail polish    No synthetic clothing  No nylon hose, underwear, panty hose or bra  No food, gum or candy  No jewelry  No smoking materials such as lighter, cigarettes or matches  No reading material  No hearing aids, non-fixed dentures  No Hard (non-gas permeable) contact lenses  Most dressings are approved to wear into the hyperbaric chamber. Please advise the hyperbaric staff of any new dressings applied to your wound to ensure the new dressings are compatible with hyperbaric oxygen treatments. No alcohol preps  No heat packs  No street clothes or shoes  No cell phones or other electronics  If it was not a part of you when you were born into this world, if it was not provided by the chamber , then it cannot go into the chamber with you.         Electronically signed by aSqib Younger RN on 9/21/2021 at 12:03 PM

## 2021-09-22 ENCOUNTER — HOSPITAL ENCOUNTER (OUTPATIENT)
Dept: HYPERBARIC MEDICINE | Age: 61
Discharge: HOME | End: 2021-09-22
Payer: MEDICARE

## 2021-09-22 VITALS
HEART RATE: 86 BPM | SYSTOLIC BLOOD PRESSURE: 159 MMHG | RESPIRATION RATE: 16 BRPM | DIASTOLIC BLOOD PRESSURE: 74 MMHG | TEMPERATURE: 97.8 F

## 2021-09-22 DIAGNOSIS — L97.524 DIABETIC ULCER OF OTHER PART OF LEFT FOOT ASSOCIATED WITH TYPE 2 DIABETES MELLITUS, WITH NECROSIS OF BONE (HCC): ICD-10-CM

## 2021-09-22 DIAGNOSIS — E11.621 DIABETIC ULCER OF TOE OF LEFT FOOT ASSOCIATED WITH TYPE 2 DIABETES MELLITUS, WITH NECROSIS OF BONE (HCC): Primary | ICD-10-CM

## 2021-09-22 DIAGNOSIS — E11.621 DIABETIC ULCER OF OTHER PART OF LEFT FOOT ASSOCIATED WITH TYPE 2 DIABETES MELLITUS, WITH NECROSIS OF BONE (HCC): ICD-10-CM

## 2021-09-22 DIAGNOSIS — L97.524 DIABETIC ULCER OF TOE OF LEFT FOOT ASSOCIATED WITH TYPE 2 DIABETES MELLITUS, WITH NECROSIS OF BONE (HCC): Primary | ICD-10-CM

## 2021-09-22 LAB
EKG ATRIAL RATE: 72 BPM
EKG DIAGNOSIS: NORMAL
EKG P AXIS: 57 DEGREES
EKG P-R INTERVAL: 164 MS
EKG Q-T INTERVAL: 358 MS
EKG QRS DURATION: 90 MS
EKG QTC CALCULATION (BAZETT): 392 MS
EKG R AXIS: -14 DEGREES
EKG T AXIS: 52 DEGREES
EKG VENTRICULAR RATE: 72 BPM
GLUCOSE BLD-MCNC: 144 MG/DL (ref 70–99)
GLUCOSE BLD-MCNC: 148 MG/DL (ref 70–99)
PERFORMED ON: ABNORMAL
PERFORMED ON: ABNORMAL

## 2021-09-22 PROCEDURE — G0277 HBOT, FULL BODY CHAMBER, 30M: HCPCS

## 2021-09-22 PROCEDURE — 99183 HYPERBARIC OXYGEN THERAPY: CPT | Performed by: EMERGENCY MEDICINE

## 2021-09-22 PROCEDURE — 93010 ELECTROCARDIOGRAM REPORT: CPT | Performed by: INTERNAL MEDICINE

## 2021-09-22 RX ORDER — OXYMETAZOLINE HYDROCHLORIDE 0.05 G/100ML
SPRAY NASAL
Qty: 1 BOTTLE | Refills: 0 | COMMUNITY
Start: 2021-09-22 | End: 2021-10-15

## 2021-09-22 ASSESSMENT — PAIN SCALES - GENERAL
PAINLEVEL_OUTOF10: 0
PAINLEVEL_OUTOF10: 0

## 2021-09-22 NOTE — PROGRESS NOTES
[address]  Hyperbaric Oxygen    Del Moncada     : 1960    DATE OF VISIT:  2021    Subjective     Del Moncada is a 59 y.o. male who  has a past medical history of Abscess of left hand (2017), Acute kidney injury superimposed on chronic kidney disease ([facility] [address] (2021), Adhesive capsulitis of shoulder (2014), Fractures, Gas gangrene of foot ([facility] (2021), and PNA (pneumonia) (2016). He presents to the [facility] today for hyperbaric oxygen treatment of his diabetic foot ulcer  , which is refractory to standard therapy for 30 days. Patient denies fever. Patient denies nausea, vomiting or diarrhea; no ear troubles and no other new complaints; no fears or anxiety regarding treatment today. Objective       Vitals:    21 0834   BP: 130/64   Pulse: 86   Resp: 16   Temp: 97.8 °F (36.6 °C)   TempSrc: Oral       General:  Alert, cooperative, no distress. Ears: External otic canals are within acceptable limits. Teed grade 0 on the right and 0 on the left pre-treatment. Teed grade 0 on the right and 0 on the left post-treatment. Lungs:  Clear to auscultation bilaterally. Ulcer: Not examined today in HBO, if applicable. Recent Labs     21  1259 21  1549 21  0831   POCGLU 139* 148* 148*       Assessment     Del Moncada is a 59 y.o. male who presented to the [facility] today for hyperbaric oxygen treatment #2 of 30 for the diagnosis as stated above. Treatment given at 2.5 STEPHANIE for 90 minutes, with two 5-minute air breaks. today, including compression, 100% oxygen at pressure, air breaks if applicable, and decompression.     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 2 diabetes mellitus, with necrosis of bone ([facility]) E11.621, L97.524    Diabetic polyneuropathy associated with type 2 diabetes mellitus ([facility]) E11.42    Elevated PSA R97.20    Mixed hyperlipidemia E78.2    Stage 3a chronic kidney disease ([facility]) N18.31    Acute osteomyelitis of metatarsal bone of left foot ([facility]) M86.172    Staphylococcus aureus infection (cultures also with Anaerococcus) A49.01    Unstageable pressure ulcer of left foot ([facility]) L89.890    Nonhealing surgical wound, initial encounter (small left dorsal foot wound) T81.89XA    Chronic low back pain M54.5, G89.29    Dental bridge present Z97.2    Callus of foot L84       In my clinical judgement, ongoing HBO therapy is necessary at this time, given a threat to patient function, limb or life from the current condition. Adjunctive Rx, objective weekly progress and goals of Rx will periodically be updated, on Mondays. Plan     1. Hyperbaric Oxygen - Del Hernandez tolerated the treatment well today without complications. Continue HBO treatment as outlined above. 2. Other -     I was present on these premises and immediately available to furnish assistance & direction throughout the procedure.      -- Electronically signed by Denney Habermann, MD on 9/22/2021 at 10:54 AM

## 2021-09-22 NOTE — PROGRESS NOTES
[address]  Hyperbaric Oxygen    Del Painting     : 1960    DATE OF VISIT:  2021    Subjective     Del Painting is a 59 y.o. male who  has a past medical history of Abscess of left hand (2017), Acute kidney injury superimposed on chronic kidney disease ([facility] (2021), Adhesive capsulitis of shoulder (2014), Fractures, [facility] ([facility] (2021), and PNA (pneumonia) (2016). He presents to the [facility] today for hyperbaric oxygen treatment of his Leafy McRae 4 diabetic foot, which in my opinion is an immediate indication for HBO therapy, without a further period of more conservative care, given the high risk of major amputation. Patient denies fever. Patient denies nausea, vomiting or diarrhea; no ear troubles and no other new complaints; no fears or anxiety regarding treatment today. Thankfully his insurance company approved therapy quite quickly, we contacted him yesterday, and he was able to start today, after orientation. Had a pre-HBO EKG today as well -- reviewed by me, normal.     We weren't sure exactly how his glucoses would do with HBO, so though he was a bit above the usual threshold of 130 today, we gave him a Glucerna before his dive, and he went up only slightly (he had had breakfast today, but no lunch). Objective     Vitals:    21 1303 21 1547   BP: 123/65 (!) 154/84   Pulse: 74 70   Resp: 18 16   Temp: 99.5 °F (37.5 °C) 98.8 °F (37.1 °C)   TempSrc: Oral Oral       General:  Alert, cooperative, no distress. Ears: External otic canals are within acceptable limits. Teed grade 0 on the right and 0 on the left pre-treatment. Teed grade 0 on the right and 0 on the left post-treatment. Lungs:  Clear to auscultation bilaterally. Ulcer: Not examined today in HBO, if applicable.       Recent Labs     21  1259 21  1549   POCGLU 139* 148*       Assessment     Gene Mary Ritter is a 59 y.o. male who presented to the [facility] today for hyperbaric oxygen treatment #1 of 30 for the diagnosis as stated above. Treatment given at 2.5 STEPHANIE for 90 minutes, with two 5-minute air breaks. Total Treatment Time (min): 120 today, including compression, 100% oxygen at pressure, air breaks if applicable, and decompression. 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 2 diabetes mellitus, with necrosis of bone ([facility]) E11.621, L97.524    Diabetic polyneuropathy associated with type 2 diabetes mellitus ([facility]) E11.42    Elevated PSA R97.20    Mixed hyperlipidemia E78.2    Stage 3a chronic kidney disease ([facility]) N18.31    Acute osteomyelitis of metatarsal bone of left foot ([facility]) M86.172    Staphylococcus aureus infection (cultures also with Anaerococcus) A49.01    Unstageable pressure ulcer of left foot ([facility]) L89.890    Nonhealing surgical wound, initial encounter (small left dorsal foot wound) T81.89XA    Chronic low back pain M54.5, G89.29    Dental bridge present Z97.2    Callus of foot L84       In my clinical judgement, ongoing HBO therapy is necessary at this time, given a threat to patient function, limb or life from the current condition. Adjunctive Rx, objective weekly progress and goals of Rx will periodically be updated, on Mondays. Plan     1. Hyperbaric Oxygen - Del Hernandez tolerated the treatment well today without complications. Continue HBO treatment as outlined above. 2. Other - continue IV and oral Abx. If he tolerates a few HBO treatments, will probably stop the Flagyl. Follow-up with me for wound care and ID issues on Friday.  Plan an in-chamber TCOM study on Thursday AM, to see if he needs to stay at 2.5 STEPHANIE, or if we can drop him back to 2.0. I was present on these premises and immediately available to furnish assistance & direction throughout the procedure.      -- Electronically signed by Megan Salinas MD on 9/21/2021 at 9:24 PM

## 2021-09-23 ENCOUNTER — HOSPITAL ENCOUNTER (OUTPATIENT)
Dept: HYPERBARIC MEDICINE | Age: 61
Discharge: HOME | End: 2021-09-23
Payer: MEDICARE

## 2021-09-23 VITALS
SYSTOLIC BLOOD PRESSURE: 160 MMHG | HEART RATE: 72 BPM | DIASTOLIC BLOOD PRESSURE: 77 MMHG | TEMPERATURE: 97.9 F | RESPIRATION RATE: 18 BRPM

## 2021-09-23 DIAGNOSIS — E11.621 DIABETIC ULCER OF OTHER PART OF LEFT FOOT ASSOCIATED WITH TYPE 2 DIABETES MELLITUS, WITH NECROSIS OF BONE (HCC): Primary | ICD-10-CM

## 2021-09-23 DIAGNOSIS — L97.524 DIABETIC ULCER OF OTHER PART OF LEFT FOOT ASSOCIATED WITH TYPE 2 DIABETES MELLITUS, WITH NECROSIS OF BONE (HCC): Primary | ICD-10-CM

## 2021-09-23 LAB
GLUCOSE BLD-MCNC: 134 MG/DL (ref 70–99)
GLUCOSE BLD-MCNC: 140 MG/DL (ref 70–99)
PERFORMED ON: ABNORMAL
PERFORMED ON: ABNORMAL

## 2021-09-23 PROCEDURE — G0277 HBOT, FULL BODY CHAMBER, 30M: HCPCS

## 2021-09-23 PROCEDURE — 99183 HYPERBARIC OXYGEN THERAPY: CPT | Performed by: INTERNAL MEDICINE

## 2021-09-23 RX ORDER — OXYMETAZOLINE HYDROCHLORIDE 0.05 G/100ML
SPRAY NASAL
Qty: 1 BOTTLE | Refills: 0 | COMMUNITY
Start: 2021-09-23 | End: 2021-09-27

## 2021-09-23 ASSESSMENT — PAIN SCALES - GENERAL: PAINLEVEL_OUTOF10: 0

## 2021-09-23 NOTE — PLAN OF CARE
Patient seen for HBOT treatment  3 / 30    today. Patient assessment complete and cleared for HBOT. Patient was compressed in HBO chamber to 2.5 STEPHANIE at 2.0 psi/min with two 5min air breaks. Patient is tolerating treatment well and is currently resting comfortably and watching television. HBO RN at chamber side, will continue to monitor.

## 2021-09-24 ENCOUNTER — HOSPITAL ENCOUNTER (OUTPATIENT)
Dept: WOUND CARE | Age: 61
Discharge: HOME | End: 2021-09-24
Payer: MEDICARE

## 2021-09-24 ENCOUNTER — HOSPITAL ENCOUNTER (OUTPATIENT)
Dept: HYPERBARIC MEDICINE | Age: 61
Discharge: HOME | End: 2021-09-24
Payer: MEDICARE

## 2021-09-24 VITALS
HEART RATE: 71 BPM | TEMPERATURE: 98.5 F | RESPIRATION RATE: 16 BRPM | SYSTOLIC BLOOD PRESSURE: 178 MMHG | DIASTOLIC BLOOD PRESSURE: 86 MMHG

## 2021-09-24 VITALS
DIASTOLIC BLOOD PRESSURE: 86 MMHG | SYSTOLIC BLOOD PRESSURE: 178 MMHG | TEMPERATURE: 98.5 F | RESPIRATION RATE: 16 BRPM | HEART RATE: 71 BPM

## 2021-09-24 DIAGNOSIS — E11.621 DIABETIC ULCER OF OTHER PART OF LEFT FOOT ASSOCIATED WITH TYPE 2 DIABETES MELLITUS, WITH NECROSIS OF BONE (HCC): Primary | ICD-10-CM

## 2021-09-24 DIAGNOSIS — L97.524 DIABETIC ULCER OF OTHER PART OF LEFT FOOT ASSOCIATED WITH TYPE 2 DIABETES MELLITUS, WITH NECROSIS OF BONE (HCC): Primary | ICD-10-CM

## 2021-09-24 DIAGNOSIS — E87.5 HYPERKALEMIA: ICD-10-CM

## 2021-09-24 LAB
GLUCOSE BLD-MCNC: 160 MG/DL (ref 70–99)
GLUCOSE BLD-MCNC: 165 MG/DL (ref 70–99)
PERFORMED ON: ABNORMAL
PERFORMED ON: ABNORMAL

## 2021-09-24 PROCEDURE — G0277 HBOT, FULL BODY CHAMBER, 30M: HCPCS

## 2021-09-24 PROCEDURE — 11046 DBRDMT MUSC&/FSCA EA ADDL: CPT | Performed by: INTERNAL MEDICINE

## 2021-09-24 PROCEDURE — 11043 DBRDMT MUSC&/FSCA 1ST 20/<: CPT

## 2021-09-24 PROCEDURE — 99183 HYPERBARIC OXYGEN THERAPY: CPT | Performed by: INTERNAL MEDICINE

## 2021-09-24 PROCEDURE — 11043 DBRDMT MUSC&/FSCA 1ST 20/<: CPT | Performed by: INTERNAL MEDICINE

## 2021-09-24 PROCEDURE — 11046 DBRDMT MUSC&/FSCA EA ADDL: CPT

## 2021-09-24 RX ORDER — LIDOCAINE HYDROCHLORIDE 40 MG/ML
SOLUTION TOPICAL ONCE
Status: CANCELLED | OUTPATIENT
Start: 2021-09-24 | End: 2021-09-24

## 2021-09-24 RX ORDER — LIDOCAINE 40 MG/G
CREAM TOPICAL ONCE
Status: CANCELLED | OUTPATIENT
Start: 2021-09-24 | End: 2021-09-24

## 2021-09-24 RX ORDER — LIDOCAINE 40 MG/G
CREAM TOPICAL ONCE
Status: DISCONTINUED | OUTPATIENT
Start: 2021-09-24 | End: 2021-09-25 | Stop reason: HOSPADM

## 2021-09-24 RX ORDER — BACITRACIN ZINC AND POLYMYXIN B SULFATE 500; 1000 [USP'U]/G; [USP'U]/G
OINTMENT TOPICAL ONCE
Status: CANCELLED | OUTPATIENT
Start: 2021-09-24 | End: 2021-09-24

## 2021-09-24 RX ORDER — OXYMETAZOLINE HYDROCHLORIDE 0.05 G/100ML
SPRAY NASAL
Qty: 1 BOTTLE | Refills: 0 | COMMUNITY
Start: 2021-09-24 | End: 2021-09-27

## 2021-09-24 RX ORDER — LIDOCAINE 50 MG/G
OINTMENT TOPICAL ONCE
Status: CANCELLED | OUTPATIENT
Start: 2021-09-24 | End: 2021-09-24

## 2021-09-24 ASSESSMENT — PAIN SCALES - GENERAL
PAINLEVEL_OUTOF10: 0

## 2021-09-24 NOTE — PLAN OF CARE
Pt to the [facility] for HBOT. Assessment completed and patient cleared for treatment. Pt to 2.0 STEPHANIE at a rate of 2.0 psi. Pt to pressure without complaints and is watching tv. Nurse at chamber side and will continue to monitor.

## 2021-09-24 NOTE — PLAN OF CARE
Wounds showing signs of improvement this week per Dr. Delvis Sepulveda. HBO discussed, Pt tolerating HBO treatments well. Wound debridement performed per Dr. Delvis Sepulveda, Pt tolerated well. Pt to stop Flagyl today. Discussed NPWT restarting next week. Follow up in [address] [place] [address] in 1 week as ordered. Pt. Aware to call sooner with any problems or questions/concerns. Discharge instructions reviewed with patient, all questions answered, copy given to patient. Dressings were applied to all wounds per M.D. Instructions at this visit.

## 2021-09-24 NOTE — PROGRESS NOTES
[address]  Hyperbaric Oxygen    Del Zeng Bailey     : 1960    DATE OF VISIT:  2021    Subjective     Del Sherwood is a 59 y.o. male who  has a past medical history of Abscess of left hand (2017), Acute kidney injury superimposed on chronic kidney disease ([facility] [address] (2021), Adhesive capsulitis of shoulder (2014), Fractures, Gas gangrene of foot ([facility] [address] (2021), and PNA (pneumonia) (2016). He presents to the [facility] today for hyperbaric oxygen treatment of his Jeneane Hails 4 diabetic foot, which is an indication for immediate HBOT, without a further period of observation and conservative care alone. Patient denies fever. Patient denies nausea, vomiting or diarrhea; no ear troubles and no other new complaints; no fears or anxiety regarding treatment today. Objective     Vitals:    21 0856 21 1120   BP: 128/70 (!) 160/77   Pulse: 80 72   Resp: 18 18   Temp: 97.8 °F (36.6 °C) 97.9 °F (36.6 °C)   TempSrc: Oral Oral       General:  Alert, cooperative, no distress. Ears: External otic canals are within acceptable limits. Teed grade 0 on the right and 0 on the left pre-treatment. Teed grade 0 on the right and 0 on the left post-treatment. Lungs:  Clear to auscultation bilaterally. Ulcer: Not examined today in HBO, if applicable. Recent Labs     21  1259 21  1549 21  0831 21  1051 21  0858 21  1124   POCGLU 139* 148* 148* 144* 140* 134*       Assessment     Del Sherwood is a 59 y.o. male who presented to the [facility] today for hyperbaric oxygen treatment #3 of 30 for the diagnosis as stated above. Treatment given at 2.5 STEPHANIE for 90 minutes, with two 5-minute air breaks. Total Treatment Time (min): 123 today, including compression, 100% oxygen at pressure, air breaks if applicable, and decompression.      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 2 diabetes mellitus, with necrosis of bone (HCC) E11.621, L97.524    Diabetic polyneuropathy associated with type 2 diabetes mellitus (HCC) E11.42    Elevated PSA R97.20    Mixed hyperlipidemia E78.2    Stage 3a chronic kidney disease (HCC) N18.31    Acute osteomyelitis of metatarsal bone of left foot ([facility]) M86.172    Staphylococcus aureus infection (cultures also with Anaerococcus) A49.01    Unstageable pressure ulcer of left foot ([facility]) L89.890    Nonhealing surgical wound, initial encounter (small left dorsal foot wound) T81.89XA    Chronic low back pain M54.5, G89.29    Dental bridge present Z97.2    Callus of foot L84       In my clinical judgement, ongoing HBO therapy is necessary at this time, given a threat to patient function, limb or life from the current condition. Adjunctive Rx, objective weekly progress and goals of Rx will periodically be updated, on Mondays. Plan     1. Hyperbaric Oxygen - Del Hernandez tolerated the treatment well today without complications. Continue HBO treatment as outlined above. 2. Other - had planned on an in-chamber TCOM today, but my schedule just didn't allow for it; he'll see me in wound-care tomorrow after his dive, and then we'll plan for that TCOM on Monday. I was present on these premises and immediately available to furnish assistance & direction throughout the procedure.      -- Electronically signed by Magalie Monzon MD on 9/24/2021 at 8:42 AM

## 2021-09-24 NOTE — PLAN OF CARE
[facility] Physician Orders and Discharge [address]  [address], [address]  [place], [facility]  Telephone: [phone]      Fax: [phone] home care company:  [facility] [address] [facility]-care supplies will be provided by: We are ordering your wound-care supplies from [employer]. Please note, depending on your insurance coverage, you may have out-of-pocket expenses for these supplies. Someone from [facility] should call you to confirm your order and discuss those potential costs before they ship your products -- please anticipate that call. If your out-of-pocket cost could be substantial, [facility] has a financial hardship program for patients who qualify, so please ask about that if you might need a hand. If you have any questions about your supplies or your potential out-of-pocket costs, or if you need to place an order for a refill of supplies (typically monthly), please call [phone]. NAME:  Del Brown   YOB: 1960  PRIMARY DIAGNOSIS FOR WOUND CARE CENTER:  Diabetic      Wound cleansing:   Do not scrub or use excessive force. Wash hands with soap and water before and after dressing changes. Prior to applying a clean dressing, cleanse wound with normal saline, wound cleanser, or mild soap and water. Ask your physician or nurse before getting the wound(s) wet in the shower. Wound care for home: Home health care to change two times a week . Wound care to change once week      Left Amp site:    Dr. Raghav Chicas used Silver Nitrate on your wound today.   The wound will be black or silver in appearance  for the next several days, this is normal.   vashe  Triad to lexi wound and wound bed  opticell rope into deeper areas  4x4  ABD  kerlix  Change daily      Left dorsal foot:  vashe  PSO  bandaid  Change daily     Left lateral foot:  vashe (2-3 puffs on wound bed, don't rinse)  Triad  gauze  Change daily Please note, all wounds (unless stated otherwise here) were mechanically debrided at the time of cleansing here in the wound-care center today, so a small amount of pain, drainage or bleeding from that process might be expected, and is normal.      All products for home use, including multiple products for a single wound if applicable, are medically necessary in order to achieve the best chance at timely wound healing. See provider documentation for details if needed. Substituted dressings applied in the [facility] today, if applicable:           New orders for this week (labs, imaging, medications, etc.):    Stop flagyl today 9/24/21  Labs Monday 9/27/21  Please bring wound vac and dressing kit next week to wound care visit         Additional instructions for specific diagnoses:           F/U Appointment is with Dr. Yaz Payton  in 1 week , on                                   at                       .     Your nurse  is Beronica Luo RN      If we applied slip-resistant hospital socks today, be sure to remove them at least once a day to inspect your toes or feet, even if you're not changing the wraps or dressings underneath. If you see anything concerning (redness, excess moisture, etc), please call and let us know right away. Should you experience any significant changes in your wound(s) (including redness, increased warmth, increased pain, increased drainage, odor, or fever) or have questions about your wound care, please contact the [address] [place] [address] at [phone] Monday-Thursday from 8:00 am - 4:30 pm, or Friday from 8:00 am - 2:30 pm.  If you need help with your wound outside these hours and cannot wait until we are again available, contact your home-care company (if applicable), your PCP, or go to the nearest emergency room.

## 2021-09-25 NOTE — PROGRESS NOTES
[address]  Hyperbaric Oxygen    Del Donato     : 1960    DATE OF VISIT:  2021    Subjective     Del Donato is a 59 y.o. male who  has a past medical history of Abscess of left hand (2017), Acute kidney injury superimposed on chronic kidney disease ([facility] [address] (2021), Adhesive capsulitis of shoulder (2014), Fractures, [facility] foot [facility] (2021), and PNA (pneumonia) (2016). He presents to the [facility] today for hyperbaric oxygen treatment of his Francis Brash 4 diabetic foot, which is an indication for immediate HBO therapy without a further period of conservative care alone. Patient denies fever. Patient denies nausea, vomiting or diarrhea; no ear troubles and no other new complaints; no fears or anxiety regarding treatment today. Objective     Vitals:    21 0916 21 1141   BP: 130/78 (!) 178/86   Pulse: 79 71   Resp: 18 16   Temp: 98.4 °F (36.9 °C) 98.5 °F (36.9 °C)   TempSrc: Oral Oral       General:  Alert, cooperative, no distress. Ears: External otic canals are within acceptable limits. Teed grade 0 on the right and 0 on the left pre-treatment. Teed grade 0 on the right and 0 on the left post-treatment. Lungs:  Clear to auscultation bilaterally. Ulcer: Not examined today in HBO, if applicable. Recent Labs     21  0831 21  1051 21  0858 21  1124 21  0913 21  1135   POCGLU 148* 144* 140* 134* 165* 160*       Assessment     Del Donato is a 59 y.o. male who presented to the [facility] today for hyperbaric oxygen treatment #4 of 30 for the diagnosis as stated above. Treatment given at 2.5 STEPHANIE for 90 minutes, with two 5-minute air breaks. Total Treatment Time (min): 124 today, including compression, 100% oxygen at pressure, air breaks if applicable, and decompression.     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 2 diabetes mellitus, with necrosis of bone (HCC) E11.621, L97.524    Diabetic polyneuropathy associated with type 2 diabetes mellitus (HCC) E11.42    Elevated PSA R97.20    Mixed hyperlipidemia E78.2    Stage 3a chronic kidney disease (HCC) N18.31    Acute osteomyelitis of metatarsal bone of left foot (HCC) M86.172    Staphylococcus aureus infection (cultures also with Anaerococcus) A49.01    Unstageable pressure ulcer of left foot ([facility]) L89.890    Nonhealing surgical wound, initial encounter (small left dorsal foot wound) T81.89XA    Chronic low back pain M54.5, G89.29    Dental bridge present Z97.2    Callus of foot L84       In my clinical judgement, ongoing HBO therapy is necessary at this time, given a threat to patient function, limb or life from the current condition. Adjunctive Rx, objective weekly progress and goals of Rx will periodically be updated, on Mondays. Plan     1. Hyperbaric Oxygen - Del Hernandez tolerated the treatment well today without complications. Continue HBO treatment as outlined above. 2. Other - see wound-care / ID note from today. Also, plan an in-chamber TCOM on Monday. I was present on these premises and immediately available to furnish assistance & direction throughout the procedure.      -- Electronically signed by Cayetano Nath MD on 9/24/2021 at 9:55 PM

## 2021-09-27 ENCOUNTER — HOSPITAL ENCOUNTER (OUTPATIENT)
Age: 61
Discharge: HOME | End: 2021-09-27
Payer: MEDICARE

## 2021-09-27 ENCOUNTER — HOSPITAL ENCOUNTER (OUTPATIENT)
Dept: HYPERBARIC MEDICINE | Age: 61
Discharge: HOME | End: 2021-09-27
Payer: MEDICARE

## 2021-09-27 VITALS
RESPIRATION RATE: 16 BRPM | HEART RATE: 112 BPM | TEMPERATURE: 98.7 F | DIASTOLIC BLOOD PRESSURE: 72 MMHG | SYSTOLIC BLOOD PRESSURE: 128 MMHG

## 2021-09-27 DIAGNOSIS — E11.621 DIABETIC ULCER OF OTHER PART OF LEFT FOOT ASSOCIATED WITH TYPE 2 DIABETES MELLITUS, WITH NECROSIS OF BONE (HCC): Primary | ICD-10-CM

## 2021-09-27 DIAGNOSIS — L97.524 DIABETIC ULCER OF OTHER PART OF LEFT FOOT ASSOCIATED WITH TYPE 2 DIABETES MELLITUS, WITH NECROSIS OF BONE (HCC): Primary | ICD-10-CM

## 2021-09-27 LAB
A/G RATIO: 1.3 (ref 1.1–2.2)
ALBUMIN SERPL-MCNC: 3.7 G/DL (ref 3.4–5)
ALP BLD-CCNC: 77 U/L (ref 40–129)
ALT SERPL-CCNC: 13 U/L (ref 10–40)
ANION GAP SERPL CALCULATED.3IONS-SCNC: 12 MMOL/L (ref 3–16)
AST SERPL-CCNC: 12 U/L (ref 15–37)
BASOPHILS ABSOLUTE: 0.2 K/UL (ref 0–0.2)
BASOPHILS RELATIVE PERCENT: 2.5 %
BILIRUB SERPL-MCNC: <0.2 MG/DL (ref 0–1)
BUN BLDV-MCNC: 31 MG/DL (ref 7–20)
C-REACTIVE PROTEIN: 4.3 MG/L (ref 0–5.1)
CALCIUM SERPL-MCNC: 8.9 MG/DL (ref 8.3–10.6)
CHLORIDE BLD-SCNC: 104 MMOL/L (ref 99–110)
CO2: 21 MMOL/L (ref 21–32)
CREAT SERPL-MCNC: 1.5 MG/DL (ref 0.8–1.3)
EOSINOPHILS ABSOLUTE: 0.3 K/UL (ref 0–0.6)
EOSINOPHILS RELATIVE PERCENT: 4.1 %
GFR AFRICAN AMERICAN: 57
GFR NON-AFRICAN AMERICAN: 47
GLOBULIN: 2.8 G/DL
GLUCOSE BLD-MCNC: 135 MG/DL (ref 70–99)
GLUCOSE BLD-MCNC: 141 MG/DL (ref 70–99)
GLUCOSE BLD-MCNC: 158 MG/DL (ref 70–99)
HCT VFR BLD CALC: 34.6 % (ref 40.5–52.5)
HEMOGLOBIN: 11.4 G/DL (ref 13.5–17.5)
LYMPHOCYTES ABSOLUTE: 1.7 K/UL (ref 1–5.1)
LYMPHOCYTES RELATIVE PERCENT: 27.3 %
MCH RBC QN AUTO: 31.3 PG (ref 26–34)
MCHC RBC AUTO-ENTMCNC: 32.9 G/DL (ref 31–36)
MCV RBC AUTO: 95.1 FL (ref 80–100)
MONOCYTES ABSOLUTE: 0.4 K/UL (ref 0–1.3)
MONOCYTES RELATIVE PERCENT: 5.9 %
NEUTROPHILS ABSOLUTE: 3.7 K/UL (ref 1.7–7.7)
NEUTROPHILS RELATIVE PERCENT: 60.2 %
PDW BLD-RTO: 15.6 % (ref 12.4–15.4)
PERFORMED ON: ABNORMAL
PERFORMED ON: ABNORMAL
PLATELET # BLD: 172 K/UL (ref 135–450)
PMV BLD AUTO: 11 FL (ref 5–10.5)
POTASSIUM SERPL-SCNC: 5.4 MMOL/L (ref 3.5–5.1)
RBC # BLD: 3.64 M/UL (ref 4.2–5.9)
SODIUM BLD-SCNC: 137 MMOL/L (ref 136–145)
TOTAL PROTEIN: 6.5 G/DL (ref 6.4–8.2)
WBC # BLD: 6.2 K/UL (ref 4–11)

## 2021-09-27 PROCEDURE — 93923 UPR/LXTR ART STDY 3+ LVLS: CPT | Performed by: INTERNAL MEDICINE

## 2021-09-27 PROCEDURE — 36415 COLL VENOUS BLD VENIPUNCTURE: CPT

## 2021-09-27 PROCEDURE — 80053 COMPREHEN METABOLIC PANEL: CPT

## 2021-09-27 PROCEDURE — 85025 COMPLETE CBC W/AUTO DIFF WBC: CPT

## 2021-09-27 PROCEDURE — 86140 C-REACTIVE PROTEIN: CPT

## 2021-09-27 PROCEDURE — G0277 HBOT, FULL BODY CHAMBER, 30M: HCPCS

## 2021-09-27 PROCEDURE — 99183 HYPERBARIC OXYGEN THERAPY: CPT | Performed by: INTERNAL MEDICINE

## 2021-09-27 PROCEDURE — 93923 UPR/LXTR ART STDY 3+ LVLS: CPT

## 2021-09-27 RX ORDER — OXYMETAZOLINE HYDROCHLORIDE 0.05 G/100ML
SPRAY NASAL
Qty: 1 BOTTLE | Refills: 0 | COMMUNITY
Start: 2021-09-27 | End: 2021-09-30

## 2021-09-27 ASSESSMENT — PAIN SCALES - GENERAL
PAINLEVEL_OUTOF10: 0
PAINLEVEL_OUTOF10: 0

## 2021-09-27 NOTE — PROGRESS NOTES
[address]  Hyperbaric Oxygen    Del Willson     : 1960    DATE OF VISIT:  2021    Subjective     Del Willson is a 59 y.o. male who  has a past medical history of Abscess of left hand (2017), Acute kidney injury superimposed on chronic kidney disease ([facility] [address] (2021), Adhesive capsulitis of shoulder (2014), Fractures, Gas gangrene of foot ([facility] [address] (2021), and PNA (pneumonia) (2016). He presents to the [facility] today for hyperbaric oxygen treatment of his Jerl Jayme 4 diabetic foot, which represents an indication for more immediate HBO therapy, without a period of ongoing observation or more conservative care. Patient denies fever. Patient denies nausea, vomiting or diarrhea; no ear troubles and no other new complaints; no fears or anxiety regarding treatment today. Objective     Vitals:    21 0915 21 1115   BP: 121/67 128/72   Pulse: 92 112   Resp: 16 16   Temp: 98.7 °F (37.1 °C) 98.7 °F (37.1 °C)   TempSrc: Oral Oral       General:  Alert, cooperative, no distress. Ears: External otic canals are within acceptable limits. Teed grade 0 on the right and 0 on the left pre-treatment. Teed grade 0 on the right and 0 on the left post-treatment. Lungs:  Clear to auscultation bilaterally. Ulcer: Not examined today in HBO, if applicable. Recent Labs     21  0909 21  1124   POCGLU 158* 135*     ____________________    In order to assess his response to oxygen therapy and select the most appropriate ongoing oxygen dose, I also performed a TCOM test today. The machine was re-membraned less than a week ago, and was calibrated this AM. The two leads that I chose to use were functioning well, with temps of 45 degrees C, and with room air pO2 levels of 154-155 mmHg. His MYRNA is 0.93 today, and his room-air pulse ox is 99 %.     Skin was first prepped with plastic tape for exfoliation and alcohol for cleansing. Electrode #1 was placed on the dorsal forefoot, between the 2nd and 3rd mets, and electrode #2 was placed at the medial foot, just proximal to the wound. Neither of these areas had significant erythema, visible vessels, bony prominences or dermatitis, just mild edema. Breathing room air at 1.0 STEPHANIE, and lying supine, the TcpO2 at electrode #1 plateaued at 83.8 mmHg, and electrode #2 at 34.6 mmHg. We then elevated the HOB slightly for the dive. This increased his TcpO2 levels slightly -- see chart below. For the in-chamber portion of the test (breathing 100% oxygen):    Time   Pressure  Elec #1 TcpO2  Elec #2 TcpO2  __________________________________________________________    0 mins  1.00 STEPHANIE    43.3 mmHg    38.2 mmHg    1.25     88.1   109    1.50  148   262     1.75  300   526     8  2.00   486   696  15  2.00  623   821  __________________________________________________________    After 15 minutes in-chamber, the TCOM machine was switched off, and he completed the dive at 2.0 STEPHANIE. Interpretation: the baseline levels below 40 mmHg indicate a low probability of healing without additional treatment, though they were admittedly not far below 40. The rapid rise of TcpO2 levels in the chamber, and the plateau well over 186 mmHg are encouraging in terms of a likelihood of clinical response to HBOT (assuming that we continue to work on other barriers to healing), and definitely show that we do not need to continue to dive him at 2.5 STEPHANIE. Will update his therapy plan to reflect that, with an oxygen dose of 2.0 STEPHANIE from here forward, and no air breaks scheduled. Juana Morel is a 59 y.o. male who presented to the [facility] today for hyperbaric oxygen treatment #5 of 30 for the diagnosis as stated above. Treatment given at 2 STEPHANIE for 90 minutes, with no air breaks.  Total Treatment Time (min): 116 today, including compression, 100% oxygen at pressure, air breaks if applicable, and decompression. 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 2 diabetes mellitus, with necrosis of bone ([facility]) E11.621, L97.524    Diabetic polyneuropathy associated with type 2 diabetes mellitus ([facility]) E11.42    Elevated PSA R97.20    Mixed hyperlipidemia E78.2    Stage 3a chronic kidney disease (HCC) N18.31    Acute osteomyelitis of metatarsal bone of left foot ([facility]) M86.172    Staphylococcus aureus infection (cultures also with Anaerococcus) A49.01    Unstageable pressure ulcer of left foot ([facility]) L89.890    Nonhealing surgical wound, initial encounter (small left dorsal foot wound) T81.89XA    Chronic low back pain M54.5, G89.29    Dental bridge present Z97.2    Callus of foot L84       In my clinical judgement, ongoing HBO therapy is necessary at this time, given a threat to patient function, limb or life from the current condition. Plan     1. Hyperbaric Oxygen - Del Hernandez tolerated the treatment well today without complications. Continue HBO treatment as outlined above. Flagyl stopped late last week; continue IV Abx. No change in local care. FU with me in wound-care on Friday. 2. Other -     I was present on these premises and immediately available to furnish assistance & direction throughout the procedure.      -- Electronically signed by Katarina Gaxiola MD on 9/27/2021 at 4:02 PM

## 2021-09-27 NOTE — PLAN OF CARE
Patient to wound care center for HBO treatment. T-com preformed per MD today . New orders received. Patient allowed time for questions all questions answered.

## 2021-09-28 ENCOUNTER — HOSPITAL ENCOUNTER (OUTPATIENT)
Dept: HYPERBARIC MEDICINE | Age: 61
Discharge: HOME | End: 2021-09-28
Payer: MEDICARE

## 2021-09-28 VITALS
DIASTOLIC BLOOD PRESSURE: 86 MMHG | TEMPERATURE: 98.2 F | RESPIRATION RATE: 16 BRPM | SYSTOLIC BLOOD PRESSURE: 181 MMHG | HEART RATE: 73 BPM

## 2021-09-28 DIAGNOSIS — L97.524 DIABETIC ULCER OF OTHER PART OF LEFT FOOT ASSOCIATED WITH TYPE 2 DIABETES MELLITUS, WITH NECROSIS OF BONE (HCC): Primary | ICD-10-CM

## 2021-09-28 DIAGNOSIS — E11.621 DIABETIC ULCER OF OTHER PART OF LEFT FOOT ASSOCIATED WITH TYPE 2 DIABETES MELLITUS, WITH NECROSIS OF BONE (HCC): Primary | ICD-10-CM

## 2021-09-28 PROBLEM — E87.5 HYPERKALEMIA: Status: ACTIVE | Noted: 2021-09-28

## 2021-09-28 LAB
GLUCOSE BLD-MCNC: 101 MG/DL (ref 70–99)
GLUCOSE BLD-MCNC: 119 MG/DL (ref 70–99)
GLUCOSE BLD-MCNC: 134 MG/DL (ref 70–99)
PERFORMED ON: ABNORMAL

## 2021-09-28 PROCEDURE — 99183 HYPERBARIC OXYGEN THERAPY: CPT | Performed by: INTERNAL MEDICINE

## 2021-09-28 PROCEDURE — G0277 HBOT, FULL BODY CHAMBER, 30M: HCPCS

## 2021-09-28 RX ORDER — OXYMETAZOLINE HYDROCHLORIDE 0.05 G/100ML
SPRAY NASAL
Qty: 1 BOTTLE | Refills: 0 | COMMUNITY
Start: 2021-09-28 | End: 2021-09-30

## 2021-09-28 ASSESSMENT — PAIN SCALES - GENERAL
PAINLEVEL_OUTOF10: 0
PAINLEVEL_OUTOF10: 0

## 2021-09-28 NOTE — PLAN OF CARE
Patient to wound care for HBO treatment. Initial , MD aware patient provided with Glucerna and recheck 119. HBO started.

## 2021-09-28 NOTE — PROGRESS NOTES
[facility] Progress Note    Del Ham     : 1960    DATE OF VISIT:  2021    Subjective:     Asher Mcneal is a 59 y.o. male who has a diabetic ulcer located on the foot (left , medial, forefoot). Current complaint of pain in this ulcer? yes. Quality of pain: aching, burning and throbbing  Timing: intermittent and stable  Severity: mild-moderate  Associated Signs/Symptoms:  swelling, redness, increased warmth, drainage (moderate, mostly serous, sometimes bloody), numbness and tingling  Other significant symptoms or pertinent ulcer history: overall doing ok this week. Wife and home-care nurse are doing well with dressing changes at home (have not restarted NPWT yet). No F/C/D, seems to be tolerating ABx well (no sore throat or mouth, no drug rash or pruritus, no N/V/D, no PICC discomfort). Tolerated his first few HBO treatments very well; did not get a chance to do the TCOM test yesterday, so planning on that for Monday. When his labs came back earlier this week, I did note his hyperkalemia, which he's had before; this CAN rarely happen from Flagyl, typically not at all with ceftriaxone; his ACE inhibitor and CKD would certainly be factors, and he tells me that he typically has a banana a day as a small snack. Additional ulcer(s) noted? yes. Small left dorsal foot surgical wound, and the left lateral foot (I believe pressure-related) eschar. Mr. Florian Ham has a past medical history of Abscess of left hand, Acute kidney injury superimposed on chronic kidney disease [facility]), Adhesive capsulitis of shoulder, Fractures, Gas gangrene of foot ([facility] [address] and PNA (pneumonia). He has a past surgical history that includes Wrist fracture surgery; Toe amputation (Left, ); Neck surgery; Hand surgery (Left, 2017); Foot Debridement (Left, 2021); and Foot Debridement (Left, 2021).     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 just a very small linear wound from a surgical incision, no depth, granular with a bit of crusted exudate; lateral foot looks like a pressure eschar beneath some of that superficial skin necrosis, looks more stable this week, better demarcated edges, not as moist; primary medial forefoot wound with improved granulation tissue this week, still a good deal of SQ necrosis, a few small patches of tendon / fascial necrosis, exposed cut end of the 1st metatarsal (marrow is granulating nicely there now), a good deal of fibrin and biofilm, still a couple of pockets or tunnels generally in the plantar and lateral direction. Photos also saved in electronic chart. Today's Wound Measurements, per RN documentation:  Wound 09/16/21 #3, left lateral foot, pressure ulcer, unstageable, onset 9/2021-Wound Length (cm): 3.3 cm  Wound 09/16/21 #2, left dorsal foot, surgical wound, full thickness, onset 8/31/2021-Wound Length (cm): 1.8 cm  Wound 09/16/21 #1, left medial forefoot, Diabetic ulcer, jenkins 4, onset 8/25/2021-Wound Length (cm): 5.5 cm    Wound 09/16/21 #3, left lateral foot, pressure ulcer, unstageable, onset 9/2021-Wound Width (cm): 1.6 cm  Wound 09/16/21 #2, left dorsal foot, surgical wound, full thickness, onset 8/31/2021-Wound Width (cm): 0.3 cm  Wound 09/16/21 #1, left medial forefoot, Diabetic ulcer, jenkins 4, onset 8/25/2021-Wound Width (cm): 4.5 cm    Wound 09/16/21 #3, left lateral foot, pressure ulcer, unstageable, onset 9/2021-Wound Depth (cm): 0.1 cm  Wound 09/16/21 #2, left dorsal foot, surgical wound, full thickness, onset 8/31/2021-Wound Depth (cm): 0.2 cm  Wound 09/16/21 #1, left medial forefoot, Diabetic ulcer, jenkins 4, onset 8/25/2021-Wound Depth (cm): 2.6 cm  _____________________________    Lab Results   Component Value Date    LABA1C 6.4 09/01/2021       Other labs from this week -- Na 142, K 5.9, BUN 31, creat 1.4.      cRP down to 10.2. Alb 3.5, alk phos 90.        WBC 6.6, Hgb 11.5, plts 328k, 200 Eos. 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 2 diabetes mellitus, with necrosis of bone ([facility]) E11.621, L97.524    Diabetic polyneuropathy associated with type 2 diabetes mellitus ([facility]) E11.42    Elevated PSA R97.20    Mixed hyperlipidemia E78.2    Stage 3a chronic kidney disease ([facility]) N18.31    Acute osteomyelitis of metatarsal bone of left foot ([facility]) M86.172    Staphylococcus aureus infection (cultures also with Anaerococcus) A49.01    Unstageable pressure ulcer of left foot ([facility]) L89.890    Nonhealing surgical wound, initial encounter (small left dorsal foot wound) T81.89XA    Chronic low back pain M54.5, G89.29    Dental bridge present Z97.2    Callus of foot L84    Hyperkalemia E87.5       Assessment of today's active condition(s): well controlled DM2, neuropathy, no signs of PAD, but probably some small vessel disease from DM and CKD; Raudel Batters 4 diabetic foot, with Hx of cellulitis, abscess, gas gangrene, acute osteo, a few weeks post-op from urgent debridement and then partial ray amp. On Abx for presumed residual osteo, tolerating them well, and labs improving. Also doing HBOT for his severe DFU, perhaps starting to granulate more. Still some exposed bone, which might not granulate over on its own -- will try to do some debridement here next week (of bone), or he might need to go back to the OR to take a bit more metatarsal than I can do here. Wound not quite clean enough for VAC, but I think by next week. Potassium elevated as mentioned, not to a dangerous level.  Factors contributing to occurrence and/or persistence of the chronic ulcer include edema, diabetes, shear force, obesity and decreased tissue oxygenation. Medical necessity of today's visit is shown by the above documentation. Sharp debridement is indicated today, based upon the exam findings in the ulcer(s) above. Procedure note:     Consent obtained. Time out performed per [address] policy. Anesthetic  Anesthetic: 4% Lidocaine Cream     Using a curette, #15 blade scalpel and forceps, I sharply debrided the foot (left , medial, forefoot) ulcer(s) down through and including the removal of muscle/fascia. The type(s) of tissue debrided included fibrin, biofilm, slough and necrotic/eschar. Total Surface Area Debrided: 22 sq cm. The ulcers were then irrigated with normal saline solution. The procedure was completed with a small amount of bleeding, and hemostasis was with pressure and with silver nitrate stick(s). The patient tolerated the procedure well, with no significant complications. The patient's level of pain during and after the procedure was monitored. Post-debridement measurements, if different from pre-debridement, are in the flowsheet as well. Didn't debride that small dorsal wound because it's almost healed, and didn't debride the lateral since that eschar is stable. Discharge plan:     Treatment in the wound care center today, per RN documentation: Wound 09/16/21 #3, left lateral foot, pressure ulcer, unstageable, onset 9/2021-Dressing/Treatment:  (vashe,triad,gauze)  Wound 09/16/21 #2, left dorsal foot, surgical wound, full thickness, onset 8/31/2021-Dressing/Treatment:  (vashe,PSO,dry dressing )  Wound 09/16/21 #1, left medial forefoot, Diabetic ulcer, jenkins 4, onset 8/25/2021-Dressing/Treatment:  (vashe,triad,opticell, 4x4, ABD,kerlix,). Continue ceftriaxone for at least another week. Will watch closely for allergic manifestations, Candidiasis, Cdiff, PICC complications, etc; weekly labs ordered, weekly PICC dressings being done.     Can stop Flagyl since the HBO ought to be taking care of anaerobes, and it might be contributing to his hyperkalemia. Stop the bananas for now also; avoid other high-potassium foods. Can stay on the ACE inhibitor for now. Continue current offloading shoe; a minimum of steps on the left foot. Keep up with good glucose control. Continue HBO. Plan to at least chamfer the edges of the 1st met next week, probably start the [facility], and then see if he might need to go back to the OR for more of a metatarsal revision. Home treatment: good handwashing before and after any dressing changes. Cleanse ulcer with saline or soap & water before dressing change. May use Vaseline (petrolatum), Aquaphor, Aveeno, CeraVe, Cetaphil, Eucerin, Lubriderm, etc for dry skin. Dressing type for home: as above, once daily. Written discharge instructions given to patient. Follow up in 1 week to see me in wound-care, daily for HBOT.     Electronically signed by Katarina Gaxiola MD on 9/28/2021 at 12:41 PM.

## 2021-09-29 ENCOUNTER — HOSPITAL ENCOUNTER (OUTPATIENT)
Dept: HYPERBARIC MEDICINE | Age: 61
Discharge: HOME | End: 2021-09-29
Payer: MEDICARE

## 2021-09-29 VITALS
DIASTOLIC BLOOD PRESSURE: 90 MMHG | TEMPERATURE: 99.4 F | HEART RATE: 69 BPM | SYSTOLIC BLOOD PRESSURE: 190 MMHG | RESPIRATION RATE: 18 BRPM

## 2021-09-29 DIAGNOSIS — L97.524 DIABETIC ULCER OF OTHER PART OF LEFT FOOT ASSOCIATED WITH TYPE 2 DIABETES MELLITUS, WITH NECROSIS OF BONE (HCC): Primary | ICD-10-CM

## 2021-09-29 DIAGNOSIS — E11.621 DIABETIC ULCER OF OTHER PART OF LEFT FOOT ASSOCIATED WITH TYPE 2 DIABETES MELLITUS, WITH NECROSIS OF BONE (HCC): Primary | ICD-10-CM

## 2021-09-29 LAB
GLUCOSE BLD-MCNC: 169 MG/DL (ref 70–99)
GLUCOSE BLD-MCNC: 189 MG/DL (ref 70–99)
PERFORMED ON: ABNORMAL
PERFORMED ON: ABNORMAL

## 2021-09-29 PROCEDURE — 99183 HYPERBARIC OXYGEN THERAPY: CPT | Performed by: EMERGENCY MEDICINE

## 2021-09-29 PROCEDURE — G0277 HBOT, FULL BODY CHAMBER, 30M: HCPCS

## 2021-09-29 RX ORDER — OXYMETAZOLINE HYDROCHLORIDE 0.05 G/100ML
SPRAY NASAL
Qty: 1 BOTTLE | Refills: 0 | COMMUNITY
Start: 2021-09-29 | End: 2021-09-30

## 2021-09-29 ASSESSMENT — PAIN SCALES - GENERAL
PAINLEVEL_OUTOF10: 0
PAINLEVEL_OUTOF10: 0

## 2021-09-29 NOTE — ED NOTES
[address]  Hyperbaric Oxygen    Gene Yolette Recinos     : 1960    DATE OF VISIT:  2021    Subjective     Del Recinos is a 59 y.o. male who  has a past medical history of Abscess of left hand (2017), Acute kidney injury superimposed on chronic kidney disease ([facility] [address] (2021), Adhesive capsulitis of shoulder (2014), Fractures, Gas gangrene of foot ([facility] (2021), and PNA (pneumonia) (2016). He presents to the [facility] today for hyperbaric oxygen treatment of his diabetic foot ulcer , which is refractory to standard therapy for 30 days. Patient denies fever. Patient denies nausea, vomiting or diarrhea; no ear troubles and no other new complaints; no fears or anxiety regarding treatment today. Objective       Vitals:    21 0906 21 1114   BP: 136/71 (!) 190/90   Pulse: 74 69   Resp: 18 18   Temp: 98 °F (36.7 °C) 99.4 °F (37.4 °C)   TempSrc: Oral Oral       General:  Alert, cooperative, no distress. Ears: External otic canals are within acceptable limits. Teed grade 0 on the right and 0 on the left pre-treatment. Teed grade 0 on the right and 0 on the left post-treatment. Lungs:  Clear to auscultation bilaterally. Ulcer: Not examined today in HBO, if applicable. Recent Labs     21  0909 21  1124 21  0912 21  0932 21  1129 21  0909 21  1114   POCGLU 158* 135* 101* 119* 134* 169* 189*       Assessment     Del Recinos is a 59 y.o. male who presented to the [facility] today for hyperbaric oxygen treatment #7 of 30 for the diagnosis as stated above. Treatment given at 2 STEPHANIE for 90 minutes, with no air breaks. Total Treatment Time (min): 108 today, including compression, 100% oxygen at pressure, air breaks if applicable, and decompression.     Patient Active Problem List   Diagnosis Code    Hypertension I10    BPH (benign prostatic hyperplasia) N40.0    Osteoarthritis M19.90    Class 1 obesity due to excess calories with serious comorbidity and body mass index (BMI) of 31.0 to 31.9 in adult E66.09, Z68.31    Diabetic ulcer of left foot associated with type 2 diabetes mellitus, with necrosis of bone (HCC) E11.621, L97.524    Diabetic polyneuropathy associated with type 2 diabetes mellitus (HCC) E11.42    Elevated PSA R97.20    Mixed hyperlipidemia E78.2    Stage 3a chronic kidney disease (HCC) N18.31    Acute osteomyelitis of metatarsal bone of left foot (HCC) M86.172    Staphylococcus aureus infection (cultures also with Anaerococcus) A49.01    Unstageable pressure ulcer of left foot (HCC) L89.890    Nonhealing surgical wound, initial encounter (small left dorsal foot wound) T81.89XA    Chronic low back pain M54.5, G89.29    Dental bridge present Z97.2    Callus of foot L84    Hyperkalemia E87.5       In my clinical judgement, ongoing HBO therapy is necessary at this time, given a threat to patient function, limb or life from the current condition. Adjunctive Rx, objective weekly progress and goals of Rx will periodically be updated, on Mondays. Plan     1. Hyperbaric Oxygen - Del Hernandez tolerated the treatment well today without complications. Continue HBO treatment as outlined above. 2. Other -     I was present on these premises and immediately available to furnish assistance & direction throughout the procedure.      -- Electronically signed by Radha Nam MD on 9/29/2021 at 12:27 PM

## 2021-09-29 NOTE — PROGRESS NOTES
[address]  Hyperbaric Oxygen    Del Bobby     : 1960    DATE OF VISIT:  2021    Subjective     Del Bobby is a 59 y.o. male who  has a past medical history of Abscess of left hand (2017), Acute kidney injury superimposed on chronic kidney disease ([facility] [address] (2021), Adhesive capsulitis of shoulder (2014), Fractures, Gas gangrene of foot ([facility] [address] (2021), and PNA (pneumonia) (2016). He presents to the [facility] today for hyperbaric oxygen treatment of his Shari Arabia 4 diabetic foot, which is an indication for immediate HBOT without a further period of conservative Rx alone. Patient denies fever. Patient denies nausea, vomiting or diarrhea; no ear troubles and no other new complaints; no fears or anxiety regarding treatment today. Did not have as much carbohydrate with breakfast this AM as usual, and had his Trulicity last night, so glucose on arrival was just a bit low, but after a snack, he did well, both before and after his dive. Objective     Vitals:    21 0902 21 1130   BP: 127/67 (!) 181/86   Pulse: 81 73   Resp: 16 16   Temp: 98.3 °F (36.8 °C) 98.2 °F (36.8 °C)   TempSrc: Oral Oral       General:  Alert, cooperative, no distress. Ears: External otic canals are within acceptable limits. Teed grade 0 on the right and 0 on the left pre-treatment. Teed grade 0 on the right and 0 on the left post-treatment. Lungs:  Clear to auscultation bilaterally. Ulcer: Not examined today in HBO, if applicable. Recent Labs     21  0909 21  1124 21  0912 21  0932 21  1129   POCGLU 158* 135* 101* 119* 134*       Assessment     Del Bobby is a 59 y.o. male who presented to the [facility] today for hyperbaric oxygen treatment #6 of 30 for the diagnosis as stated above. Treatment given at 2 STEPHANIE for 90 minutes, with no air breaks.  Total Treatment Time (min): 106 today, including compression, 100% oxygen at pressure, air breaks if applicable, and decompression. 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 2 diabetes mellitus, with necrosis of bone ([facility]) E11.621, L97.524    Diabetic polyneuropathy associated with type 2 diabetes mellitus (HCC) E11.42    Elevated PSA R97.20    Mixed hyperlipidemia E78.2    Stage 3a chronic kidney disease (HCC) N18.31    Acute osteomyelitis of metatarsal bone of left foot ([facility]) M86.172    Staphylococcus aureus infection (cultures also with Anaerococcus) A49.01    Unstageable pressure ulcer of left foot ([facility]) L89.890    Nonhealing surgical wound, initial encounter (small left dorsal foot wound) T81.89XA    Chronic low back pain M54.5, G89.29    Dental bridge present Z97.2    Callus of foot L84    Hyperkalemia E87.5       In my clinical judgement, ongoing HBO therapy is necessary at this time, given a threat to patient function, limb or life from the current condition. Adjunctive Rx, objective weekly progress and goals of Rx will periodically be updated, on Mondays. Plan     1. Hyperbaric Oxygen - Dle Hernandez tolerated the treatment well today without complications. Continue HBO treatment as outlined above. 2. Other - would not make any adjustments to DM medications, but just be sure to have a small amount of carbs with meals before he comes in each day; will be interesting to see if next Tuesday's AM glucose is a bit lower than other days (I wouldn't necessarily expect so, with the long half-life or Trulicity).      I was present on these premises and immediately available to furnish assistance & direction throughout the procedure.      -- Electronically signed by Deion Boyd MD on 9/29/2021 at 8:09 AM

## 2021-09-29 NOTE — PLAN OF CARE
Pt to the [facility] for HBOT assessment completed and patient cleared for treatment. Pt to 2.0 AT at a rate of 2.0 psi. Pt to pressure without complaints and watching tv. Nurse at chamber side and will continue to monitor.

## 2021-09-30 ENCOUNTER — HOSPITAL ENCOUNTER (OUTPATIENT)
Dept: HYPERBARIC MEDICINE | Age: 61
Discharge: HOME | End: 2021-09-30
Payer: MEDICARE

## 2021-09-30 VITALS
HEART RATE: 80 BPM | SYSTOLIC BLOOD PRESSURE: 186 MMHG | DIASTOLIC BLOOD PRESSURE: 87 MMHG | RESPIRATION RATE: 18 BRPM | TEMPERATURE: 97.8 F

## 2021-09-30 DIAGNOSIS — L97.524 DIABETIC ULCER OF OTHER PART OF LEFT FOOT ASSOCIATED WITH TYPE 2 DIABETES MELLITUS, WITH NECROSIS OF BONE (HCC): Primary | ICD-10-CM

## 2021-09-30 DIAGNOSIS — E11.621 DIABETIC ULCER OF OTHER PART OF LEFT FOOT ASSOCIATED WITH TYPE 2 DIABETES MELLITUS, WITH NECROSIS OF BONE (HCC): Primary | ICD-10-CM

## 2021-09-30 LAB
GLUCOSE BLD-MCNC: 107 MG/DL (ref 70–99)
GLUCOSE BLD-MCNC: 118 MG/DL (ref 70–99)
PERFORMED ON: ABNORMAL
PERFORMED ON: ABNORMAL

## 2021-09-30 PROCEDURE — 99183 HYPERBARIC OXYGEN THERAPY: CPT | Performed by: INTERNAL MEDICINE

## 2021-09-30 PROCEDURE — G0277 HBOT, FULL BODY CHAMBER, 30M: HCPCS

## 2021-09-30 RX ORDER — LIDOCAINE 40 MG/G
CREAM TOPICAL ONCE
Status: CANCELLED | OUTPATIENT
Start: 2021-09-30 | End: 2021-09-30

## 2021-09-30 RX ORDER — BACITRACIN ZINC AND POLYMYXIN B SULFATE 500; 1000 [USP'U]/G; [USP'U]/G
OINTMENT TOPICAL ONCE
Status: CANCELLED | OUTPATIENT
Start: 2021-09-30 | End: 2021-09-30

## 2021-09-30 RX ORDER — OXYMETAZOLINE HYDROCHLORIDE 0.05 G/100ML
SPRAY NASAL
Qty: 1 BOTTLE | Refills: 0 | COMMUNITY
Start: 2021-09-30 | End: 2021-10-01

## 2021-09-30 RX ORDER — LIDOCAINE 50 MG/G
OINTMENT TOPICAL ONCE
Status: CANCELLED | OUTPATIENT
Start: 2021-09-30 | End: 2021-09-30

## 2021-09-30 RX ORDER — LIDOCAINE HYDROCHLORIDE 40 MG/ML
SOLUTION TOPICAL ONCE
Status: CANCELLED | OUTPATIENT
Start: 2021-09-30 | End: 2021-09-30

## 2021-09-30 NOTE — PROGRESS NOTES
[address]  Hyperbaric Oxygen    Del Goldstein     : 1960    DATE OF VISIT:  2021    Subjective     Del Goldstein is a 59 y.o. male who  has a past medical history of Abscess of left hand (2017), Acute kidney injury superimposed on chronic kidney disease ([facility] [address] (2021), Adhesive capsulitis of shoulder (2014), Fractures, Gas gangrene of foot ([facility] (2021), and PNA (pneumonia) (2016). He presents to the [facility] today for hyperbaric oxygen treatment of his Karan Montesano 4 diabetic foot, which is an indication for immediate HBO therapy without a further period of more conservative care alone. Patient denies fever. Patient denies nausea, vomiting or diarrhea; no ear troubles and no other new complaints; no fears or anxiety regarding treatment today. Objective     Vitals:    21 0906 21 1120   BP: (!) 151/81 (!) 186/87   Pulse: 80 80   Resp: 18 18   Temp: 98.1 °F (36.7 °C) 97.8 °F (36.6 °C)   TempSrc: Oral Oral       General:  Alert, cooperative, no distress. Ears: External otic canals are within acceptable limits. Teed grade 0 on the right and 0 on the left pre-treatment. Teed grade 0 on the right and 0 on the left post-treatment. Lungs:  Clear to auscultation bilaterally. Ulcer: Not examined today in HBO, if applicable. Recent Labs     21  0912 21  0932 21  1129 21  0909 21  1114 21  0912 21  1116   POCGLU 101* 119* 134* 169* 189* 118* 107*       Assessment     Del Goldstein is a 59 y.o. male who presented to the [facility] today for hyperbaric oxygen treatment #8 of 30 for the diagnosis as stated above. Treatment given at 2 STEPHANIE for 90 minutes, with no air breaks. Total Treatment Time (min): 106 today, including compression, 100% oxygen at pressure, air breaks if applicable, and decompression.     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 2 diabetes mellitus, with necrosis of bone ([facility]) E11.621, L97.524    Diabetic polyneuropathy associated with type 2 diabetes mellitus ([facility]) E11.42    Elevated PSA R97.20    Mixed hyperlipidemia E78.2    Stage 3a chronic kidney disease (HCC) N18.31    Acute osteomyelitis of metatarsal bone of left foot ([facility]) M86.172    Staphylococcus aureus infection (cultures also with Anaerococcus) A49.01    Unstageable pressure ulcer of left foot ([facility]) L89.890    Nonhealing surgical wound, initial encounter (small left dorsal foot wound) T81.89XA    Chronic low back pain M54.5, G89.29    Dental bridge present Z97.2    Callus of foot L84    Hyperkalemia E87.5       In my clinical judgement, ongoing HBO therapy is necessary at this time, given a threat to patient function, limb or life from the current condition. Adjunctive Rx, objective weekly progress and goals of Rx will periodically be updated, on Mondays. Plan     1. Hyperbaric Oxygen - Del Hernandez tolerated the treatment well today without complications. Continue HBO treatment as outlined above. 2. Other -     I was present on these premises and immediately available to furnish assistance & direction throughout the procedure.      -- Electronically signed by Ronnie Mora MD on 9/30/2021 at 4:57 PM

## 2021-10-01 ENCOUNTER — HOSPITAL ENCOUNTER (OUTPATIENT)
Dept: WOUND CARE | Age: 61
Discharge: HOME | End: 2021-10-01
Payer: MEDICARE

## 2021-10-01 ENCOUNTER — HOSPITAL ENCOUNTER (OUTPATIENT)
Dept: HYPERBARIC MEDICINE | Age: 61
Discharge: HOME | End: 2021-10-01
Payer: MEDICARE

## 2021-10-01 VITALS
BODY MASS INDEX: 31.54 KG/M2 | DIASTOLIC BLOOD PRESSURE: 88 MMHG | HEART RATE: 68 BPM | WEIGHT: 238 LBS | SYSTOLIC BLOOD PRESSURE: 180 MMHG | RESPIRATION RATE: 16 BRPM | TEMPERATURE: 98.2 F | HEIGHT: 73 IN

## 2021-10-01 VITALS
RESPIRATION RATE: 16 BRPM | HEART RATE: 68 BPM | SYSTOLIC BLOOD PRESSURE: 180 MMHG | TEMPERATURE: 98.2 F | DIASTOLIC BLOOD PRESSURE: 88 MMHG

## 2021-10-01 DIAGNOSIS — E11.621 DIABETIC ULCER OF OTHER PART OF LEFT FOOT ASSOCIATED WITH TYPE 2 DIABETES MELLITUS, WITH NECROSIS OF BONE (HCC): Primary | ICD-10-CM

## 2021-10-01 DIAGNOSIS — L97.524 DIABETIC ULCER OF OTHER PART OF LEFT FOOT ASSOCIATED WITH TYPE 2 DIABETES MELLITUS, WITH NECROSIS OF BONE (HCC): Primary | ICD-10-CM

## 2021-10-01 PROCEDURE — G0277 HBOT, FULL BODY CHAMBER, 30M: HCPCS

## 2021-10-01 PROCEDURE — 97605 NEG PRS WND THER DME<=50SQCM: CPT

## 2021-10-01 PROCEDURE — 11044 DBRDMT BONE 1ST 20 SQ CM/<: CPT

## 2021-10-01 PROCEDURE — 97597 DBRDMT OPN WND 1ST 20 CM/<: CPT

## 2021-10-01 PROCEDURE — 97597 DBRDMT OPN WND 1ST 20 CM/<: CPT | Performed by: INTERNAL MEDICINE

## 2021-10-01 PROCEDURE — 11047 DBRDMT BONE EACH ADDL: CPT | Performed by: INTERNAL MEDICINE

## 2021-10-01 PROCEDURE — 11044 DBRDMT BONE 1ST 20 SQ CM/<: CPT | Performed by: INTERNAL MEDICINE

## 2021-10-01 PROCEDURE — 11047 DBRDMT BONE EACH ADDL: CPT

## 2021-10-01 PROCEDURE — 97605 NEG PRS WND THER DME<=50SQCM: CPT | Performed by: INTERNAL MEDICINE

## 2021-10-01 PROCEDURE — 99214 OFFICE O/P EST MOD 30 MIN: CPT | Performed by: INTERNAL MEDICINE

## 2021-10-01 PROCEDURE — 99183 HYPERBARIC OXYGEN THERAPY: CPT | Performed by: INTERNAL MEDICINE

## 2021-10-01 RX ORDER — LIDOCAINE 40 MG/G
CREAM TOPICAL ONCE
Status: DISCONTINUED | OUTPATIENT
Start: 2021-10-01 | End: 2021-10-02 | Stop reason: HOSPADM

## 2021-10-01 RX ORDER — LIDOCAINE HYDROCHLORIDE 40 MG/ML
SOLUTION TOPICAL ONCE
Status: CANCELLED | OUTPATIENT
Start: 2021-10-01 | End: 2021-10-01

## 2021-10-01 RX ORDER — LIDOCAINE 40 MG/G
CREAM TOPICAL ONCE
Status: CANCELLED | OUTPATIENT
Start: 2021-10-01 | End: 2021-10-01

## 2021-10-01 RX ORDER — LIDOCAINE 50 MG/G
OINTMENT TOPICAL ONCE
Status: CANCELLED | OUTPATIENT
Start: 2021-10-01 | End: 2021-10-01

## 2021-10-01 RX ORDER — BACITRACIN ZINC AND POLYMYXIN B SULFATE 500; 1000 [USP'U]/G; [USP'U]/G
OINTMENT TOPICAL ONCE
Status: CANCELLED | OUTPATIENT
Start: 2021-10-01 | End: 2021-10-01

## 2021-10-01 ASSESSMENT — PAIN DESCRIPTION - LOCATION: LOCATION: FOOT

## 2021-10-01 ASSESSMENT — PAIN DESCRIPTION - ORIENTATION: ORIENTATION: LEFT

## 2021-10-01 ASSESSMENT — PAIN SCALES - GENERAL
PAINLEVEL_OUTOF10: 0
PAINLEVEL_OUTOF10: 0

## 2021-10-01 NOTE — PLAN OF CARE
[facility] Physician Orders and Discharge [address]  [address], [address]  [place], [facility]  Telephone: [phone]      Fax: [phone] home care company:  [facility] [address] wound-care supplies will be provided by: We are ordering your wound-care supplies from [employer]. Please note, depending on your insurance coverage, you may have out-of-pocket expenses for these supplies. Someone from [facility] should call you to confirm your order and discuss those potential costs before they ship your products -- please anticipate that call. If your out-of-pocket cost could be substantial, [facility] has a financial hardship program for patients who qualify, so please ask about that if you might need a hand. If you have any questions about your supplies or your potential out-of-pocket costs, or if you need to place an order for a refill of supplies (typically monthly), please call [phone]. NAME:  Del Scott   YOB: 1960  PRIMARY DIAGNOSIS FOR WOUND CARE CENTER:  Diabetic      Wound cleansing:   Do not scrub or use excessive force. Wash hands with soap and water before and after dressing changes. Prior to applying a clean dressing, cleanse wound with normal saline, wound cleanser, or mild soap and water. Ask your physician or nurse before getting the wound(s) wet in the shower. Wound care for home: Home health care to change two times a week .  Wound care to change once week      Left Amp site:    Start NPWT today 10/1/21  vashe  Adaptic   Foam  NPWT drape    NPWT suction to 125 continuous    Home care to change Monday, Wednesday and  [facility] to change on Fridays         Left dorsal foot:  vashe  PSO  bandaid  Change daily     Left lateral foot:  vashe (2-3 puffs on wound bed, don't rinse)  Triad  gauze  Change daily         Please note, all wounds (unless stated otherwise here) were mechanically debrided at

## 2021-10-01 NOTE — PLAN OF CARE
Pt reports antibiotic therapy going well, denies adverse side effects. Right upper arm PICC line in place. Dr. Maureen Qiu orders to restart NPWT this week. Pt to continue HBOT. Wounds debrided per Dr. Maureen Qiu, Pt tolerated well. Follow up in [address] [place] [address] in 1 week as ordered. Pt. Aware to call sooner with any problems or questions/concerns.  MD orders/D/C instructions reviewed with patient, all questions answered; copy of instructions given to patient

## 2021-10-01 NOTE — PROGRESS NOTES
[address] [facility] Physician Orders and Discharge [address]  [address], [address]  [place], [facility]  Telephone: [phone]      Fax: [phone] home care company:  [facility] [employer] [address] wound-care supplies will be provided by: We are ordering your wound-care supplies from [employer]. Please note, depending on your insurance coverage, you may have out-of-pocket expenses for these supplies. Someone from [facility] should call you to confirm your order and discuss those potential costs before they ship your products -- please anticipate that call. If your out-of-pocket cost could be substantial, [facility] has a financial hardship program for patients who qualify, so please ask about that if you might need a hand. If you have any questions about your supplies or your potential out-of-pocket costs, or if you need to place an order for a refill of supplies (typically monthly), please call [phone]. NAME:  Del Boss   YOB: 1960  PRIMARY DIAGNOSIS FOR WOUND CARE CENTER:  Diabetic      Wound cleansing:   Do not scrub or use excessive force. Wash hands with soap and water before and after dressing changes. Prior to applying a clean dressing, cleanse wound with normal saline, wound cleanser, or mild soap and water. Ask your physician or nurse before getting the wound(s) wet in the shower. Wound care for home: Home health care to change two times a week .  Wound care to change once week      Left Amp site:    Start NPWT today 10/1/21  vashe  Adaptic   Foam to wound and either try to get a corner of the foam into the one deeper area or plain packing riboon  NPWT drape    NPWT suction to 125 continuous    Home care to change Monday, Wednesday and  [address] [facility] to change on Fridays         Left dorsal foot:  vashe  PSO  bandaid  Change daily     Left lateral foot:  vashe (2-3 puffs on wound bed, don't rinse)  Triad  gauze  Change daily         Please note, all wounds (unless stated otherwise here) were mechanically debrided at the time of cleansing here in the wound-care center today, so a small amount of pain, drainage or bleeding from that process might be expected, and is normal.      All products for home use, including multiple products for a single wound if applicable, are medically necessary in order to achieve the best chance at timely wound healing. See provider documentation for details if needed. Substituted dressings applied in the [facility] today, if applicable:           New orders for this week (labs, imaging, medications, etc.):     Please take updated picture of Left Amp site wound 10/1/21    Please bring wound vac and dressing kit next week to wound care visit         Additional instructions for specific diagnoses:          F/U Appointment is with Dr. Chris Toledo  in 1 week , on                                   at                       .     Your nurse  is Junito Watts RN      If we applied slip-resistant hospital socks today, be sure to remove them at least once a day to inspect your toes or feet, even if you're not changing the wraps or dressings underneath. If you see anything concerning (redness, excess moisture, etc), please call and let us know right away. Should you experience any significant changes in your wound(s) (including redness, increased warmth, increased pain, increased drainage, odor, or fever) or have questions about your wound care, please contact the [address] [place] [address] at [phone] Monday-Thursday from 8:00 am - 4:30 pm, or Friday from 8:00 am - 2:30 pm.  If you need help with your wound outside these hours and cannot wait until we are again available, contact your home-care company (if applicable), your PCP, or go to the nearest emergency room.

## 2021-10-03 NOTE — PROGRESS NOTES
[address]  Hyperbaric Oxygen    Gene Deven Massey     : 1960    DATE OF VISIT:  10/1/2021    Subjective     Del Massey is a 59 y.o. male who  has a past medical history of Abscess of left hand (2017), Acute kidney injury superimposed on chronic kidney disease ([facility] [address] (2021), Adhesive capsulitis of shoulder (2014), Fractures, Gas gangrene of foot ([facility] (2021), and PNA (pneumonia) (2016). He presents to the [facility] Emergency Department today for hyperbaric oxygen treatment of his [address] diabetic foot, which is an indication for immediate HBO therapy without a further period of more conservative treatment alone. Patient denies fever. Patient denies nausea, vomiting or diarrhea; no ear troubles and no other new complaints; no fears or anxiety regarding treatment today. Objective     Vitals:    10/01/21 0911 10/01/21 1124   BP: (!) 142/71 (!) 180/88   Pulse: 77 68   Resp: 18 16   Temp: 98.1 °F (36.7 °C) 98.2 °F (36.8 °C)   TempSrc: Oral Oral       General:  Alert, cooperative, no distress. Ears: External otic canals are within acceptable limits. Teed grade 0 on the right and 0 on the left pre-treatment. Teed grade 0 on the right and 0 on the left post-treatment. Lungs:  Clear to auscultation bilaterally. Ulcer: Not examined today in HBO, if applicable. No results for input(s): POCGLU in the last 72 hours. Inderjit Knight is a 59 y.o. male who presented to the [facility] Emergency Department today for hyperbaric oxygen treatment #9 of 30 for the diagnosis as stated above. Treatment given at 2 STEPHANIE for 90 minutes, with no air breaks. Total Treatment Time (min): 106 today, including compression, 100% oxygen at pressure, air breaks if applicable, and decompression.     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 2 diabetes mellitus, with necrosis of bone ([facility]) E11.621, L97.524    Diabetic polyneuropathy associated with type 2 diabetes mellitus (HCC) E11.42    Elevated PSA R97.20    Mixed hyperlipidemia E78.2    Stage 3a chronic kidney disease (HCC) N18.31    Acute osteomyelitis of metatarsal bone of left foot ([facility]) M86.172    Staphylococcus aureus infection (cultures also with Anaerococcus) A49.01    Unstageable pressure ulcer of left foot ([facility]) L89.890    Nonhealing surgical wound, initial encounter (small left dorsal foot wound) T81.89XA    Chronic low back pain M54.50, G89.29    Dental bridge present Z97.2    Callus of foot L84    Hyperkalemia E87.5       In my clinical judgement, ongoing HBO therapy is necessary at this time, given a threat to patient function, limb or life from the current condition. Adjunctive Rx, objective weekly progress and goals of Rx will periodically be updated, on Mondays. Plan     1. Hyperbaric Oxygen - Del Hernandez tolerated the treatment well today without complications. Continue HBO treatment as outlined above. 2. Other - see wound-care / ID note from today. I was present on these premises and immediately available to furnish assistance & direction throughout the procedure.      -- Electronically signed by Ina Jeffries MD on 10/3/2021 at 3:22 PM

## 2021-10-04 ENCOUNTER — HOSPITAL ENCOUNTER (OUTPATIENT)
Dept: HYPERBARIC MEDICINE | Age: 61
Discharge: HOME | End: 2021-10-04
Payer: MEDICARE

## 2021-10-04 ENCOUNTER — HOSPITAL ENCOUNTER (OUTPATIENT)
Age: 61
Discharge: HOME | End: 2021-10-04
Payer: MEDICARE

## 2021-10-04 VITALS
SYSTOLIC BLOOD PRESSURE: 168 MMHG | RESPIRATION RATE: 16 BRPM | DIASTOLIC BLOOD PRESSURE: 80 MMHG | HEART RATE: 71 BPM | TEMPERATURE: 97.9 F

## 2021-10-04 DIAGNOSIS — L97.524 DIABETIC ULCER OF OTHER PART OF LEFT FOOT ASSOCIATED WITH TYPE 2 DIABETES MELLITUS, WITH NECROSIS OF BONE (HCC): Primary | ICD-10-CM

## 2021-10-04 DIAGNOSIS — E11.621 DIABETIC ULCER OF OTHER PART OF LEFT FOOT ASSOCIATED WITH TYPE 2 DIABETES MELLITUS, WITH NECROSIS OF BONE (HCC): Primary | ICD-10-CM

## 2021-10-04 LAB
A/G RATIO: 1.3 (ref 1.1–2.2)
ALBUMIN SERPL-MCNC: 3.7 G/DL (ref 3.4–5)
ALP BLD-CCNC: 76 U/L (ref 40–129)
ALT SERPL-CCNC: 14 U/L (ref 10–40)
ANION GAP SERPL CALCULATED.3IONS-SCNC: 13 MMOL/L (ref 3–16)
AST SERPL-CCNC: 11 U/L (ref 15–37)
BASOPHILS ABSOLUTE: 0.1 K/UL (ref 0–0.2)
BASOPHILS RELATIVE PERCENT: 1.9 %
BILIRUB SERPL-MCNC: <0.2 MG/DL (ref 0–1)
BUN BLDV-MCNC: 31 MG/DL (ref 7–20)
CALCIUM SERPL-MCNC: 9.2 MG/DL (ref 8.3–10.6)
CHLORIDE BLD-SCNC: 111 MMOL/L (ref 99–110)
CO2: 21 MMOL/L (ref 21–32)
CREAT SERPL-MCNC: 1.4 MG/DL (ref 0.8–1.3)
EOSINOPHILS ABSOLUTE: 0.3 K/UL (ref 0–0.6)
EOSINOPHILS RELATIVE PERCENT: 4.2 %
GFR AFRICAN AMERICAN: >60
GFR NON-AFRICAN AMERICAN: 51
GLOBULIN: 2.9 G/DL
GLUCOSE BLD-MCNC: 116 MG/DL (ref 70–99)
GLUCOSE BLD-MCNC: 119 MG/DL (ref 70–99)
GLUCOSE BLD-MCNC: 126 MG/DL (ref 70–99)
GLUCOSE BLD-MCNC: 135 MG/DL (ref 70–99)
GLUCOSE BLD-MCNC: 161 MG/DL (ref 70–99)
HCT VFR BLD CALC: 32.8 % (ref 40.5–52.5)
HEMOGLOBIN: 10.9 G/DL (ref 13.5–17.5)
LYMPHOCYTES ABSOLUTE: 1.5 K/UL (ref 1–5.1)
LYMPHOCYTES RELATIVE PERCENT: 22.3 %
MCH RBC QN AUTO: 31.4 PG (ref 26–34)
MCHC RBC AUTO-ENTMCNC: 33.2 G/DL (ref 31–36)
MCV RBC AUTO: 94.6 FL (ref 80–100)
MONOCYTES ABSOLUTE: 0.4 K/UL (ref 0–1.3)
MONOCYTES RELATIVE PERCENT: 5.8 %
NEUTROPHILS ABSOLUTE: 4.3 K/UL (ref 1.7–7.7)
NEUTROPHILS RELATIVE PERCENT: 65.8 %
PDW BLD-RTO: 15.8 % (ref 12.4–15.4)
PERFORMED ON: ABNORMAL
PLATELET # BLD: 164 K/UL (ref 135–450)
PMV BLD AUTO: 9.7 FL (ref 5–10.5)
POTASSIUM SERPL-SCNC: 5.7 MMOL/L (ref 3.5–5.1)
RBC # BLD: 3.47 M/UL (ref 4.2–5.9)
SODIUM BLD-SCNC: 145 MMOL/L (ref 136–145)
TOTAL PROTEIN: 6.6 G/DL (ref 6.4–8.2)
WBC # BLD: 6.6 K/UL (ref 4–11)

## 2021-10-04 PROCEDURE — 99183 HYPERBARIC OXYGEN THERAPY: CPT | Performed by: INTERNAL MEDICINE

## 2021-10-04 PROCEDURE — G0277 HBOT, FULL BODY CHAMBER, 30M: HCPCS

## 2021-10-04 PROCEDURE — 80053 COMPREHEN METABOLIC PANEL: CPT

## 2021-10-04 PROCEDURE — 86140 C-REACTIVE PROTEIN: CPT

## 2021-10-04 PROCEDURE — 36415 COLL VENOUS BLD VENIPUNCTURE: CPT

## 2021-10-04 PROCEDURE — 85025 COMPLETE CBC W/AUTO DIFF WBC: CPT

## 2021-10-04 RX ORDER — OXYMETAZOLINE HYDROCHLORIDE 0.05 G/100ML
SPRAY NASAL
Qty: 1 BOTTLE | Refills: 0 | COMMUNITY
Start: 2021-10-04 | End: 2021-10-15

## 2021-10-04 ASSESSMENT — PAIN SCALES - GENERAL
PAINLEVEL_OUTOF10: 0
PAINLEVEL_OUTOF10: 0

## 2021-10-04 NOTE — PROGRESS NOTES
of the ROS was reviewed and is negative. Objective:     Vitals:    10/01/21 1137 10/01/21 1138   BP: (!) 180/88    Pulse: 68    Resp: 16    Temp: 98.2 °F (36.8 °C)    TempSrc: Oral    Weight:  238 lb (108 kg)   Height:  6' 1\" (1.854 m)       Constitutional:  well-developed, well-nourished, NAD  Psychiatric:  oriented to person, place and time; mood and affect appropriate for the situation  Eyes:  pupils equal, round and reactive to light; sclerae anicteric, conjunctivae not pale  ENT: no thrush or oral ulcers; TMs visible, no effusion or erythema  Cardiovascular:  bilateral pedal pulses palpable, feet warm, brisk cap refill; milder left lower extremity edema  Lymphatic:  no inguinal or popliteal adenopathy, no angitis, no residual left forefoot cellulitis  Musculoskeletal:  no clubbing, cyanosis or petechiae; RLE and LLE with no gross effusions, joint misalignment or acute arthritis; slight prominence of right 1st met head, with a small, stable, longstanding callus there. Neuro: decreased pedal sensation to light touch; no allodynia   Elicia-ulcer skin: dorsal foot just andrey and indurated; lateral foot improved hyperkeratosis; medial forefoot with some andrey erythema, induration, minimal maceration. Ulcer(s): dorsal foot just a very small linear wound from a surgical incision, no depth, granular with a bit of crusted exudate; lateral foot eschar starting to lyse away from healthy skin in just a couple of small spots; primary medial forefoot wound with improved granulation tissue this week, still a good deal of SQ necrosis, a few small patches of tendon / fascial necrosis, exposed cut end of the 1st metatarsal (marrow is granulating nicely there now), a good deal of fibrin and biofilm, still one of pocket of undermining and depth in the plantar and lateral direction. Photos also saved in electronic chart.     Today's Wound Measurements, per RN documentation:  Wound 09/16/21 #1, left medial forefoot, Diabetic ulcer, jenkins 4, onset 8/25/2021-Wound Length (cm): 5.5 cm  Wound 09/16/21 #2, left dorsal foot, surgical wound, full thickness, onset 8/31/2021-Wound Length (cm): 1.2 cm  Wound 09/16/21 #3, left lateral foot, pressure ulcer, unstageable, onset 9/2021-Wound Length (cm): 3.3 cm    Wound 09/16/21 #1, left medial forefoot, Diabetic ulcer, jenkins 4, onset 8/25/2021-Wound Width (cm): 4.5 cm  Wound 09/16/21 #2, left dorsal foot, surgical wound, full thickness, onset 8/31/2021-Wound Width (cm): 0.2 cm  Wound 09/16/21 #3, left lateral foot, pressure ulcer, unstageable, onset 9/2021-Wound Width (cm): 1.4 cm    Wound 09/16/21 #1, left medial forefoot, Diabetic ulcer, jenkins 4, onset 8/25/2021-Wound Depth (cm): 2.5 cm  Wound 09/16/21 #2, left dorsal foot, surgical wound, full thickness, onset 8/31/2021-Wound Depth (cm): 0.1 cm  Wound 09/16/21 #3, left lateral foot, pressure ulcer, unstageable, onset 9/2021-Wound Depth (cm): 0.1 cm  _____________________________    Lab Results   Component Value Date    LABA1C 6.4 09/01/2021     Pertinent labs from this week -- K+ down to 5.4, BUN 31, creat 1.5.       cRP only 4.3, albumin 3.7, LFTs normal.       WBC 6.2, Hgb 11.4, plts 172k, 300 Eos.      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 2 diabetes mellitus, with necrosis of bone (HCC) E11.621, L97.524    Diabetic polyneuropathy associated with type 2 diabetes mellitus (HCC) E11.42    Elevated PSA R97.20    Mixed hyperlipidemia E78.2    Stage 3a chronic kidney disease ([facility]) N18.31    Acute osteomyelitis of metatarsal bone of left foot ([facility]) M86.172    Staphylococcus aureus infection (cultures also with Anaerococcus) A49.01    Unstageable pressure ulcer of left foot ([facility] [address] L89.890    Nonhealing surgical wound, initial encounter (small left dorsal foot wound) T81.89XA    Chronic low back pain M54.50, G89.29    Dental bridge present Z97.2    Callus of foot L84    Hyperkalemia E87.5       Assessment of today's active condition(s): well controlled DM2, neuropathy, no PAD; Hx MSSA / anaerobic abscess of left foot with gas gangrene, cellulitis, acute osteo, a few weeks post-op from a partial ray amputation. Making some good progress toward healing with offloading, debridement, Abx, standard dressings and adjunctive HBOT. Still with some exposed 1st met bone, which I think is the biggest remaining obstacle to healing. Dorsal wound nearly healed, lateral wound should do well with a bit of patience. Tolerating Abx and HBOT well, glucoses generally doing well; still with some mild hyperkalemia, not new for him. Factors contributing to occurrence and/or persistence of the chronic ulcer include edema, diabetes, shear force, obesity and decreased tissue oxygenation. Medical necessity of today's visit is shown by the above documentation. Sharp debridement is indicated today, based upon the exam findings in the ulcer(s) above. Procedure note:     Consent obtained. Time out performed per [facility] policy. Anesthetic  Anesthetic: 4% Lidocaine Cream     Using a #15 blade scalpel and forceps, I sharply debrided the foot (left , lateral) ulcer(s) down through and including the removal of dermis. The type(s) of tissue debrided included necrotic/eschar. Total Surface Area Debrided: just 1-2 sq cm, with most of that eschar still firmly adhered to underlying tissue. Using a curette, #15 blade scalpel, forceps and rongeur, I sharply debrided the foot (left , medial) ulcer(s) down through and including the removal of bone. The type(s) of tissue debrided included fibrin, biofilm, slough and necrotic/eschar. Total Surface Area Debrided: 24 sq cm.  This included chamfering the edges of the exposed first met bone, to get a bit more bleeding there, encourage more granulation, and give a more contoured surface to try to heal over. The ulcers were then irrigated with normal saline solution. The procedure was completed with a moderate amount of bleeding, and hemostasis was with pressure, with silver nitrate stick(s) and with ORC (cellulose). The patient tolerated the procedure well, with no significant complications. The patient's level of pain during and after the procedure was monitored. Post-debridement measurements, if different from pre-debridement, are in the flowsheet as well. Discharge plan:     Treatment in the wound care center today, per RN documentation: Wound 09/16/21 #1, left medial forefoot, Diabetic ulcer, jenkins 4, onset 8/25/2021-Dressing/Treatment: Other (comment) (Vashe, adaptic over bone, plain packing ribbon into that distal pocket, NPWT (black foam, 125 mmHg continuous negative pressure)  Wound 09/16/21 #2, left dorsal foot, surgical wound, full thickness, onset 8/31/2021-Dressing/Treatment: Other (comment) (pso, bandaid)  Wound 09/16/21 #3, left lateral foot, pressure ulcer, unstageable, onset 9/2021-Dressing/Treatment: Other (comment) (triad, dry dressing). Continue good work with offloading and glucoses. Continue HBOT. Probably entering his final week of IV Abx, and we can remove his PICC next week. Will watch closely for allergic manifestations, Candidiasis, Cdiff, PICC complications, etc; weekly labs ordered, weekly PICC dressings being done. D/W Dr. Glynn Vaca. He may still need another trip to the OR because of that exposed 1st met, but I'm hopeful we can get it to heal with bedside debridement, HBOT and NPWT. Home treatment: good handwashing before and after any dressing changes. Cleanse ulcer with saline or soap & water before dressing change.  May use Vaseline (petrolatum), Aquaphor, Aveeno, CeraVe, Cetaphil, Eucerin, Lubriderm, etc for dry skin. Dressing type for home: as above, daily for the lateral foot and TIW for the primary wound and that little dorsal spot. Written discharge instructions given to patient. Follow up in 1 week to see me, daily for HBOT.     Electronically signed by Fatemeh Jang MD on 10/4/2021 at 7:46 PM.

## 2021-10-04 NOTE — DISCHARGE INSTR - COC
Continuity of Care Form    Patient Name: Mayda Rosario   :  1960  MRN:  [id number]    Admit date:  10/4/2021  Discharge date:  ***    Code Status Order: Prior   Advance Directives:     Admitting Physician:  No admitting provider for patient encounter. PCP: Christopher Bazan MD    Discharging Nurse: [facility] Unit/Room#: No information available for this encounter. Discharging Unit Phone Number: ***    Emergency Contact:   Extended Emergency Contact Information  Primary Emergency Contact: Micheline Kwok  Address: [address] Phone: [phone]  Relation: Spouse  Secondary Emergency Contact: Varun Gifford Phone: [phone]  Relation: Brother/Sister    Past Surgical History:  Past Surgical History:   Procedure Laterality Date    FOOT DEBRIDEMENT Left 2021    LEFT FOOT DEBRIDEMENT INCISION AND DRAINAGE performed by Kevyn Wheeler DPM at [address] [place] [address] Left 2021    STAGED INCISION AND DEBRIDEMENT LEFT FOOT WITH PARTIAL FIRST RAY AMPUTATION performed by Kevyn Wheeler DPM at [facility] HAND SURGERY Left 2017    LEFT HAND DEBRIDEMENT INCISION AND DRAINAGE    NECK SURGERY      30s year    TOE AMPUTATION Left     hallux    WRIST FRACTURE SURGERY         Immunization History: There is no immunization history on file for this patient.     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 2 diabetes mellitus, with necrosis of bone (HCC) E11.621, L97.524    Diabetic polyneuropathy associated with type 2 diabetes mellitus (HCC) E11.42    Elevated PSA R97.20    Mixed hyperlipidemia E78.2    Stage 3a chronic kidney disease (HCC) N18.31    Acute osteomyelitis of metatarsal bone of left foot ([facility]) M86.172    Staphylococcus aureus infection (cultures also with Anaerococcus) A49.01    Unstageable pressure ulcer of left foot ([facility]) L89.890    Nonhealing surgical wound, initial encounter (small left dorsal foot wound) T81.89XA    Chronic low back pain M54.50, G89.29    Dental bridge present Z97.2    Callus of foot L84    Hyperkalemia E87.5       Isolation/Infection:   Isolation          No Isolation        Patient Infection Status     Infection Onset Added Last Indicated Last Indicated By Review Planned Expiration Resolved Resolved By    None active    Resolved    COVID-19 Rule Out 08/30/21 08/30/21 08/30/21 COVID-19, Rapid (Ordered)   08/30/21 Rule-Out Test Resulted          Nurse Assessment:  Last Vital Signs: BP (!) 168/80   Pulse 71   Temp 97.9 °F (36.6 °C) (Oral)   Resp 16     Last documented pain score (0-10 scale): Pain Level: 0  Last Weight:   Wt Readings from Last 1 Encounters:   10/01/21 238 lb (108 kg)     Mental Status:  {IP PT MENTAL STATUS:20030}    IV Access:  { SUYAPA IV ACCESS:107585356}    Nursing Mobility/ADLs:  Walking   {P DME SDTZ:401383509}  Transfer  {P DME JTAA:542652529}  Bathing  {P DME ZDET:510582495}  Dressing  {P DME UNKA:124532928}  Toileting  {P DME DAPW:884071856}  Feeding  {P DME ROZH:283178252}  Med Admin  {P DME DXVC:766148888}  Med Delivery   { SUYAPA MED Delivery:171895294}    Wound Care Documentation and Therapy:  Negative Pressure Wound Therapy Foot Anterior;Left;Medial (Active)   Wound Type Surgical 09/07/21 1935   Unit Type [facility] 10/01/21 1314   Dressing Type Black foam 10/01/21 1314   Number of pieces used 2 10/01/21 1314   Cycle Continuous 10/01/21 1314   Target Pressure (mmHg) 125 10/01/21 1314   Intensity 1 09/07/21 1935   Canister changed? Yes 10/01/21 1314   Dressing Status Clean;Dry; Intact 10/01/21 1314   Dressing Changed Changed/New 10/01/21 1314   Drainage Amount Small 09/07/21 1935   Drainage Description Serosanguinous 09/07/21 1935   Dressing Change Due 09/10/21 09/07/21 1935   Wound Assessment Black;Red;Yellow;Slough 09/07/21 1935   Elicia-wound Assessment Dry;Edema;Fragile;Blanchable erythema 09/07/21 1935   Shape irregular 09/07/21 1935   Odor Mild 09/07/21 1935   Number of days: 26       Wound 03/28/16 Other (Comment) Toe (Comment  which one) Posterior open area (Active)   Number of days: 2015       Incision 06/19/17 Hand Left (Active)   Number of days: 7133       Wound 09/16/21 #1, left medial forefoot, Diabetic ulcer, hercules 4, onset 8/25/2021 (Active)   Wound Image   10/01/21 1205   Wound Etiology Diabetic Hercules 4 10/01/21 1140   Dressing Status New dressing applied;Clean;Dry; Intact 10/01/21 1314   Wound Cleansed Vashe 10/01/21 1314   Dressing/Treatment Other (comment) 10/01/21 1314   Offloading for Diabetic Foot Ulcers Orthowedge/inserts 10/01/21 1314   Wound Length (cm) 5.5 cm 10/01/21 1140   Wound Width (cm) 4.5 cm 10/01/21 1140   Wound Depth (cm) 2.5 cm 10/01/21 1140   Wound Surface Area (cm^2) 24.75 cm^2 10/01/21 1140   Change in Wound Size % (l*w) -41.43 10/01/21 1140   Wound Volume (cm^3) 61.875 cm^3 10/01/21 1140   Wound Healing % -47 10/01/21 1140   Post-Procedure Length (cm) 5.5 cm 10/01/21 1205   Post-Procedure Width (cm) 4.5 cm 10/01/21 1205   Post-Procedure Depth (cm) 2.5 cm 10/01/21 1205   Post-Procedure Surface Area (cm^2) 24.75 cm^2 10/01/21 1205   Post-Procedure Volume (cm^3) 61.875 cm^3 10/01/21 1205   Distance Tunneling (cm) 0 cm 09/24/21 1150   Undermining Maxium Distance (cm) 0 09/24/21 1150   Wound Assessment Hyper granulation tissue;Pink/red;Slough 10/01/21 1140   Drainage Amount Small 10/01/21 1140   Drainage Description Serous 10/01/21 1140   Odor None 10/01/21 1140   Elicia-wound Assessment Intact 10/01/21 1140   Number of days: 18       Wound 09/16/21 #2, left dorsal foot, surgical wound, full thickness, onset 8/31/2021 (Active)   Wound Image   09/16/21 1006   Wound Etiology Surgical 10/01/21 1140   Dressing Status New dressing applied;Clean;Dry; Intact 10/01/21 1314   Wound Cleansed Vashe 10/01/21 1314   Dressing/Treatment Other (comment) 10/01/21 1314   Offloading for Diabetic Foot Ulcers Orthowedge/inserts 10/01/21 1314   Wound Length (cm) 1.2 cm 10/01/21 1140   Wound Width (cm) 0.2 cm 10/01/21 1140   Wound Depth (cm) 0.1 cm 10/01/21 1140   Wound Surface Area (cm^2) 0.24 cm^2 10/01/21 1140   Change in Wound Size % (l*w) 45.45 10/01/21 1140   Wound Volume (cm^3) 0.024 cm^3 10/01/21 1140   Wound Healing % 89 10/01/21 1140   Post-Procedure Length (cm) 1.1 cm 09/16/21 1011   Post-Procedure Width (cm) 0.4 cm 09/16/21 1011   Post-Procedure Depth (cm) 0.5 cm 09/16/21 1011   Post-Procedure Surface Area (cm^2) 0.44 cm^2 09/16/21 1011   Post-Procedure Volume (cm^3) 0.22 cm^3 09/16/21 1011   Distance Tunneling (cm) 0 cm 09/24/21 1150   Tunneling Position ___ O'Clock 0 09/16/21 1011   Undermining Starts ___ O'Clock 0 09/16/21 1011   Undermining Ends___ O'Clock 0 09/16/21 1011   Undermining Maxium Distance (cm) 0 09/24/21 1150   Wound Assessment Pink/red 10/01/21 1140   Drainage Amount None 10/01/21 1140   Drainage Description Serosanguinous 09/24/21 1150   Odor None 10/01/21 1140   Elicia-wound Assessment Intact 10/01/21 1140   Number of days: 18       Wound 09/16/21 #3, left lateral foot, pressure ulcer, unstageable, onset 9/2021 (Active)   Wound Image   09/16/21 1006   Wound Etiology Pressure Unstageable 10/01/21 1140   Dressing Status New dressing applied;Dry;Clean; Intact 10/01/21 1314   Wound Cleansed Vashe 10/01/21 1314   Dressing/Treatment Other (comment) 10/01/21 1314   Offloading for Diabetic Foot Ulcers Orthowedge/inserts 10/01/21 1314   Wound Length (cm) 3.3 cm 10/01/21 1140   Wound Width (cm) 1.4 cm 10/01/21 1140   Wound Depth (cm) 0.1 cm 10/01/21 1140   Wound Surface Area (cm^2) 4.62 cm^2 10/01/21 1140 Change in Wound Size % (l*w) 40 10/01/21 1140   Wound Volume (cm^3) 0.462 cm^3 10/01/21 1140   Wound Healing % 40 10/01/21 1140   Post-Procedure Length (cm) 3.3 cm 10/01/21 1205   Post-Procedure Width (cm) 1.4 cm 10/01/21 1205   Post-Procedure Depth (cm) 0.1 cm 10/01/21 1205   Post-Procedure Surface Area (cm^2) 4.62 cm^2 10/01/21 1205   Post-Procedure Volume (cm^3) 0.462 cm^3 10/01/21 1205   Tunneling Position ___ O'Clock 0 21 1150   Undermining Starts ___ O'Clock 0 21 1006   Undermining Ends___ O'Clock 0 21 1006   Undermining Maxium Distance (cm) 0 21 1006   Wound Assessment Eschar dry 10/01/21 1140   Drainage Amount None 10/01/21 1140   Drainage Description Serous 21 1006   Odor None 10/01/21 1140   Elicia-wound Assessment Intact 10/01/21 1140   Number of days: 18        Elimination:  Continence:   · Bowel: {YES / EH:03797}  · Bladder: {YES / LN:94781}  Urinary Catheter: {Urinary Catheter:791049783}   Colostomy/Ileostomy/Ileal Conduit: {YES / YN:05660}       Date of Last BM: ***  No intake or output data in the 24 hours ending 10/04/21 1038  No intake/output data recorded.     Safety Concerns:     [address] 1 Safety Concerns:971511759}    Impairments/Disabilities:      508 Social Strategy 1 Impairments/Disabilities:028844881}    Nutrition Therapy:  Current Nutrition Therapy:   508 Social Strategy 1 Diet List:205324022}    Routes of Feeding: {CHP DME Other Feedings:160102517}  Liquids: {Slp liquid thickness:24500}  Daily Fluid Restriction: {CHP DME Yes amt example:212312356}  Last Modified Barium Swallow with Video (Video Swallowing Test): {Done Not Done KYEV:247286633}    Treatments at the Time of Hospital Discharge:   Respiratory Treatments: ***  Oxygen Therapy:  {Therapy; copd oxygen:68400}  Ventilator:    {JIM MILLER Vent CEWI:777742691}    Rehab Therapies: {THERAPEUTIC INTERVENTION:0125709338}  Weight Bearing Status/Restrictions: {JIM MILLER Weight Bearin}  Other Medical Equipment (for information only, NOT a DME order):  {EQUIPMENT:433802342}  Other Treatments: ***    Patient's personal belongings (please select all that are sent with patient):  {CHP DME Belongings:593504166}    RN SIGNATURE:  {Esignature:735270779}    CASE MANAGEMENT/SOCIAL WORK SECTION    Inpatient Status Date: ***    Readmission Risk Assessment Score:  Readmission Risk              Risk of Unplanned Readmission:  0           Discharging to Facility/ Agency   · Name:   · Address:  · Phone:  · Fax:    Dialysis Facility (if applicable)   · Name:  · Address:  · Dialysis Schedule:  · Phone:  · Fax:    / signature: {Esignature:830896238}    PHYSICIAN SECTION    Prognosis: {Prognosis:3415199157}    Condition at Discharge: [address] [place] [address] Patient Condition:004195811}    Rehab Potential (if transferring to Rehab): {Prognosis:1728961710}    Recommended Labs or Other Treatments After Discharge: ***    Physician Certification: I certify the above information and transfer of Pepper Desai  is necessary for the continuing treatment of the diagnosis listed and that he requires {Admit to Appropriate Level of Care:05662} for {GREATER/LESS:137217556} 30 days.      Update Admission H&P: {CHP DME Changes in IUCHN:420535227}    PHYSICIAN SIGNATURE:  {Esignature:187557650}

## 2021-10-05 ENCOUNTER — HOSPITAL ENCOUNTER (OUTPATIENT)
Dept: HYPERBARIC MEDICINE | Age: 61
Discharge: HOME | End: 2021-10-05
Payer: MEDICARE

## 2021-10-05 VITALS
RESPIRATION RATE: 16 BRPM | SYSTOLIC BLOOD PRESSURE: 199 MMHG | HEIGHT: 73 IN | DIASTOLIC BLOOD PRESSURE: 89 MMHG | TEMPERATURE: 97.8 F | BODY MASS INDEX: 31.54 KG/M2 | HEART RATE: 86 BPM | WEIGHT: 238 LBS

## 2021-10-05 DIAGNOSIS — E11.621 DIABETIC ULCER OF OTHER PART OF LEFT FOOT ASSOCIATED WITH TYPE 2 DIABETES MELLITUS, WITH NECROSIS OF BONE (HCC): Primary | ICD-10-CM

## 2021-10-05 DIAGNOSIS — L97.524 DIABETIC ULCER OF OTHER PART OF LEFT FOOT ASSOCIATED WITH TYPE 2 DIABETES MELLITUS, WITH NECROSIS OF BONE (HCC): Primary | ICD-10-CM

## 2021-10-05 LAB
C-REACTIVE PROTEIN: 6 MG/L (ref 0–5.1)
GLUCOSE BLD-MCNC: 157 MG/DL (ref 70–99)
GLUCOSE BLD-MCNC: 185 MG/DL (ref 70–99)
PERFORMED ON: ABNORMAL
PERFORMED ON: ABNORMAL

## 2021-10-05 PROCEDURE — 99183 HYPERBARIC OXYGEN THERAPY: CPT | Performed by: INTERNAL MEDICINE

## 2021-10-05 PROCEDURE — G0277 HBOT, FULL BODY CHAMBER, 30M: HCPCS

## 2021-10-05 RX ORDER — HYDRALAZINE HYDROCHLORIDE 25 MG/1
TABLET, FILM COATED ORAL
Qty: 30 TABLET | Refills: 0 | Status: SHIPPED | OUTPATIENT
Start: 2021-10-05 | End: 2021-10-31

## 2021-10-05 RX ORDER — HYDROCHLOROTHIAZIDE 25 MG/1
25 TABLET ORAL DAILY
Qty: 30 TABLET | Refills: 3 | Status: SHIPPED | OUTPATIENT
Start: 2021-10-05

## 2021-10-05 ASSESSMENT — PAIN SCALES - GENERAL: PAINLEVEL_OUTOF10: 0

## 2021-10-05 NOTE — PLAN OF CARE
Patient to [address] [place] [address],[address] for HBO treatment. BG slightly elevated today. Patient reports he had birthday cake last night. Patient VS per doc flow sheet.

## 2021-10-06 ENCOUNTER — HOSPITAL ENCOUNTER (OUTPATIENT)
Dept: HYPERBARIC MEDICINE | Age: 61
Discharge: HOME | End: 2021-10-06
Payer: MEDICARE

## 2021-10-06 VITALS
HEART RATE: 70 BPM | RESPIRATION RATE: 16 BRPM | SYSTOLIC BLOOD PRESSURE: 199 MMHG | DIASTOLIC BLOOD PRESSURE: 90 MMHG | WEIGHT: 238 LBS | TEMPERATURE: 98.4 F | HEIGHT: 73 IN | BODY MASS INDEX: 31.54 KG/M2

## 2021-10-06 DIAGNOSIS — L97.524 DIABETIC ULCER OF OTHER PART OF LEFT FOOT ASSOCIATED WITH TYPE 2 DIABETES MELLITUS, WITH NECROSIS OF BONE (HCC): Primary | ICD-10-CM

## 2021-10-06 DIAGNOSIS — E11.621 DIABETIC ULCER OF OTHER PART OF LEFT FOOT ASSOCIATED WITH TYPE 2 DIABETES MELLITUS, WITH NECROSIS OF BONE (HCC): Primary | ICD-10-CM

## 2021-10-06 LAB
GLUCOSE BLD-MCNC: 120 MG/DL (ref 70–99)
GLUCOSE BLD-MCNC: 134 MG/DL (ref 70–99)
PERFORMED ON: ABNORMAL
PERFORMED ON: ABNORMAL

## 2021-10-06 PROCEDURE — 99183 HYPERBARIC OXYGEN THERAPY: CPT | Performed by: EMERGENCY MEDICINE

## 2021-10-06 PROCEDURE — G0277 HBOT, FULL BODY CHAMBER, 30M: HCPCS

## 2021-10-06 RX ORDER — OXYMETAZOLINE HYDROCHLORIDE 0.05 G/100ML
SPRAY NASAL
Qty: 1 BOTTLE | Refills: 0 | COMMUNITY
Start: 2021-10-06 | End: 2021-10-15

## 2021-10-06 ASSESSMENT — PAIN SCALES - GENERAL
PAINLEVEL_OUTOF10: 0
PAINLEVEL_OUTOF10: 0

## 2021-10-06 NOTE — PLAN OF CARE
Patient seen for HBOT treatment 12/ 30   today. Patient assessment WNL and cleared for HBOT. Patient was compressed in HBO chamber to 2.0 STEPHANIE at 2.0 psi/min. Patient is tolerating treatment well and is currently resting comfortably and watching television. HBO RN at chamber side, will continue to monitor.

## 2021-10-06 NOTE — PROGRESS NOTES
[address]  Hyperbaric Oxygen    Del Pratt     : 1960    DATE OF VISIT:  10/5/2021    Subjective     Del Pratt is a 59 y.o. male who  has a past medical history of Abscess of left hand (2017), Acute kidney injury superimposed on chronic kidney disease ([facility] (2021), Adhesive capsulitis of shoulder (2014), Fractures, Gas gangrene of foot ([facility] (2021), and PNA (pneumonia) (2016). He presents to the [facility] today for hyperbaric oxygen [facility], which is refractory to standard therapy for 30 days. Patient denies fever. Patient denies nausea, vomiting or diarrhea; no ear troubles and no other new complaints; no fears or anxiety regarding treatment today. BP elevations below were asymptomatic today, but that's the highest post-HBO BP that he's had. Currently just on an ACE inhibitor - sounds like the dose was twice its current dose a couple of years ago, and more recently than that he was on a bit of a calcium channel blocker and a beta blocker, but those were stopped when his BP was trending lower. Objective     Vitals:    10/05/21 0813 10/05/21 1015   BP: (!) 156/76 (!) 199/89  Comment: asymptomatic   Pulse: 74 86   Resp: 16 16   Temp: 97.8 °F (36.6 °C) 97.8 °F (36.6 °C)   TempSrc: Oral Oral   Weight: 238 lb (108 kg)    Height: 6' 1\" (1.854 m)        General:  Alert, cooperative, no distress. Ears: External otic canals are within acceptable limits. Teed grade 0 on the right and 0 on the left pre-treatment. Teed grade 0 on the right and 0 on the left post-treatment. Lungs:  Clear to auscultation bilaterally. Ulcer: Not examined today in HBO, if applicable.       Recent Labs     10/04/21  0816 10/04/21  1026 10/05/21  0816 10/05/21  1015   POCGLU 135* 119* 185* 157*       Assessment     Del Pratt is a 59 y.o. male who presented to the [address] [place] [address] hydralazine just to be taken here on a PRN basis, on weekdays before HBOT, if his systolic pressure is above 140. I was present on these premises and immediately available to furnish assistance & direction throughout the procedure.      -- Electronically signed by Ronnie Mora MD on 10/5/2021 at 8:43 PM

## 2021-10-06 NOTE — PROGRESS NOTES
[address]  Hyperbaric Oxygen    Del Granger     : 1960    DATE OF VISIT:  10/6/2021    Subjective     Del Granger is a 59 y.o. male who  has a past medical history of Abscess of left hand (2017), [facility] kidney [facility] superimposed on chronic [facility] disease [facility] (2021), Adhesive capsulitis of shoulder (2014), Fractures, Gas gangrene of foot ([facility] (2021), and PNA (pneumonia) (2016). He presents to the [facility] today for hyperbaric oxygen treatment of his diabetic foot ulcer  , which is refractory to standard therapy for 30 days. Patient denies fever. Patient denies nausea, vomiting or diarrhea; no ear troubles and no other new complaints; no fears or anxiety regarding treatment today. Objective       Vitals:    10/06/21 0817   BP: (!) 167/78   Pulse: 81   Resp: 16   Temp: 97.7 °F (36.5 °C)   TempSrc: Oral   Weight: 238 lb (108 kg)   Height: 6' 1\" (1.854 m)       General:  Alert, cooperative, no distress. Ears: External otic canals are within acceptable limits. Teed grade 0 on the right and 0 on the left pre-treatment. Teed grade 0 on the right and 0 on the left post-treatment. Lungs:  Clear to auscultation bilaterally. Ulcer: Not examined today in HBO, if applicable. Recent Labs     10/04/21  0816 10/04/21  1026 10/05/21  0816 10/05/21  1015   POCGLU 135* 119* 185* 157*       Assessment     Del Granger is a 59 y.o. male who presented to the [facility] today for hyperbaric oxygen treatment #12 of 30 for the diagnosis as stated above. Treatment given at 2 STEPHANIE for 90 minutes, with no air breaks. Total Treatment Time (min): 107 today, including compression, 100% oxygen at pressure, air breaks if applicable, and decompression.     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 2 diabetes mellitus, with necrosis of bone ([facility]) E11.621, L97.524    Diabetic polyneuropathy associated with type 2 diabetes mellitus ([facility]) E11.42    Elevated PSA R97.20    Mixed hyperlipidemia E78.2    Stage 3a chronic kidney disease (HCC) N18.31    Acute osteomyelitis of metatarsal bone of left foot ([facility]) M86.172    Staphylococcus aureus infection (cultures also with Anaerococcus) A49.01    Unstageable pressure ulcer of left foot ([facility]) L89.890    Nonhealing surgical wound, initial encounter (small left dorsal foot wound) T81.89XA    Chronic low back pain M54.50, G89.29    Dental bridge present Z97.2    Callus of foot L84    Hyperkalemia E87.5       In my clinical judgement, ongoing HBO therapy is necessary at this time, given a threat to patient function, limb or life from the current condition. Adjunctive Rx, objective weekly progress and goals of Rx will periodically be updated, on Mondays. Plan     1. Hyperbaric Oxygen - Del Hernandez tolerated the treatment well today without complications. Continue HBO treatment as outlined above. 2. Other -     I was present on these premises and immediately available to furnish assistance & direction throughout the procedure.      -- Electronically signed by Ken Reagan MD on 10/6/2021 at 10:25 AM

## 2021-10-06 NOTE — PROGRESS NOTES
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 2 diabetes mellitus, with necrosis of bone (HCC) E11.621, L97.524    Diabetic polyneuropathy associated with type 2 diabetes mellitus (HCC) E11.42    Elevated PSA R97.20    Mixed hyperlipidemia E78.2    Stage 3a chronic kidney disease (HCC) N18.31    Acute osteomyelitis of metatarsal bone of left foot ([facility]) M86.172    Staphylococcus aureus infection (cultures also with Anaerococcus) A49.01    Unstageable pressure ulcer of left foot ([facility]) L89.890    Nonhealing surgical wound, initial encounter (small left dorsal foot wound) T81.89XA    Chronic low back pain M54.50, G89.29    Dental bridge present Z97.2    Callus of foot L84    Hyperkalemia E87.5       In my clinical judgement, ongoing HBO therapy is necessary at this time, given a threat to patient function, limb or life from the current condition. Adjunctive Rx, objective weekly progress and goals of Rx will periodically be updated, on Mondays. Plan     1. Hyperbaric Oxygen - Del Hernandez tolerated the treatment well today without complications. Continue HBO treatment as outlined above. 2. Other -     I was present on these premises and immediately available to furnish assistance & direction throughout the procedure.      -- Electronically signed by Elsa Chamberlain MD on 10/5/2021 at 8:42 PM

## 2021-10-07 ENCOUNTER — TELEPHONE (OUTPATIENT)
Dept: INFECTIOUS DISEASES | Age: 61
End: 2021-10-07

## 2021-10-07 ENCOUNTER — HOSPITAL ENCOUNTER (OUTPATIENT)
Dept: HYPERBARIC MEDICINE | Age: 61
Discharge: HOME | End: 2021-10-07
Payer: MEDICARE

## 2021-10-07 VITALS
HEART RATE: 73 BPM | TEMPERATURE: 97.9 F | RESPIRATION RATE: 18 BRPM | DIASTOLIC BLOOD PRESSURE: 72 MMHG | SYSTOLIC BLOOD PRESSURE: 153 MMHG

## 2021-10-07 DIAGNOSIS — L97.524 DIABETIC ULCER OF OTHER PART OF LEFT FOOT ASSOCIATED WITH TYPE 2 DIABETES MELLITUS, WITH NECROSIS OF BONE (HCC): Primary | ICD-10-CM

## 2021-10-07 DIAGNOSIS — E11.621 DIABETIC ULCER OF OTHER PART OF LEFT FOOT ASSOCIATED WITH TYPE 2 DIABETES MELLITUS, WITH NECROSIS OF BONE (HCC): Primary | ICD-10-CM

## 2021-10-07 LAB
GLUCOSE BLD-MCNC: 125 MG/DL (ref 70–99)
GLUCOSE BLD-MCNC: 157 MG/DL (ref 70–99)
PERFORMED ON: ABNORMAL
PERFORMED ON: ABNORMAL

## 2021-10-07 PROCEDURE — G0277 HBOT, FULL BODY CHAMBER, 30M: HCPCS

## 2021-10-07 PROCEDURE — 99183 HYPERBARIC OXYGEN THERAPY: CPT | Performed by: INTERNAL MEDICINE

## 2021-10-07 RX ORDER — OXYMETAZOLINE HYDROCHLORIDE 0.05 G/100ML
SPRAY NASAL
Qty: 1 BOTTLE | Refills: 0 | COMMUNITY
Start: 2021-10-07 | End: 2021-10-15

## 2021-10-07 NOTE — TELEPHONE ENCOUNTER
Patient is due to end IV antibiotics on 10/7/2021. Can verbal order be given to [facility] to d/c IV antibiotics and pull picc?

## 2021-10-07 NOTE — PLAN OF CARE
Patient seen for HBOT treatment  13 / 30    today. Patient assessment complete and cleared for HBOT. Pt given hydralazine prn for systolic above 325 per MD instructions. Patient was compressed in HBO chamber to 2.0 STEPHANIE at 2.0 psi/min. Patient is tolerating treatment well and is currently resting comfortably and watching television. HBO RN at chamber side, will continue to monitor.

## 2021-10-08 ENCOUNTER — HOSPITAL ENCOUNTER (OUTPATIENT)
Dept: HYPERBARIC MEDICINE | Age: 61
Discharge: HOME | End: 2021-10-08
Payer: MEDICARE

## 2021-10-08 ENCOUNTER — HOSPITAL ENCOUNTER (OUTPATIENT)
Dept: WOUND CARE | Age: 61
Discharge: HOME | End: 2021-10-08
Payer: MEDICARE

## 2021-10-08 VITALS
HEART RATE: 71 BPM | BODY MASS INDEX: 32.2 KG/M2 | TEMPERATURE: 97.9 F | HEIGHT: 73 IN | WEIGHT: 243 LBS | RESPIRATION RATE: 18 BRPM | DIASTOLIC BLOOD PRESSURE: 81 MMHG | SYSTOLIC BLOOD PRESSURE: 173 MMHG

## 2021-10-08 VITALS
TEMPERATURE: 97.9 F | RESPIRATION RATE: 18 BRPM | HEART RATE: 71 BPM | DIASTOLIC BLOOD PRESSURE: 81 MMHG | SYSTOLIC BLOOD PRESSURE: 173 MMHG

## 2021-10-08 DIAGNOSIS — L97.524 DIABETIC ULCER OF OTHER PART OF LEFT FOOT ASSOCIATED WITH TYPE 2 DIABETES MELLITUS, WITH NECROSIS OF BONE (HCC): Primary | ICD-10-CM

## 2021-10-08 DIAGNOSIS — E11.621 DIABETIC ULCER OF OTHER PART OF LEFT FOOT ASSOCIATED WITH TYPE 2 DIABETES MELLITUS, WITH NECROSIS OF BONE (HCC): Primary | ICD-10-CM

## 2021-10-08 LAB
GLUCOSE BLD-MCNC: 169 MG/DL (ref 70–99)
GLUCOSE BLD-MCNC: 173 MG/DL (ref 70–99)
PERFORMED ON: ABNORMAL
PERFORMED ON: ABNORMAL

## 2021-10-08 PROCEDURE — 97605 NEG PRS WND THER DME<=50SQCM: CPT

## 2021-10-08 PROCEDURE — 11044 DBRDMT BONE 1ST 20 SQ CM/<: CPT

## 2021-10-08 PROCEDURE — 99183 HYPERBARIC OXYGEN THERAPY: CPT | Performed by: INTERNAL MEDICINE

## 2021-10-08 PROCEDURE — 11044 DBRDMT BONE 1ST 20 SQ CM/<: CPT | Performed by: INTERNAL MEDICINE

## 2021-10-08 PROCEDURE — 99214 OFFICE O/P EST MOD 30 MIN: CPT | Performed by: INTERNAL MEDICINE

## 2021-10-08 PROCEDURE — 97605 NEG PRS WND THER DME<=50SQCM: CPT | Performed by: INTERNAL MEDICINE

## 2021-10-08 PROCEDURE — G0277 HBOT, FULL BODY CHAMBER, 30M: HCPCS

## 2021-10-08 RX ORDER — BACITRACIN ZINC AND POLYMYXIN B SULFATE 500; 1000 [USP'U]/G; [USP'U]/G
OINTMENT TOPICAL ONCE
Status: CANCELLED | OUTPATIENT
Start: 2021-10-08 | End: 2021-10-08

## 2021-10-08 RX ORDER — LIDOCAINE HYDROCHLORIDE 40 MG/ML
SOLUTION TOPICAL ONCE
Status: CANCELLED | OUTPATIENT
Start: 2021-10-08 | End: 2021-10-08

## 2021-10-08 RX ORDER — LIDOCAINE 40 MG/G
CREAM TOPICAL ONCE
Status: CANCELLED | OUTPATIENT
Start: 2021-10-08 | End: 2021-10-08

## 2021-10-08 RX ORDER — LIDOCAINE 40 MG/G
CREAM TOPICAL ONCE
Status: DISCONTINUED | OUTPATIENT
Start: 2021-10-08 | End: 2021-10-09 | Stop reason: HOSPADM

## 2021-10-08 RX ORDER — LIDOCAINE 50 MG/G
OINTMENT TOPICAL ONCE
Status: CANCELLED | OUTPATIENT
Start: 2021-10-08 | End: 2021-10-08

## 2021-10-08 ASSESSMENT — PAIN SCALES - GENERAL
PAINLEVEL_OUTOF10: 0

## 2021-10-08 NOTE — PROGRESS NOTES
[address] [facility] Physician Orders and Discharge [address], [address] [facility]  Telephone: [phone]      Fax: [phone]        Your home care [address] wound-care supplies will be provided by: We are ordering your wound-care supplies from [employer]. Please note, depending on your insurance coverage, you may have out-of-pocket expenses for these supplies. Someone from [facility] should call you to confirm your order and discuss those potential costs before they ship your products -- please anticipate that call. If your out-of-pocket cost could be substantial, [facility] has a financial hardship program for patients who qualify, so please ask about that if you might need a hand. If you have any questions about your supplies or your potential out-of-pocket costs, or if you need to place an order for a refill of supplies (typically monthly), please call [phone].      NAME: Veto Holden  DATE of BIRTH:  1960  PRIMARY DIAGNOSIS FOR WOUND CARE CENTER:  Diabetic      Wound cleansing:   Do not scrub or use excessive force. Wash hands with soap and water before and after dressing changes. Prior to applying a clean dressing, cleanse wound with normal saline, wound cleanser, or mild soap and water. Ask your physician or nurse before getting the wound(s) wet in the shower.                Wound care for home: Home health care to change two times a week .  Wound care to change once week      Left Amp site:  Betadine to blistered areas then a 4x4 No Hydrocollooid  Vashe  Adaptic   Foam to wound and either try to get a corner of the foam into the one deeper area or plain packing riboon  NPWT drape     NPWT suction to 125 continuous     Home care to change Monday, Wednesday and  [facility] to change on Fridays          Left dorsal foot:  vashe  PSO  bandaid  Change daily     Left lateral foot:  vashe (2-3 puffs on wound bed, don't rinse)  Triad  gauze  Change daily         Please note, all wounds (unless stated otherwise here) were mechanically debrided at the time of cleansing here in the wound-care center today, so a small amount of pain, drainage or bleeding from that process might be expected, and is normal.      All products for home use, including multiple products for a single wound if applicable, are medically necessary in order to achieve the best chance at timely wound healing. See provider documentation for details if needed.     Substituted dressings applied in the [address] [place] [address],[address] today, if applicable:           New orders for this week (labs, imaging, medications, etc.):        PULL PICC LINE TODAY AT WOUND VISIT        Additional instructions for specific diagnoses:           F/U Appointment is with Dr. Jerardo Mccartney 1 week , on                                   at                       .     Your nurse  is EFFIE Cummings      If we applied slip-resistant hospital socks today, be sure to remove them at least once a day to inspect your toes or feet, even if you're not changing the wraps or dressings underneath. If you see anything concerning (redness, excess moisture, etc), please call and let us know right away.     Should you experience any significant changes in your wound(s) (including redness, increased warmth, increased pain, increased drainage, odor, or fever) or have questions about your wound care, please contact the [address] [place] [address] at [phone] Monday-Thursday from 8:00 am - 4:30 pm, or Friday from 8:00 am - 2:30 pm.  If you need help with your wound outside these hours and cannot wait until we are again available, contact your home-care company (if applicable), your PCP, or go to the nearest emergency room.

## 2021-10-08 NOTE — PROGRESS NOTES
PICC removed per orders. Catheter intact. No bleeding or complications. Pressure applied to site for five minutes. Patient instructed to keep dressing dry and intact.

## 2021-10-08 NOTE — PLAN OF CARE
Patient seen for HBOT treatment 14/ 30    today. Patient assessment WNL with the exception of /76, Pt denies s/s. Hydralazine administered per MD order. Pt cleared for HBOT. Patient was compressed in HBO chamber to 2.0 STEPHANIE at 2.0 psi/min. Patient is tolerating treatment well and is currently resting comfortably and watching television. HBO RN at chamber side, will continue to monitor.

## 2021-10-10 NOTE — PROGRESS NOTES
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 2 diabetes mellitus, with necrosis of bone ([facility]) E11.621, L97.524    Diabetic polyneuropathy associated with type 2 diabetes mellitus (HCC) E11.42    Elevated PSA R97.20    Mixed hyperlipidemia E78.2    Stage 3a chronic kidney disease (HCC) N18.31    Acute osteomyelitis of metatarsal bone of left foot ([facility]) M86.172    Staphylococcus aureus infection (cultures also with Anaerococcus) A49.01    Unstageable pressure ulcer of left foot ([facility]) L89.890    Nonhealing surgical wound, initial encounter (small left dorsal foot wound) T81.89XA    Chronic low back pain M54.50, G89.29    Dental bridge present Z97.2    Callus of foot L84    Hyperkalemia E87.5       In my clinical judgement, ongoing HBO therapy is necessary at this time, given a threat to patient function, limb or life from the current condition. Adjunctive Rx, objective weekly progress and goals of Rx will periodically be updated, on Mondays. Plan     1. Hyperbaric Oxygen - Del Hernandez tolerated the treatment well today without complications. Continue HBO treatment as outlined above. 2. Other -     I was present on these premises and immediately available to furnish assistance & direction throughout the procedure.      -- Electronically signed by Fatemhe Jang MD on 10/10/2021 at 2:57 PM

## 2021-10-10 NOTE — PROGRESS NOTES
[address]  Hyperbaric Oxygen    Del Glaser     : 1960    DATE OF VISIT:  10/8/2021    Subjective     Del Glaser is a 59 y.o. male who  has a past medical history of Abscess of left hand (2017), Acute kidney injury superimposed on chronic kidney disease ([facility] (2021), Adhesive capsulitis of shoulder (2014), Fractures, Gas gangrene of foot ([facility] [facility] (2021), and PNA (pneumonia) (2016). He presents to the [facility] today for hyperbaric oxygen treatment of his Amee Cunning 4 diabetic foot, which is refractory to standard therapy for 30 days. Patient denies fever. Patient denies nausea, vomiting or diarrhea; no ear troubles and no other new complaints; no fears or anxiety regarding treatment today. Objective     Vitals:    10/08/21 0818 10/08/21 1020   BP: (!) 141/76 (!) 173/81   Pulse: 80 71   Resp: 18 18   Temp: 99.1 °F (37.3 °C) 97.9 °F (36.6 °C)   TempSrc: Oral Oral       General:  Alert, cooperative, no distress. Ears: External otic canals are within acceptable limits. Teed grade 0 on the right and 0 on the left pre-treatment. Teed grade 0 on the right and 0 on the left post-treatment. Lungs:  Clear to auscultation bilaterally. Ulcer: Not examined today in HBO, if applicable. Recent Labs     10/08/21  0816 10/08/21  1020   POCGLU 169* 173*       Assessment     Del Glaser is a 59 y.o. male who presented to the [facility] today for hyperbaric oxygen treatment #14 of 30 for the diagnosis as stated above. Treatment given at 2 STEPHANIE for 90 minutes, with no air breaks. Total Treatment Time (min): 105 today, including compression, 100% oxygen at pressure, air breaks if applicable, and decompression.     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 2 diabetes mellitus, with necrosis of bone ([facility]) E11.621, L97.524    Diabetic polyneuropathy associated with type 2 diabetes mellitus (HCC) E11.42    Elevated PSA R97.20    Mixed hyperlipidemia E78.2    Stage 3a chronic kidney disease (HCC) N18.31    Acute osteomyelitis of metatarsal bone of left foot ([facility]) M86.172    Staphylococcus aureus infection (cultures also with Anaerococcus) A49.01    Unstageable pressure ulcer of left foot ([facility]) L89.890    Nonhealing surgical wound, initial encounter (small left dorsal foot wound) T81.89XA    Chronic low back pain M54.50, G89.29    Dental bridge present Z97.2    Callus of foot L84    Hyperkalemia E87.5       In my clinical judgement, ongoing HBO therapy is necessary at this time, given a threat to patient function, limb or life from the current condition. Adjunctive Rx, objective weekly progress and goals of Rx will periodically be updated, on Mondays. Plan     1. Hyperbaric Oxygen - Del Hernandez tolerated the treatment well today without complications. Continue HBO treatment as outlined above. 2. Other -     I was present on these premises and immediately available to furnish assistance & direction throughout the procedure.      -- Electronically signed by Angie Guerra MD on 10/10/2021 at 3:22 PM

## 2021-10-11 ENCOUNTER — HOSPITAL ENCOUNTER (OUTPATIENT)
Dept: HYPERBARIC MEDICINE | Age: 61
Discharge: HOME | End: 2021-10-11
Payer: MEDICARE

## 2021-10-11 VITALS
RESPIRATION RATE: 16 BRPM | HEART RATE: 73 BPM | SYSTOLIC BLOOD PRESSURE: 167 MMHG | TEMPERATURE: 97.8 F | DIASTOLIC BLOOD PRESSURE: 78 MMHG

## 2021-10-11 DIAGNOSIS — E11.621 DIABETIC ULCER OF OTHER PART OF LEFT FOOT ASSOCIATED WITH TYPE 2 DIABETES MELLITUS, WITH NECROSIS OF BONE (HCC): Primary | ICD-10-CM

## 2021-10-11 DIAGNOSIS — L97.524 DIABETIC ULCER OF OTHER PART OF LEFT FOOT ASSOCIATED WITH TYPE 2 DIABETES MELLITUS, WITH NECROSIS OF BONE (HCC): Primary | ICD-10-CM

## 2021-10-11 LAB
GLUCOSE BLD-MCNC: 133 MG/DL (ref 70–99)
GLUCOSE BLD-MCNC: 97 MG/DL (ref 70–99)
PERFORMED ON: ABNORMAL
PERFORMED ON: NORMAL

## 2021-10-11 PROCEDURE — 99183 HYPERBARIC OXYGEN THERAPY: CPT | Performed by: INTERNAL MEDICINE

## 2021-10-11 PROCEDURE — G0277 HBOT, FULL BODY CHAMBER, 30M: HCPCS

## 2021-10-11 ASSESSMENT — PAIN SCALES - GENERAL
PAINLEVEL_OUTOF10: 0
PAINLEVEL_OUTOF10: 0

## 2021-10-11 NOTE — DISCHARGE INSTR - COC
Continuity of Care Form    Patient Name: [facility]   :  1960  MRN:  [id number]    Admit date:  10/11/2021  Discharge date:  ***    Code Status Order: Prior   Advance Directives:     Admitting Physician:  No admitting provider for patient encounter. PCP: Lory Mcneal MD    Discharging Nurse: [facility] Unit/Room#: No information available for this encounter. Discharging Unit Phone Number: ***    Emergency Contact:   Extended Emergency Contact Information  Primary Emergency Contact: Kristin Snyder  Address: [address] Phone: [phone]  Relation: Spouse  Secondary Emergency Contact: Varun Gifford Phone: [phone]  Relation: Brother/Sister    Past Surgical History:  Past Surgical History:   Procedure Laterality Date    FOOT DEBRIDEMENT Left 2021    LEFT FOOT DEBRIDEMENT INCISION AND DRAINAGE performed by Treasure Mckeon DPM at [address] [place] [address] Left 2021    STAGED INCISION AND DEBRIDEMENT LEFT FOOT WITH PARTIAL FIRST RAY AMPUTATION performed by Treasure Mckeon DPM at [facility] HAND SURGERY Left 2017    LEFT HAND DEBRIDEMENT INCISION AND DRAINAGE    NECK SURGERY      30s year    TOE AMPUTATION Left     hallux    WRIST FRACTURE SURGERY         Immunization History: There is no immunization history on file for this patient.     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 2 diabetes mellitus, with necrosis of bone (HCC) E11.621, L97.524    Diabetic polyneuropathy associated with type 2 diabetes mellitus (HCC) E11.42    Elevated PSA R97.20    Mixed hyperlipidemia E78.2    Stage 3a chronic kidney disease (HCC) N18.31    Acute osteomyelitis of metatarsal bone of left foot ([facility]) M86.172    Staphylococcus aureus infection (cultures also with Anaerococcus) A49.01    Unstageable pressure ulcer of left foot ([address] L89.890    Nonhealing surgical wound, initial encounter (small left dorsal foot wound) T81.89XA    Chronic low back pain M54.50, G89.29    Dental bridge present Z97.2    Callus of foot L84    Hyperkalemia E87.5       Isolation/Infection:   Isolation          No Isolation        Patient Infection Status     Infection Onset Added Last Indicated Last Indicated By Review Planned Expiration Resolved Resolved By    None active    Resolved    COVID-19 Rule Out 21 COVID-19, Rapid (Ordered)   21 Rule-Out Test Resulted          Nurse Assessment:  Last Vital Signs: BP (!) 167/78   Pulse 73   Temp 97.8 °F (36.6 °C) (Oral)   Resp 16     Last documented pain score (0-10 scale): Pain Level: 0  Last Weight:   Wt Readings from Last 1 Encounters:   10/08/21 243 lb (110.2 kg)     Mental Status:  {IP PT MENTAL STATUS:97527}    IV Access:  { SUYAPA IV ACCESS:744051614}    Nursing Mobility/ADLs:  Walking   {P DME FACN:594044867}  Transfer  {P DME JIHK:615605161}  Bathing  {[facility] DME PXEL:536148296}  Dressing  {[facility] DME KTPO:518356526}  Toileting  {[facility] DME KNZI:871699536}  Feeding  {[facility] DME KVYZ:452052993}  Med Admin  {P DME PMQ}  Med Delivery   { SUYAPA MED Delivery:263018450}    Wound Care Documentation and Therapy:  Negative Pressure Wound Therapy Foot Anterior;Left;Medial (Active)   Unit Type Located within [facility] 10/01/21 1314   Dressing Type Black foam 10/08/21 1235   Number of pieces used 1 10/08/21 1235   Cycle Continuous 10/08/21 1235   Target Pressure (mmHg) 125 10/08/21 1235   Canister changed? Yes 10/08/21 1235   Dressing Status Clean;Dry; Intact 10/08/21 1235   Dressing Changed Changed/New 10/08/21 1235   Number of days: 33       Wound 16 Other (Comment) Toe (Comment  which one) Posterior open area (Active)   Number of days: 2022       Incision 06/19/17 Hand Left (Active)   Number of days: 7675       Wound 09/16/21 #1, left medial forefoot, Diabetic ulcer, hercules 4, onset 8/25/2021 (Active)   Wound Image   10/01/21 1205   Wound Etiology Diabetic Hercules 4 10/08/21 1048   Dressing Status New dressing applied 10/08/21 1235   Wound Cleansed Wound cleanser 10/08/21 1048   Dressing/Treatment Other (comment) 10/08/21 1235   Offloading for Diabetic Foot Ulcers Orthowedge/inserts 10/01/21 1314   Wound Length (cm) 4 cm 10/08/21 1048   Wound Width (cm) 4 cm 10/08/21 1048   Wound Depth (cm) 2.5 cm 10/08/21 1048   Wound Surface Area (cm^2) 16 cm^2 10/08/21 1048   Change in Wound Size % (l*w) 8.57 10/08/21 1048   Wound Volume (cm^3) 40 cm^3 10/08/21 1048   Wound Healing % 5 10/08/21 1048   Post-Procedure Length (cm) 4 cm 10/08/21 1126   Post-Procedure Width (cm) 4 cm 10/08/21 1126   Post-Procedure Depth (cm) 2.5 cm 10/08/21 1126   Post-Procedure Surface Area (cm^2) 16 cm^2 10/08/21 1126   Post-Procedure Volume (cm^3) 40 cm^3 10/08/21 1126   Distance Tunneling (cm) 0 cm 09/24/21 1150   Undermining Maxium Distance (cm) 0 09/24/21 1150   Wound Assessment Pink/red; Other (Comment) 10/08/21 1048   Drainage Amount Moderate 10/08/21 1048   Drainage Description Sanguinous 10/08/21 1048   Odor None 10/08/21 1048   Elicia-wound Assessment Maceration 10/08/21 1048   Number of days: 25       Wound 09/16/21 #2, left dorsal foot, surgical wound, full thickness, onset 8/31/2021 (Active)   Wound Image   09/16/21 1006   Wound Etiology Surgical 10/08/21 1048   Dressing Status New dressing applied 10/08/21 1235   Wound Cleansed Wound cleanser 10/08/21 1048   Dressing/Treatment Other (comment) 10/01/21 1314   Offloading for Diabetic Foot Ulcers Orthowedge/inserts 10/01/21 1314   Wound Length (cm) 0.5 cm 10/08/21 1048   Wound Width (cm) 0.2 cm 10/08/21 1048   Wound Depth (cm) 0.1 cm 10/08/21 1048   Wound Surface Area (cm^2) 0.1 cm^2 10/08/21 1048   Change in Wound Size % (l*w) 77.27 10/08/21 1048   Wound Volume (cm^3) 0.01 cm^3 10/08/21 1048   Wound Healing % 95 10/08/21 1048   Post-Procedure Length (cm) 0.5 cm 10/08/21 1126   Post-Procedure Width (cm) 0.2 cm 10/08/21 1126   Post-Procedure Depth (cm) 0.1 cm 10/08/21 1126   Post-Procedure Surface Area (cm^2) 0.1 cm^2 10/08/21 1126   Post-Procedure Volume (cm^3) 0.01 cm^3 10/08/21 1126   Distance Tunneling (cm) 0 cm 09/24/21 1150   Tunneling Position ___ O'Clock 0 09/16/21 1011   Undermining Starts ___ O'Clock 0 09/16/21 1011   Undermining Ends___ O'Clock 0 09/16/21 1011   Undermining Maxium Distance (cm) 0 09/24/21 1150   Wound Assessment Pink/red 10/08/21 1048   Drainage Amount Scant 10/08/21 1048   Drainage Description Serosanguinous 10/08/21 1048   Odor None 10/08/21 1048   Elicia-wound Assessment Intact 10/08/21 1048   Number of days: 25       Wound 09/16/21 #3, left lateral foot, pressure ulcer, unstageable, onset 9/2021 (Active)   Wound Image   09/16/21 1006   Wound Etiology Pressure Unstageable 10/08/21 1048   Dressing Status New dressing applied 10/08/21 1235   Wound Cleansed Wound cleanser 10/08/21 1048   Dressing/Treatment Other (comment) 10/08/21 1235   Offloading for Diabetic Foot Ulcers Orthowedge/inserts 10/01/21 1314   Wound Length (cm) 3 cm 10/08/21 1048   Wound Width (cm) 1.3 cm 10/08/21 1048   Wound Depth (cm) 0.1 cm 10/08/21 1048   Wound Surface Area (cm^2) 3.9 cm^2 10/08/21 1048   Change in Wound Size % (l*w) 49.35 10/08/21 1048   Wound Volume (cm^3) 0.39 cm^3 10/08/21 1048   Wound Healing % 49 10/08/21 1048   Post-Procedure Length (cm) 3 cm 10/08/21 1126   Post-Procedure Width (cm) 1.3 cm 10/08/21 1126   Post-Procedure Depth (cm) 0.1 cm 10/08/21 1126   Post-Procedure Surface Area (cm^2) 3.9 cm^2 10/08/21 1126   Post-Procedure Volume (cm^3) 0.39 cm^3 10/08/21 1126   Tunneling Position ___ O'Clock 0 09/24/21 1150   Undermining Starts ___ O'Clock 0 21 1006   Undermining Ends___ O'Clock 0 21 1006   Undermining Maxium Distance (cm) 0 21 1006   Wound Assessment Eschar dry 10/08/21 1048   Drainage Amount None 10/08/21 1048   Drainage Description Serous 21 1006   Odor None 10/08/21 1048   Elicia-wound Assessment Intact 10/08/21 1048   Number of days: 25        Elimination:  Continence:   · Bowel: {YES / AH:33376}  · Bladder: {YES / BL:39030}  Urinary Catheter: {Urinary Catheter:323550374}   Colostomy/Ileostomy/Ileal Conduit: {YES / UT:18512}       Date of Last BM: ***  No intake or output data in the 24 hours ending 10/11/21 1031  No intake/output data recorded.     Safety Concerns:     [employer] Safety Concerns:893492107}    Impairments/Disabilities:      [employer] Impairments/Disabilities:988151680}    Nutrition Therapy:  Current Nutrition Therapy:   [employer] Diet List:759105830}    Routes of Feeding: {CHP DME Other Feedings:948509343}  Liquids: {Slp liquid thickness:90305}  Daily Fluid Restriction: {CHP DME Yes amt example:906922346}  Last Modified Barium Swallow with Video (Video Swallowing Test): {Done Not Done [facility]:284378658}    Treatments at the Time of Hospital Discharge:   Respiratory Treatments: ***  Oxygen Therapy:  {Therapy; copd oxygen:67115}  Ventilator:    { CC Vent RDAE:543952017}    Rehab Therapies: {THERAPEUTIC INTERVENTION:6416409659}  Weight Bearing Status/Restrictions: [employer] Weight Bearin}  Other Medical Equipment (for information only, NOT a DME order):  {EQUIPMENT:944436061}  Other Treatments: ***    Patient's personal belongings (please select all that are sent with patient):  {CHP DME Belongings:593979261}    RN SIGNATURE:  {Esignature:721108781}    CASE MANAGEMENT/SOCIAL WORK SECTION    Inpatient Status Date: ***    Readmission Risk Assessment Score:  Readmission Risk              Risk of Unplanned Readmission:  0           Discharging to Facility/ Agency   · Name: · Address:  · Phone:  · Fax:    Dialysis Facility (if applicable)   · Name:  · Address:  · Dialysis Schedule:  · Phone:  · Fax:    / signature: {Esignature:355552018}    PHYSICIAN SECTION    Prognosis: {Prognosis:8902716104}    Condition at Discharge: Jose Calhoun Patient Condition:487506087}    Rehab Potential (if transferring to Rehab): {Prognosis:8315405739}    Recommended Labs or Other Treatments After Discharge: ***    Physician Certification: I certify the above information and transfer of Shelli Singleton  is necessary for the continuing treatment of the diagnosis listed and that he requires {Admit to Appropriate Level of Care:09605} for {GREATER/LESS:206965026} 30 days.      Update Admission H&P: {CHP DME Changes in SCRTP:037975429}    PHYSICIAN SIGNATURE:  {Esignature:455714355}

## 2021-10-11 NOTE — PLAN OF CARE
Patient with continues improvement noted in wound again this week. Debridement  Per MD today and patient tolerated well. PICC line will be pulled today before leaving [facility], continue with HBO as ordered. Discharge instructions reviewed with patient, all questions answered, copy given to patient. Dressings were applied to all wounds per M.D. Instructions at this visit.

## 2021-10-12 ENCOUNTER — HOSPITAL ENCOUNTER (OUTPATIENT)
Dept: HYPERBARIC MEDICINE | Age: 61
Discharge: HOME | End: 2021-10-12
Payer: MEDICARE

## 2021-10-12 VITALS
DIASTOLIC BLOOD PRESSURE: 86 MMHG | BODY MASS INDEX: 32.2 KG/M2 | SYSTOLIC BLOOD PRESSURE: 180 MMHG | TEMPERATURE: 97.6 F | WEIGHT: 243 LBS | HEIGHT: 73 IN | HEART RATE: 71 BPM | RESPIRATION RATE: 16 BRPM

## 2021-10-12 DIAGNOSIS — E11.621 DIABETIC ULCER OF OTHER PART OF LEFT FOOT ASSOCIATED WITH TYPE 2 DIABETES MELLITUS, WITH NECROSIS OF BONE (HCC): Primary | ICD-10-CM

## 2021-10-12 DIAGNOSIS — L97.524 DIABETIC ULCER OF OTHER PART OF LEFT FOOT ASSOCIATED WITH TYPE 2 DIABETES MELLITUS, WITH NECROSIS OF BONE (HCC): Primary | ICD-10-CM

## 2021-10-12 LAB
GLUCOSE BLD-MCNC: 141 MG/DL (ref 70–99)
GLUCOSE BLD-MCNC: 152 MG/DL (ref 70–99)
PERFORMED ON: ABNORMAL
PERFORMED ON: ABNORMAL

## 2021-10-12 PROCEDURE — G0277 HBOT, FULL BODY CHAMBER, 30M: HCPCS

## 2021-10-12 PROCEDURE — 99183 HYPERBARIC OXYGEN THERAPY: CPT | Performed by: INTERNAL MEDICINE

## 2021-10-12 RX ORDER — OXYMETAZOLINE HYDROCHLORIDE 0.05 G/100ML
SPRAY NASAL
Qty: 1 BOTTLE | Refills: 0 | COMMUNITY
Start: 2021-10-12 | End: 2021-10-15

## 2021-10-12 ASSESSMENT — PAIN SCALES - GENERAL: PAINLEVEL_OUTOF10: 0

## 2021-10-12 NOTE — PROGRESS NOTES
drugs.    His current medication list consists of Dulaglutide, Multiple Vitamin, atorvastatin, fish oil, hydrALAZINE, hydroCHLOROthiazide, insulin glargine, lisinopril, metFORMIN, oxymetazoline, and tamsulosin. Completed his prescribed course of ceftriaxone yesterday (POD 37 / 34). Allergies: Patient has no known allergies. Pertinent items from the review of systems are discussed in the HPI; the remainder of the ROS was reviewed and is negative. Objective:     Vitals:    10/08/21 1045   BP: (!) 173/81   Pulse: 71   Resp: 18   Temp: 97.9 °F (36.6 °C)   TempSrc: Oral   Weight: 243 lb (110.2 kg)   Height: 6' 1\" (1.854 m)       Constitutional:  well-developed, well-nourished, NAD  Psychiatric:  oriented to person, place and time; mood and affect appropriate for the situation  Eyes:  pupils equal, round and reactive to light; sclerae anicteric, conjunctivae not pale  ENT: no thrush or oral ulcers  Cardiovascular:  bilateral pedal pulses palpable, feet warm, brisk cap refill; milder left lower extremity edema  Lymphatic:  no inguinal or popliteal adenopathy, no angitis, no cellulitis  Musculoskeletal:  no clubbing, cyanosis or petechiae; RLE and LLE with no gross effusions, joint misalignment or acute arthritis; slight prominence of right 1st met head, with a small, stable, longstanding callus there.   Neuro: decreased pedal sensation to light touch; no allodynia   Elicia-ulcer skin: dorsal foot with a bit of contact dermatitis from BandAids; lateral foot just pink and slightly indurated / hyperkeratotic; medial forefoot with less andrey erythema, but that \"micro-pustular\" eruption a small distance from the wound  Ulcer(s): dorsal foot just a very small linear wound from a surgical incision, no depth, nearly healed; lateral foot eschar looking and feeling more stable this week; primary medial forefoot wound with improved granulation tissue again this week, still some SQ necrosis, a few small patches of tendon / fascial necrosis, exposed cut end of the 1st metatarsal starting to cover over with granulation (marrow is granulating nicely there now), a good deal of fibrin and biofilm, still one of pocket of undermining and depth in the plantar and lateral direction, but less necrotic tissue there. Photos also saved in electronic chart. Today's Wound Measurements, per RN documentation:  Wound 09/16/21 #1, left medial forefoot, Diabetic ulcer, jenkins 4, onset 8/25/2021-Wound Length (cm): 4 cm  Wound 09/16/21 #2, left dorsal foot, surgical wound, full thickness, onset 8/31/2021-Wound Length (cm): 0.5 cm  Wound 09/16/21 #3, left lateral foot, pressure ulcer, unstageable, onset 9/2021-Wound Length (cm): 3 cm    Wound 09/16/21 #1, left medial forefoot, Diabetic ulcer, jenkins 4, onset 8/25/2021-Wound Width (cm): 4 cm  Wound 09/16/21 #2, left dorsal foot, surgical wound, full thickness, onset 8/31/2021-Wound Width (cm): 0.2 cm  Wound 09/16/21 #3, left lateral foot, pressure ulcer, unstageable, onset 9/2021-Wound Width (cm): 1.3 cm    Wound 09/16/21 #1, left medial forefoot, Diabetic ulcer, jenkins 4, onset 8/25/2021-Wound Depth (cm): 2.5 cm  Wound 09/16/21 #2, left dorsal foot, surgical wound, full thickness, onset 8/31/2021-Wound Depth (cm): 0.1 cm  Wound 09/16/21 #3, left lateral foot, pressure ulcer, unstageable, onset 9/2021-Wound Depth (cm): 0.1 cm  _____________________________    Lab Results   Component Value Date    LABALBU 3.7 10/04/2021     Lab Results   Component Value Date    CREATININE 1.4 (H) 10/04/2021     Lab Results   Component Value Date    HGB 10.9 (L) 10/04/2021     Lab Results   Component Value Date    LABA1C 6.4 09/01/2021     Other pertinent labs from this week -- K+ back up a bit to 5.7. BUN 31. cRP 6.0, alk phos 76. WBC 6.6, plts 164k, 300 Eos.      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 2 diabetes mellitus, with necrosis of bone ([facility]) E11.621, L97.524    Diabetic polyneuropathy associated with type 2 diabetes mellitus ([facility]) E11.42    Elevated PSA R97.20    Mixed hyperlipidemia E78.2    Stage 3a chronic kidney disease (HCC) N18.31    Acute osteomyelitis of metatarsal bone of left foot ([facility]) M86.172    Staphylococcus aureus infection (cultures also with Anaerococcus) A49.01    Unstageable pressure ulcer of left foot ([facility]) L89.890    Nonhealing surgical wound, initial encounter (small left dorsal foot wound) T81.89XA    Chronic low back pain M54.50, G89.29    Dental bridge present Z97.2    Callus of foot L84    Hyperkalemia E87.5       Assessment of today's active condition(s): well controlled DM2, neuropathy, no PAD; Nathaneil Elmer 4 diabetic foot ulcer with cellulitis, abscess, gas-producing soft tissue infection, acute osteo, about 5 weeks post-op from debridement, drainage and partial ray amputation; no active soft tissue infection, and osteo seems likely to be resolved. Wound making good progress toward healing with debridement, Abx, HBOT, and now addition of NPWT; I think that new plantar rash could be from the hydrocolloid. Factors contributing to occurrence and/or persistence of the chronic ulcer include edema, diabetes, shear force and decreased tissue oxygenation. Medical necessity of today's visit is shown by the above documentation. Sharp debridement is indicated today, based upon the exam findings in the ulcer(s) above. Procedure note:     Consent obtained. Time out performed per [facility][1] policy.      Anesthetic  Anesthetic: 4% Lidocaine Cream     Using a curette, #15 blade scalpel, forceps and rongeur, I sharply debrided the foot (left , medial, forefoot) ulcer(s) down through and including the removal of bone. The type(s) of tissue debrided included fibrin, biofilm, slough and necrotic/eschar. Total Surface Area Debrided: 16 sq cm. The ulcers were then irrigated with normal saline solution. The procedure was completed with a moderate amount of bleeding, and hemostasis was with pressure, with silver nitrate stick(s) and with ORC (cellulose). The patient tolerated the procedure well, with no significant complications. The patient's level of pain during and after the procedure was monitored. Post-debridement measurements, if different from pre-debridement, are in the flowsheet as well. Discharge plan:     Treatment in the wound care center today, per RN documentation: Wound 09/16/21 #1, left medial forefoot, Diabetic ulcer, jenkins 4, onset 8/25/2021-Dressing/Treatment: Other (comment) (betadine,vashe, adaptic, packing, NPWT )  Wound 09/16/21 #2, left dorsal foot, surgical wound, full thickness, onset 8/31/2021-Dressing/Treatment:  (vashe, pso, 2x2)  Wound 09/16/21 #3, left lateral foot, pressure ulcer, unstageable, onset 9/2021-Dressing/Treatment: Other (comment) (vashe, triad, gauze). Continue HBOT. Keep up the good work with glucose control, protein intake, offloading. No additional Abx needed at present. Remove PICC today. I'll call [employer] to update them. Continue ACE inhibitor, HCTZ, daily PRN hydralazine. Would stop hydrocolloid to periwound skin before placing VAC drape -- we'll just place a piece of gauze with betadine there today, before the VAC drape. Home treatment: good handwashing before and after any dressing changes. Cleanse ulcer with saline or soap & water before dressing change. May use Vaseline (petrolatum), Aquaphor, Aveeno, CeraVe, Cetaphil, Eucerin, Lubriderm, etc for dry skin. Dressing type for home: as above, three times weekly (daily to the left lateral foot, if possible). Written discharge instructions given to patient.  Follow up in 1 week to see me, but daily for HBOT.     Electronically signed by Goran Márquez MD on 10/12/2021 at 8:36 AM.

## 2021-10-12 NOTE — PROGRESS NOTES
[address]  Hyperbaric Oxygen    Del Webb     : 1960    DATE OF VISIT:  10/11/2021    Subjective     Del Webb is a 59 y.o. male who  has a past medical history of Abscess of left hand (2017), Acute kidney injury superimposed on chronic kidney disease ([facility] [address] (2021), Adhesive capsulitis of shoulder (2014), Fractures, Gas gangrene of foot ([facility] [address] (2021), and PNA (pneumonia) (2016). He presents to the [facility] today for hyperbaric oxygen treatment of his Jes Anita 4 diabetic foot, which is refractory to standard therapy for 30 days. Patient denies fever. Patient denies nausea, vomiting or diarrhea; no ear troubles and no other new complaints; no fears or anxiety regarding treatment today. Objective     Vitals:    10/11/21 0820 10/11/21 1028   BP: 136/75 (!) 167/78   Pulse: 82 73   Resp: 16 16   Temp: 98.1 °F (36.7 °C) 97.8 °F (36.6 °C)   TempSrc: Oral Oral       General:  Alert, cooperative, no distress. Ears: External otic canals are within acceptable limits. Teed grade 0 on the right and 0 on the left pre-treatment. Teed grade 0 on the right and 0 on the left post-treatment. Lungs:  Clear to auscultation bilaterally. Ulcer: Not examined today in HBO, if applicable. Recent Labs     10/11/21  0816 10/11/21  1017   POCGLU 133* 80       Assessment     Del Webb is a 59 y.o. male who presented to the [facility] today for hyperbaric oxygen treatment #16 of 30 for the diagnosis as stated above. Treatment given at 2 STEPHANIE for 90 minutes, with no air breaks. Total Treatment Time (min): 106 today, including compression, 100% oxygen at pressure, air breaks if applicable, and decompression.     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 2 diabetes mellitus, with necrosis of bone ([facility]) E11.621, L97.524    Diabetic polyneuropathy associated with type 2 diabetes mellitus (HCC) E11.42    Elevated PSA R97.20    Mixed hyperlipidemia E78.2    Stage 3a chronic kidney disease (HCC) N18.31    Acute osteomyelitis of metatarsal bone of left foot ([facility]) M86.172    Staphylococcus aureus infection (cultures also with Anaerococcus) A49.01    Unstageable pressure ulcer of left foot ([facility]) L89.890    Nonhealing surgical wound, initial encounter (small left dorsal foot wound) T81.89XA    Chronic low back pain M54.50, G89.29    Dental bridge present Z97.2    Callus of foot L84    Hyperkalemia E87.5       In my clinical judgement, ongoing HBO therapy is necessary at this time, given a threat to patient function, limb or life from the current condition. Plan     1. Hyperbaric Oxygen - Del Hernandez tolerated the treatment well today without complications. Continue HBO treatment as outlined above. 2. Other -     I was present on these premises and immediately available to furnish assistance & direction throughout the procedure.      -- Electronically signed by Lisandra Roman MD on 10/12/2021 at 8:14 AM

## 2021-10-13 ENCOUNTER — HOSPITAL ENCOUNTER (OUTPATIENT)
Dept: HYPERBARIC MEDICINE | Age: 61
Discharge: HOME | End: 2021-10-13
Payer: MEDICARE

## 2021-10-13 VITALS
DIASTOLIC BLOOD PRESSURE: 77 MMHG | RESPIRATION RATE: 16 BRPM | TEMPERATURE: 97.8 F | HEART RATE: 72 BPM | SYSTOLIC BLOOD PRESSURE: 142 MMHG

## 2021-10-13 DIAGNOSIS — L97.524 DIABETIC ULCER OF OTHER PART OF LEFT FOOT ASSOCIATED WITH TYPE 2 DIABETES MELLITUS, WITH NECROSIS OF BONE (HCC): Primary | ICD-10-CM

## 2021-10-13 DIAGNOSIS — E11.621 DIABETIC ULCER OF OTHER PART OF LEFT FOOT ASSOCIATED WITH TYPE 2 DIABETES MELLITUS, WITH NECROSIS OF BONE (HCC): Primary | ICD-10-CM

## 2021-10-13 LAB
GLUCOSE BLD-MCNC: 136 MG/DL (ref 70–99)
GLUCOSE BLD-MCNC: 169 MG/DL (ref 70–99)
PERFORMED ON: ABNORMAL
PERFORMED ON: ABNORMAL

## 2021-10-13 PROCEDURE — 99183 HYPERBARIC OXYGEN THERAPY: CPT | Performed by: EMERGENCY MEDICINE

## 2021-10-13 PROCEDURE — G0277 HBOT, FULL BODY CHAMBER, 30M: HCPCS

## 2021-10-13 RX ORDER — OXYMETAZOLINE HYDROCHLORIDE 0.05 G/100ML
SPRAY NASAL
Qty: 1 BOTTLE | Refills: 0 | COMMUNITY
Start: 2021-10-13 | End: 2021-10-15

## 2021-10-13 ASSESSMENT — PAIN SCALES - GENERAL: PAINLEVEL_OUTOF10: 0

## 2021-10-13 NOTE — PLAN OF CARE
Patient seen for HBOT treatment  17 /30 today. Patient assessment WNL and cleared for HBOT. Patient was compressed in HBO chamber to 2.0 STEPHANIE at 2.0 psi/min. Patient is tolerating treatment well and is currently resting comfortably and watching television. HBO RN at chamber side, will continue to monitor.

## 2021-10-13 NOTE — PROGRESS NOTES
[address]  Hyperbaric Oxygen    Del Mendez     : 1960    DATE OF VISIT:  10/13/2021    Subjective     Del Mendez is a 59 y.o. male who  has a past medical history of Abscess of left hand (2017), Acute kidney injury superimposed on chronic kidney disease ([facility] [address] (2021), Adhesive capsulitis of shoulder (2014), Fractures, Gas gangrene of foot ([facility] (2021), and PNA (pneumonia) (2016). He presents to the [facility] today for hyperbaric oxygen treatment of his diabetic foot ulcer , which is refractory to standard therapy for 30 days. Patient denies fever. Patient denies nausea, vomiting or diarrhea; no ear troubles and no other new complaints; no fears or anxiety regarding treatment today. Objective       Vitals:    10/13/21 0822 10/13/21 1017   BP: (!) 147/81 (!) 142/77   Pulse: 79 72   Resp: 16 16   Temp: 98.2 °F (36.8 °C) 97.8 °F (36.6 °C)   TempSrc: Oral Oral       General:  Alert, cooperative, no distress. Ears: External otic canals are within acceptable limits. Teed grade 0 on the right and 0 on the left pre-treatment. Teed grade 0 on the right and 0 on the left post-treatment. Lungs:  Clear to auscultation bilaterally. Ulcer: Not examined today in HBO, if applicable. Recent Labs     10/11/21  0816 10/11/21  1017 10/12/21  0818 10/12/21  1014 10/13/21  0820   POCGLU 133* 97 152* 141* 169*       Assessment     Del Mendez is a 59 y.o. male who presented to the [facility] today for hyperbaric oxygen treatment #17 of 30 for the diagnosis as stated above. Treatment given at 2 STEPHANIE for 90 minutes, with no air breaks. Total Treatment Time (min): 105 today, including compression, 100% oxygen at pressure, air breaks if applicable, and decompression.     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 2 diabetes mellitus, with necrosis of bone ([facility]) E11.621, L97.524    Diabetic polyneuropathy associated with type 2 diabetes mellitus ([facility]) E11.42    Elevated PSA R97.20    Mixed hyperlipidemia E78.2    Stage 3a chronic kidney disease (HCC) N18.31    Acute osteomyelitis of metatarsal bone of left foot ([facility]) M86.172    Staphylococcus aureus infection (cultures also with Anaerococcus) A49.01    Unstageable pressure ulcer of left foot ([facility]) L89.890    Nonhealing surgical wound, initial encounter (small left dorsal foot wound) T81.89XA    Chronic low back pain M54.50, G89.29    Dental bridge present Z97.2    Callus of foot L84    Hyperkalemia E87.5       In my clinical judgement, ongoing HBO therapy is necessary at this time, given a threat to patient function, limb or life from the current condition. Adjunctive Rx, objective weekly progress and goals of Rx will periodically be updated, on Mondays. Plan     1. Hyperbaric Oxygen - Del Hernandez tolerated the treatment well today without complications. Continue HBO treatment as outlined above. 2. Other -     I was present on these premises and immediately available to furnish assistance & direction throughout the procedure.      -- Electronically signed by Sailaja Jacobs MD on 10/13/2021 at 10:24 AM

## 2021-10-13 NOTE — PROGRESS NOTES
[address]  Hyperbaric Oxygen    Del Bolton     : 1960    DATE OF VISIT:  10/12/2021    Subjective     Del Bolton is a 59 y.o. male who  has a past medical history of Abscess of left hand (2017), Acute kidney injury superimposed on chronic kidney disease ([facility] [address] (2021), Adhesive capsulitis of shoulder (2014), Fractures, [facility] (2021), and PNA (pneumonia) (2016). He presents to the [facility] today for hyperbaric oxygen treatment of his Enzo Yee 4 diabetic foot, which is refractory to standard therapy for 30 days. Patient denies fever. Patient denies nausea, vomiting or diarrhea; no ear troubles and no other new complaints; no fears or anxiety regarding treatment today. Objective     Vitals:    10/12/21 0812 10/12/21 0819 10/12/21 1015 10/12/21 1033   BP: (!) 141/74  (!) 189/86 (!) 180/86  Comment: asymptomatic, will repeat   Pulse: 76  71    Resp: 16  16    Temp: 97.6 °F (36.4 °C)  97.6 °F (36.4 °C)    TempSrc: Oral  Oral    Weight:  243 lb (110.2 kg)     Height:  6' 1\" (1.854 m)         General:  Alert, cooperative, no distress. Ears: External otic canals are within acceptable limits. Teed grade 0 on the right and 0 on the left pre-treatment. Teed grade 0 on the right and 0 on the left post-treatment. Lungs:  Clear to auscultation bilaterally. Ulcer: Not examined today in HBO, if applicable. Recent Labs     10/11/21  0816 10/11/21  1017 10/12/21  0818 10/12/21  1014   POCGLU 133* 97 152* 141*       Assessment     Del Bolton is a 59 y.o. male who presented to the [facility] today for hyperbaric oxygen treatment #16 of 30 for the diagnosis as stated above. Treatment given at 2 STEPHANIE for 90 minutes, with no air breaks.  Total Treatment Time (min): 107 today, including compression, 100% oxygen at pressure, air breaks if applicable, and decompression. 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 2 diabetes mellitus, with necrosis of bone ([facility]) E11.621, L97.524    Diabetic polyneuropathy associated with type 2 diabetes mellitus ([facility]) E11.42    Elevated PSA R97.20    Mixed hyperlipidemia E78.2    Stage 3a chronic kidney disease ([facility]) N18.31    Acute osteomyelitis of metatarsal bone of left foot ([facility]) M86.172    Staphylococcus aureus infection (cultures also with Anaerococcus) A49.01    Unstageable pressure ulcer of left foot ([facility]) L89.890    Nonhealing surgical wound, initial encounter (small left dorsal foot wound) T81.89XA    Chronic low back pain M54.50, G89.29    Dental bridge present Z97.2    Callus of foot L84    Hyperkalemia E87.5       In my clinical judgement, ongoing HBO therapy is necessary at this time, given a threat to patient function, limb or life from the current condition. Adjunctive Rx, objective weekly progress and goals of Rx will periodically be updated, on Mondays. Plan     1. Hyperbaric Oxygen - Del Hernandez tolerated the treatment well today without complications. Continue HBO treatment as outlined above. 2. Other -     I was present on these premises and immediately available to furnish assistance & direction throughout the procedure.      -- Electronically signed by Luz Marina Gudino MD on 10/13/2021 at 8:04 AM

## 2021-10-14 ENCOUNTER — HOSPITAL ENCOUNTER (OUTPATIENT)
Dept: HYPERBARIC MEDICINE | Age: 61
Discharge: HOME | End: 2021-10-14
Payer: MEDICARE

## 2021-10-14 VITALS
TEMPERATURE: 97.8 F | HEART RATE: 74 BPM | SYSTOLIC BLOOD PRESSURE: 142 MMHG | RESPIRATION RATE: 18 BRPM | DIASTOLIC BLOOD PRESSURE: 69 MMHG

## 2021-10-14 DIAGNOSIS — L97.524 DIABETIC ULCER OF OTHER PART OF LEFT FOOT ASSOCIATED WITH TYPE 2 DIABETES MELLITUS, WITH NECROSIS OF BONE (HCC): Primary | ICD-10-CM

## 2021-10-14 DIAGNOSIS — E11.621 DIABETIC ULCER OF OTHER PART OF LEFT FOOT ASSOCIATED WITH TYPE 2 DIABETES MELLITUS, WITH NECROSIS OF BONE (HCC): Primary | ICD-10-CM

## 2021-10-14 LAB
GLUCOSE BLD-MCNC: 140 MG/DL (ref 70–99)
GLUCOSE BLD-MCNC: 163 MG/DL (ref 70–99)
PERFORMED ON: ABNORMAL
PERFORMED ON: ABNORMAL

## 2021-10-14 PROCEDURE — G0277 HBOT, FULL BODY CHAMBER, 30M: HCPCS

## 2021-10-14 PROCEDURE — 99183 HYPERBARIC OXYGEN THERAPY: CPT | Performed by: INTERNAL MEDICINE

## 2021-10-14 RX ORDER — OXYMETAZOLINE HYDROCHLORIDE 0.05 G/100ML
SPRAY NASAL
Qty: 1 BOTTLE | Refills: 0 | COMMUNITY
Start: 2021-10-14 | End: 2021-11-19

## 2021-10-14 NOTE — PLAN OF CARE
Patient seen for HBOT treatment  18 / 30    today. Patient assessment complete and cleared for HBOT. Patient was compressed in HBO chamber to 2.0 STEPHANIE at 2.0 psi/min. Patient is tolerating treatment well and is currently resting comfortably and watching television. HBO RN at chamber side, will continue to monitor.

## 2021-10-14 NOTE — PROGRESS NOTES
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) E11.621, L97.524    Diabetic polyneuropathy associated with type 2 diabetes mellitus ([facility]) E11.42    Elevated PSA R97.20    Mixed hyperlipidemia E78.2    Stage 3a chronic kidney disease (HCC) N18.31    Acute osteomyelitis of metatarsal bone of left foot ([facility]) M86.172    Staphylococcus aureus infection (cultures also with Anaerococcus) A49.01    Unstageable pressure ulcer of left foot ([facility]) L89.890    Nonhealing surgical wound, initial encounter (small left dorsal foot wound) T81.89XA    Chronic low back pain M54.50, G89.29    Dental bridge present Z97.2    Callus of foot L84    Hyperkalemia E87.5       In my clinical judgement, ongoing HBO therapy is necessary at this time, given a threat to patient function, limb or life from the current condition. Adjunctive Rx, objective weekly progress and goals of Rx will periodically be updated, on Mondays. Plan     1. Hyperbaric Oxygen - Del Hernandez tolerated the treatment well today without complications. Continue HBO treatment as outlined above. 2. Other -     I was present on these premises and immediately available to furnish assistance & direction throughout the procedure.      -- Electronically signed by Dunia Felix MD on 10/14/2021 at 5:34 PM

## 2021-10-15 ENCOUNTER — HOSPITAL ENCOUNTER (OUTPATIENT)
Dept: HYPERBARIC MEDICINE | Age: 61
Discharge: HOME | End: 2021-10-15
Payer: MEDICARE

## 2021-10-15 ENCOUNTER — HOSPITAL ENCOUNTER (OUTPATIENT)
Dept: WOUND CARE | Age: 61
Discharge: HOME | End: 2021-10-15
Payer: MEDICARE

## 2021-10-15 VITALS
BODY MASS INDEX: 32.2 KG/M2 | TEMPERATURE: 97.8 F | RESPIRATION RATE: 16 BRPM | SYSTOLIC BLOOD PRESSURE: 156 MMHG | WEIGHT: 243 LBS | DIASTOLIC BLOOD PRESSURE: 74 MMHG | HEIGHT: 73 IN | HEART RATE: 75 BPM

## 2021-10-15 VITALS
TEMPERATURE: 97.8 F | DIASTOLIC BLOOD PRESSURE: 74 MMHG | SYSTOLIC BLOOD PRESSURE: 156 MMHG | HEART RATE: 75 BPM | RESPIRATION RATE: 16 BRPM

## 2021-10-15 DIAGNOSIS — E11.621 DIABETIC ULCER OF OTHER PART OF LEFT FOOT ASSOCIATED WITH TYPE 2 DIABETES MELLITUS, WITH NECROSIS OF BONE (HCC): Primary | ICD-10-CM

## 2021-10-15 DIAGNOSIS — L97.524 DIABETIC ULCER OF OTHER PART OF LEFT FOOT ASSOCIATED WITH TYPE 2 DIABETES MELLITUS, WITH NECROSIS OF BONE (HCC): Primary | ICD-10-CM

## 2021-10-15 LAB
ANION GAP SERPL CALCULATED.3IONS-SCNC: 13 MMOL/L (ref 3–16)
BUN BLDV-MCNC: 42 MG/DL (ref 7–20)
CALCIUM SERPL-MCNC: 9.3 MG/DL (ref 8.3–10.6)
CHLORIDE BLD-SCNC: 109 MMOL/L (ref 99–110)
CO2: 20 MMOL/L (ref 21–32)
CREAT SERPL-MCNC: 1.4 MG/DL (ref 0.8–1.3)
GFR AFRICAN AMERICAN: >60
GFR NON-AFRICAN AMERICAN: 51
GLUCOSE BLD-MCNC: 101 MG/DL (ref 70–99)
GLUCOSE BLD-MCNC: 119 MG/DL (ref 70–99)
GLUCOSE BLD-MCNC: 130 MG/DL (ref 70–99)
GLUCOSE BLD-MCNC: 131 MG/DL (ref 70–99)
PERFORMED ON: ABNORMAL
POTASSIUM REFLEX MAGNESIUM: 5.3 MMOL/L (ref 3.5–5.1)
SODIUM BLD-SCNC: 142 MMOL/L (ref 136–145)

## 2021-10-15 PROCEDURE — G0277 HBOT, FULL BODY CHAMBER, 30M: HCPCS

## 2021-10-15 PROCEDURE — 36415 COLL VENOUS BLD VENIPUNCTURE: CPT

## 2021-10-15 PROCEDURE — 99183 HYPERBARIC OXYGEN THERAPY: CPT | Performed by: INTERNAL MEDICINE

## 2021-10-15 PROCEDURE — 11045 DBRDMT SUBQ TISS EACH ADDL: CPT | Performed by: INTERNAL MEDICINE

## 2021-10-15 PROCEDURE — 11042 DBRDMT SUBQ TIS 1ST 20SQCM/<: CPT | Performed by: INTERNAL MEDICINE

## 2021-10-15 PROCEDURE — 80048 BASIC METABOLIC PNL TOTAL CA: CPT

## 2021-10-15 PROCEDURE — 11042 DBRDMT SUBQ TIS 1ST 20SQCM/<: CPT

## 2021-10-15 RX ORDER — LIDOCAINE HYDROCHLORIDE 40 MG/ML
SOLUTION TOPICAL ONCE
Status: CANCELLED | OUTPATIENT
Start: 2021-10-15 | End: 2021-10-15

## 2021-10-15 RX ORDER — LIDOCAINE 40 MG/G
CREAM TOPICAL ONCE
Status: DISCONTINUED | OUTPATIENT
Start: 2021-10-15 | End: 2021-10-16 | Stop reason: HOSPADM

## 2021-10-15 RX ORDER — LIDOCAINE 50 MG/G
OINTMENT TOPICAL ONCE
Status: CANCELLED | OUTPATIENT
Start: 2021-10-15 | End: 2021-10-15

## 2021-10-15 RX ORDER — BACITRACIN ZINC AND POLYMYXIN B SULFATE 500; 1000 [USP'U]/G; [USP'U]/G
OINTMENT TOPICAL ONCE
Status: CANCELLED | OUTPATIENT
Start: 2021-10-15 | End: 2021-10-15

## 2021-10-15 RX ORDER — LIDOCAINE 40 MG/G
CREAM TOPICAL ONCE
Status: CANCELLED | OUTPATIENT
Start: 2021-10-15 | End: 2021-10-15

## 2021-10-15 ASSESSMENT — PAIN SCALES - GENERAL
PAINLEVEL_OUTOF10: 0

## 2021-10-15 NOTE — PLAN OF CARE
Wounds debrided per MD and patient tolerated well. Some superficial dermatitis noted from NPWT transparent dressing. Dr. Amber Gordon wants to hold NPWT until Monday at which time he will use the Safetac drape from the samples we gave him today. LABS drawn today as well. Continue HBO as ordered. Discharge instructions reviewed with patient, all questions answered, copy given to patient. Dressings were applied to all wounds per M.D. Instructions at this visit.

## 2021-10-15 NOTE — PROGRESS NOTES
[address] [facility] Physician Orders and Discharge [address], [address] [facility]  Telephone: [phone]      Fax: [phone]        Your home care [place] wound-care supplies will be provided by: We are ordering your wound-care supplies from [employer]. Please note, depending on your insurance coverage, you may have out-of-pocket expenses for these supplies. Someone from [facility] should call you to confirm your order and discuss those potential costs before they ship your products -- please anticipate that call. If your out-of-pocket cost could be substantial, [facility] has a financial hardship program for patients who qualify, so please ask about that if you might need a hand. If you have any questions about your supplies or your potential out-of-pocket costs, or if you need to place an order for a refill of supplies (typically monthly), please call [phone].      NAME: Lee Ann Nguyễn  DATE of BIRTH:  1960  PRIMARY DIAGNOSIS FOR WOUND CARE CENTER:  Diabetic      Wound cleansing:   Do not scrub or use excessive force. Wash hands with soap and water before and after dressing changes. Prior to applying a clean dressing, cleanse wound with normal saline, wound cleanser, or mild soap and water.  Ask your physician or nurse before getting the wound(s) wet in the shower.                Wound care for home:     Today in wound clinic apply to wounds the following:  Vashe, Triad, Opticel Ag  ABD, kerlix  LEAVE in place until Monday when NPWT is re-applied by home care    HOLD NPWT UNTIL Monday AT [address] AS FOLLOWS  Left Amp site:  Vashe  Adaptic   Foam to wound and either try to get a corner of the foam into the one deeper area or plain packing riboon  NPWT Safetac drape we gave you DO NOT USE SKIN PREP WITH THIS NEW us when you're ready to quit. *  Do not soak your feet unless specifically instructed to do so by us.             F/U Appointment is with Dr. Marina Holding 1 week , on                                   at                       .     Your nurse  is Herberth Hoyos RN      If we applied slip-resistant hospital socks today, be sure to remove them at least once a day to inspect your toes or feet, even if you're not changing the wraps or dressings underneath. If you see anything concerning (redness, excess moisture, etc), please call and let us know right away.     Should you experience any significant changes in your wound(s) (including redness, increased warmth, increased pain, increased drainage, odor, or fever) or have questions about your wound care, please contact the [facility] at [phone] Monday-Thursday from 8:00 am - 4:30 pm, or Friday from 8:00 am - 2:30 pm.  If you need help with your wound outside these hours and cannot wait until we are again available, contact your home-care company (if applicable), your PCP, or go to the nearest emergency room.

## 2021-10-15 NOTE — PLAN OF CARE
Patient seen for HBOT treatment  19 / 30  today. Patient assessment WNL- AVSS, Lungs CTA, TEED=0 bilat - [id number]- pt ate KETO pancakes this am - pt given snack prior to HBO - repeat FSBS 118 , NPWT held during HBO- tubing vented during HBO - cleared for HBOT. Patient was compressed in HBO chamber to 2.0 STEPHANIE at 2.0 psi/min x 90 min treatment w/o air breaks . Patient is tolerating treatment well and is currently resting comfortably and watching television. HBO RN at chamber side, will continue to monitor.   Pt will be seen in [address] [place] [address],[address] post HBO today

## 2021-10-17 NOTE — PROGRESS NOTES
[address]  Hyperbaric Oxygen    Del Boss     : 1960    DATE OF VISIT:  10/15/2021    Subjective     Del Boss is a 59 y.o. male who  has a past medical history of Abscess of left hand (2017), Acute kidney injury superimposed on chronic kidney disease ([facility] [facility] (2021), Adhesive capsulitis of shoulder (2014), Fractures, Gas gangrene of foot ([facility] (2021), and PNA (pneumonia) (2016). He presents to the [facility] today for hyperbaric oxygen treatment of his Devika Gutting 4 diabetic foot, which is refractory to standard therapy for 30 days. Patient denies fever. Patient denies nausea, vomiting or diarrhea; no ear troubles and no other new complaints; no fears or anxiety regarding treatment today. Objective     Vitals:    10/15/21 0820 10/15/21 1042   BP: 120/60 (!) 156/74   Pulse: 80 75   Resp: 18 16   Temp: 99 °F (37.2 °C) 97.8 °F (36.6 °C)   TempSrc: Oral Oral       General:  Alert, cooperative, no distress. Ears: External otic canals are within acceptable limits. Teed grade 0 on the right and 0 on the left pre-treatment. Teed grade 0 on the right and 0 on the left post-treatment. Lungs:  Clear to auscultation bilaterally. Ulcer: Not examined today in HBO, if applicable. Recent Labs     10/15/21  0807 10/15/21  0845 10/15/21  1040   POCGLU 101* 119* 130*       Assessment     Del Boss is a 59 y.o. male who presented to the [facility] today for hyperbaric oxygen treatment #19 of 30 for the diagnosis as stated above. Treatment given at 2 STEPHANIE for 90 minutes, with no air breaks. Total Treatment Time (min): 108 today, including compression, 100% oxygen at pressure, air breaks if applicable, and decompression.     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 2 diabetes mellitus, with necrosis of bone ([facility]) E11.621, L97.524    Diabetic polyneuropathy associated with type 2 diabetes mellitus (HCC) E11.42    Elevated PSA R97.20    Mixed hyperlipidemia E78.2    Stage 3a chronic kidney disease (HCC) N18.31    Acute osteomyelitis of metatarsal bone of left foot ([facility]) M86.172    Staphylococcus aureus infection (cultures also with Anaerococcus) A49.01    Unstageable pressure ulcer of left foot ([facility]) L89.890    Nonhealing surgical wound, initial encounter (small left dorsal foot wound) T81.89XA    Chronic low back pain M54.50, G89.29    Dental bridge present Z97.2    Callus of foot L84    Hyperkalemia E87.5       In my clinical judgement, ongoing HBO therapy is necessary at this time, given a threat to patient function, limb or life from the current condition. Adjunctive Rx, objective weekly progress and goals of Rx will periodically be updated, on Mondays. Plan     1. Hyperbaric Oxygen - Del Hernandez tolerated the treatment well today without complications. Continue HBO treatment as outlined above. 2. Other -     I was present on these premises and immediately available to furnish assistance & direction throughout the procedure.      -- Electronically signed by Angie Guerra MD on 10/17/2021 at 11:29 AM

## 2021-10-18 ENCOUNTER — HOSPITAL ENCOUNTER (OUTPATIENT)
Dept: HYPERBARIC MEDICINE | Age: 61
Discharge: HOME | End: 2021-10-18
Payer: MEDICARE

## 2021-10-18 VITALS
DIASTOLIC BLOOD PRESSURE: 65 MMHG | TEMPERATURE: 98.1 F | SYSTOLIC BLOOD PRESSURE: 132 MMHG | HEART RATE: 73 BPM | RESPIRATION RATE: 16 BRPM

## 2021-10-18 DIAGNOSIS — L97.524 DIABETIC ULCER OF OTHER PART OF LEFT FOOT ASSOCIATED WITH TYPE 2 DIABETES MELLITUS, WITH NECROSIS OF BONE (HCC): Primary | ICD-10-CM

## 2021-10-18 DIAGNOSIS — E11.621 DIABETIC ULCER OF OTHER PART OF LEFT FOOT ASSOCIATED WITH TYPE 2 DIABETES MELLITUS, WITH NECROSIS OF BONE (HCC): Primary | ICD-10-CM

## 2021-10-18 LAB
GLUCOSE BLD-MCNC: 116 MG/DL (ref 70–99)
GLUCOSE BLD-MCNC: 127 MG/DL (ref 70–99)
PERFORMED ON: ABNORMAL
PERFORMED ON: ABNORMAL

## 2021-10-18 PROCEDURE — 99183 HYPERBARIC OXYGEN THERAPY: CPT | Performed by: INTERNAL MEDICINE

## 2021-10-18 PROCEDURE — G0277 HBOT, FULL BODY CHAMBER, 30M: HCPCS

## 2021-10-18 ASSESSMENT — PAIN SCALES - GENERAL
PAINLEVEL_OUTOF10: 0
PAINLEVEL_OUTOF10: 0

## 2021-10-18 NOTE — DISCHARGE INSTR - COC
Continuity of Care Form    Patient Name: Shelli Dates   :  1960  MRN:  [id number]    Admit date:  10/18/2021  Discharge date:  ***    Code Status Order: Prior   Advance Directives:     Admitting Physician:  No admitting provider for patient encounter. PCP: Pernell Nissen, MD    Discharging Nurse: [facility] Unit/Room#: No information available for this encounter. Discharging Unit Phone Number: ***    Emergency Contact:   Extended Emergency Contact Information  Primary Emergency Contact: Rose Leonard  Address: [address] Phone: [phone]  Relation: Spouse  Secondary Emergency Contact: Varun Gifford Phone: [phone]  Relation: Brother/Sister    Past Surgical History:  Past Surgical History:   Procedure Laterality Date    FOOT DEBRIDEMENT Left 2021    LEFT FOOT DEBRIDEMENT INCISION AND DRAINAGE performed by Gayle Linton DPM at [address] [place] [address] Left 2021    STAGED INCISION AND DEBRIDEMENT LEFT FOOT WITH PARTIAL FIRST RAY AMPUTATION performed by Gayle Linton DPM at [facility] HAND SURGERY Left 2017    LEFT HAND DEBRIDEMENT INCISION AND DRAINAGE    NECK SURGERY      30s year    TOE AMPUTATION Left     hallux    WRIST FRACTURE SURGERY         Immunization History: There is no immunization history on file for this patient.     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 2 diabetes mellitus, with necrosis of bone (HCC) E11.621, L97.524    Diabetic polyneuropathy associated with type 2 diabetes mellitus (HCC) E11.42    Elevated PSA R97.20    Mixed hyperlipidemia E78.2    Stage 3a chronic kidney disease (HCC) N18.31    Acute osteomyelitis of metatarsal bone of left foot ([facility]) M86.172    Staphylococcus aureus infection (cultures also with Anaerococcus) A49.01    Unstageable pressure ulcer of left foot ([address] L89.890    Nonhealing surgical wound, initial encounter (small left dorsal foot wound) T81.89XA    Chronic low back pain M54.50, G89.29    Dental bridge present Z97.2    Callus of foot L84    Hyperkalemia E87.5       Isolation/Infection:   Isolation          No Isolation        Patient Infection Status     Infection Onset Added Last Indicated Last Indicated By Review Planned Expiration Resolved Resolved By    None active    Resolved    COVID-19 Rule Out 08/30/21 08/30/21 08/30/21 COVID-19, Rapid (Ordered)   08/30/21 Rule-Out Test Resulted          Nurse Assessment:  Last Vital Signs: /65   Pulse 73   Temp 98.1 °F (36.7 °C) (Oral)   Resp 16     Last documented pain score (0-10 scale): Pain Level: 0  Last Weight:   Wt Readings from Last 1 Encounters:   10/15/21 243 lb (110.2 kg)     Mental Status:  {IP PT MENTAL STATUS:14718}    IV Access:  { SUYAPA IV ACCESS:620619903}    Nursing Mobility/ADLs:  Walking   {CHP DME UIAX:130631451}  Transfer  {CHP DME VBFC:618493739}  Bathing  {CHP DME DTON:046935466}  Dressing  {CHP DME ODYF:018715121}  Toileting  {CHP DME VSSV:181291896}  Feeding  {CHP DME NXCJ:056735085}  Med Admin  {CHP DME BCXB:191397723}  Med Delivery   { SUYAPA MED Delivery:067745574}    Wound Care Documentation and Therapy:  Negative Pressure Wound Therapy Foot Anterior;Left;Medial (Active)   Unit Type [facility] 10/01/21 1314   Dressing Type Black foam 10/08/21 1235   Number of pieces used 1 10/08/21 1235   Cycle Continuous 10/08/21 1235   Target Pressure (mmHg) 125 10/08/21 1235   Canister changed? Yes 10/08/21 1235   Dressing Status Clean;Dry; Intact 10/08/21 1235   Dressing Changed Changed/New 10/08/21 1235   Number of days: 40       Wound 03/28/16 Other (Comment) Toe (Comment  which one) Posterior open area (Active)   Number of days: 2029       Incision 06/19/17 Hand Left (Active)   Number of days: 2156       Wound 09/16/21 #1, left medial forefoot, Diabetic ulcer, hercules 4, onset 8/25/2021 (Active)   Wound Image   10/01/21 1205   Wound Etiology Diabetic Hercules 4 10/15/21 1049   Dressing Status New dressing applied 10/15/21 1133   Wound Cleansed Vashe 10/15/21 1133   Dressing/Treatment Other (comment) 10/15/21 1133   Offloading for Diabetic Foot Ulcers Other (comment) 10/15/21 1133   Wound Length (cm) 4.6 cm 10/15/21 1049   Wound Width (cm) 5 cm 10/15/21 1049   Wound Depth (cm) 1.5 cm 10/15/21 1049   Wound Surface Area (cm^2) 23 cm^2 10/15/21 1049   Change in Wound Size % (l*w) -31.43 10/15/21 1049   Wound Volume (cm^3) 34.5 cm^3 10/15/21 1049   Wound Healing % 18 10/15/21 1049   Post-Procedure Length (cm) 4.6 cm 10/15/21 1105   Post-Procedure Width (cm) 5 cm 10/15/21 1105   Post-Procedure Depth (cm) 1.5 cm 10/15/21 1105   Post-Procedure Surface Area (cm^2) 23 cm^2 10/15/21 1105   Post-Procedure Volume (cm^3) 34.5 cm^3 10/15/21 1105   Distance Tunneling (cm) 0.5 cm 10/15/21 1049   Tunneling Position ___ O'Clock 9 10/15/21 1049   Undermining Maxium Distance (cm) 0 09/24/21 1150   Wound Assessment Pink/red; Other (Comment); [facility] 10/15/21 1049   Drainage Amount Moderate 10/15/21 1049   Drainage Description Serosanguinous 10/15/21 1049   Odor None 10/15/21 1049   Elicia-wound Assessment Blanchable erythema; Maceration 10/15/21 1049   Number of days: 32       Wound 09/16/21 #3, left lateral foot, pressure ulcer, unstageable, onset 9/2021 (Active)   Wound Etiology Pressure Unstageable 10/15/21 1049   Dressing Status New dressing applied 10/15/21 1133   Wound Cleansed Vashe 10/15/21 1133   Dressing/Treatment Other (comment) 10/15/21 1133   Offloading for Diabetic Foot Ulcers Orthowedge/inserts 10/01/21 1314   Wound Length (cm) 3.1 cm 10/15/21 1049   Wound Width (cm) 1.3 cm 10/15/21 1049   Wound Depth (cm) 0.2 cm 10/15/21 1049   Wound Surface Area (cm^2) 4.03 cm^2 10/15/21 1049   Change in Wound Size % (l*w) 47.66 10/15/21 1049   Wound Volume (cm^3) 0.806 cm^3 10/15/21 1049   Wound Healing % -5 10/15/21 1049   Post-Procedure Length (cm) 3.1 cm 10/15/21 1105   Post-Procedure Width (cm) 1.3 cm 10/15/21 1105   Post-Procedure Depth (cm) 0.2 cm 10/15/21 1105   Post-Procedure Surface Area (cm^2) 4.03 cm^2 10/15/21 1105   Post-Procedure Volume (cm^3) 0.806 cm^3 10/15/21 1105   Tunneling Position ___ O'Clock 0 21 1150   Wound Assessment Eschar dry;Slough 10/15/21 1049   Drainage Amount None 10/15/21 1049   Odor None 10/15/21 1049   Elicia-wound Assessment Maceration;Blanchable erythema; Hyperkeratosis (callous) 10/15/21 1049   Number of days: 32        Elimination:  Continence:   · Bowel: {YES / PW:11795}  · Bladder: {YES / TJ:37663}  Urinary Catheter: {Urinary Catheter:143549403}   Colostomy/Ileostomy/Ileal Conduit: {YES / BJ:56106}       Date of Last BM: ***  No intake or output data in the 24 hours ending 10/18/21 1136  No intake/output data recorded.     Safety Concerns:     [employer] Safety Concerns:681528179}    Impairments/Disabilities:      [employer] Impairments/Disabilities:637534189}    Nutrition Therapy:  Current Nutrition Therapy:   [employer] Diet List:500907589}    Routes of Feeding: {CHP DME Other Feedings:256479546}  Liquids: {Slp liquid thickness:40254}  Daily Fluid Restriction: {CHP DME Yes amt example:072759433}  Last Modified Barium Swallow with Video (Video Swallowing Test): {Done Not Done YLDO:788250724}    Treatments at the Time of Hospital Discharge:   Respiratory Treatments: ***  Oxygen Therapy:  {Therapy; copd oxygen:87229}  Ventilator:    { CC Vent DLMN:013101969}    Rehab Therapies: {THERAPEUTIC INTERVENTION:0234105103}  Weight Bearing Status/Restrictions: [address] Weight Bearin}  Other Medical Equipment (for information only, NOT a DME order):

## 2021-10-19 ENCOUNTER — HOSPITAL ENCOUNTER (OUTPATIENT)
Dept: HYPERBARIC MEDICINE | Age: 61
Discharge: HOME | End: 2021-10-19
Payer: MEDICARE

## 2021-10-19 VITALS
SYSTOLIC BLOOD PRESSURE: 188 MMHG | RESPIRATION RATE: 18 BRPM | DIASTOLIC BLOOD PRESSURE: 88 MMHG | HEART RATE: 67 BPM | TEMPERATURE: 97.7 F

## 2021-10-19 DIAGNOSIS — L97.524 DIABETIC ULCER OF OTHER PART OF LEFT FOOT ASSOCIATED WITH TYPE 2 DIABETES MELLITUS, WITH NECROSIS OF BONE (HCC): Primary | ICD-10-CM

## 2021-10-19 DIAGNOSIS — E11.621 DIABETIC ULCER OF OTHER PART OF LEFT FOOT ASSOCIATED WITH TYPE 2 DIABETES MELLITUS, WITH NECROSIS OF BONE (HCC): Primary | ICD-10-CM

## 2021-10-19 LAB
AFB CULTURE (MYCOBACTERIA): NORMAL
AFB SMEAR: NORMAL
GLUCOSE BLD-MCNC: 228 MG/DL (ref 70–99)
GLUCOSE BLD-MCNC: 234 MG/DL (ref 70–99)
PERFORMED ON: ABNORMAL
PERFORMED ON: ABNORMAL

## 2021-10-19 PROCEDURE — 99183 HYPERBARIC OXYGEN THERAPY: CPT | Performed by: INTERNAL MEDICINE

## 2021-10-19 PROCEDURE — G0277 HBOT, FULL BODY CHAMBER, 30M: HCPCS

## 2021-10-19 RX ORDER — OXYMETAZOLINE HYDROCHLORIDE 0.05 G/100ML
SPRAY NASAL
Qty: 1 BOTTLE | Refills: 0 | COMMUNITY
Start: 2021-10-19 | End: 2021-10-20

## 2021-10-19 ASSESSMENT — PAIN SCALES - GENERAL: PAINLEVEL_OUTOF10: 0

## 2021-10-19 NOTE — PROGRESS NOTES
[address]  Hyperbaric Oxygen    Del Edwards     : 1960    DATE OF VISIT:  10/19/2021    Subjective     Del Edwards is a 59 y.o. male who  has a past medical history of Abscess of left hand (2017), Acute kidney injury superimposed on chronic kidney disease ([facility] (2021), Adhesive capsulitis of shoulder (2014), Fractures, Gas gangrene [facility] (2021), and PNA (pneumonia) (2016). He presents to the [facility] today for hyperbaric oxygen treatment of his Kenneth Calender 4 diabetic foot, which is refractory to standard therapy for 30 days. Patient denies fever. Patient denies nausea, vomiting or diarrhea; no ear troubles and no other new complaints; no fears or anxiety regarding treatment today. Objective     Vitals:    10/19/21 0813 10/19/21 1020   BP: (!) 151/77 (!) 188/88   Pulse: 73 67   Resp: 18 18   Temp: 97.8 °F (36.6 °C) 97.7 °F (36.5 °C)   TempSrc: Oral Oral       General:  Alert, cooperative, no distress. Ears: External otic canals are within acceptable limits. Teed grade 0 on the right and 0 on the left pre-treatment. Teed grade 0 on the right and 0 on the left post-treatment. Lungs:  Clear to auscultation bilaterally. Ulcer: Not examined today in HBO, if applicable. Recent Labs     10/18/21  0849 10/18/21  1057 10/19/21  0815 10/19/21  1021   POCGLU 127* 116* 234* 1*       Assessment     Del Edwards is a 59 y.o. male who presented to the [facility] today for hyperbaric oxygen treatment #21 of 30 for the diagnosis as stated above. Treatment given at 2 STEPHANIE for 90 minutes, with no air breaks. Total Treatment Time (min): 106 today, including compression, 100% oxygen at pressure, air breaks if applicable, and decompression.     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 2 diabetes mellitus, with necrosis of bone ([facility]) E11.621, L97.524    Diabetic polyneuropathy associated with type 2 diabetes mellitus ([facility]) E11.42    Elevated PSA R97.20    Mixed hyperlipidemia E78.2    Stage 3a chronic kidney disease (HCC) N18.31    Acute osteomyelitis of metatarsal bone of left foot ([facility]) M86.172    Staphylococcus aureus infection (cultures also with Anaerococcus) A49.01    Unstageable pressure ulcer of left foot ([facility]) L89.890    Nonhealing surgical wound, initial encounter (small left dorsal foot wound) T81.89XA    Chronic low back pain M54.50, G89.29    Dental bridge present Z97.2    Callus of foot L84    Hyperkalemia E87.5       In my clinical judgement, ongoing HBO therapy is necessary at this time, given a threat to patient function, limb or life from the current condition. Adjunctive Rx, objective weekly progress and goals of Rx will periodically be updated, on Mondays. Plan     1. Hyperbaric Oxygen - Del Hernandez tolerated the treatment well today without complications. Continue HBO treatment as outlined above. 2. Other -     I was present on these premises and immediately available to furnish assistance & direction throughout the procedure.      -- Electronically signed by Ree Luis MD on 10/19/2021 at 4:45 PM

## 2021-10-19 NOTE — PROGRESS NOTES
[address]  Hyperbaric Oxygen    Del Edwards     : 1960    DATE OF VISIT:  10/18/2021    Subjective     Del Edwards is a 59 y.o. male who  has a past medical history of Abscess of left hand (2017), Acute kidney injury superimposed on chronic kidney disease ([facility] [address] (2021), Adhesive capsulitis of shoulder (2014), Fractures, Gas gangrene of foot ([email or web address] (2021), and PNA (pneumonia) (2016). He presents to the [facility] today for hyperbaric oxygen treatment of his Kenneth Calender 4 diabetic foot, which is refractory to standard therapy for 30 days. Patient denies fever. Patient denies nausea, vomiting or diarrhea; no ear troubles and no other new complaints; no fears or anxiety regarding treatment today. Objective     Vitals:    10/18/21 0853 10/18/21 1100   BP: (!) 141/69 132/65   Pulse: 82 73   Resp: 16 16   Temp: 98.4 °F (36.9 °C) 98.1 °F (36.7 °C)   TempSrc: Oral Oral       General:  Alert, cooperative, no distress. Ears: External otic canals are within acceptable limits. Teed grade 0 on the right and 0 on the left pre-treatment. Teed grade 0 on the right and 0 on the left post-treatment. Lungs:  Clear to auscultation bilaterally. Ulcer: Not examined today in HBO, if applicable. Recent Labs     10/18/21  0849 10/18/21  1057 10/19/21  0815   POCGLU 127* 116* 65*       Assessment     Del Edwards is a 59 y.o. male who presented to the [facility] today for hyperbaric oxygen treatment #20 of 30 for the diagnosis as stated above. Treatment given at 2 STEPHANIE for 90 minutes, with no air breaks. Total Treatment Time (min): 106 today, including compression, 100% oxygen at pressure, air breaks if applicable, and decompression.     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 2 diabetes mellitus, with necrosis of bone ([facility]) E11.621, L97.524    Diabetic polyneuropathy associated with type 2 diabetes mellitus ([facility]) E11.42    Elevated PSA R97.20    Mixed hyperlipidemia E78.2    Stage 3a chronic kidney disease ([facility]) N18.31    Acute osteomyelitis of metatarsal bone of left foot ([facility]) M86.172    Staphylococcus aureus infection (cultures also with Anaerococcus) A49.01    Unstageable pressure ulcer of left foot ([facility]) L89.890    Nonhealing surgical wound, initial encounter (small left dorsal foot wound) T81.89XA    Chronic low back pain M54.50, G89.29    Dental bridge present Z97.2    Callus of foot L84    Hyperkalemia E87.5       In my clinical judgement, ongoing HBO therapy is necessary at this time, given a threat to patient function, limb or life from the current condition. By way of an overall summary of his wound-care and clinical progress to date --    -- He has no signs of large-vessel PAD, with a left MYRNA of 0.93, multiphasic flow at the left CFA, and Duplex signs of < 50% stenosis in the SFA, popliteal and STEPHANIE. Because of Duplex concerns for an occluded PTA, a laser skin perfusion pressure study was also done, and those values were encouraging (range of 90 - 136 mmHg across the left foot and ankle, with normal waveforms on PVR). Nonetheless he did have decreased room-air TcpO2 values near the wound, demonstrating microvascular disease that could benefit from HBOT. -- Necrotic tissue is debrided weekly as needed, and is definitely improving. Still a very small focus of bone exposed last week, but granulation tissue is increasing around that.     -- Between his two surgeries around the first of September, and about 5 weeks of post-op IV Abx, there is no evidence of active soft tissue infection, and I believe his osteomyelitis is also likely resolved; cRP when we stopped Abx was only 6.0; will obviously watch closely for signs of infection relapse. -- He had mild-moderate LLE edema when I first met him, but with elevation, Abx and HBOT, that is now very mild, and I think not an impediment to wound healing.    -- Wound bed has continued to stay moist, and some initial maceration is not completely resolved at times, but is definitely improved. -- Tissue growth is being addressed by HBOT and now the addition of NPWT. From its largest size in late September until now, the wound area is only decreased by about 10%, but the calculated volume is decreased by almost 50%, and the degree of granulation tissue is much improved. -- Offloading is with a wheelchair, and when he must take steps, with a handheld walker and padded surgical shoe. -- He has very little foot pain, certainly nothing to interfere with wound care. -- Systemically, things are also in good order, with no immune compromising medications, no smoking, excellent adherence to recommended care, no signs of malnutrition, and a most recent albumin level of 3.7, Hgb A1c of 6.4%. HBOT tolerance thus far has been excellent, with no claustrophobia, no treatment-related hypoglycemia, no cardiopulmonary symptoms, no barotrauma, and no visual changes reported. Overall I believe he has made very good progress with HBOT in these first four weeks, but still has additional progress to make, to give him the best chances of rapid healing and avoidance of further surgery / more proximal amputation. I believe that the likely benefits of additional HBOT clearly outweigh the risks, and believe that we should continue therapy. Mr. Jazmín Nunes is in agreement.  Will plan to push forward to the initially ordered 30 treatments, but at that time, we might elect to go a little longer, depending on the degree of granulation of the wound bed, how well the currently exposed bone is covered, and how much more his wound depth and volume improve. Plan     1. Hyperbaric Oxygen - Del Hernandez tolerated the treatment well today without complications. Continue HBO treatment as outlined above. 2. Other -     I was present on these premises and immediately available to furnish assistance & direction throughout the procedure.      -- Electronically signed by Anmol Andrews MD on 10/19/2021 at 9:22 AM

## 2021-10-19 NOTE — PROGRESS NOTES
[address] [facility] Progress Note    Del Okeefe     : 1960    DATE OF VISIT:  10/15/2021    Subjective:     Jeffrey Andrade is a 59 y.o. male who has a diabetic ulcer located on the foot (left , medial, forefoot). Significant symptoms or pertinent wound history since last visit: feeling ok overall; no F/C/D, no real foot pain, NPWT going well (apart from skin issues). Tolerating HBOT well. Additional ulcer(s) noted? yes. Dorsal wound healed, lateral pressure eschar finally starting to lyse and soften up. His current medication list consists of Dulaglutide, Multiple Vitamin, atorvastatin, fish oil, hydrALAZINE, hydroCHLOROthiazide, insulin glargine, lisinopril, metFORMIN, oxymetazoline, and tamsulosin. Allergies: Patient has no known allergies. Objective:     Vitals:    10/15/21 1049   BP: (!) 156/74  Comment: Simultaneous filing. User may not have seen previous data. Pulse: 75  Comment: Simultaneous filing. User may not have seen previous data. Resp: 16  Comment: Simultaneous filing. User may not have seen previous data. Temp: 97.8 °F (36.6 °C)  Comment: Simultaneous filing. User may not have seen previous data. TempSrc: Oral  Comment: Simultaneous filing. User may not have seen previous data. Weight: 243 lb (110.2 kg)  Comment: From 3 days ago  Simultaneous filing. User may not have seen previous data. Height: 6' 1\" (1.854 m)  Comment: Simultaneous filing. User may not have seen previous data.      Constitutional:  well-developed, well-nourished, NAD  Psychiatric:  oriented to person, place and time; mood and affect appropriate for the situation  Cardiovascular:  bilateral pedal pulses palpable, feet warm, brisk cap refill; mild left lower extremity edema  Lymphatic:  no inguinal or popliteal adenopathy, no angitis, no cellulitis  Musculoskeletal:  no clubbing, cyanosis or petechiae; RLE and LLE with no gross effusions, joint misalignment or acute arthritis; slight prominence of right 1st met head, with a small, stable, longstanding callus there. Neuro: decreased pedal sensation to light touch; no allodynia   Elicia-ulcer skin: that unusual almost pustular eruption of the left plantar foot from last week is resolved, with putting a bit of Betadine and gauze beneath the VAC drape there, but there's more of a typical contact dermatitis from drape on the dorsal foot now  Ulcer(s): dorsal foot healed. Lateral foot with a softening eschar, starting to lyse and retract from the edges; primary diabetic ulcer more granular, less sloughy SQ debris, some fibrin and biofilm, no definite fascial or muscle necrosis, still a small amount of exposed 1st met bone, but less than last week, no pus, less distal undermining. Photos also saved in electronic chart.     Today's wound measurements, per RN documentation:  Wound 09/16/21 #1, left medial forefoot, Diabetic ulcer, jenkins 4, onset 8/25/2021-Wound Length (cm): 4.6 cm  [REMOVED] Wound 09/16/21 #2, left dorsal foot, surgical wound, full thickness, onset 8/31/2021-Wound Length (cm): 0 cm  Wound 09/16/21 #3, left lateral foot, pressure ulcer, unstageable, onset 9/2021-Wound Length (cm): 3.1 cm    Wound 09/16/21 #1, left medial forefoot, Diabetic ulcer, jenkins 4, onset 8/25/2021-Wound Width (cm): 5 cm  [REMOVED] Wound 09/16/21 #2, left dorsal foot, surgical wound, full thickness, onset 8/31/2021-Wound Width (cm): 0 cm  Wound 09/16/21 #3, left lateral foot, pressure ulcer, unstageable, onset 9/2021-Wound Width (cm): 1.3 cm    Wound 09/16/21 #1, left medial forefoot, Diabetic ulcer, jenkins 4, onset 8/25/2021-Wound Depth (cm): 1.5 cm  [REMOVED] Wound 09/16/21 #2, left dorsal foot, surgical wound, full thickness, onset 8/31/2021-Wound Depth (cm): 0 cm  Wound 09/16/21 #3, left lateral foot, pressure ulcer, unstageable, onset 9/2021-Wound Depth (cm): 0.2 cm   ________________    Got a repeat BMP today, since stopping Abx and adding HCTZ to his ACE inhibitor -- Na 142, K 5.3, BUN 42, creat 1.4. 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 2 diabetes mellitus, with necrosis of bone ([facility]) E11.621, L97.524    Diabetic polyneuropathy associated with type 2 diabetes mellitus ([facility]) E11.42    Elevated PSA R97.20    Mixed hyperlipidemia E78.2    Stage 3a chronic kidney disease ([facility]) N18.31    Acute osteomyelitis of metatarsal bone of left foot ([facility]) M86.172    Staphylococcus aureus infection (cultures also with Anaerococcus) A49.01    Unstageable pressure ulcer of left foot ([facility]) L89.890    Nonhealing surgical wound, initial encounter (small left dorsal foot wound) T81.89XA    Chronic low back pain M54.50, G89.29    Dental bridge present Z97.2    Callus of foot L84    Hyperkalemia E87.5       Assessment of today's active condition(s): well controlled Dm2, neuropathy, no PAD; Garrett Kanaris 4 diabetic foot with Staph cellulitis, abscess, acute osteo, recent partial first ray amp; also I think a lateral foot pressure eschar from the post-op period. Generally making very good progress overall, no signs of ischemia, infection seems to be resolved, less necrosis, more granulation, but he's having an ongoing problem with VAC drape, I think. Factors contributing to occurrence and/or persistence of the chronic ulcer include edema, diabetes, chronic pressure and decreased tissue oxygenation. Medical necessity of today's visit is shown by the above documentation. Sharp debridement is indicated today, based upon the exam findings in the wound(s) above. Procedure note:     Consent obtained. Time out performed per [facility] policy.     Anesthetic  Anesthetic: 4% Lidocaine Cream Using a curette, #15 blade scalpel and forceps, I sharply debrided the foot (left , medial, lateral) ulcer(s) down through and including the removal of subcutaneous tissue. The type(s) of tissue debrided included fibrin, biofilm, slough and necrotic/eschar. Total Surface Area Debrided: about 24 sq cm. Beneath that lateral eschar is a bit of pink-red tissue, but still some SQ fibrosis, maybe a bit of fascial or tendon sheath exposure, no pus, no bone. The ulcers were then irrigated with normal saline solution. The procedure was completed with a small amount of bleeding, and hemostasis was with pressure and with silver nitrate stick(s). The patient tolerated the procedure well, with no significant complications. The patient's level of pain during and after the procedure was monitored. Post-debridement measurements, if different from pre-debridement, are in the flowsheet as well. Discharge plan:     Treatment in the wound care center today, per RN documentation: Wound 09/16/21 #1, left medial forefoot, Diabetic ulcer, jenkins 4, onset 8/25/2021-Dressing/Treatment: Other (comment) (triad opticel ag 4x4's ABD kerlix)  Wound 09/16/21 #3, left lateral foot, pressure ulcer, unstageable, onset 9/2021-Dressing/Treatment: Other (comment) (triad 4x4's kerlix). Holding NPWT for the weekend to get some topical triamcinolone on that rash, get that skin calmed down. We have a supply of Safetac NPWT drape that we can let him use, at least for a couple of weeks. No additional Abx planned for now. Continue HBO. Keep up good work with glucose control. No change in BP meds right now (ACE inhibitor and HCTZ daily, hydralazine PRN on weekdays depending on BP before HBO). Home treatment: good handwashing before and after any dressing changes. Cleanse wound with saline or soap & water before dressing change. May use Vaseline (petrolatum), Aquaphor, Aveeno, CeraVe, Cetaphil, Eucerin, Lubriderm, etc for dry skin. Dressing type for home: continue Vashe to both; Triad and a dry dressing lateral, NPWT foam and new (Safetac) drape medial, three times weekly. Written discharge instructions given to patient. Follow up in 1 week to see me, daily for HBOT.     Electronically signed by Luz Maria Rust MD on 10/19/2021 at 6:04 PM.

## 2021-10-19 NOTE — PLAN OF CARE
Patient seen for HBOT treatment  21 / 30   today. Patient assessment complete - /77, hydralazine given to pt for systolic >573 prior to HBO per MD orders  - and cleared for HBOT. Patient was compressed in HBO chamber to 2.0 STEPHANIE at 2.0 psi/min. Patient is tolerating treatment well and is currently resting comfortably and watching television. HBO RN at chamber side, will continue to monitor.

## 2021-10-20 ENCOUNTER — HOSPITAL ENCOUNTER (OUTPATIENT)
Dept: HYPERBARIC MEDICINE | Age: 61
Discharge: HOME | End: 2021-10-20
Payer: MEDICARE

## 2021-10-20 VITALS
SYSTOLIC BLOOD PRESSURE: 164 MMHG | HEART RATE: 71 BPM | DIASTOLIC BLOOD PRESSURE: 78 MMHG | RESPIRATION RATE: 16 BRPM | TEMPERATURE: 98 F

## 2021-10-20 DIAGNOSIS — E11.621 DIABETIC ULCER OF OTHER PART OF LEFT FOOT ASSOCIATED WITH TYPE 2 DIABETES MELLITUS, WITH NECROSIS OF BONE (HCC): Primary | ICD-10-CM

## 2021-10-20 DIAGNOSIS — L97.524 DIABETIC ULCER OF OTHER PART OF LEFT FOOT ASSOCIATED WITH TYPE 2 DIABETES MELLITUS, WITH NECROSIS OF BONE (HCC): Primary | ICD-10-CM

## 2021-10-20 LAB
GLUCOSE BLD-MCNC: 116 MG/DL (ref 70–99)
GLUCOSE BLD-MCNC: 130 MG/DL (ref 70–99)
PERFORMED ON: ABNORMAL
PERFORMED ON: ABNORMAL

## 2021-10-20 PROCEDURE — G0277 HBOT, FULL BODY CHAMBER, 30M: HCPCS

## 2021-10-20 PROCEDURE — 99183 HYPERBARIC OXYGEN THERAPY: CPT | Performed by: EMERGENCY MEDICINE

## 2021-10-20 ASSESSMENT — PAIN DESCRIPTION - ORIENTATION: ORIENTATION: LEFT

## 2021-10-20 ASSESSMENT — PAIN DESCRIPTION - PAIN TYPE: TYPE: CHRONIC PAIN

## 2021-10-20 ASSESSMENT — PAIN SCALES - GENERAL
PAINLEVEL_OUTOF10: 0
PAINLEVEL_OUTOF10: 0

## 2021-10-20 ASSESSMENT — PAIN DESCRIPTION - LOCATION: LOCATION: FOOT

## 2021-10-20 NOTE — PROGRESS NOTES
[address]  Hyperbaric Oxygen    Del Castle     : 1960    DATE OF VISIT:  10/20/2021    Subjective     Del Castle is a 59 y.o. male who  has a past medical history of Abscess of left hand (2017), Acute kidney injury superimposed on chronic kidney disease ([facility] [address] (2021), Adhesive capsulitis of shoulder (2014), Fractures, Gas gangrene of foot ([facility] (2021), and PNA (pneumonia) (2016). He presents to the [facility] today for hyperbaric oxygen treatment of his diabetic foot ulcer  , which is refractory to standard therapy for 30 days. Patient denies fever. Patient denies nausea, vomiting or diarrhea; no ear troubles and no other new complaints; no fears or anxiety regarding treatment today. Objective       Vitals:    10/20/21 0813   BP: 135/69   Pulse: 79   Resp: 18   Temp: 98 °F (36.7 °C)   TempSrc: Oral       General:  Alert, cooperative, no distress. Ears: External otic canals are within acceptable limits. Teed grade 0 on the right and 0 on the left pre-treatment. Teed grade 0 on the right and 0 on the left post-treatment. Lungs:  Clear to auscultation bilaterally. Ulcer: Not examined today in HBO, if applicable. Recent Labs     10/18/21  0849 10/18/21  1057 10/19/21  0815 10/19/21  1021 10/20/21  0805   POCGLU 127* 116* 234* 228* 130*       Assessment     Del Castle is a 59 y.o. male who presented to the [facility] today for hyperbaric oxygen treatment #22 of 30 for the diagnosis as stated above. Treatment given at 2 STEPHANIE for 90 minutes, if applicable air breaks were administered. Total Treatment Time (min): 106 today, including compression, 100% oxygen at pressure, air breaks if applicable, and decompression.     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 2 diabetes mellitus, with necrosis of bone ([facility]) E11.621, L97.524    Diabetic polyneuropathy associated with type 2 diabetes mellitus ([facility]) E11.42    Elevated PSA R97.20    Mixed hyperlipidemia E78.2    Stage 3a chronic kidney disease ([facility]) N18.31    Acute osteomyelitis of metatarsal bone of left foot ([facility]) M86.172    Staphylococcus aureus infection (cultures also with Anaerococcus) A49.01    Unstageable pressure ulcer of left foot ([facility]) L89.890    Nonhealing surgical wound, initial encounter (small left dorsal foot wound) T81.89XA    Chronic low back pain M54.50, G89.29    Dental bridge present Z97.2    Callus of foot L84    Hyperkalemia E87.5       In my clinical judgement, ongoing HBO therapy is necessary at this time, given a threat to patient function, limb or life from the current condition. Adjunctive Rx, objective weekly progress and goals of Rx will periodically be updated, on Mondays. Plan     1. Hyperbaric Oxygen - Del Hernandez tolerated the treatment well today without complications. Continue HBO treatment as outlined above. 2. Other -     I was present on these premises and immediately available to furnish assistance & direction throughout the procedure.      -- Electronically signed by Sailaja Jacobs MD on 10/20/2021 at 10:19 AM

## 2021-10-20 NOTE — PLAN OF CARE
Patient seen for HBOT treatment  22 / 30    today. Patient assessment WNL- AVSS, lungs CTA, TEED=0bilat, FSBS 130- NPWT tubing vented  -cleared for HBOT. Patient was compressed in HBO chamber to 2.0 STEPHANIE at 2.0 psi/min x 90 min treatment w/o air breaks . Patient is tolerating treatment well and is currently resting comfortably and watching television. HBO RN at chamber side, will continue to monitor.

## 2021-10-21 ENCOUNTER — HOSPITAL ENCOUNTER (OUTPATIENT)
Dept: HYPERBARIC MEDICINE | Age: 61
Discharge: HOME | End: 2021-10-21
Payer: MEDICARE

## 2021-10-21 VITALS
DIASTOLIC BLOOD PRESSURE: 74 MMHG | RESPIRATION RATE: 18 BRPM | TEMPERATURE: 99.9 F | HEART RATE: 71 BPM | SYSTOLIC BLOOD PRESSURE: 148 MMHG

## 2021-10-21 DIAGNOSIS — E11.621 DIABETIC ULCER OF OTHER PART OF LEFT FOOT ASSOCIATED WITH TYPE 2 DIABETES MELLITUS, WITH NECROSIS OF BONE (HCC): Primary | ICD-10-CM

## 2021-10-21 DIAGNOSIS — L97.524 DIABETIC ULCER OF OTHER PART OF LEFT FOOT ASSOCIATED WITH TYPE 2 DIABETES MELLITUS, WITH NECROSIS OF BONE (HCC): Primary | ICD-10-CM

## 2021-10-21 LAB
GLUCOSE BLD-MCNC: 140 MG/DL (ref 70–99)
GLUCOSE BLD-MCNC: 230 MG/DL (ref 70–99)
PERFORMED ON: ABNORMAL
PERFORMED ON: ABNORMAL

## 2021-10-21 PROCEDURE — 99183 HYPERBARIC OXYGEN THERAPY: CPT | Performed by: INTERNAL MEDICINE

## 2021-10-21 PROCEDURE — G0277 HBOT, FULL BODY CHAMBER, 30M: HCPCS

## 2021-10-21 ASSESSMENT — PAIN SCALES - GENERAL
PAINLEVEL_OUTOF10: 0
PAINLEVEL_OUTOF10: 0

## 2021-10-21 NOTE — PLAN OF CARE
Pt to the [address] [place] [address] for HBOT. Assessment completed and patient cleared for treatment. Pt to 2.0 STEPHANIE at a rate of 2.0 psi. Pt to pressure without complaints and is watching tv. Nurse at chamber side and will continue to monitor.

## 2021-10-22 ENCOUNTER — HOSPITAL ENCOUNTER (OUTPATIENT)
Dept: HYPERBARIC MEDICINE | Age: 61
Discharge: HOME | End: 2021-10-22
Payer: MEDICARE

## 2021-10-22 ENCOUNTER — HOSPITAL ENCOUNTER (OUTPATIENT)
Dept: WOUND CARE | Age: 61
Discharge: HOME | End: 2021-10-22
Payer: MEDICARE

## 2021-10-22 VITALS
RESPIRATION RATE: 16 BRPM | TEMPERATURE: 97.8 F | HEIGHT: 73 IN | HEART RATE: 77 BPM | DIASTOLIC BLOOD PRESSURE: 72 MMHG | WEIGHT: 238 LBS | SYSTOLIC BLOOD PRESSURE: 157 MMHG | BODY MASS INDEX: 31.54 KG/M2

## 2021-10-22 VITALS
TEMPERATURE: 97.8 F | SYSTOLIC BLOOD PRESSURE: 157 MMHG | HEART RATE: 77 BPM | DIASTOLIC BLOOD PRESSURE: 72 MMHG | RESPIRATION RATE: 18 BRPM

## 2021-10-22 DIAGNOSIS — E11.621 DIABETIC ULCER OF OTHER PART OF LEFT FOOT ASSOCIATED WITH TYPE 2 DIABETES MELLITUS, WITH NECROSIS OF BONE (HCC): Primary | ICD-10-CM

## 2021-10-22 DIAGNOSIS — L97.524 DIABETIC ULCER OF OTHER PART OF LEFT FOOT ASSOCIATED WITH TYPE 2 DIABETES MELLITUS, WITH NECROSIS OF BONE (HCC): Primary | ICD-10-CM

## 2021-10-22 LAB
GLUCOSE BLD-MCNC: 157 MG/DL (ref 70–99)
GLUCOSE BLD-MCNC: 185 MG/DL (ref 70–99)
PERFORMED ON: ABNORMAL
PERFORMED ON: ABNORMAL

## 2021-10-22 PROCEDURE — 11044 DBRDMT BONE 1ST 20 SQ CM/<: CPT

## 2021-10-22 PROCEDURE — 11042 DBRDMT SUBQ TIS 1ST 20SQCM/<: CPT

## 2021-10-22 PROCEDURE — G0277 HBOT, FULL BODY CHAMBER, 30M: HCPCS

## 2021-10-22 PROCEDURE — 11044 DBRDMT BONE 1ST 20 SQ CM/<: CPT | Performed by: INTERNAL MEDICINE

## 2021-10-22 PROCEDURE — 99183 HYPERBARIC OXYGEN THERAPY: CPT | Performed by: INTERNAL MEDICINE

## 2021-10-22 PROCEDURE — 97605 NEG PRS WND THER DME<=50SQCM: CPT

## 2021-10-22 PROCEDURE — 97605 NEG PRS WND THER DME<=50SQCM: CPT | Performed by: INTERNAL MEDICINE

## 2021-10-22 PROCEDURE — 11042 DBRDMT SUBQ TIS 1ST 20SQCM/<: CPT | Performed by: INTERNAL MEDICINE

## 2021-10-22 RX ORDER — BACITRACIN ZINC AND POLYMYXIN B SULFATE 500; 1000 [USP'U]/G; [USP'U]/G
OINTMENT TOPICAL ONCE
Status: CANCELLED | OUTPATIENT
Start: 2021-10-22 | End: 2021-10-22

## 2021-10-22 RX ORDER — LIDOCAINE 50 MG/G
OINTMENT TOPICAL ONCE
Status: CANCELLED | OUTPATIENT
Start: 2021-10-22 | End: 2021-10-22

## 2021-10-22 RX ORDER — LIDOCAINE HYDROCHLORIDE 40 MG/ML
SOLUTION TOPICAL ONCE
Status: CANCELLED | OUTPATIENT
Start: 2021-10-22 | End: 2021-10-22

## 2021-10-22 RX ORDER — LIDOCAINE 40 MG/G
CREAM TOPICAL ONCE
Status: DISCONTINUED | OUTPATIENT
Start: 2021-10-22 | End: 2021-10-23 | Stop reason: HOSPADM

## 2021-10-22 RX ORDER — OXYMETAZOLINE HYDROCHLORIDE 0.05 G/100ML
SPRAY NASAL
Qty: 1 BOTTLE | Refills: 0 | COMMUNITY
Start: 2021-10-22 | End: 2021-10-26

## 2021-10-22 RX ORDER — LIDOCAINE 40 MG/G
CREAM TOPICAL ONCE
Status: CANCELLED | OUTPATIENT
Start: 2021-10-22 | End: 2021-10-22

## 2021-10-22 ASSESSMENT — PAIN SCALES - GENERAL
PAINLEVEL_OUTOF10: 0

## 2021-10-22 NOTE — PROGRESS NOTES
rinse)  Triad  gauze  Change daily if possible        Please note, all wounds (unless stated otherwise here) were mechanically debrided at the time of cleansing here in the wound-care center today, so a small amount of pain, drainage or bleeding from that process might be expected, and is normal.      All products for home use, including multiple products for a single wound if applicable, are medically necessary in order to achieve the best chance at timely wound healing. See provider documentation for details if needed. Substituted dressings applied in the [facility] today, if applicable:           New orders for this week (labs, imaging, medications, etc.):           Additional instructions for specific diagnoses:    CONTINUE HYPERBARICS AS ORDERED      General comments for diabetic / neuropathic ulcers:  *  Unless you've been instructed not to remove your dressings, be sure to inspect your feet daily, and notify us of any major changes. *  If you do not have a long-term podiatrist, be sure to let us know. *  Moisturize your skin regularly with Vaseline, Aquaphor, Aveeno, CeraVe, Cetaphil, Eucerin, Lubriderm, etc; but keep the skin between your toes dry. *  Always allow your wound-care doctor or podiatrist to cut nails, calluses & corns. *  Be sure to always wear footwear that fits well, and NEVER go without shoes and socks. *  Do you know your Hemoglobin A1c level? You should! Please ask if you have questions. *  Be sure to adhere to any recommendations from your PCP or endocrinologist when it comes to diabetes medications and diet. If you have questions, please ask. *  If you smoke, your wound can not heal properly -- please talk with us when you're ready to quit. *  Do not soak your feet unless specifically instructed to do so by us.               F/U Appointment is with Dr. Elana Baker  in 1 week , on                                   at                       .     Your nurse  is Elaine Mari RN If we applied slip-resistant hospital socks today, be sure to remove them at least once a day to inspect your toes or feet, even if you're not changing the wraps or dressings underneath. If you see anything concerning (redness, excess moisture, etc), please call and let us know right away. Should you experience any significant changes in your wound(s) (including redness, increased warmth, increased pain, increased drainage, odor, or fever) or have questions about your wound care, please contact the [facility] at [phone] Monday-Thursday from 8:00 am - 4:30 pm, or Friday from 8:00 am - 2:30 pm.  If you need help with your wound outside these hours and cannot wait until we are again available, contact your home-care company (if applicable), your PCP, or go to the nearest emergency room.

## 2021-10-22 NOTE — PLAN OF CARE
Patient seen for HBOT treatment  24/30 today. Patient assessment WNL . Pt cleared for HBOT. Patient was compressed in HBO chamber to 2.0 STEPHANIE at 2.0 psi/min. Patient is tolerating treatment well and is currently resting comfortably and watching television. HBO RN at chamber side, will continue to monitor.

## 2021-10-22 NOTE — PLAN OF CARE
Continued improvement noted in wounds today. Wounds debrided per MD and patient tolerated well. No changes in wound care regime, continue NPWT, Home Care and HBO as ordered. Discharge instructions reviewed with patient, all questions answered, copy given to patient. Dressings were applied to all wounds per M.D. Instructions at this visit.

## 2021-10-23 NOTE — PROGRESS NOTES
[address]  Hyperbaric Oxygen    Del Glaser     : 1960    DATE OF VISIT:  10/21/2021    Subjective     Del Glaser is a 59 y.o. male who  has a past medical history of Abscess of left hand (2017), Acute kidney injury superimposed on chronic kidney disease ([facility] (2021), Adhesive capsulitis of shoulder (2014), Fractures, Gas gangrene of foot ([facility] (2021), and PNA (pneumonia) (2016). He presents to the [facility] today for hyperbaric oxygen treatment of his Amee Cunning 4 diabetic foot, which is refractory to standard therapy for 30 days. Patient denies fever. Patient denies nausea, vomiting or diarrhea; no ear troubles and no other new complaints; no fears or anxiety regarding treatment today. Objective     Vitals:    10/21/21 0817 10/21/21 1018   BP: 133/72 (!) 148/74   Pulse: 82 71   Resp: 18 18   Temp: 99.3 °F (37.4 °C) 99.9 °F (37.7 °C)   TempSrc: Oral Oral       General:  Alert, cooperative, no distress. Ears: External otic canals are within acceptable limits. Teed grade 0 on the right and 0 on the left pre-treatment. Teed grade 0 on the right and 0 on the left post-treatment. Lungs:  Clear to auscultation bilaterally. Ulcer: Not examined today in HBO, if applicable. Recent Labs     10/21/21  0804 10/21/21  1020 10/22/21  0807 10/22/21  1020   POCGLU 230* 140* 185* 157*       Assessment     Del Glaser is a 59 y.o. male who presented to the [facility] today for hyperbaric oxygen treatment #23 of 30 for the diagnosis as stated above. Treatment given at 2 STEPHANIE for 90 minutes, with no air breaks. Total Treatment Time (min): 106 today, including compression, 100% oxygen at pressure, air breaks if applicable, and decompression.     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 2 diabetes mellitus, with necrosis of bone (HCC) E11.621, L97.524    Diabetic polyneuropathy associated with type 2 diabetes mellitus (HCC) E11.42    Elevated PSA R97.20    Mixed hyperlipidemia E78.2    Stage 3a chronic kidney disease (HCC) N18.31    Acute osteomyelitis of metatarsal bone of left foot ([facility]) M86.172    Staphylococcus aureus infection (cultures also with Anaerococcus) A49.01    Unstageable pressure ulcer of left foot ([facility]) L89.890    Nonhealing surgical wound, initial encounter (small left dorsal foot wound) T81.89XA    Chronic low back pain M54.50, G89.29    Dental bridge present Z97.2    Callus of foot L84    Hyperkalemia E87.5       In my clinical judgement, ongoing HBO therapy is necessary at this time, given a threat to patient function, limb or life from the current condition. Adjunctive Rx, objective weekly progress and goals of Rx will periodically be updated, on Mondays. Plan     1. Hyperbaric Oxygen - Del Hernandez tolerated the treatment well today without complications. Continue HBO treatment as outlined above. 2. Other -     I was present on these premises and immediately available to furnish assistance & direction throughout the procedure.      -- Electronically signed by Patrick Palmer MD on 10/23/2021 at 12:21 PM

## 2021-10-23 NOTE — PROGRESS NOTES
Class 1 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 2 diabetes mellitus, with necrosis of bone ([facility]) E11.621, L97.524    Diabetic polyneuropathy associated with type 2 diabetes mellitus (HCC) E11.42    Elevated PSA R97.20    Mixed hyperlipidemia E78.2    Stage 3a chronic kidney disease (HCC) N18.31    Acute osteomyelitis of metatarsal bone of left foot ([facility]) M86.172    Staphylococcus aureus infection (cultures also with Anaerococcus) A49.01    Unstageable pressure ulcer of left foot ([facility]) L89.890    Nonhealing surgical wound, initial encounter (small left dorsal foot wound) T81.89XA    Chronic low back pain M54.50, G89.29    Dental bridge present Z97.2    Callus of foot L84    Hyperkalemia E87.5       In my clinical judgement, ongoing HBO therapy is necessary at this time, given a threat to patient function, limb or life from the current condition. Adjunctive Rx, objective weekly progress and goals of Rx will periodically be updated, on Mondays. Plan     1. Hyperbaric Oxygen - Del Hernandez tolerated the treatment well today without complications. Continue HBO treatment as outlined above. 2. Other -     I was present on these premises and immediately available to furnish assistance & direction throughout the procedure.      -- Electronically signed by Elsa Chamberlain MD on 10/23/2021 at 2:18 PM

## 2021-10-25 ENCOUNTER — HOSPITAL ENCOUNTER (OUTPATIENT)
Dept: HYPERBARIC MEDICINE | Age: 61
Discharge: HOME | End: 2021-10-25
Payer: MEDICARE

## 2021-10-25 VITALS
TEMPERATURE: 99.3 F | DIASTOLIC BLOOD PRESSURE: 83 MMHG | SYSTOLIC BLOOD PRESSURE: 163 MMHG | HEART RATE: 69 BPM | RESPIRATION RATE: 18 BRPM

## 2021-10-25 DIAGNOSIS — E11.621 DIABETIC ULCER OF OTHER PART OF LEFT FOOT ASSOCIATED WITH TYPE 2 DIABETES MELLITUS, WITH NECROSIS OF BONE (HCC): Primary | ICD-10-CM

## 2021-10-25 DIAGNOSIS — L97.524 DIABETIC ULCER OF OTHER PART OF LEFT FOOT ASSOCIATED WITH TYPE 2 DIABETES MELLITUS, WITH NECROSIS OF BONE (HCC): Primary | ICD-10-CM

## 2021-10-25 LAB
GLUCOSE BLD-MCNC: 135 MG/DL (ref 70–99)
GLUCOSE BLD-MCNC: 160 MG/DL (ref 70–99)
PERFORMED ON: ABNORMAL
PERFORMED ON: ABNORMAL

## 2021-10-25 PROCEDURE — 99183 HYPERBARIC OXYGEN THERAPY: CPT | Performed by: INTERNAL MEDICINE

## 2021-10-25 PROCEDURE — G0277 HBOT, FULL BODY CHAMBER, 30M: HCPCS

## 2021-10-25 RX ORDER — OXYMETAZOLINE HYDROCHLORIDE 0.05 G/100ML
SPRAY NASAL
Qty: 1 BOTTLE | Refills: 0 | COMMUNITY
Start: 2021-10-25 | End: 2021-10-26

## 2021-10-26 ENCOUNTER — HOSPITAL ENCOUNTER (OUTPATIENT)
Dept: HYPERBARIC MEDICINE | Age: 61
Discharge: HOME | End: 2021-10-26
Payer: MEDICARE

## 2021-10-26 VITALS
DIASTOLIC BLOOD PRESSURE: 73 MMHG | TEMPERATURE: 97.8 F | HEART RATE: 75 BPM | RESPIRATION RATE: 18 BRPM | SYSTOLIC BLOOD PRESSURE: 143 MMHG

## 2021-10-26 DIAGNOSIS — E11.621 DIABETIC ULCER OF OTHER PART OF LEFT FOOT ASSOCIATED WITH TYPE 2 DIABETES MELLITUS, WITH NECROSIS OF BONE (HCC): Primary | ICD-10-CM

## 2021-10-26 DIAGNOSIS — L97.524 DIABETIC ULCER OF OTHER PART OF LEFT FOOT ASSOCIATED WITH TYPE 2 DIABETES MELLITUS, WITH NECROSIS OF BONE (HCC): Primary | ICD-10-CM

## 2021-10-26 LAB
GLUCOSE BLD-MCNC: 111 MG/DL (ref 70–99)
GLUCOSE BLD-MCNC: 117 MG/DL (ref 70–99)
PERFORMED ON: ABNORMAL
PERFORMED ON: ABNORMAL

## 2021-10-26 PROCEDURE — 99183 HYPERBARIC OXYGEN THERAPY: CPT | Performed by: INTERNAL MEDICINE

## 2021-10-26 PROCEDURE — G0277 HBOT, FULL BODY CHAMBER, 30M: HCPCS

## 2021-10-26 RX ORDER — OXYMETAZOLINE HYDROCHLORIDE 0.05 G/100ML
SPRAY NASAL
Qty: 1 BOTTLE | Refills: 0 | COMMUNITY
Start: 2021-10-26 | End: 2021-10-27

## 2021-10-26 NOTE — PROGRESS NOTES
[address]  Hyperbaric Oxygen    Del Gallegos     : 1960    DATE OF VISIT:  10/26/2021    Subjective     Del Gallegos is a 59 y.o. male who  has a past medical history of Abscess of left hand (2017), Acute kidney injury superimposed on chronic kidney disease ([facility] [address] [facility] (2021), Adhesive capsulitis of shoulder (2014), Fractures, Gas gangrene of foot ([facility] (2021), and PNA (pneumonia) (2016). He presents to the [facility] today for hyperbaric oxygen treatment of his Shamir Limbo 4 diabetic foot, which is refractory to standard therapy for 30 days. Patient denies fever. Patient denies nausea, vomiting or diarrhea; no ear troubles and no other new complaints; no fears or anxiety regarding treatment today. Objective     Vitals:    10/26/21 0826 10/26/21 1030   BP: (!) 140/72 (!) 143/73   Pulse: 83 75   Resp: 18 18   Temp: 98.7 °F (37.1 °C) 97.8 °F (36.6 °C)   TempSrc: Oral Oral       General:  Alert, cooperative, no distress. Ears: External otic canals are within acceptable limits. Teed grade 0 on the right and 0 on the left pre-treatment. Teed grade 0 on the right and 0 on the left post-treatment. Lungs:  Clear to auscultation bilaterally. Ulcer: Not examined today in HBO, if applicable. Recent Labs     10/25/21  0806 10/25/21  1023 10/26/21  0817 10/26/21  1028   POCGLU 160* 135* 117* 111*       Assessment     Del Gallegos is a 59 y.o. male who presented to the [facility] today for hyperbaric oxygen treatment #26 of 30 for the diagnosis as stated above. Treatment given at 2 STEPHANIE for 90 minutes, with no air breaks. Total Treatment Time (min): 106 today, including compression, 100% oxygen at pressure, air breaks if applicable, and decompression.     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 2 diabetes mellitus, with necrosis of bone ([facility]) E11.621, L97.524    Diabetic polyneuropathy associated with type 2 diabetes mellitus (HCC) E11.42    Elevated PSA R97.20    Mixed hyperlipidemia E78.2    Stage 3a chronic kidney disease (HCC) N18.31    Acute osteomyelitis of metatarsal bone of left foot ([facility]) M86.172    Staphylococcus aureus infection (cultures also with Anaerococcus) A49.01    Unstageable pressure ulcer of left foot ([facility]) L89.890    Nonhealing surgical wound, initial encounter (small left dorsal foot wound) T81.89XA    Chronic low back pain M54.50, G89.29    Dental bridge present Z97.2    Callus of foot L84    Hyperkalemia E87.5       In my clinical judgement, ongoing HBO therapy is necessary at this time, given a threat to patient function, limb or life from the current condition. Adjunctive Rx, objective weekly progress and goals of Rx will periodically be updated, on Mondays. Plan     1. Hyperbaric Oxygen - Del Hernandez tolerated the treatment well today without complications. Continue HBO treatment as outlined above. 2. Other -     I was present on these premises and immediately available to furnish assistance & direction throughout the procedure.      -- Electronically signed by Becky Hicks MD on 10/26/2021 at 1:26 PM

## 2021-10-26 NOTE — PLAN OF CARE
Patient seen for HBOT treatment  26 / 30    today. Patient assessment complete - pt , had pancakes and sugar free syrup for breakfast per pt along with daily metformin, MD notified and delegated RN to give pt a snack and continue to treatment without re-checking blood glucose levels. Pt cleared for HBOT and was given stephon crackers for snack. Patient was compressed in HBO chamber to 2.0 STEPHANIE at 2.0 psi/min. Patient is tolerating treatment well and is currently resting comfortably and watching television. HBO RN at chamber side, will continue to monitor.

## 2021-10-26 NOTE — PROGRESS NOTES
[address]  Hyperbaric Oxygen    Del Taylor     : 1960    DATE OF VISIT:  10/25/2021    Subjective     Del Taylor is a 59 y.o. male who  has a past medical history of Abscess of left hand (2017), Acute kidney injury superimposed on chronic kidney disease ([facility] (2021), Adhesive capsulitis of shoulder (2014), Fractures, Gas gangrene of foot ([facility] (2021), and PNA (pneumonia) (2016). He presents to the [facility] today for hyperbaric oxygen treatment of his Jackquline Distance 4 diabetic foot, which is refractory to standard therapy for 30 days. Patient denies fever. Patient denies nausea, vomiting or diarrhea; no ear troubles and no other new complaints; no fears or anxiety regarding treatment today. Objective     Vitals:    10/25/21 0824 10/25/21 1020   BP: 127/68 (!) 163/83   Pulse: 90 69   Resp: 18 18   Temp: 98.4 °F (36.9 °C) 99.3 °F (37.4 °C)   TempSrc: Oral Oral       General:  Alert, cooperative, no distress. Ears: External otic canals are within acceptable limits. Teed grade 0 on the right and 0 on the left pre-treatment. Teed grade 0 on the right and 0 on the left post-treatment. Lungs:  Clear to auscultation bilaterally. Ulcer: Not examined today in HBO, if applicable. Recent Labs     10/25/21  0806 10/25/21  1023 10/26/21  0817 10/26/21  1028   POCGLU 160* 135* 117* 111*       Assessment     Del Taylor is a 59 y.o. male who presented to the [facility] today for hyperbaric oxygen treatment #25 of 30 for the diagnosis as stated above. Treatment given at 2 STEPHANIE for 90 minutes, with no air breaks. Total Treatment Time (min): 106 today, including compression, 100% oxygen at pressure, air breaks if applicable, and decompression.     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 2 diabetes mellitus, with necrosis of bone ([facility]) E11.621, L97.524    Diabetic polyneuropathy associated with type 2 diabetes mellitus ([facility]) E11.42    Elevated PSA R97.20    Mixed hyperlipidemia E78.2    Stage 3a chronic kidney disease (HCC) N18.31    Acute osteomyelitis of metatarsal bone of left foot ([facility]) M86.172    Staphylococcus aureus infection (cultures also with Anaerococcus) A49.01    Unstageable pressure ulcer of left foot ([facility]) L89.890    Nonhealing surgical wound, initial encounter (small left dorsal foot wound) T81.89XA    Chronic low back pain M54.50, G89.29    Dental bridge present Z97.2    Callus of foot L84    Hyperkalemia E87.5       In my clinical judgement, ongoing HBO therapy is necessary at this time, given a threat to patient function, limb or life from the current condition.      By way of an overall summary of his wound-care and clinical progress to date --     --          He has no signs of large-vessel PAD, with a left MYRNA of 0.93, multiphasic flow at the left CFA, and Duplex signs of < 50% stenosis in the SFA, popliteal and STEPHANIE. Because of Duplex concerns for an occluded PTA, a laser skin perfusion pressure study was also done, but those values were also encouraging (range of 90 - 136 mmHg across the left foot and ankle, with normal waveforms on PVR). Nonetheless he did have decreased room-air TcpO2 values near the wound, demonstrating microvascular disease that could benefit from HBOT.     --          Necrotic tissue is debrided weekly as needed, and is definitely improving.  Still a very small focus of bone exposed last week, but granulation tissue is increasing around that.     --          Between his two surgeries around the first of September, and about 5 weeks of post-op IV Abx, there is no evidence of active soft tissue infection, and I believe his osteomyelitis is also likely resolved; cRP when we stopped Abx was only 6.0; will obviously watch closely for signs of infection relapse, especially until that bone is fully granulated over.     --          He had mild-moderate LLE edema when I first met him, but with elevation, Abx and HBOT, that is now very mild, and I think not an impediment to wound healing.     --          Wound bed has continued to stay moist, and some initial maceration is improved.     --          Tissue growth is being addressed by HBOT and the addition of NPWT. From its largest size in late September until now, the wound area is decreased by about 27%, and the calculated volume is decreased by about 72%, and the degree of granulation tissue is much improved.          --          Offloading is with a wheelchair, and when he must take steps, with a handheld walker and padded surgical shoe.     --          He has very little foot pain, certainly nothing to interfere with wound care.     --          Systemically, things are also in good order, with no immune compromising medications, no smoking, excellent adherence to recommended care, no signs of malnutrition, and a most recent albumin level of 3.7, Hgb A1c of 6.4%.     HBOT tolerance thus far has been excellent, with no claustrophobia, no treatment-related hypoglycemia, no cardiopulmonary symptoms, no barotrauma, and no visual changes reported.      Overall I believe he has made very good progress with HBOT in these first five weeks, but still has additional progress to make, to give him the best chances of rapid healing and avoidance of further surgery / more proximal amputation. I believe that the likely benefits of additional HBOT clearly outweigh the risks, and believe that we should continue therapy. Mr. Melvin Mendez is in agreement.  Will send documentation to his insurance company to request extending his therapy to 50 total treatments, but I am hopeful that he might not even need that many, depending on the degree of granulation of the wound bed, how well the currently exposed bone is covered, and how much more his wound depth and volume improve; will reassess things each week. Plan     1. Hyperbaric Oxygen - Del Hernandez tolerated the treatment well today without complications. Continue HBO treatment as outlined above. 2. Other -     I was present on these premises and immediately available to furnish assistance & direction throughout the procedure.      -- Electronically signed by Tash Holland MD on 10/26/2021 at 11:07 AM

## 2021-10-27 ENCOUNTER — HOSPITAL ENCOUNTER (OUTPATIENT)
Dept: HYPERBARIC MEDICINE | Age: 61
Discharge: HOME | End: 2021-10-27
Payer: MEDICARE

## 2021-10-27 VITALS
HEART RATE: 71 BPM | RESPIRATION RATE: 16 BRPM | DIASTOLIC BLOOD PRESSURE: 72 MMHG | SYSTOLIC BLOOD PRESSURE: 153 MMHG | TEMPERATURE: 97.7 F

## 2021-10-27 DIAGNOSIS — E11.621 DIABETIC ULCER OF OTHER PART OF LEFT FOOT ASSOCIATED WITH TYPE 2 DIABETES MELLITUS, WITH NECROSIS OF BONE (HCC): Primary | ICD-10-CM

## 2021-10-27 DIAGNOSIS — L97.524 DIABETIC ULCER OF OTHER PART OF LEFT FOOT ASSOCIATED WITH TYPE 2 DIABETES MELLITUS, WITH NECROSIS OF BONE (HCC): Primary | ICD-10-CM

## 2021-10-27 LAB
GLUCOSE BLD-MCNC: 127 MG/DL (ref 70–99)
GLUCOSE BLD-MCNC: 139 MG/DL (ref 70–99)
PERFORMED ON: ABNORMAL
PERFORMED ON: ABNORMAL

## 2021-10-27 PROCEDURE — 99183 HYPERBARIC OXYGEN THERAPY: CPT | Performed by: EMERGENCY MEDICINE

## 2021-10-27 PROCEDURE — G0277 HBOT, FULL BODY CHAMBER, 30M: HCPCS

## 2021-10-27 ASSESSMENT — PAIN DESCRIPTION - LOCATION: LOCATION: FOOT

## 2021-10-27 ASSESSMENT — PAIN SCALES - GENERAL
PAINLEVEL_OUTOF10: 0
PAINLEVEL_OUTOF10: 0

## 2021-10-27 ASSESSMENT — PAIN DESCRIPTION - PROGRESSION: CLINICAL_PROGRESSION: GRADUALLY IMPROVING

## 2021-10-27 ASSESSMENT — PAIN DESCRIPTION - ORIENTATION: ORIENTATION: LEFT

## 2021-10-27 NOTE — PROGRESS NOTES
[address]  Hyperbaric Oxygen    Del Bolton     : 1960    DATE OF VISIT:  10/27/2021    Subjective     Del Bolton is a 59 y.o. male who  has a past medical history of Abscess of left hand (2017), Acute kidney injury superimposed on chronic kidney disease ([facility] [facility] (2021), Adhesive capsulitis of shoulder (2014), Fractures, Gas gangrene of foot ([facility] (2021), History of hyperbaric oxygen therapy (2021), and PNA (pneumonia) (2016). He presents to the [facility] today for hyperbaric oxygen treatment of his diabetic foot ulcer  , which is refractory to standard therapy for 30 days. Patient denies fever. Patient denies nausea, vomiting or diarrhea; no ear troubles and no other new complaints; no fears or anxiety regarding treatment today. Objective       Vitals:    10/27/21 0809 10/27/21 0819   BP:  126/67   Pulse:  84   Resp: 18 18   Temp:  99 °F (37.2 °C)   TempSrc: Oral Oral       General:  Alert, cooperative, no distress. Ears: External otic canals are within acceptable limits. Teed grade 0 on the right and 0 on the left pre-treatment. Teed grade 0 on the right and 0 on the left post-treatment. Lungs:  Clear to auscultation bilaterally. Ulcer: Not examined today in HBO, if applicable. Recent Labs     10/25/21  0806 10/25/21  1023 10/26/21  0817 10/26/21  1028 10/27/21  0806   POCGLU 160* 135* 117* 111* 139*       Assessment     Del Bolton is a 59 y.o. male who presented to the [facility] today for hyperbaric oxygen treatment #27 of 30 for the diagnosis as stated above. Treatment given at 2 STEPHANIE for 90 minutes, with no air breaks. Total Treatment Time (min): 105 today, including compression, 100% oxygen at pressure, air breaks if applicable, and decompression.     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 2 diabetes mellitus, with necrosis of bone (HCC) E11.621, L97.524    Diabetic polyneuropathy associated with type 2 diabetes mellitus (HCC) E11.42    Elevated PSA R97.20    Mixed hyperlipidemia E78.2    Stage 3a chronic kidney disease (HCC) N18.31    Acute osteomyelitis of metatarsal bone of left foot (HCC) M86.172    Staphylococcus aureus infection (cultures also with Anaerococcus) A49.01    Unstageable pressure ulcer of left foot ([facility]) L89.890    Nonhealing surgical wound, initial encounter (small left dorsal foot wound) T81.89XA    Chronic low back pain M54.50, G89.29    Dental bridge present Z97.2    Callus of foot L84    Hyperkalemia E87.5       In my clinical judgement, ongoing HBO therapy is necessary at this time, given a threat to patient function, limb or life from the current condition. Adjunctive Rx, objective weekly progress and goals of Rx will periodically be updated, on Mondays. Plan     1. Hyperbaric Oxygen - Del Hernandez tolerated the treatment well today without complications. Continue HBO treatment as outlined above. 2. Other -     I was present on these premises and immediately available to furnish assistance & direction throughout the procedure.      -- Electronically signed by Dillon Mercado MD on 10/27/2021 at 10:18 AM]

## 2021-10-28 ENCOUNTER — HOSPITAL ENCOUNTER (OUTPATIENT)
Dept: HYPERBARIC MEDICINE | Age: 61
Discharge: HOME | End: 2021-10-28
Payer: MEDICARE

## 2021-10-28 VITALS
RESPIRATION RATE: 16 BRPM | SYSTOLIC BLOOD PRESSURE: 154 MMHG | TEMPERATURE: 97.7 F | DIASTOLIC BLOOD PRESSURE: 72 MMHG | HEART RATE: 74 BPM

## 2021-10-28 DIAGNOSIS — E11.621 DIABETIC ULCER OF OTHER PART OF LEFT FOOT ASSOCIATED WITH TYPE 2 DIABETES MELLITUS, WITH NECROSIS OF BONE (HCC): Primary | ICD-10-CM

## 2021-10-28 DIAGNOSIS — L97.524 DIABETIC ULCER OF OTHER PART OF LEFT FOOT ASSOCIATED WITH TYPE 2 DIABETES MELLITUS, WITH NECROSIS OF BONE (HCC): Primary | ICD-10-CM

## 2021-10-28 LAB
GLUCOSE BLD-MCNC: 106 MG/DL (ref 70–99)
GLUCOSE BLD-MCNC: 130 MG/DL (ref 70–99)
PERFORMED ON: ABNORMAL
PERFORMED ON: ABNORMAL

## 2021-10-28 PROCEDURE — 99183 HYPERBARIC OXYGEN THERAPY: CPT | Performed by: INTERNAL MEDICINE

## 2021-10-28 PROCEDURE — G0277 HBOT, FULL BODY CHAMBER, 30M: HCPCS

## 2021-10-28 RX ORDER — OXYMETAZOLINE HYDROCHLORIDE 0.05 G/100ML
SPRAY NASAL
Qty: 1 BOTTLE | Refills: 0 | COMMUNITY
Start: 2021-10-28 | End: 2021-11-05

## 2021-10-28 ASSESSMENT — PAIN SCALES - GENERAL
PAINLEVEL_OUTOF10: 0
PAINLEVEL_OUTOF10: 0

## 2021-10-28 NOTE — PLAN OF CARE
Pt to the [facility] for HBOT. Assessment completed and patient cleared for treatment. Pt to 2.0 STEPHANIE at a rate of 2.0 psi. Pt to pressure without complaints. Pt watching tv. Nurse at chamber side and will continue to monitor.

## 2021-10-29 ENCOUNTER — HOSPITAL ENCOUNTER (OUTPATIENT)
Dept: HYPERBARIC MEDICINE | Age: 61
Discharge: HOME | End: 2021-10-29
Payer: MEDICARE

## 2021-10-29 ENCOUNTER — HOSPITAL ENCOUNTER (OUTPATIENT)
Dept: WOUND CARE | Age: 61
Discharge: HOME | End: 2021-10-29
Payer: MEDICARE

## 2021-10-29 VITALS
RESPIRATION RATE: 16 BRPM | DIASTOLIC BLOOD PRESSURE: 79 MMHG | BODY MASS INDEX: 31.28 KG/M2 | HEART RATE: 70 BPM | HEIGHT: 73 IN | TEMPERATURE: 98.5 F | SYSTOLIC BLOOD PRESSURE: 163 MMHG | WEIGHT: 236 LBS

## 2021-10-29 VITALS
TEMPERATURE: 98.5 F | SYSTOLIC BLOOD PRESSURE: 163 MMHG | RESPIRATION RATE: 16 BRPM | HEART RATE: 70 BPM | DIASTOLIC BLOOD PRESSURE: 79 MMHG

## 2021-10-29 DIAGNOSIS — E11.621 DIABETIC ULCER OF OTHER PART OF LEFT FOOT ASSOCIATED WITH TYPE 2 DIABETES MELLITUS, WITH NECROSIS OF BONE (HCC): Primary | ICD-10-CM

## 2021-10-29 DIAGNOSIS — L97.524 DIABETIC ULCER OF OTHER PART OF LEFT FOOT ASSOCIATED WITH TYPE 2 DIABETES MELLITUS, WITH NECROSIS OF BONE (HCC): Primary | ICD-10-CM

## 2021-10-29 LAB
GLUCOSE BLD-MCNC: 158 MG/DL (ref 70–99)
GLUCOSE BLD-MCNC: 281 MG/DL (ref 70–99)
PERFORMED ON: ABNORMAL
PERFORMED ON: ABNORMAL

## 2021-10-29 PROCEDURE — 99183 HYPERBARIC OXYGEN THERAPY: CPT | Performed by: INTERNAL MEDICINE

## 2021-10-29 PROCEDURE — 97605 NEG PRS WND THER DME<=50SQCM: CPT | Performed by: INTERNAL MEDICINE

## 2021-10-29 PROCEDURE — 11043 DBRDMT MUSC&/FSCA 1ST 20/<: CPT | Performed by: INTERNAL MEDICINE

## 2021-10-29 PROCEDURE — G0277 HBOT, FULL BODY CHAMBER, 30M: HCPCS

## 2021-10-29 PROCEDURE — 11042 DBRDMT SUBQ TIS 1ST 20SQCM/<: CPT

## 2021-10-29 PROCEDURE — 11043 DBRDMT MUSC&/FSCA 1ST 20/<: CPT

## 2021-10-29 PROCEDURE — 97605 NEG PRS WND THER DME<=50SQCM: CPT

## 2021-10-29 PROCEDURE — 11042 DBRDMT SUBQ TIS 1ST 20SQCM/<: CPT | Performed by: INTERNAL MEDICINE

## 2021-10-29 RX ORDER — LIDOCAINE 40 MG/G
CREAM TOPICAL ONCE
Status: CANCELLED | OUTPATIENT
Start: 2021-10-29 | End: 2021-10-29

## 2021-10-29 RX ORDER — OXYMETAZOLINE HYDROCHLORIDE 0.05 G/100ML
SPRAY NASAL
Qty: 1 BOTTLE | Refills: 0 | COMMUNITY
Start: 2021-10-29 | End: 2021-11-05

## 2021-10-29 RX ORDER — LIDOCAINE HYDROCHLORIDE 40 MG/ML
SOLUTION TOPICAL ONCE
Status: CANCELLED | OUTPATIENT
Start: 2021-10-29 | End: 2021-10-29

## 2021-10-29 RX ORDER — BACITRACIN ZINC AND POLYMYXIN B SULFATE 500; 1000 [USP'U]/G; [USP'U]/G
OINTMENT TOPICAL ONCE
Status: CANCELLED | OUTPATIENT
Start: 2021-10-29 | End: 2021-10-29

## 2021-10-29 RX ORDER — LIDOCAINE 40 MG/G
CREAM TOPICAL ONCE
Status: DISCONTINUED | OUTPATIENT
Start: 2021-10-29 | End: 2021-10-30 | Stop reason: HOSPADM

## 2021-10-29 RX ORDER — LIDOCAINE 50 MG/G
OINTMENT TOPICAL ONCE
Status: CANCELLED | OUTPATIENT
Start: 2021-10-29 | End: 2021-10-29

## 2021-10-29 ASSESSMENT — PAIN SCALES - GENERAL
PAINLEVEL_OUTOF10: 0

## 2021-10-29 NOTE — PLAN OF CARE
Wounds debrided per MD and patient tolerated well. Patient will begin santyl for primary dressing on left lateral foot wound. Otherwise continue with NPWT to remaining wound and HBO treatments as ordered. Discharge instructions reviewed with patient, all questions answered, copy given to patient. Dressings were applied to all wounds per M.D. Instructions at this visit.

## 2021-10-29 NOTE — PROGRESS NOTES
[address] [facility] Physician Orders and Discharge [address], Hailey Armstrong 53  [place], [facility]  Telephone: [phone]      Fax: [phone]        Your home care [place]                                                         Your wound-care supplies will be provided by: We are ordering your wound-care supplies from [employer]. Please note, depending on your insurance coverage, you may have out-of-pocket expenses for these supplies. Someone from [facility] should call you to confirm your order and discuss those potential costs before they ship your products -- please anticipate that call. If your out-of-pocket cost could be substantial, [facility] has a financial hardship program for patients who qualify, so please ask about that if you might need a hand. If you have any questions about your supplies or your potential out-of-pocket costs, or if you need to place an order for a refill of supplies (typically monthly), please call [phone].      NAME: Reza Warren  DATE of BIRTH:  1960  PRIMARY DIAGNOSIS FOR WOUND CARE CENTER:  Diabetic      Wound cleansing:   Do not scrub or use excessive force. Wash hands with soap and water before and after dressing changes. Prior to applying a clean dressing, cleanse wound with normal saline, wound cleanser, or mild soap and water.  Ask your physician or nurse before getting the wound(s) wet in the shower.                Wound care for home:      Left Amp site:  Vashe  Foam to wound (you don't need to try to pack it in any spaces just place on wound bed)  NPWT Safetac drape we gave you DO NOT USE SKIN PREP WITH THIS NEW DRAPE!! DO NOT USE DRAPE THAT CAME WITH NPWT      NPWT suction to 125 continuous     Home care to change Monday, Wednesday and [facility] to change on Fridays       Left lateral foot:  Vashe (2-3 sprays on wound bed, don't rinse)  Triad   gauze  Change daily if possible    IF YOU END UP GETTING THE SANTYL   Apply Triad or Desitin (zinc Oxide) to lexi wound to protect  Apply Santyl about nickel thick to just the wound   Apply a slightly moistened gauze over Santyl  cover with a dry dressing  Change EVERY DAY        Please note, all wounds (unless stated otherwise here) were mechanically debrided at the time of cleansing here in the wound-care center today, so a small amount of pain, drainage or bleeding from that process might be expected, and is normal.      All products for home use, including multiple products for a single wound if applicable, are medically necessary in order to achieve the best chance at timely wound healing. See provider documentation for details if needed.     Substituted dressings applied in the [facility] today, if applicable:           New orders for this week (labs, imaging, medications, etc.):       DR. PUGH IS CALLING IN A PRESCRIPTION CALLED SANTYL FOR YOUR WOUND, IF THE CO PAY IS TOO HIGH YOU DON'T HAVE TO GET IT   Santyl is an enzyme that dissolves dead tissue in the wound       Additional instructions for specific diagnoses:     CONTINUE HYPERBARICS AS ORDERED      General comments for diabetic / neuropathic ulcers:  *  Unless you've been instructed not to remove your dressings, be sure to inspect your feet daily, and notify us of any major changes. *  If you do not have a long-term podiatrist, be sure to let us know. *  Moisturize your skin regularly with Vaseline, Aquaphor, Aveeno, CeraVe, Cetaphil, Eucerin, Lubriderm, etc; but keep the skin between your toes dry. Aurora Grew allow your wound-care doctor or podiatrist to cut nails, calluses & corns. *  Be sure to always wear footwear that fits well, and NEVER go without shoes and socks. *  Do you know your Hemoglobin A1c level? You should! Please ask if you have questions.   *  Be sure to adhere to any recommendations from your PCP or endocrinologist when it comes to diabetes medications and diet. If you have questions, please ask. *  If you smoke, your wound can not heal properly -- please talk with us when you're ready to quit. *  Do not soak your feet unless specifically instructed to do so by us.              F/U Appointment is with Dr. Trinidad Regalado 1 week , on                                   at                       .     Your nurse  is Robbin Mcdowell RN      If we applied slip-resistant hospital socks today, be sure to remove them at least once a day to inspect your toes or feet, even if you're not changing the wraps or dressings underneath.  If you see anything concerning (redness, excess moisture, etc), please call and let us know right away.     Should you experience any significant changes in your wound(s) (including redness, increased warmth, increased pain, increased drainage, odor, or fever) or have questions about your wound care, please contact the [facility] at [phone] Monday-Thursday from 8:00 am - 4:30 pm, or Friday from 8:00 am - 2:30 pm.  If you need help with your wound outside these hours and cannot wait until we are again available, contact your home-care company (if applicable), your PCP, or go to the nearest emergency room

## 2021-10-29 NOTE — PROGRESS NOTES
[address]  Hyperbaric Oxygen    Del Granger     : 1960    DATE OF VISIT:  10/28/2021    Subjective     Del Granger is a 59 y.o. male who  has a past medical history of Abscess of left hand (2017), Acute kidney injury superimposed on chronic kidney disease ([facility] (2021), Adhesive capsulitis of shoulder (2014), Fractures, Gas gangrene of foot ([facility] (2021), History of hyperbaric oxygen therapy (2021), and PNA (pneumonia) (2016). He presents to the [facility] today for hyperbaric oxygen treatment of his Doylene Dyers 4 diabetic foot, which is refractory to standard therapy for 30 days. Patient denies fever. Patient denies nausea, vomiting or diarrhea; no ear troubles and no other new complaints; no fears or anxiety regarding treatment today. Objective     Vitals:    10/28/21 1125 10/28/21 1328   BP: (!) 141/68 (!) 154/72   Pulse: 79 74   Resp: 16 16   Temp: 99.1 °F (37.3 °C) 97.7 °F (36.5 °C)   TempSrc: Oral Oral       General:  Alert, cooperative, no distress. Ears: External otic canals are within acceptable limits. Teed grade 0 on the right and 0 on the left pre-treatment. Teed grade 0 on the right and 0 on the left post-treatment. Lungs:  Clear to auscultation bilaterally. Ulcer: Not examined today in HBO, if applicable. Recent Labs     10/26/21  0817 10/26/21  1028 10/27/21  0806 10/27/21  1016 10/28/21  1121 10/28/21  1331   POCGLU 117* 111* 139* 127* 130* 106*       Assessment     Del Granger is a 59 y.o. male who presented to the [facility] today for hyperbaric oxygen treatment #28 of 30 for the diagnosis as stated above. Treatment given at 2 STEPHANIE for 90 minutes, with no air breaks. Total Treatment Time (min): 109 today, including compression, 100% oxygen at pressure, air breaks if applicable, and decompression.     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 2 diabetes mellitus, with necrosis of bone (HCC) E11.621, L97.524    Diabetic polyneuropathy associated with type 2 diabetes mellitus (HCC) E11.42    Elevated PSA R97.20    Mixed hyperlipidemia E78.2    Stage 3a chronic kidney disease (HCC) N18.31    Acute osteomyelitis of metatarsal bone of left foot ([facility]) M86.172    Staphylococcus aureus infection (cultures also with Anaerococcus) A49.01    Unstageable pressure ulcer of left foot ([facility]) L89.890    Nonhealing surgical wound, initial encounter (small left dorsal foot wound) T81.89XA    Chronic low back pain M54.50, G89.29    Dental bridge present Z97.2    Callus of foot L84    Hyperkalemia E87.5       In my clinical judgement, ongoing HBO therapy is necessary at this time, given a threat to patient function, limb or life from the current condition. Adjunctive Rx, objective weekly progress and goals of Rx will periodically be updated, on Mondays. Plan     1. Hyperbaric Oxygen - Del Hernandez tolerated the treatment well today without complications. Continue HBO treatment as outlined above. 2. Other -     I was present on these premises and immediately available to furnish assistance & direction throughout the procedure.      -- Electronically signed by Moon Real MD on 10/28/2021 at 8:08 PM

## 2021-10-29 NOTE — PLAN OF CARE
Patient seen for HBOT treatment  29 /30 today. Patient assessment WNL and cleared for HBOT. Patient was compressed in HBO chamber to 2.0 STEPHANIE at 2.0 psi/min. Patient is tolerating treatment well and is currently resting comfortably and watching television. HBO RN at chamber side, will continue to monitor.

## 2021-10-31 PROBLEM — L89.894 PRESSURE ULCER OF LEFT FOOT, STAGE 4 (HCC): Status: ACTIVE | Noted: 2021-09-16

## 2021-10-31 RX ORDER — HYDRALAZINE HYDROCHLORIDE 25 MG/1
TABLET, FILM COATED ORAL
Qty: 30 TABLET | Refills: 0 | Status: SHIPPED | OUTPATIENT
Start: 2021-10-31 | End: 2021-12-03

## 2021-10-31 NOTE — PROGRESS NOTES
necrotic tissue there a bit more stubborn than I expected. Factors contributing to occurrence and/or persistence of the chronic ulcer include edema, diabetes, shear force, obesity and decreased tissue oxygenation. Medical necessity of today's visit is shown by the above documentation. Sharp debridement is indicated today, based upon the exam findings in the wound(s) above. Procedure note:     Consent obtained. Time out performed per [facility]. Anesthetic  Anesthetic: 4% Lidocaine Cream     Using a curette, I sharply debrided the foot (left , lateral) ulcer(s) down through and including the removal of muscle/fascia. The type(s) of tissue debrided included fibrin, biofilm, slough and necrotic/eschar. Total Surface Area Debrided: 3 sq cm. A bit more bleeding this week, but still a decent amount of fibrotic and stagnant deep soft tissue. Using a curette, I sharply debrided the foot (left , medial, forefoot) ulcer(s) down through and including the removal of subcutaneous tissue. The type(s) of tissue debrided included fibrin, biofilm and necrotic/eschar. Total Surface Area Debrided: 16 sq cm. The ulcers were then irrigated with normal saline solution. The procedure was completed with a small amount of bleeding, and hemostasis was with pressure. The patient tolerated the procedure well, with no significant complications. The patient's level of pain during and after the procedure was monitored. Post-debridement measurements, if different from pre-debridement, are in the flowsheet as well. Discharge plan:     Treatment in the wound care center today, per RN documentation: Wound 09/16/21 #3, left lateral foot, pressure ulcer, Stage 4, onset 9/2021-Dressing/Treatment: Other (comment) (triad mepilex border)  Wound 09/16/21 #1, left medial forefoot, Diabetic ulcer, jenkins 4, onset 8/25/2021-Dressing/Treatment: Other (comment) (NPWT). Keep up the good work with glucose control and offloading.   Awaiting word from insurance company on approval of more HBO treatments. Home treatment: good handwashing before and after any dressing changes. Cleanse wound with saline or soap & water before dressing change. May use Vaseline (petrolatum), Aquaphor, Aveeno, CeraVe, Cetaphil, Eucerin, Lubriderm, etc for dry skin. Dressing type for home: Vashe to both, stop Adaptic medially, just NPWT (foam, Safetac drape) TIW; for the lateral foot, try a change to periwound ZnO, Santyl, moist gauze, dry cover dressing, once daily. Written discharge instructions given to patient. Follow up in 1 week to see me, daily for HBOT.     Electronically signed by Mirna Mohs, MD on 10/31/2021 at 2:19 PM.

## 2021-10-31 NOTE — PROGRESS NOTES
[address]  Hyperbaric Oxygen    Del Soria     : 1960    DATE OF VISIT:  10/29/2021    Subjective     Del Soria is a 59 y.o. male who  has a past medical history of Abscess of left hand (2017), Acute kidney injury superimposed on chronic kidney disease ([facility] (2021), Adhesive capsulitis of shoulder (2014), Fractures, Gas gangrene of foot ([facility] (2021), History of hyperbaric oxygen therapy (2021), and PNA (pneumonia) (2016). He presents to the [facility] today for hyperbaric oxygen treatment of his Garrett Kanaris 4 diabetic foot, which is refractory to standard therapy for 30 days. Patient denies fever. Patient denies nausea, vomiting or diarrhea; no ear troubles and no other new complaints; no fears or anxiety regarding treatment today. Objective     Vitals:    10/29/21 0820 10/29/21 1024   BP: 129/65 (!) 163/79   Pulse: 83 70   Resp: 18 16   Temp: 98.6 °F (37 °C) 98.5 °F (36.9 °C)   TempSrc: Oral Oral       General:  Alert, cooperative, no distress. Ears: External otic canals are within acceptable limits. Teed grade 0 on the right and 0 on the left pre-treatment. Teed grade 0 on the right and 0 on the left post-treatment. Lungs:  Clear to auscultation bilaterally. Ulcer: Not examined today in HBO, if applicable. Recent Labs     10/28/21  1121 10/28/21  1331 10/29/21  0809 10/29/21  1022   POCGLU 130* 106* 281* 158*       Assessment     Del Soria is a 59 y.o. male who presented to the [facility] today for hyperbaric oxygen treatment #29 of 30 for the diagnosis as stated above. Treatment given at 2 STEPHANIE for 90 minutes, with no air breaks. Total Treatment Time (min): 107 today, including compression, 100% oxygen at pressure, air breaks if applicable, and decompression.     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 2 diabetes mellitus, with necrosis of bone (HCC) E11.621, L97.524    Diabetic polyneuropathy associated with type 2 diabetes mellitus (HCC) E11.42    Elevated PSA R97.20    Mixed hyperlipidemia E78.2    Stage 3a chronic kidney disease (HCC) N18.31    Acute osteomyelitis of metatarsal bone of left foot (HCC) M86.172    Staphylococcus aureus infection (cultures also with Anaerococcus) A49.01    Unstageable pressure ulcer of left foot (HCC) L89.890    Nonhealing surgical wound, initial encounter (small left dorsal foot wound) T81.89XA    Chronic low back pain M54.50, G89.29    Dental bridge present Z97.2    Callus of foot L84    Hyperkalemia E87.5       In my clinical judgement, ongoing HBO therapy is necessary at this time, given a threat to patient function, limb or life from the current condition. Adjunctive Rx, objective weekly progress and goals of Rx will periodically be updated, on Mondays. Plan     1. Hyperbaric Oxygen - Del Hernandez tolerated the treatment well today without complications. Continue HBO treatment as outlined above. 2. Other -     I was present on these premises and immediately available to furnish assistance & direction throughout the procedure.      -- Electronically signed by Bladimir Lerma MD on 10/31/2021 at 10:53 AM

## 2021-10-31 NOTE — PROGRESS NOTES
[address] [facility] Progress Note    Del Ramirez     : 1960    DATE OF VISIT:  10/22/2021    Subjective:     Del Ramirez is a 59 y.o. male who has a diabetic ulcer located on the foot (left , medial, forefoot). Significant symptoms or pertinent wound history since last visit: doing well overall, tolerating HBO, tolerating current BP medications, no delayed side effects from recent ABx, skin doing better with new NPWT drape. Additional ulcer(s) noted? yes. Lateral forefoot pressure ulcer. His current medication list consists of Dulaglutide, Multiple Vitamin, atorvastatin, fish oil, hydrALAZINE, hydroCHLOROthiazide, insulin glargine, lisinopril, metFORMIN, oxymetazoline, and tamsulosin. Allergies: Patient has no known allergies. Objective:     Vitals:    10/22/21 1030   BP: (!) 157/72   Pulse: 77   Resp: 16   Temp: 97.8 °F (36.6 °C)   TempSrc: Oral   Weight: 238 lb (108 kg)   Height: 6' 1\" (1.854 m)     Constitutional:  well-developed, well-nourished, NAD  Cardiovascular:  bilateral pedal pulses palpable, feet warm, brisk cap refill; mild left lower extremity edema  Lymphatic:  no inguinal or popliteal adenopathy, no angitis, no cellulitis  Musculoskeletal:  no clubbing, cyanosis or petechiae; RLE and LLE with no gross effusions, joint misalignment or acute arthritis; slight prominence of right 1st met head, with a small, stable, longstanding callus there. Neuro: decreased pedal sensation to light touch; no allodynia   Elicia-ulcer skin: minor contact dermatis now, otherwise just pink and indurated  Ulcer(s): dorsal foot healed.  Lateral foot with a bit of pink granulation, mostly some sloughy fat necrosis, minor fascial exposure, some fibrin and biofilm; primary diabetic ulcer more granular, less sloughy SQ debris, some fibrin and biofilm, no definite fascial or muscle necrosis, still a small amount of exposed 1st met bone, no pus, less distal undermining, still a bit of central depth. Photos also saved in electronic chart. Today's wound measurements, per RN documentation:  Wound 09/16/21 #3, left lateral foot, pressure ulcer, Stage 4, onset 9/2021-Wound Length (cm): 3 cm  Wound 09/16/21 #1, left medial forefoot, Diabetic ulcer, jenkins 4, onset 8/25/2021-Wound Length (cm): 4.5 cm    Wound 09/16/21 #3, left lateral foot, pressure ulcer, Stage 4, onset 9/2021-Wound Width (cm): 1 cm  Wound 09/16/21 #1, left medial forefoot, Diabetic ulcer, jenkins 4, onset 8/25/2021-Wound Width (cm): 4 cm    Wound 09/16/21 #3, left lateral foot, pressure ulcer, Stage 4, onset 9/2021-Wound Depth (cm): 0.2 cm  Wound 09/16/21 #1, left medial forefoot, Diabetic ulcer, jenkins 4, onset 8/25/2021-Wound Depth (cm): 1 cm   _______________    BMP from last week (no bananas, off Abx, still on ACE, addition of HCTZ) -- Na 142, K 5.3, Cl 109, HCO3 20, BUN 42, creat 1.4.     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 2 diabetes mellitus, with necrosis of bone (HCC) E11.621, L97.524    Diabetic polyneuropathy associated with type 2 diabetes mellitus (HCC) E11.42    Elevated PSA R97.20    Mixed hyperlipidemia E78.2    Stage 3a chronic kidney disease (HCC) N18.31    Acute osteomyelitis of metatarsal bone of left foot (HCC) M86.172    Staphylococcus aureus infection (cultures also with Anaerococcus) A49.01    Pressure ulcer of left lateral forefoot, stage 4 ([facility]) L89.894    Nonhealing surgical wound, initial encounter (small left dorsal foot wound) T81.89XA    Chronic low back pain M54.50, G89.29    Dental bridge present Z97.2    Callus of foot L84    Hyperkalemia E87.5       Assessment of today's active condition(s): well forefoot, Diabetic ulcer, jenkins 4, onset 8/25/2021-Dressing/Treatment:  (adaptic,NPWT, safetec drape). No additional Abx for now. Continue HBOT - I'll send a letter to his insurance asking for approval for +/- a few more weeks. Keep up good efforts at glucose control. No change BP meds for now (hydralazine only PRN on HBO days). Home treatment: good handwashing before and after any dressing changes. Cleanse wound with saline or soap & water before dressing change. May use Vaseline (petrolatum), Aquaphor, Aveeno, CeraVe, Cetaphil, Eucerin, Lubriderm, etc for dry skin. Dressing type for home: Vashe to both ulcers, then generally as above, daily for the lateral foot and TIW for the medial. Written discharge instructions given to patient. Follow up in 1 week to see me, daily for HBOT.     Electronically signed by Bladimir Lerma MD on 10/31/2021 at 2:12 PM.

## 2021-11-01 ENCOUNTER — HOSPITAL ENCOUNTER (OUTPATIENT)
Dept: HYPERBARIC MEDICINE | Age: 61
Discharge: HOME | End: 2021-11-01
Payer: MEDICARE

## 2021-11-01 VITALS
RESPIRATION RATE: 16 BRPM | DIASTOLIC BLOOD PRESSURE: 79 MMHG | SYSTOLIC BLOOD PRESSURE: 171 MMHG | HEART RATE: 70 BPM | TEMPERATURE: 99 F

## 2021-11-01 DIAGNOSIS — E11.621 DIABETIC ULCER OF OTHER PART OF LEFT FOOT ASSOCIATED WITH TYPE 2 DIABETES MELLITUS, WITH NECROSIS OF BONE (HCC): Primary | ICD-10-CM

## 2021-11-01 DIAGNOSIS — L97.524 DIABETIC ULCER OF OTHER PART OF LEFT FOOT ASSOCIATED WITH TYPE 2 DIABETES MELLITUS, WITH NECROSIS OF BONE (HCC): Primary | ICD-10-CM

## 2021-11-01 LAB
GLUCOSE BLD-MCNC: 118 MG/DL (ref 70–99)
GLUCOSE BLD-MCNC: 121 MG/DL (ref 70–99)
PERFORMED ON: ABNORMAL
PERFORMED ON: ABNORMAL

## 2021-11-01 PROCEDURE — G0277 HBOT, FULL BODY CHAMBER, 30M: HCPCS

## 2021-11-01 PROCEDURE — 99183 HYPERBARIC OXYGEN THERAPY: CPT | Performed by: INTERNAL MEDICINE

## 2021-11-01 ASSESSMENT — PAIN SCALES - GENERAL
PAINLEVEL_OUTOF10: 0
PAINLEVEL_OUTOF10: 0

## 2021-11-01 NOTE — DISCHARGE INSTR - COC
Continuity of Care Form    Patient Name: Scar Sears   :  1960  MRN:  [id number]    Admit date:  2021  Discharge date:  ***    Code Status Order: Prior   Advance Directives:     Admitting Physician:  No admitting provider for patient encounter. PCP: Eloise Alexander MD    Discharging Nurse: [facility] Unit/Room#: No information available for this encounter. Discharging Unit Phone Number: ***    Emergency Contact:   Extended Emergency Contact Information  Primary Emergency Contact: Fenton Holiday  Address: [address] Phone: [phone]  Relation: Spouse  Secondary Emergency Contact: Varun Gifford Phone: [phone]  Relation: Brother/Sister    Past Surgical History:  Past Surgical History:   Procedure Laterality Date    FOOT DEBRIDEMENT Left 2021    LEFT FOOT DEBRIDEMENT INCISION AND DRAINAGE performed by Tita Chong DPM at [facility] Left 2021    STAGED INCISION AND DEBRIDEMENT LEFT FOOT WITH PARTIAL FIRST RAY AMPUTATION performed by Tita Chong DPM at [address] [place] HAND SURGERY Left 2017    LEFT HAND DEBRIDEMENT INCISION AND DRAINAGE    NECK SURGERY      30s year    TOE AMPUTATION Left     hallux    WRIST FRACTURE SURGERY         Immunization History: There is no immunization history on file for this patient.     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 2 diabetes mellitus, with necrosis of bone (HCC) E11.621, L97.524    Diabetic polyneuropathy associated with type 2 diabetes mellitus (HCC) E11.42    Elevated PSA R97.20    Mixed hyperlipidemia E78.2    Number of days: 54       Wound 09/16/21 #1, left medial forefoot, Diabetic ulcer, hercules 4, onset 8/25/2021 (Active)   Wound Image   10/22/21 1030   Wound Etiology Diabetic Hercules 4 10/29/21 1044   Dressing Status New dressing applied 10/29/21 1157   Wound Cleansed Vashe 10/29/21 1157   Dressing/Treatment Other (comment) 10/29/21 1157   Offloading for Diabetic Foot Ulcers Yes (type); Offloading boot 10/29/21 1157   Wound Length (cm) 4.2 cm 10/29/21 1044   Wound Width (cm) 3.7 cm 10/29/21 1044   Wound Depth (cm) 0.7 cm 10/29/21 1044   Wound Surface Area (cm^2) 15.54 cm^2 10/29/21 1044   Change in Wound Size % (l*w) 11.2 10/29/21 1044   Wound Volume (cm^3) 10.878 cm^3 10/29/21 1044   Wound Healing % 74 10/29/21 1044   Post-Procedure Length (cm) 4.2 cm 10/29/21 1134   Post-Procedure Width (cm) 3.7 cm 10/29/21 1134   Post-Procedure Depth (cm) 0.7 cm 10/29/21 1134   Post-Procedure Surface Area (cm^2) 15.54 cm^2 10/29/21 1134   Post-Procedure Volume (cm^3) 10.878 cm^3 10/29/21 1134   Distance Tunneling (cm) 0.5 cm 10/15/21 1049   Tunneling Position ___ O'Clock 9 10/15/21 1049   Wound Assessment Pink/red; Other (Comment) 10/29/21 1044   Drainage Amount Moderate 10/29/21 1044   Drainage Description Serosanguinous;Green 10/29/21 1044   Odor None 10/29/21 1044   Elicia-wound Assessment Maceration;Blanchable erythema 10/29/21 1044   Number of days: 46       Wound 09/16/21 #3, left lateral foot, pressure ulcer, Stage 4, onset 9/2021 (Active)   Wound Image   10/22/21 1030   Wound Etiology Pressure Unstageable 10/29/21 1044   Dressing Status New dressing applied 10/29/21 1157   Wound Cleansed Vashe 10/29/21 1157   Dressing/Treatment Other (comment) 10/29/21 1157   Offloading for Diabetic Foot Ulcers Yes (type); Offloading boot 10/29/21 1157   Wound Length (cm) 3 cm 10/29/21 1044   Wound Width (cm) 1 cm 10/29/21 1044   Wound Depth (cm) 0.2 cm 10/29/21 1044   Wound Surface Area (cm^2) 3 cm^2 10/29/21 1044   Change in Wound Size % (l*w) 61.04 10/29/21 1044   Wound Volume (cm^3) 0.6 cm^3 10/29/21 1044   Wound Healing % 22 10/29/21 1044   Post-Procedure Length (cm) 3 cm 10/29/21 1134   Post-Procedure Width (cm) 1 cm 10/29/21 1134   Post-Procedure Depth (cm) 0.2 cm 10/29/21 1134   Post-Procedure Surface Area (cm^2) 3 cm^2 10/29/21 1134   Post-Procedure Volume (cm^3) 0.6 cm^3 10/29/21 1134   Wound Assessment [facility] 10/29/21 1044   Drainage Amount Small 10/29/21 1044   Drainage Description Yellow 10/29/21 1044   Odor None 10/29/21 1044   Elicia-wound Assessment Blanchable erythema 10/29/21 1044   Number of days: 46        Elimination:  Continence:   · Bowel: {YES / CX:83012}  · Bladder: {YES / VW:03721}  Urinary Catheter: {Urinary Catheter:353945754}   Colostomy/Ileostomy/Ileal Conduit: {YES / JZ:44444}       Date of Last BM: ***  No intake or output data in the 24 hours ending 21 1029  No intake/output data recorded.     Safety Concerns:     [address] [employer] Safety Concerns:604695041}    Impairments/Disabilities:      [employer] Impairments/Disabilities:318797074}    Nutrition Therapy:  Current Nutrition Therapy:   [facility] [employer] Diet List:939924902}    Routes of Feeding: {CHP DME Other Feedings:342542408}  Liquids: {Slp liquid thickness:69853}  Daily Fluid Restriction: {CHP DME Yes amt example:491520473}  Last Modified Barium Swallow with Video (Video Swallowing Test): {Done Not Done LOJE:722652708}    Treatments at the Time of Hospital Discharge:   Respiratory Treatments: ***  Oxygen Therapy:  {Therapy; copd oxygen:39930}  Ventilator:    { CC Vent TTQA:940856398}    Rehab Therapies: {THERAPEUTIC INTERVENTION:7191530857}  Weight Bearing Status/Restrictions: [facility] Weight Bearin}  Other Medical Equipment (for information only, NOT a DME order):  {EQUIPMENT:815427257}  Other Treatments: ***    Patient's personal belongings (please select all that are sent with patient):  {CRYSTAL DME Belongings:405327383}    RN SIGNATURE:  {Esignature:190509076}    CASE MANAGEMENT/SOCIAL WORK SECTION    Inpatient Status Date: ***    Readmission Risk Assessment Score:  Readmission Risk              Risk of Unplanned Readmission:  0           Discharging to Facility/ Agency   · Name:   · Address:  · Phone:  · Fax:    Dialysis Facility (if applicable)   · Name:  · Address:  · Dialysis Schedule:  · Phone:  · Fax:    / signature: {Esignature:097238437}    PHYSICIAN SECTION    Prognosis: {Prognosis:0437750541}    Condition at Discharge: [address] [place] [address] Patient Condition:171501383}    Rehab Potential (if transferring to Rehab): {Prognosis:9076418624}    Recommended Labs or Other Treatments After Discharge: ***    Physician Certification: I certify the above information and transfer of [place]  is necessary for the continuing treatment of the diagnosis listed and that he requires {Admit to Appropriate Level of Care:21714} for {GREATER/LESS:283348942} 30 days.      Update Admission H&P: {CHP DME Changes in EFICY:801439917}    PHYSICIAN SIGNATURE:  {Esignature:519993365}

## 2021-11-02 NOTE — PROGRESS NOTES
 Class 1 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 2 diabetes mellitus, with necrosis of bone ([facility]) E11.621, L97.524    Diabetic polyneuropathy associated with type 2 diabetes mellitus (HCC) E11.42    Elevated PSA R97.20    Mixed hyperlipidemia E78.2    Stage 3a chronic kidney disease (HCC) N18.31    Acute osteomyelitis of metatarsal bone of left foot ([facility]) M86.172    Staphylococcus aureus infection (cultures also with Anaerococcus) A49.01    Pressure ulcer of left lateral forefoot, stage 4 ([facility]) T52.256    Nonhealing surgical wound, initial encounter (small left dorsal foot wound) T81.89XA    Chronic low back pain M54.50, G89.29    Dental bridge present Z97.2    Callus of foot L84    Hyperkalemia E87.5       In my clinical judgement, ongoing HBO therapy is necessary at this time, given a threat to patient function, limb or life from the current condition. Plan     1. Hyperbaric Oxygen - Del Hernandez tolerated the treatment well today without complications. Continue HBO treatment as outlined above. 2. Other - I don't believe we have official approval of his extension of HBO therapy from 30 to 50 dives yet, though we sent the request in a week ago. Will be in touch with his insurance company today, and we have tentative plans to extend therapy, given his progress thus far, but the risk of further complications if we don't continue to push his healing as much as we can; I'm hopeful that he might only need closer to 40 dives, but we'll reassess his progress and tolerance week to week. I was present on these premises and immediately available to furnish assistance & direction throughout the procedure.      -- Electronically signed by Syd Alcocer MD on 11/2/2021 at 8:22 AM

## 2021-11-04 ENCOUNTER — HOSPITAL ENCOUNTER (OUTPATIENT)
Dept: HYPERBARIC MEDICINE | Age: 61
Discharge: HOME | End: 2021-11-04
Payer: MEDICARE

## 2021-11-04 VITALS
TEMPERATURE: 98.7 F | RESPIRATION RATE: 16 BRPM | SYSTOLIC BLOOD PRESSURE: 158 MMHG | DIASTOLIC BLOOD PRESSURE: 78 MMHG | HEART RATE: 68 BPM

## 2021-11-04 DIAGNOSIS — E11.621 DIABETIC ULCER OF OTHER PART OF LEFT FOOT ASSOCIATED WITH TYPE 2 DIABETES MELLITUS, WITH NECROSIS OF BONE (HCC): Primary | ICD-10-CM

## 2021-11-04 DIAGNOSIS — L97.524 DIABETIC ULCER OF OTHER PART OF LEFT FOOT ASSOCIATED WITH TYPE 2 DIABETES MELLITUS, WITH NECROSIS OF BONE (HCC): Primary | ICD-10-CM

## 2021-11-04 LAB
GLUCOSE BLD-MCNC: 122 MG/DL (ref 70–99)
GLUCOSE BLD-MCNC: 158 MG/DL (ref 70–99)
PERFORMED ON: ABNORMAL
PERFORMED ON: ABNORMAL

## 2021-11-04 PROCEDURE — G0277 HBOT, FULL BODY CHAMBER, 30M: HCPCS

## 2021-11-04 PROCEDURE — 99183 HYPERBARIC OXYGEN THERAPY: CPT | Performed by: INTERNAL MEDICINE

## 2021-11-04 RX ORDER — OXYMETAZOLINE HYDROCHLORIDE 0.05 G/100ML
SPRAY NASAL
Qty: 1 BOTTLE | Refills: 0 | COMMUNITY
Start: 2021-11-04 | End: 2021-11-05

## 2021-11-04 ASSESSMENT — PAIN SCALES - GENERAL
PAINLEVEL_OUTOF10: 0
PAINLEVEL_OUTOF10: 0

## 2021-11-05 ENCOUNTER — HOSPITAL ENCOUNTER (OUTPATIENT)
Dept: WOUND CARE | Age: 61
Discharge: HOME | End: 2021-11-05
Payer: MEDICARE

## 2021-11-05 ENCOUNTER — HOSPITAL ENCOUNTER (OUTPATIENT)
Dept: HYPERBARIC MEDICINE | Age: 61
Discharge: HOME | End: 2021-11-05
Payer: MEDICARE

## 2021-11-05 VITALS
HEART RATE: 71 BPM | DIASTOLIC BLOOD PRESSURE: 69 MMHG | BODY MASS INDEX: 31.28 KG/M2 | HEIGHT: 73 IN | WEIGHT: 236 LBS | SYSTOLIC BLOOD PRESSURE: 146 MMHG | TEMPERATURE: 97.8 F | RESPIRATION RATE: 16 BRPM

## 2021-11-05 VITALS
TEMPERATURE: 97.8 F | RESPIRATION RATE: 16 BRPM | SYSTOLIC BLOOD PRESSURE: 146 MMHG | HEART RATE: 71 BPM | DIASTOLIC BLOOD PRESSURE: 69 MMHG

## 2021-11-05 DIAGNOSIS — E11.621 DIABETIC ULCER OF OTHER PART OF LEFT FOOT ASSOCIATED WITH TYPE 2 DIABETES MELLITUS, WITH NECROSIS OF BONE (HCC): Primary | ICD-10-CM

## 2021-11-05 DIAGNOSIS — L97.524 DIABETIC ULCER OF OTHER PART OF LEFT FOOT ASSOCIATED WITH TYPE 2 DIABETES MELLITUS, WITH NECROSIS OF BONE (HCC): Primary | ICD-10-CM

## 2021-11-05 LAB
GLUCOSE BLD-MCNC: 129 MG/DL (ref 70–99)
GLUCOSE BLD-MCNC: 142 MG/DL (ref 70–99)
PERFORMED ON: ABNORMAL
PERFORMED ON: ABNORMAL

## 2021-11-05 PROCEDURE — 97605 NEG PRS WND THER DME<=50SQCM: CPT | Performed by: INTERNAL MEDICINE

## 2021-11-05 PROCEDURE — 11044 DBRDMT BONE 1ST 20 SQ CM/<: CPT | Performed by: INTERNAL MEDICINE

## 2021-11-05 PROCEDURE — 97605 NEG PRS WND THER DME<=50SQCM: CPT

## 2021-11-05 PROCEDURE — 11043 DBRDMT MUSC&/FSCA 1ST 20/<: CPT

## 2021-11-05 PROCEDURE — G0277 HBOT, FULL BODY CHAMBER, 30M: HCPCS

## 2021-11-05 PROCEDURE — 11043 DBRDMT MUSC&/FSCA 1ST 20/<: CPT | Performed by: INTERNAL MEDICINE

## 2021-11-05 PROCEDURE — 99183 HYPERBARIC OXYGEN THERAPY: CPT | Performed by: INTERNAL MEDICINE

## 2021-11-05 PROCEDURE — 11044 DBRDMT BONE 1ST 20 SQ CM/<: CPT

## 2021-11-05 RX ORDER — LIDOCAINE 50 MG/G
OINTMENT TOPICAL ONCE
Status: CANCELLED | OUTPATIENT
Start: 2021-11-05 | End: 2021-11-05

## 2021-11-05 RX ORDER — LIDOCAINE HYDROCHLORIDE 40 MG/ML
SOLUTION TOPICAL ONCE
Status: CANCELLED | OUTPATIENT
Start: 2021-11-05 | End: 2021-11-05

## 2021-11-05 RX ORDER — BACITRACIN ZINC AND POLYMYXIN B SULFATE 500; 1000 [USP'U]/G; [USP'U]/G
OINTMENT TOPICAL ONCE
Status: CANCELLED | OUTPATIENT
Start: 2021-11-05 | End: 2021-11-05

## 2021-11-05 RX ORDER — LIDOCAINE 40 MG/G
CREAM TOPICAL ONCE
Status: CANCELLED | OUTPATIENT
Start: 2021-11-05 | End: 2021-11-05

## 2021-11-05 RX ORDER — LIDOCAINE 40 MG/G
CREAM TOPICAL ONCE
Status: DISCONTINUED | OUTPATIENT
Start: 2021-11-05 | End: 2021-11-06 | Stop reason: HOSPADM

## 2021-11-05 ASSESSMENT — PAIN SCALES - GENERAL
PAINLEVEL_OUTOF10: 0

## 2021-11-05 NOTE — PLAN OF CARE
Wounds showing signs of improvement this week. Wound debridement per Dr. Fabrizio Neil and Dr. Namita Shaw, Pt tolerated well. Pt reports he is tolerating HBOT well, plan to continue. POC discussed with Pt and wife per Dr. Namita Shaw. Follow up in [address] [place] [address] in 1 week as ordered. Pt. Aware to call sooner with any problems or questions/concerns.  MD orders/D/C instructions reviewed with patient, all questions answered; copy of instructions given to patient

## 2021-11-05 NOTE — PLAN OF CARE
[facility] Physician Orders and Discharge [address]  [address], [address]  [place], [facility]  Telephone: [phone]      Fax: [phone]        Your home care [place]                                                         Your wound-care supplies will be provided by: We are ordering your wound-care supplies from [employer]. Please note, depending on your insurance coverage, you may have out-of-pocket expenses for these supplies. Someone from [facility] should call you to confirm your order and discuss those potential costs before they ship your products -- please anticipate that call. If your out-of-pocket cost could be substantial, [facility] has a financial hardship program for patients who qualify, so please ask about that if you might need a hand. If you have any questions about your supplies or your potential out-of-pocket costs, or if you need to place an order for a refill of supplies (typically monthly), please call [phone].      NAME: John Davis  DATE of BIRTH:  1960  PRIMARY DIAGNOSIS FOR WOUND CARE CENTER:  Diabetic      Wound cleansing:   Do not scrub or use excessive force. Wash hands with soap and water before and after dressing changes. Prior to applying a clean dressing, cleanse wound with normal saline, wound cleanser, or mild soap and water.  Ask your physician or nurse before getting the wound(s) wet in the shower.                Wound care for home:      Left Amp site:  Vashe  Foam to wound (you don't need to try to pack it in any spaces just place on wound bed)  NPWT Safetac drape we gave you DO NOT USE SKIN PREP WITH THIS NEW DRAPE!! DO NOT USE DRAPE THAT CAME WITH NPWT      NPWT suction to 125 continuous     Home care to change Monday, Wednesday and [facility] to change on Fridays       Left lateral foot:  Vashe (2-3 sprays on wound bed, don't rinse)  Apply Triad or Desitin (zinc Oxide) to lexi wound to protect  Apply Santyl about nickel thick to just the wound   Apply a slightly moistened gauze over Santyl  cover with a dry dressing  Change EVERY DAY        Please note, all wounds (unless stated otherwise here) were mechanically debrided at the time of cleansing here in the wound-care center today, so a small amount of pain, drainage or bleeding from that process might be expected, and is normal.      All products for home use, including multiple products for a single wound if applicable, are medically necessary in order to achieve the best chance at timely wound healing. See provider documentation for details if needed.     Substituted dressings applied in the [facility] today, if applicable:           New orders for this week (labs, imaging, medications, etc.)        Additional instructions for specific diagnoses:     CONTINUE HYPERBARICS AS ORDERED      General comments for diabetic / neuropathic ulcers:  *  Unless you've been instructed not to remove your dressings, be sure to inspect your feet daily, and notify us of any major changes. *  If you do not have a long-term podiatrist, be sure to let us know. *  Moisturize your skin regularly with Vaseline, Aquaphor, Aveeno, CeraVe, Cetaphil, Eucerin, Lubriderm, etc; but keep the skin between your toes dry. Aurora Grew allow your wound-care doctor or podiatrist to cut nails, calluses & corns. *  Be sure to always wear footwear that fits well, and NEVER go without shoes and socks. *  Do you know your Hemoglobin A1c level? You should! Please ask if you have questions. *  Be sure to adhere to any recommendations from your PCP or endocrinologist when it comes to diabetes medications and diet. If you have questions, please ask. *  If you smoke, your wound can not heal properly -- please talk with us when you're ready to quit.    *  Do not soak your feet unless specifically instructed to do so by us.              F/U Appointment is with Dr. Amelie Olivares 1 week

## 2021-11-05 NOTE — PLAN OF CARE
Patient seen for HBOT treatment  32 / 48   today. Patient assessment WNL- AVSS, Lungs CTA, Lungs CTA , TEED=0bilat- NPWT to Left foot- tubing vented during HBO- FSBS 142-cleared for HBOT. Patient was compressed in HBO chamber to 2.0 STEPHANIE at 2.0 psi/min x90 min treatment w/o air breaks. Patient is tolerating treatment well and is currently resting comfortably and watching television. HBO RN at chamber side, will continue to monitor.

## 2021-11-08 ENCOUNTER — HOSPITAL ENCOUNTER (OUTPATIENT)
Dept: HYPERBARIC MEDICINE | Age: 61
Discharge: HOME | End: 2021-11-08
Payer: MEDICARE

## 2021-11-08 VITALS
HEART RATE: 66 BPM | DIASTOLIC BLOOD PRESSURE: 77 MMHG | SYSTOLIC BLOOD PRESSURE: 165 MMHG | TEMPERATURE: 97.6 F | RESPIRATION RATE: 16 BRPM

## 2021-11-08 DIAGNOSIS — E11.621 DIABETIC ULCER OF OTHER PART OF LEFT FOOT ASSOCIATED WITH TYPE 2 DIABETES MELLITUS, WITH NECROSIS OF BONE (HCC): Primary | ICD-10-CM

## 2021-11-08 DIAGNOSIS — L97.524 DIABETIC ULCER OF OTHER PART OF LEFT FOOT ASSOCIATED WITH TYPE 2 DIABETES MELLITUS, WITH NECROSIS OF BONE (HCC): Primary | ICD-10-CM

## 2021-11-08 LAB
GLUCOSE BLD-MCNC: 138 MG/DL (ref 70–99)
GLUCOSE BLD-MCNC: 142 MG/DL (ref 70–99)
PERFORMED ON: ABNORMAL
PERFORMED ON: ABNORMAL

## 2021-11-08 PROCEDURE — G0277 HBOT, FULL BODY CHAMBER, 30M: HCPCS

## 2021-11-08 PROCEDURE — 99183 HYPERBARIC OXYGEN THERAPY: CPT | Performed by: INTERNAL MEDICINE

## 2021-11-08 ASSESSMENT — PAIN SCALES - GENERAL
PAINLEVEL_OUTOF10: 0
PAINLEVEL_OUTOF10: 0

## 2021-11-08 NOTE — PROGRESS NOTES
[address]  Hyperbaric Oxygen    Del Massey     : 1960    DATE OF VISIT:  2021    Subjective     Del Massey is a 59 y.o. male who  has a past medical history of Abscess of left hand (2017), Acute kidney injury superimposed on chronic kidney disease ([facility] (2021), Adhesive capsulitis of shoulder (2014), Fractures, Gas gangrene of foot ([facility] (2021), History of hyperbaric oxygen therapy (2021), and PNA (pneumonia) (2016). He presents to the [facility] today for hyperbaric oxygen treatment of his Kelli [place] 4 diabetic foot, which is refractory to standard therapy for 30 days. Patient denies fever. Patient denies nausea, vomiting or diarrhea; no ear troubles and no other new complaints; no fears or anxiety regarding treatment today. Objective     Vitals:    21 0810 21 1021   BP: (!) 150/73 (!) 146/69   Pulse: 73 71   Resp: 18 16   Temp: 98.8 °F (37.1 °C) 97.8 °F (36.6 °C)   TempSrc: Oral Oral       General:  Alert, cooperative, no distress. Ears: External otic canals are within acceptable limits. Teed grade 0 on the right and 0 on the left pre-treatment. Teed grade 0 on the right and 0 on the left post-treatment. Lungs:  Clear to auscultation bilaterally. Ulcer: Not examined today in HBO, if applicable. Recent Labs     21  0805 21  1019   POCGLU 142* 80*       Assessment     Del Massey is a 59 y.o. male who presented to the [facility] today for hyperbaric oxygen treatment #32 of 50 for the diagnosis as stated above. Treatment given at 2 STEPHANIE for 90 minutes, with no air breaks. Total Treatment Time (min): 105 today, including compression, 100% oxygen at pressure, air breaks if applicable, and decompression.     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 2 diabetes mellitus, with necrosis of bone ([facility]) E11.621, L97.524    Diabetic polyneuropathy associated with type 2 diabetes mellitus ([facility]) E11.42    Elevated PSA R97.20    Mixed hyperlipidemia E78.2    Stage 3a chronic kidney disease (HCC) N18.31    Acute osteomyelitis of metatarsal bone of left foot ([facility]) M86.172    Staphylococcus aureus infection (cultures also with Anaerococcus) A49.01    Pressure ulcer of left lateral forefoot, stage 4 ([facility]) J93.236    Nonhealing surgical wound, initial encounter (small left dorsal foot wound) T81.89XA    Chronic low back pain M54.50, G89.29    Dental bridge present Z97.2    Callus of foot L84    Hyperkalemia E87.5       In my clinical judgement, ongoing HBO therapy is necessary at this time, given a threat to patient function, limb or life from the current condition. Adjunctive Rx, objective weekly progress and goals of Rx will periodically be updated, on Mondays. Plan     1. Hyperbaric Oxygen - Del Hernandez tolerated the treatment well today without complications. Continue HBO treatment as outlined above. 2. Other -     I was present on these premises and immediately available to furnish assistance & direction throughout the procedure.      -- Electronically signed by Severo Pinna, MD on 11/7/2021 at 7:41 PM

## 2021-11-08 NOTE — DISCHARGE INSTR - COC
Continuity of Care Form    Patient Name: Bo Crowley   :  1960  MRN:  [id number]    Admit date:  2021  Discharge date:  ***    Code Status Order: Prior   Advance Directives:      Admitting Physician:  No admitting provider for patient encounter. PCP: Ashish Pena MD    Discharging Nurse: [facility] Unit/Room#: No information available for this encounter. Discharging Unit Phone Number: ***    Emergency Contact:   Extended Emergency Contact Information  Primary Emergency Contact: Brandee Flor  Address: [address] Phone: [phone]  Relation: Spouse  Secondary Emergency Contact: Varun Gifford Phone: [phone]  Relation: Brother/Sister    Past Surgical History:  Past Surgical History:   Procedure Laterality Date    FOOT DEBRIDEMENT Left 2021    LEFT FOOT DEBRIDEMENT INCISION AND DRAINAGE performed by Juan Manuel Gregory DPM at [address] [place] [address] Left 2021    STAGED INCISION AND DEBRIDEMENT LEFT FOOT WITH PARTIAL FIRST RAY AMPUTATION performed by Juan Manuel Gregory DPM at [address] Left 2017    LEFT HAND DEBRIDEMENT INCISION AND DRAINAGE    NECK SURGERY      30s year    TOE AMPUTATION Left     hallux    WRIST FRACTURE SURGERY         Immunization History: There is no immunization history on file for this patient.     Active Problems:  Patient Active Problem List   Diagnosis Code    Hypertension I10    BPH (benign prostatic hyperplasia) N40.0    Osteoarthritis M19.90    Class 1 obesity due to excess calories with serious comorbidity and body mass index (BMI) of 31.0 to 31.9 in adult E66.09, Z68.31    Diabetic ulcer of left foot associated with type 2 diabetes mellitus, with necrosis of bone (HCC) E11.621, I11.946    Diabetic polyneuropathy associated with type 2 diabetes mellitus (HCC) E11.42    Elevated PSA R97.20    Mixed hyperlipidemia E78.2    Stage 3a chronic kidney disease ([facility]) N18.31    Acute osteomyelitis of metatarsal bone of left foot ([facility]) M86.172    Staphylococcus aureus infection (cultures also with Anaerococcus) A49.01    Pressure ulcer of left lateral forefoot, stage 4 ([facility]) L89.894    Nonhealing surgical wound, initial encounter (small left dorsal foot wound) T81.89XA    Chronic low back pain M54.50, G89.29    Dental bridge present Z97.2    Callus of foot L84    Hyperkalemia E87.5       Isolation/Infection:   Isolation            No Isolation          Patient Infection Status       Infection Onset Added Last Indicated Last Indicated By Review Planned Expiration Resolved Resolved By    None active    Resolved    COVID-19 Rule Out 08/30/21 08/30/21 08/30/21 COVID-19, Rapid (Ordered)   08/30/21 Rule-Out Test Resulted            Nurse Assessment:  Last Vital Signs: BP (!) 165/77   Pulse 66   Temp 97.6 °F (36.4 °C) (Oral)   Resp 16     Last documented pain score (0-10 scale): Pain Level: 0  Last Weight:   Wt Readings from Last 1 Encounters:   11/05/21 236 lb (107 kg)     Mental Status:  {IP PT MENTAL STATUS:20030}    IV Access:  { SUYAPA IV ACCESS:505250631}    Nursing Mobility/ADLs:  Walking   {P DME KNUU:996078342}  Transfer  {P DME SZTA:422266256}  Bathing  {P DME TOPM:157297390}  Dressing  {P DME RPCX:316053430}  Toileting  {P DME AFNC:822948969}  Feeding  {P DME KNDL:966060172}  Med Admin  {P DME VEKV:709963740}  Med Delivery   { SUYAPA MED Delivery:992951196}    Wound Care Documentation and Therapy:  Negative Pressure Wound Therapy Foot Anterior;Left;Medial (Active)   Wound Type Diabetic foot ulcer 10/29/21 1157   Unit Type medella 11/05/21 1131   Dressing Type Cleanse Choice 11/05/21 1131   Number of pieces used 2 11/05/21 1131   Cycle Continuous 11/05/21 1131   Target Pressure (mmHg) 125 11/05/21 1131   Canister changed?  Yes 11/05/21 1131   Dressing Status Clean; Dry; Intact 11/05/21 1131   Dressing Changed Changed/New 11/05/21 1131   Number of days: 61       Wound 09/16/21 #1, left medial forefoot, Diabetic ulcer, hercules 4, onset 8/25/2021 (Active)   Wound Image   10/22/21 1030   Wound Etiology Diabetic Hercules 4 11/05/21 1035   Dressing Status New dressing applied; Clean; Dry; Intact 11/05/21 1131   Wound Cleansed Vashe 11/05/21 1131   Dressing/Treatment Other (comment) 11/05/21 1131   Offloading for Diabetic Foot Ulcers Yes (type); Offloading boot 10/29/21 1157   Wound Length (cm) 3.3 cm 11/05/21 1035   Wound Width (cm) 2.5 cm 11/05/21 1035   Wound Depth (cm) 0.6 cm 11/05/21 1035   Wound Surface Area (cm^2) 8.25 cm^2 11/05/21 1035   Change in Wound Size % (l*w) 52.86 11/05/21 1035   Wound Volume (cm^3) 4.95 cm^3 11/05/21 1035   Wound Healing % 88 11/05/21 1035   Post-Procedure Length (cm) 3.3 cm 11/05/21 1050   Post-Procedure Width (cm) 2.5 cm 11/05/21 1050   Post-Procedure Depth (cm) 0.6 cm 11/05/21 1050   Post-Procedure Surface Area (cm^2) 8.25 cm^2 11/05/21 1050   Post-Procedure Volume (cm^3) 4.95 cm^3 11/05/21 1050   Distance Tunneling (cm) 0.5 cm 10/15/21 1049   Tunneling Position ___ O'Clock 9 10/15/21 1049   Wound Assessment Pink/red; Other (Comment) 11/05/21 1035   Drainage Amount Small 11/05/21 1035   Drainage Description Serosanguinous 11/05/21 1035   Odor None 11/05/21 1035   Elicia-wound Assessment Maceration; Blanchable erythema 11/05/21 1035   Number of days: 53       Wound 09/16/21 #3, left lateral foot, pressure ulcer, Stage 4, onset 9/2021 (Active)   Wound Image   10/22/21 1030   Wound Etiology Pressure Stage  4 11/05/21 1035   Dressing Status New dressing applied; Clean; Dry; Intact 11/05/21 1131   Wound Cleansed Vashe 11/05/21 1131   Dressing/Treatment Other (comment) 11/05/21 1131   Offloading for Diabetic Foot Ulcers Yes (type);  Offloading boot 10/29/21 1157   Wound Length (cm) 2.8 cm 11/05/21 1035   Wound Width (cm) 1.1 cm 11/05/21 1035   Wound Depth (cm) 0.2 cm 11/05/21 1035   Wound Surface Area (cm^2) 3.08 cm^2 11/05/21 1035 Change in Wound Size % (l*w) 60 21 1035   Wound Volume (cm^3) 0.616 cm^3 21 1035   Wound Healing % 20 21 1035   Post-Procedure Length (cm) 2.8 cm 21 1050   Post-Procedure Width (cm) 1.1 cm 21 1050   Post-Procedure Depth (cm) 0.2 cm 21 1050   Post-Procedure Surface Area (cm^2) 3.08 cm^2 21 1050   Post-Procedure Volume (cm^3) 0.616 cm^3 21 1050   Wound Assessment Slough; [place]/red 21 1035   Drainage Amount Small 21 1035   Drainage Description Serous 21 1035   Odor None 21 1035   Elicia-wound Assessment Blanchable erythema 21 1035   Number of days: 53        Elimination:  Continence: Bowel: {YES / DB:36821}  Bladder: {YES / WF:11821}  Urinary Catheter: {Urinary Catheter:639612217}   Colostomy/Ileostomy/Ileal Conduit: {YES / AF:30914}       Date of Last BM: ***  No intake or output data in the 24 hours ending 21 1030  No intake/output data recorded.     Safety Concerns:     [address] Safety Concerns:334231172}    Impairments/Disabilities:      [address] Impairments/Disabilities:367347001}    Nutrition Therapy:  Current Nutrition Therapy:   [address] Diet List:926348260}    Routes of Feeding: {CHP DME Other Feedings:792777273}  Liquids: {Slp liquid thickness:13263}  Daily Fluid Restriction: {CHP DME Yes amt example:142370194}  Last Modified Barium Swallow with Video (Video Swallowing Test): {Done Not Done LAFW:272679902}    Treatments at the Time of Hospital Discharge:   Respiratory Treatments: ***  Oxygen Therapy:  {Therapy; copd oxygen:66918}  Ventilator:    { ANGELA Vent KXME:472974579}    Rehab Therapies: {THERAPEUTIC INTERVENTION:4459903328}  Weight Bearing Status/Restrictions: [address]  Weight Bearin}  Other Medical Equipment (for information only, NOT a DME order):  {EQUIPMENT:022868686}  Other Treatments: ***    Patient's personal belongings (please select all that are sent with patient):  {[facility] DME Belongings:184436153}    RN SIGNATURE:  {Esignature:781821713}    CASE MANAGEMENT/SOCIAL WORK SECTION    Inpatient Status Date: ***    Readmission Risk Assessment Score:  Readmission Risk              Risk of Unplanned Readmission:  0           Discharging to Facility/ Agency   Name:   Address:  Phone:  Fax:    Dialysis Facility (if applicable)   Name:  Address:  Dialysis Schedule:  Phone:  Fax:    / signature: {Esignature:435342011}    PHYSICIAN SECTION    Prognosis: {Prognosis:5582580139}    Condition at Discharge: [address] [place] [address] Patient Condition:101658049}    Rehab Potential (if transferring to Rehab): {Prognosis:3774525581}    Recommended Labs or Other Treatments After Discharge: ***    Physician Certification: I certify the above information and transfer of Jone Ford  is necessary for the continuing treatment of the diagnosis listed and that he requires {Admit to Appropriate Level of Care:30280} for {GREATER/LESS:542794429} 30 days.      Update Admission H&P: {CHP DME Changes in MIGVX:402344774}    PHYSICIAN SIGNATURE:  {Esignature:889632889}

## 2021-11-08 NOTE — PROGRESS NOTES
[address]  Hyperbaric Oxygen    Del Veliz     : 1960    DATE OF VISIT:  2021    Subjective     Del Veliz is a 59 y.o. male who  has a past medical history of Abscess of left hand (2017), Acute kidney injury superimposed on chronic kidney disease ([facility] (2021), Adhesive capsulitis of shoulder (2014), Fractures, Gas gangrene of foot ([facility] (2021), History of hyperbaric oxygen therapy (2021), and PNA (pneumonia) (2016). He presents to the [facility] today for hyperbaric oxygen treatment of his Jarome Janes 4 diabetic foot, which is refractory to standard therapy for 30 days. Patient denies fever. Patient denies nausea, vomiting or diarrhea; no ear troubles and no other new complaints; no fears or anxiety regarding treatment today. Objective     Vitals:    21 1056 21 1255   BP: 138/67 (!) 158/78   Pulse: 84 68   Resp: 16 16   Temp: 99.2 °F (37.3 °C) 98.7 °F (37.1 °C)   TempSrc: Oral Oral       General:  Alert, cooperative, no distress. Ears: External otic canals are within acceptable limits. Teed grade 0 on the right and 0 on the left pre-treatment. Teed grade 0 on the right and 0 on the left post-treatment. Lungs:  Clear to auscultation bilaterally. Ulcer: Not examined today in HBO, if applicable. Recent Labs     21  0805 21  1019   POCGLU 142* 80*       Assessment     Del Veliz is a 59 y.o. male who presented to the [facility] today for hyperbaric oxygen treatment #31 of 50 for the diagnosis as stated above. Treatment given at 2 STEPHANIE for 90 minutes, with no air breaks. Total Treatment Time (min): 109 today, including compression, 100% oxygen at pressure, air breaks if applicable, and decompression.     Patient Active Problem List   Diagnosis Code    Hypertension I10    BPH (benign prostatic hyperplasia) N40.0    Osteoarthritis M19.90  Class 1 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 2 diabetes mellitus, with necrosis of bone ([facility]) E11.621, L97.524    Diabetic polyneuropathy associated with type 2 diabetes mellitus ([facility]) E11.42    Elevated PSA R97.20    Mixed hyperlipidemia E78.2    Stage 3a chronic kidney disease (HCC) N18.31    Acute osteomyelitis of metatarsal bone of left foot ([facility]) M86.172    Staphylococcus aureus infection (cultures also with Anaerococcus) A49.01    Pressure ulcer of left lateral forefoot, stage 4 ([facility]) L56.549    Nonhealing surgical wound, initial encounter (small left dorsal foot wound) T81.89XA    Chronic low back pain M54.50, G89.29    Dental bridge present Z97.2    Callus of foot L84    Hyperkalemia E87.5       In my clinical judgement, ongoing HBO therapy is necessary at this time, given a threat to patient function, limb or life from the current condition. Adjunctive Rx, objective weekly progress and goals of Rx will periodically be updated, on Mondays. Plan     1. Hyperbaric Oxygen - Del Hernandez tolerated the treatment well today without complications. Continue HBO treatment as outlined above. 2. Other -     I was present on these premises and immediately available to furnish assistance & direction throughout the procedure.      -- Electronically signed by Maritza Grijalva MD on 11/7/2021 at 7:40 PM

## 2021-11-09 ENCOUNTER — HOSPITAL ENCOUNTER (OUTPATIENT)
Dept: HYPERBARIC MEDICINE | Age: 61
Discharge: HOME | End: 2021-11-09
Payer: MEDICARE

## 2021-11-09 VITALS
TEMPERATURE: 97.8 F | RESPIRATION RATE: 18 BRPM | DIASTOLIC BLOOD PRESSURE: 82 MMHG | SYSTOLIC BLOOD PRESSURE: 188 MMHG | HEART RATE: 71 BPM

## 2021-11-09 DIAGNOSIS — L97.524 DIABETIC ULCER OF OTHER PART OF LEFT FOOT ASSOCIATED WITH TYPE 2 DIABETES MELLITUS, WITH NECROSIS OF BONE (HCC): Primary | ICD-10-CM

## 2021-11-09 DIAGNOSIS — E11.621 DIABETIC ULCER OF OTHER PART OF LEFT FOOT ASSOCIATED WITH TYPE 2 DIABETES MELLITUS, WITH NECROSIS OF BONE (HCC): Primary | ICD-10-CM

## 2021-11-09 LAB
GLUCOSE BLD-MCNC: 137 MG/DL (ref 70–99)
GLUCOSE BLD-MCNC: 156 MG/DL (ref 70–99)
PERFORMED ON: ABNORMAL
PERFORMED ON: ABNORMAL

## 2021-11-09 PROCEDURE — G0277 HBOT, FULL BODY CHAMBER, 30M: HCPCS

## 2021-11-09 PROCEDURE — 99183 HYPERBARIC OXYGEN THERAPY: CPT | Performed by: INTERNAL MEDICINE

## 2021-11-09 RX ORDER — OXYMETAZOLINE HYDROCHLORIDE 0.05 G/100ML
SPRAY NASAL
Qty: 1 BOTTLE | Refills: 0 | COMMUNITY
Start: 2021-11-09 | End: 2021-11-10

## 2021-11-09 ASSESSMENT — PAIN SCALES - GENERAL
PAINLEVEL_OUTOF10: 0
PAINLEVEL_OUTOF10: 0

## 2021-11-09 NOTE — PLAN OF CARE
Patient seen for HBOT treatment  34 / 48    today. Patient assessment complete and cleared for HBOT. Patient was compressed in HBO chamber to 2.0 STEPHANIE at 2.0 psi/min. Patient is tolerating treatment well and is currently resting comfortably and watching television. HBO RN at chamber side, will continue to monitor.

## 2021-11-10 ENCOUNTER — HOSPITAL ENCOUNTER (OUTPATIENT)
Dept: HYPERBARIC MEDICINE | Age: 61
Discharge: HOME | End: 2021-11-10
Payer: MEDICARE

## 2021-11-10 VITALS
HEART RATE: 70 BPM | SYSTOLIC BLOOD PRESSURE: 159 MMHG | DIASTOLIC BLOOD PRESSURE: 75 MMHG | TEMPERATURE: 97.8 F | RESPIRATION RATE: 16 BRPM

## 2021-11-10 DIAGNOSIS — E11.621 DIABETIC ULCER OF OTHER PART OF LEFT FOOT ASSOCIATED WITH TYPE 2 DIABETES MELLITUS, WITH NECROSIS OF BONE (HCC): Primary | ICD-10-CM

## 2021-11-10 DIAGNOSIS — L97.524 DIABETIC ULCER OF OTHER PART OF LEFT FOOT ASSOCIATED WITH TYPE 2 DIABETES MELLITUS, WITH NECROSIS OF BONE (HCC): Primary | ICD-10-CM

## 2021-11-10 LAB
GLUCOSE BLD-MCNC: 112 MG/DL (ref 70–99)
GLUCOSE BLD-MCNC: 120 MG/DL (ref 70–99)
PERFORMED ON: ABNORMAL
PERFORMED ON: ABNORMAL

## 2021-11-10 PROCEDURE — 99183 HYPERBARIC OXYGEN THERAPY: CPT | Performed by: EMERGENCY MEDICINE

## 2021-11-10 PROCEDURE — G0277 HBOT, FULL BODY CHAMBER, 30M: HCPCS

## 2021-11-10 ASSESSMENT — PAIN SCALES - GENERAL
PAINLEVEL_OUTOF10: 0
PAINLEVEL_OUTOF10: 0

## 2021-11-10 NOTE — PROGRESS NOTES
[address]  Hyperbaric Oxygen    Del Leon     : 1960    DATE OF VISIT:  2021    Subjective     Del Leon is a 59 y.o. male who  has a past medical history of Abscess of left hand (2017), Acute kidney injury superimposed on chronic kidney disease ([facility] [address] (2021), Adhesive capsulitis of shoulder (2014), Fractures, Gas gangrene of foot ([facility] (2021), History of hyperbaric oxygen therapy (2021), and PNA (pneumonia) (2016). He presents to the [facility] today for hyperbaric oxygen treatment of his Donnalee Crew 4 diabetic foot, which is refractory to standard therapy for 30 days. Patient denies fever. Patient denies nausea, vomiting or diarrhea; no ear troubles and no other new complaints; no fears or anxiety regarding treatment today. Objective     Vitals:    21 0813 21 1015   BP: (!) 156/77 (!) 188/82   Pulse: 76 71   Resp: 18 18   Temp: 98.6 °F (37 °C) 97.8 °F (36.6 °C)   TempSrc: Oral Oral       General:  Alert, cooperative, no distress. Ears: External otic canals are within acceptable limits. Teed grade 0 on the right and 0 on the left pre-treatment. Teed grade 0 on the right and 0 on the left post-treatment. Lungs:  Clear to auscultation bilaterally. Ulcer: Not examined today in HBO, if applicable. Recent Labs     21  0816 21  1018 21  0816 21  1021   POCGLU 142* 138* 156* 80*       Assessment     Del Leon is a 59 y.o. male who presented to the [facility] today for hyperbaric oxygen treatment #34 of 50 for the diagnosis as stated above. Treatment given at 2 STEPHANIE for 90 minutes, with no air breaks. Total Treatment Time (min): 106 today, including compression, 100% oxygen at pressure, air breaks if applicable, and decompression.     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 2 diabetes mellitus, with necrosis of bone (HCC) E11.621, L97.524    Diabetic polyneuropathy associated with type 2 diabetes mellitus (HCC) E11.42    Elevated PSA R97.20    Mixed hyperlipidemia E78.2    Stage 3a chronic kidney disease (HCC) N18.31    Acute osteomyelitis of metatarsal bone of left foot (HCC) M86.172    Staphylococcus aureus infection (cultures also with Anaerococcus) A49.01    Pressure ulcer of left lateral forefoot, stage 4 ([facility]) G75.148    Nonhealing surgical wound, initial encounter (small left dorsal foot wound) T81.89XA    Chronic low back pain M54.50, G89.29    Dental bridge present Z97.2    Callus of foot L84    Hyperkalemia E87.5       In my clinical judgement, ongoing HBO therapy is necessary at this time, given a threat to patient function, limb or life from the current condition. Adjunctive Rx, objective weekly progress and goals of Rx will periodically be updated, on Mondays. Plan     1. Hyperbaric Oxygen - Del Hernandez tolerated the treatment well today without complications. Continue HBO treatment as outlined above. 2. Other -     I was present on these premises and immediately available to furnish assistance & direction throughout the procedure.      -- Electronically signed by Cruz Reinoso MD on 11/9/2021 at 8:43 PM

## 2021-11-10 NOTE — PROGRESS NOTES
[address]  Hyperbaric Oxygen    Del Crandall     : 1960    DATE OF VISIT:  11/10/2021    Subjective     Del Crandall is a 59 y.o. male who  has a past medical history of Abscess of left hand (2017), Acute kidney injury superimposed on chronic kidney disease ([facility] [address] (2021), Adhesive capsulitis of shoulder (2014), Fractures, Gas gangrene of foot ([facility] (2021), History of hyperbaric oxygen therapy (2021), and PNA (pneumonia) (2016). He presents to the [facility] today for hyperbaric oxygen treatment of his diabetic foot ulcer  , which is refractory to standard therapy for 30 days. Patient denies fever. Patient denies nausea, vomiting or diarrhea; no ear troubles and no other new complaints; no fears or anxiety regarding treatment today. Objective       Vitals:    11/10/21 0816   BP: (!) 159/72   Pulse: 81   Resp: 18   Temp: 97.7 °F (36.5 °C)   TempSrc: Oral       General:  Alert, cooperative, no distress. Ears: External otic canals are within acceptable limits. Teed grade 0 on the right and 0 on the left pre-treatment. Teed grade 0 on the right and 0 on the left post-treatment. Lungs:  Clear to auscultation bilaterally. Ulcer: Not examined today in HBO, if applicable. Recent Labs     21  0816 21  1018 21  0816 21  1021 11/10/21  0821   POCGLU 142* 138* 156* 137* 120*       Assessment     Del Crandall is a 59 y.o. male who presented to the [facility] today for hyperbaric oxygen treatment #35 of 50 for the diagnosis as stated above. Treatment given at 2 STEPHANIE for 90 minutes, with no air breaks. Total Treatment Time (min): 106 today, including compression, 100% oxygen at pressure, air breaks if applicable, and decompression.     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 2 diabetes mellitus, with necrosis of bone ([facility]) E11.621, L97.524    Diabetic polyneuropathy associated with type 2 diabetes mellitus ([facility]) E11.42    Elevated PSA R97.20    Mixed hyperlipidemia E78.2    Stage 3a chronic kidney disease ([facility]) N18.31    Acute osteomyelitis of metatarsal bone of left foot ([facility]) M86.172    Staphylococcus aureus infection (cultures also with Anaerococcus) A49.01    Pressure ulcer of left lateral forefoot, stage 4 ([facility]) X98.748    Nonhealing surgical wound, initial encounter (small left dorsal foot wound) T81.89XA    Chronic low back pain M54.50, G89.29    Dental bridge present Z97.2    Callus of foot L84    Hyperkalemia E87.5       In my clinical judgement, ongoing HBO therapy is necessary at this time, given a threat to patient function, limb or life from the current condition. Adjunctive Rx, objective weekly progress and goals of Rx will periodically be updated, on Mondays. Plan     1. Hyperbaric Oxygen - Del Hernandez tolerated the treatment well today without complications. Continue HBO treatment as outlined above. 2. Other -     I was present on these premises and immediately available to furnish assistance & direction throughout the procedure.      -- Electronically signed by Teo Jeffries MD on 11/10/2021 at 10:18 AM

## 2021-11-10 NOTE — PLAN OF CARE
Patient seen for HBOT treatment  35 / 48   today. Patient assessment WNL - AVSS- BP elevated - took hydralazine prior to HBO as directed - TEED=0 bilat , lungs CTA , FSBS 120- OK to Dive w/o supplements per Dr. Michael Yun cleared for HBOT. Patient was compressed in HBO chamber to 2.0 STEPHANIE at 2.0 psi/min x 90 min treatment w/o air breaks . Pt cont to have NPWT - on hold during HBO and tubing vented. Patient is tolerating treatment well and is currently resting comfortably and watching television. HBO RN at chamber side, will continue to monitor.

## 2021-11-10 NOTE — PROGRESS NOTES
[address]  Hyperbaric Oxygen    Del Ricardo     : 1960    DATE OF VISIT:  2021    Subjective     Del Ricardo is a 59 y.o. male who  has a past medical history of Abscess of left hand (2017), Acute kidney injury superimposed on chronic kidney disease ([facility] [address] (2021), Adhesive capsulitis of shoulder (2014), Fractures, Gas gangrene of foot ([facility] (2021), History of hyperbaric oxygen therapy (2021), and PNA (pneumonia) (2016). He presents to the [facility] today for hyperbaric oxygen treatment of his Jonn Stella 4 diabetic foot, which is refractory to standard therapy for 30 days. Patient denies fever. Patient denies nausea, vomiting or diarrhea; no ear troubles and no other new complaints; no fears or anxiety regarding treatment today. Objective     Vitals:    21 0815 21 1025   BP: 137/74 (!) 165/77   Pulse: 73 66   Resp: 16 16   Temp: 98.8 °F (37.1 °C) 97.6 °F (36.4 °C)   TempSrc: Oral Oral       General:  Alert, cooperative, no distress. Ears: External otic canals are within acceptable limits. Teed grade 0 on the right and 0 on the left pre-treatment. Teed grade 0 on the right and 0 on the left post-treatment. Lungs:  Clear to auscultation bilaterally. Ulcer: Not examined today in HBO, if applicable. Recent Labs     21  0816 21  1018 21  0816 21  1021   POCGLU 142* 138* 156* 80*       Assessment     Del Ricardo is a 59 y.o. male who presented to the [facility] today for hyperbaric oxygen treatment #33 of 50 for the diagnosis as stated above. Treatment given at 2 STEPHANIE for 90 minutes, with no air breaks. Total Treatment Time (min): 106 today, including compression, 100% oxygen at pressure, air breaks if applicable, and decompression.     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 2 diabetes mellitus, with necrosis of bone (HCC) E11.621, L97.524    Diabetic polyneuropathy associated with type 2 diabetes mellitus (HCC) E11.42    Elevated PSA R97.20    Mixed hyperlipidemia E78.2    Stage 3a chronic kidney disease (HCC) N18.31    Acute osteomyelitis of metatarsal bone of left foot (HCC) M86.172    Staphylococcus aureus infection (cultures also with Anaerococcus) A49.01    Pressure ulcer of left lateral forefoot, stage 4 ([facility]) D85.853    Nonhealing surgical wound, initial encounter (small left dorsal foot wound) T81.89XA    Chronic low back pain M54.50, G89.29    Dental bridge present Z97.2    Callus of foot L84    Hyperkalemia E87.5       In my clinical judgement, ongoing HBO therapy is necessary at this time, given a threat to patient function, limb or life from the current condition. Plan     1. Hyperbaric Oxygen - Del Hernandez tolerated the treatment well today without complications. Continue HBO treatment as outlined above. 2. Other -     I was present on these premises and immediately available to furnish assistance & direction throughout the procedure.      -- Electronically signed by Fatemeh Jang MD on 11/9/2021 at 8:06 PM

## 2021-11-11 ENCOUNTER — HOSPITAL ENCOUNTER (OUTPATIENT)
Dept: HYPERBARIC MEDICINE | Age: 61
Discharge: HOME | End: 2021-11-11
Payer: MEDICARE

## 2021-11-11 VITALS
SYSTOLIC BLOOD PRESSURE: 160 MMHG | DIASTOLIC BLOOD PRESSURE: 85 MMHG | HEART RATE: 72 BPM | RESPIRATION RATE: 16 BRPM | TEMPERATURE: 97.6 F

## 2021-11-11 DIAGNOSIS — L97.524 DIABETIC ULCER OF OTHER PART OF LEFT FOOT ASSOCIATED WITH TYPE 2 DIABETES MELLITUS, WITH NECROSIS OF BONE (HCC): Primary | ICD-10-CM

## 2021-11-11 DIAGNOSIS — E11.621 DIABETIC ULCER OF OTHER PART OF LEFT FOOT ASSOCIATED WITH TYPE 2 DIABETES MELLITUS, WITH NECROSIS OF BONE (HCC): Primary | ICD-10-CM

## 2021-11-11 LAB
GLUCOSE BLD-MCNC: 114 MG/DL (ref 70–99)
GLUCOSE BLD-MCNC: 115 MG/DL (ref 70–99)
PERFORMED ON: ABNORMAL
PERFORMED ON: ABNORMAL

## 2021-11-11 PROCEDURE — G0277 HBOT, FULL BODY CHAMBER, 30M: HCPCS

## 2021-11-11 PROCEDURE — 99183 HYPERBARIC OXYGEN THERAPY: CPT | Performed by: INTERNAL MEDICINE

## 2021-11-11 ASSESSMENT — PAIN SCALES - GENERAL
PAINLEVEL_OUTOF10: 0
PAINLEVEL_OUTOF10: 0

## 2021-11-11 NOTE — PLAN OF CARE
Pt to the [address] [place] [address],[address] for HBOT. Assessment completed and patient cleared for treatment. Pt to 2.0 STEPHANIE at a rate of 2.0 psi. Pt to pressure without complaints. Pt watching tv. Nurse at chamber side and will continue to monitor. Discharge instructions reviewed with patient, all questions answered, copy given to patient. Dressings were applied to all wounds per M.D. Instructions at this visit.

## 2021-11-12 ENCOUNTER — HOSPITAL ENCOUNTER (OUTPATIENT)
Dept: HYPERBARIC MEDICINE | Age: 61
Discharge: HOME | End: 2021-11-12
Payer: MEDICARE

## 2021-11-12 ENCOUNTER — HOSPITAL ENCOUNTER (OUTPATIENT)
Dept: WOUND CARE | Age: 61
Discharge: HOME | End: 2021-11-12
Payer: MEDICARE

## 2021-11-12 VITALS
TEMPERATURE: 97.6 F | SYSTOLIC BLOOD PRESSURE: 188 MMHG | HEART RATE: 71 BPM | RESPIRATION RATE: 16 BRPM | DIASTOLIC BLOOD PRESSURE: 88 MMHG

## 2021-11-12 VITALS
SYSTOLIC BLOOD PRESSURE: 162 MMHG | TEMPERATURE: 97.6 F | RESPIRATION RATE: 18 BRPM | DIASTOLIC BLOOD PRESSURE: 87 MMHG | HEART RATE: 71 BPM

## 2021-11-12 DIAGNOSIS — L97.524 DIABETIC ULCER OF OTHER PART OF LEFT FOOT ASSOCIATED WITH TYPE 2 DIABETES MELLITUS, WITH NECROSIS OF BONE (HCC): Primary | ICD-10-CM

## 2021-11-12 DIAGNOSIS — E11.621 DIABETIC ULCER OF OTHER PART OF LEFT FOOT ASSOCIATED WITH TYPE 2 DIABETES MELLITUS, WITH NECROSIS OF BONE (HCC): Primary | ICD-10-CM

## 2021-11-12 LAB
GLUCOSE BLD-MCNC: 127 MG/DL (ref 70–99)
GLUCOSE BLD-MCNC: 130 MG/DL (ref 70–99)
PERFORMED ON: ABNORMAL
PERFORMED ON: ABNORMAL

## 2021-11-12 PROCEDURE — 97605 NEG PRS WND THER DME<=50SQCM: CPT | Performed by: INTERNAL MEDICINE

## 2021-11-12 PROCEDURE — 11044 DBRDMT BONE 1ST 20 SQ CM/<: CPT

## 2021-11-12 PROCEDURE — 99183 HYPERBARIC OXYGEN THERAPY: CPT | Performed by: INTERNAL MEDICINE

## 2021-11-12 PROCEDURE — 11044 DBRDMT BONE 1ST 20 SQ CM/<: CPT | Performed by: INTERNAL MEDICINE

## 2021-11-12 PROCEDURE — 11043 DBRDMT MUSC&/FSCA 1ST 20/<: CPT

## 2021-11-12 PROCEDURE — G0277 HBOT, FULL BODY CHAMBER, 30M: HCPCS

## 2021-11-12 PROCEDURE — 11043 DBRDMT MUSC&/FSCA 1ST 20/<: CPT | Performed by: INTERNAL MEDICINE

## 2021-11-12 PROCEDURE — 97605 NEG PRS WND THER DME<=50SQCM: CPT

## 2021-11-12 RX ORDER — BACITRACIN ZINC AND POLYMYXIN B SULFATE 500; 1000 [USP'U]/G; [USP'U]/G
OINTMENT TOPICAL ONCE
Status: CANCELLED | OUTPATIENT
Start: 2021-11-12 | End: 2021-11-12

## 2021-11-12 RX ORDER — OXYMETAZOLINE HYDROCHLORIDE 0.05 G/100ML
SPRAY NASAL
Qty: 1 BOTTLE | Refills: 0 | COMMUNITY
Start: 2021-11-12 | End: 2021-11-19

## 2021-11-12 RX ORDER — LIDOCAINE 40 MG/G
CREAM TOPICAL ONCE
Status: DISCONTINUED | OUTPATIENT
Start: 2021-11-12 | End: 2021-11-13 | Stop reason: HOSPADM

## 2021-11-12 RX ORDER — LIDOCAINE HYDROCHLORIDE 40 MG/ML
SOLUTION TOPICAL ONCE
Status: CANCELLED | OUTPATIENT
Start: 2021-11-12 | End: 2021-11-12

## 2021-11-12 RX ORDER — LIDOCAINE 50 MG/G
OINTMENT TOPICAL ONCE
Status: CANCELLED | OUTPATIENT
Start: 2021-11-12 | End: 2021-11-12

## 2021-11-12 RX ORDER — AMLODIPINE BESYLATE 10 MG/1
10 TABLET ORAL DAILY
COMMUNITY

## 2021-11-12 RX ORDER — LIDOCAINE 40 MG/G
CREAM TOPICAL ONCE
Status: CANCELLED | OUTPATIENT
Start: 2021-11-12 | End: 2021-11-12

## 2021-11-12 ASSESSMENT — PAIN SCALES - GENERAL
PAINLEVEL_OUTOF10: 0
PAINLEVEL_OUTOF10: 0

## 2021-11-12 NOTE — PROGRESS NOTES
Wednesday and [facility] to change on Fridays       Left lateral foot:  Vashe (2-3 sprays on wound bed, don't rinse)  Apply Triad or Desitin (zinc Oxide) to lexi wound to protect  Apply Santyl about nickel thick to just the wound   Apply a slightly moistened gauze over Santyl  cover with a dry dressing  Change EVERY DAY        Please note, all wounds (unless stated otherwise here) were mechanically debrided at the time of cleansing here in the wound-care center today, so a small amount of pain, drainage or bleeding from that process might be expected, and is normal.      All products for home use, including multiple products for a single wound if applicable, are medically necessary in order to achieve the best chance at timely wound healing. See provider documentation for details if needed.     Substituted dressings applied in the [facility] today, if applicable:      Triad and polysporin in place of Santyl today in wound clinic     New orders for this week (labs, imaging, medications, etc.)        Additional instructions for specific diagnoses:     CONTINUE HYPERBARICS AS ORDERED      General comments for diabetic / neuropathic ulcers:  *  Unless you've been instructed not to remove your dressings, be sure to inspect your feet daily, and notify us of any major changes. *  If you do not have a long-term podiatrist, be sure to let us know. *  Moisturize your skin regularly with Vaseline, Aquaphor, Aveeno, CeraVe, Cetaphil, Eucerin, Lubriderm, etc; but keep the skin between your toes dry. Edison Booze allow your wound-care doctor or podiatrist to cut nails, calluses & corns. *  Be sure to always wear footwear that fits well, and NEVER go without shoes and socks. *  Do you know your Hemoglobin A1c level? You should! Please ask if you have questions. *  Be sure to adhere to any recommendations from your PCP or endocrinologist when it comes to diabetes medications and diet. If you have questions, please ask.   *  If you smoke, your wound can not heal properly -- please talk with us when you're ready to quit. *  Do not soak your feet unless specifically instructed to do so by us.              F/U Appointment is with Dr. Daphne Rhodes 1 week , on                                   at                       .     Your nurse  is Van Grullon RN      If we applied slip-resistant hospital socks today, be sure to remove them at least once a day to inspect your toes or feet, even if you're not changing the wraps or dressings underneath.  If you see anything concerning (redness, excess moisture, etc), please call and let us know right away.     Should you experience any significant changes in your wound(s) (including redness, increased warmth, increased pain, increased drainage, odor, or fever) or have questions about your wound care, please contact the [address] [place] [address] at [phone] Monday-Thursday from 8:00 am  4:30 pm, or Friday from 8:00 am - 2:30 pm.  If you need help with your wound outside these hours and cannot wait until we are again available, contact your home-care company (if applicable), your PCP, or go to the nearest emergency room

## 2021-11-12 NOTE — PLAN OF CARE
Improvement noted in wounds. Debridement tolerated well by patient. Plan to continue with HBO and NPWT and potentially if improved again next week will be able to d/c these. Discharge instructions reviewed with patient, all questions answered, copy given to patient. Dressings were applied to all wounds per M.D. Instructions at this visit.

## 2021-11-12 NOTE — PROGRESS NOTES
[address]  Hyperbaric Oxygen    Del Negron     : 1960    DATE OF VISIT:  2021    Subjective     Del Negron is a 59 y.o. male who  has a past medical history of Abscess of left hand (2017), Acute kidney injury superimposed on chronic kidney disease ([facility] (2021), Adhesive capsulitis of shoulder (2014), Fractures, Gas gangrene of foot ([facility] (2021), History of hyperbaric oxygen therapy (2021), and PNA (pneumonia) (2016). He presents to the [facility] today for hyperbaric oxygen treatment of his Earth Master 4 diabetic foot, which is refractory to standard therapy for 30 days. Patient denies fever. Patient denies nausea, vomiting or diarrhea; no ear troubles and no other new complaints; no fears or anxiety regarding treatment today. Objective     Vitals:    21 0806 21 1007   BP: 136/71 (!) 160/85   Pulse: 94 72   Resp: 16 16   Temp: 98.3 °F (36.8 °C) 97.6 °F (36.4 °C)   TempSrc: Oral Oral       General:  Alert, cooperative, no distress. Ears: External otic canals are within acceptable limits. Teed grade 0 on the right and 0 on the left pre-treatment. Teed grade 0 on the right and 0 on the left post-treatment. Lungs:  Clear to auscultation bilaterally. Ulcer: Not examined today in HBO, if applicable. Recent Labs     21  0816 21  1021 11/10/21  0821 11/10/21  1021 21  0759 21  1007   POCGLU 156* 137* 120* 112* 115* 114*       Assessment     Del Negron is a 59 y.o. male who presented to the [facility] today for hyperbaric oxygen treatment #36 of 50 for the diagnosis as stated above. Treatment given at 2 STEPHANIE for 90 minutes, with no air breaks. Total Treatment Time (min): 109 today, including compression, 100% oxygen at pressure, air breaks if applicable, and decompression.     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 2 diabetes mellitus, with necrosis of bone (HCC) E11.621, L97.524    Diabetic polyneuropathy associated with type 2 diabetes mellitus (HCC) E11.42    Elevated PSA R97.20    Mixed hyperlipidemia E78.2    Stage 3a chronic kidney disease (HCC) N18.31    Acute osteomyelitis of metatarsal bone of left foot (HCC) M86.172    Staphylococcus aureus infection (cultures also with Anaerococcus) A49.01    Pressure ulcer of left lateral forefoot, stage 4 (HCC) M64.311    Nonhealing surgical wound, initial encounter (small left dorsal foot wound) T81.89XA    Chronic low back pain M54.50, G89.29    Dental bridge present Z97.2    Callus of foot L84    Hyperkalemia E87.5       In my clinical judgement, ongoing HBO therapy is necessary at this time, given a threat to patient function, limb or life from the current condition. Adjunctive Rx, objective weekly progress and goals of Rx will periodically be updated, on Mondays. Plan     1. Hyperbaric Oxygen - Del Hernandez tolerated the treatment well today without complications. Continue HBO treatment as outlined above. 2. Other -     I was present on these premises and immediately available to furnish assistance & direction throughout the procedure.      -- Electronically signed by Severo Pinna, MD on 11/11/2021 at 9:10 PM

## 2021-11-12 NOTE — PLAN OF CARE
Patient seen for HBOT treatment  37/50 today. Patient assessment WNL and cleared for HBOT. Patient was compressed in HBO chamber to 2.0 STEPHANIE at 2.0 psi/min. Patient is tolerating treatment well and is currently resting comfortably and watching television. HBO RN at chamber side, will continue to monitor.

## 2021-11-13 NOTE — PROGRESS NOTES
[address]  Hyperbaric Oxygen    Del Rciardo     : 1960    DATE OF VISIT:  2021    Subjective     Del Ricardo is a 59 y.o. male who  has a past medical history of Abscess of left hand (2017), Acute kidney injury superimposed on chronic kidney disease ([facility] (2021), Adhesive capsulitis of shoulder (2014), Fractures, Gas gangrene of foot ([facility] (2021), History of hyperbaric oxygen therapy (2021), and PNA (pneumonia) (2016). He presents to the [facility] today for hyperbaric oxygen treatment of his Jonn Stella 4 diabetic foot, which is refractory to standard therapy for 30 days. Patient denies fever. Patient denies nausea, vomiting or diarrhea; no ear troubles and no other new complaints; no fears or anxiety regarding treatment today. Objective     Vitals:    21 0813 21 1005   BP: 133/68 (!) 188/88   Pulse: 97 71   Resp: 16 16   Temp: 98 °F (36.7 °C) 97.6 °F (36.4 °C)   TempSrc: Oral Oral       General:  Alert, cooperative, no distress. Ears: External otic canals are within acceptable limits. Teed grade 0 on the right and 0 on the left pre-treatment. Teed grade 0 on the right and 0 on the left post-treatment. Lungs:  Clear to auscultation bilaterally. Ulcer: See wound-care note from today. Recent Labs     11/10/21  1021 21  0759 21  1007 21  0805 21  1009   POCGLU 112* 115* 114* 130* 127*       Assessment     Del Ricardo is a 59 y.o. male who presented to the [facility] today for hyperbaric oxygen treatment #37 of 50 for the diagnosis as stated above. Treatment given at 2 STEPHANIE for 90 minutes, with no air breaks. Total Treatment Time (min): 107 today, including compression, 100% oxygen at pressure, air breaks if applicable, and decompression.     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 2 diabetes mellitus, with necrosis of bone ([facility]) E11.621, L97.524    Diabetic polyneuropathy associated with type 2 diabetes mellitus (HCC) E11.42    Elevated PSA R97.20    Mixed hyperlipidemia E78.2    Stage 3a chronic kidney disease (HCC) N18.31    Acute osteomyelitis of metatarsal bone of left foot ([facility]) M86.172    Staphylococcus aureus infection (cultures also with Anaerococcus) A49.01    Pressure ulcer of left lateral forefoot, stage 4 ([facility]) B75.052    Nonhealing surgical wound, initial encounter (small left dorsal foot wound) T81.89XA    Chronic low back pain M54.50, G89.29    Dental bridge present Z97.2    Callus of foot L84    Hyperkalemia E87.5       In my clinical judgement, ongoing HBO therapy is necessary at this time, given a threat to patient function, limb or life from the current condition. Adjunctive Rx, objective weekly progress and goals of Rx will periodically be updated, on Mondays. Plan     1. Hyperbaric Oxygen - Del Hernandez tolerated the treatment well today without complications. Continue HBO treatment as outlined above. 2. Other - see wound care note re: ongoing HBO plans, NPWT, etc.     I was present on these premises and immediately available to furnish assistance & direction throughout the procedure.      -- Electronically signed by Norma Lr MD on 11/13/2021 at 10:20 AM

## 2021-11-15 ENCOUNTER — HOSPITAL ENCOUNTER (OUTPATIENT)
Dept: HYPERBARIC MEDICINE | Age: 61
Discharge: HOME | End: 2021-11-15
Payer: MEDICARE

## 2021-11-15 ENCOUNTER — CLINICAL DOCUMENTATION (OUTPATIENT)
Dept: OTHER | Age: 61
End: 2021-11-15

## 2021-11-15 VITALS
TEMPERATURE: 97.6 F | RESPIRATION RATE: 16 BRPM | DIASTOLIC BLOOD PRESSURE: 71 MMHG | HEART RATE: 71 BPM | SYSTOLIC BLOOD PRESSURE: 137 MMHG

## 2021-11-15 DIAGNOSIS — L97.524 DIABETIC ULCER OF OTHER PART OF LEFT FOOT ASSOCIATED WITH TYPE 2 DIABETES MELLITUS, WITH NECROSIS OF BONE (HCC): Primary | ICD-10-CM

## 2021-11-15 DIAGNOSIS — E11.621 DIABETIC ULCER OF OTHER PART OF LEFT FOOT ASSOCIATED WITH TYPE 2 DIABETES MELLITUS, WITH NECROSIS OF BONE (HCC): Primary | ICD-10-CM

## 2021-11-15 LAB
GLUCOSE BLD-MCNC: 108 MG/DL (ref 70–99)
GLUCOSE BLD-MCNC: 131 MG/DL (ref 70–99)
PERFORMED ON: ABNORMAL
PERFORMED ON: ABNORMAL

## 2021-11-15 PROCEDURE — 99183 HYPERBARIC OXYGEN THERAPY: CPT | Performed by: INTERNAL MEDICINE

## 2021-11-15 PROCEDURE — G0277 HBOT, FULL BODY CHAMBER, 30M: HCPCS

## 2021-11-15 RX ORDER — OXYMETAZOLINE HYDROCHLORIDE 0.05 G/100ML
SPRAY NASAL
Qty: 1 BOTTLE | Refills: 0 | COMMUNITY
Start: 2021-11-15 | End: 2021-11-19

## 2021-11-15 ASSESSMENT — PAIN SCALES - GENERAL
PAINLEVEL_OUTOF10: 0
PAINLEVEL_OUTOF10: 0

## 2021-11-15 NOTE — DISCHARGE INSTR - COC
Continuity of Care Form    [place] [facility]   :  1960  MRN:  [id number]    Admit date:  11/15/2021  Discharge date:  ***    Code Status Order: Prior   Advance Directives:      Admitting Physician:  No admitting provider for patient encounter. PCP: Lory Mcneal MD    Discharging Nurse: [facility] Unit/Room#: No information available for this encounter. Discharging Unit Phone Number: ***    Emergency Contact:   Extended Emergency Contact Information  Primary Emergency Contact: Kristin Snyder  Address: [address] Phone: [phone]  Relation: Spouse  Secondary Emergency Contact: Varun Gifford Phone: [phone]  Relation: Brother/Sister    Past Surgical History:  Past Surgical History:   Procedure Laterality Date    FOOT DEBRIDEMENT Left 2021    LEFT FOOT DEBRIDEMENT INCISION AND DRAINAGE performed by Treasure Mckeon DPM at [address] [place] [address] Left 2021    STAGED INCISION AND DEBRIDEMENT LEFT FOOT WITH PARTIAL FIRST RAY AMPUTATION performed by Treasure Mckeon DPM at [address] Left 2017    LEFT HAND DEBRIDEMENT INCISION AND DRAINAGE    NECK SURGERY      30s year    TOE AMPUTATION Left     hallux    WRIST FRACTURE SURGERY         Immunization History: There is no immunization history on file for this patient.     Active Problems:  Patient Active Problem List   Diagnosis Code    Hypertension I10    BPH (benign prostatic hyperplasia) N40.0    Osteoarthritis M19.90    Class 1 obesity due to excess calories with serious comorbidity and body mass index (BMI) of 31.0 to 31.9 in adult E66.09, Z68.31    Diabetic ulcer of left foot associated with type 2 diabetes mellitus, with necrosis of bone (HCC) E11.621, B62.888    Diabetic polyneuropathy associated with type 2 diabetes mellitus (HCC) E11.42    Elevated PSA R97.20    Mixed hyperlipidemia E78.2    Stage 3a chronic kidney disease ([facility]) N18.31    Acute osteomyelitis of metatarsal bone of left foot ([facility]) M86.172    Staphylococcus aureus infection (cultures also with Anaerococcus) A49.01    Pressure ulcer of left lateral forefoot, stage 4 ([facility]) L89.894    Nonhealing surgical wound, initial encounter (small left dorsal foot wound) T81.89XA    Chronic low back pain M54.50, G89.29    Dental bridge present Z97.2    Callus of foot L84    Hyperkalemia E87.5       Isolation/Infection:   Isolation            No Isolation          Patient Infection Status       Infection Onset Added Last Indicated Last Indicated By Review Planned Expiration Resolved Resolved By    None active    Resolved    COVID-19 Rule Out 08/30/21 08/30/21 08/30/21 COVID-19, Rapid (Ordered)   08/30/21 Rule-Out Test Resulted            Nurse Assessment:  Last Vital Signs: /71   Pulse 71   Temp 97.6 °F (36.4 °C) (Oral)   Resp 16     Last documented pain score (0-10 scale): Pain Level: 0  Last Weight:   Wt Readings from Last 1 Encounters:   11/05/21 236 lb (107 kg)     Mental Status:  {IP PT MENTAL STATUS:20030}    IV Access:  {[facility] [place]:452410976}    Nursing Mobility/ADLs:  Walking   {[facility] DME CWJC:629506756}  Transfer  {[facility] DME BNKN:402808079}  Bathing  {[facility] DME VEBE:921715447}  Dressing  {[facility] DME CKCN:831412438}  Toileting  {[facility] DME ATGO:241153158}  Feeding  {[facility] DME KBRY:171858021}  Med Admin  {[facility] DME JFEW:815618290}  Med Delivery   {[facility] MED Delivery:395328950}    Wound Care Documentation and Therapy:  Negative Pressure Wound Therapy Foot Anterior;Left;Medial (Active)   Wound Type Diabetic foot ulcer 10/29/21 1157   Unit Type medela 11/12/21 1115   Dressing Type Black foam 11/12/21 1115   Number of pieces used 1 11/12/21 1115   Cycle Continuous 11/12/21 1115   Target Pressure (mmHg) 125 11/12/21 1115   Canister changed?  Yes 11/12/21 1115   Dressing Status Clean; Dry; Intact 11/12/21 1115   Dressing Changed Changed/New 11/12/21 9919   Number of days: 68       Wound 09/16/21 #1, left medial forefoot, Diabetic ulcer, hercules 4, onset 8/25/2021 (Active)   Wound Image   10/22/21 1030   Wound Etiology Diabetic Hercules 4 11/12/21 1030   Dressing Status New dressing applied; Clean; Dry; Intact 11/12/21 1115   Wound Cleansed Vashe 11/12/21 1115   Dressing/Treatment Other (comment) 11/12/21 1115   Offloading for Diabetic Foot Ulcers Offloading boot 11/12/21 1115   Wound Length (cm) 3.4 cm 11/12/21 1030   Wound Width (cm) 2.6 cm 11/12/21 1030   Wound Depth (cm) 0.5 cm 11/12/21 1030   Wound Surface Area (cm^2) 8.84 cm^2 11/12/21 1030   Change in Wound Size % (l*w) 49.49 11/12/21 1030   Wound Volume (cm^3) 4.42 cm^3 11/12/21 1030   Wound Healing % 89 11/12/21 1030   Post-Procedure Length (cm) 3.3 cm 11/05/21 1050   Post-Procedure Width (cm) 2.5 cm 11/05/21 1050   Post-Procedure Depth (cm) 0.6 cm 11/05/21 1050   Post-Procedure Surface Area (cm^2) 8.25 cm^2 11/05/21 1050   Post-Procedure Volume (cm^3) 4.95 cm^3 11/05/21 1050   Wound Assessment Pink/red;  Other (Comment) 11/12/21 1030   Drainage Amount Moderate 11/12/21 1030   Drainage Description Serosanguinous 11/12/21 1030   Odor None 11/12/21 1030   Elicia-wound Assessment Maceration 11/12/21 1030   Number of days: 60       Wound 09/16/21 #3, left lateral foot, pressure ulcer, Stage 4, onset 9/2021 (Active)   Wound Image   10/22/21 1030   Wound Etiology Pressure Stage  4 11/12/21 1030   Dressing Status New dressing applied; Clean; Dry; Intact 11/12/21 1115   Wound Cleansed Vashe 11/12/21 1115   Dressing/Treatment Other (comment) 11/12/21 1115   Offloading for Diabetic Foot Ulcers Offloading boot 11/12/21 1115   Wound Length (cm) 3 cm 11/12/21 1030   Wound Width (cm) 1 cm 11/12/21 1030   Wound Depth (cm) 0.4 cm 11/12/21 1030   Wound Surface Area (cm^2) 3 cm^2 11/12/21 1030   Change in Wound Size % (l*w) 61.04 11/12/21 1030   Wound Volume (cm^3) 1.2 cm^3 11/12/21 1030   Wound Healing % -56 11/12/21 1030   Post-Procedure Length (cm) 2.8 cm 21 1050   Post-Procedure Width (cm) 1.1 cm 21 1050   Post-Procedure Depth (cm) 0.2 cm 21 1050   Post-Procedure Surface Area (cm^2) 3.08 cm^2 21 1050   Post-Procedure Volume (cm^3) 0.616 cm^3 21 1050   Distance Tunneling (cm) 0 cm 21 1030   Tunneling Position ___ O'Clock 0 21 1030   Undermining Starts ___ O'Clock 0 21 1030   Undermining Ends___ O'Clock 0 21 1030   Undermining Maxium Distance (cm) 0 21 1030   Wound Assessment Slough; [place]/red 21 1030   Drainage Amount Scant 21 1030   Drainage Description Serosanguinous 21 1030   Odor None 21 1030   Elicia-wound Assessment Maceration 21 1030   Number of days: 60        Elimination:  Continence: Bowel: {YES / IS:20189}  Bladder: {YES / ZR:40484}  Urinary Catheter: {Urinary Catheter:524671244}   Colostomy/Ileostomy/Ileal Conduit: {YES / NX:06398}       Date of Last BM: ***  No intake or output data in the 24 hours ending 11/15/21 1108  No intake/output data recorded.     Safety Concerns:     [address] Safety Concerns:130233898}    Impairments/Disabilities:      [address] Impairments/Disabilities:043067057}    Nutrition Therapy:  Current Nutrition Therapy:   [address] Diet List:399083227}    Routes of Feeding: {CHP DME Other Feedings:033488941}  Liquids: {Slp liquid thickness:39044}  Daily Fluid Restriction: {CHP DME Yes amt example:060917174}  Last Modified Barium Swallow with Video (Video Swallowing Test): {Done Not Done RRK}    Treatments at the Time of Hospital Discharge:   Respiratory Treatments: ***  Oxygen Therapy:  {Therapy; copd oxygen:97076}  Ventilator:    {MH CC Vent VPYL:313809946}    Rehab Therapies: {THERAPEUTIC INTERVENTION:4709093624}  Weight Bearing Status/Restrictions: Ghassan8 Elaina MILLER Weight Bearin}  Other Medical Equipment (for information only, NOT a DME order):  {EQUIPMENT:883496704}  Other Treatments: ***    Patient's personal belongings (please select all that are sent with patient):  {CHP DME Belongings:919718340}    RN SIGNATURE:  {Esignature:218463613}    CASE MANAGEMENT/SOCIAL WORK SECTION    Inpatient Status Date: ***    Readmission Risk Assessment Score:  Readmission Risk              Risk of Unplanned Readmission:  0           Discharging to Facility/ Agency   Name:   Address:  Phone:  Fax:    Dialysis Facility (if applicable)   Name:  Address:  Dialysis Schedule:  Phone:  Fax:    / signature: {Esignature:631160153}    PHYSICIAN SECTION    Prognosis: {Prognosis:1171542162}    Condition at Discharge: [address] [place] [address] Patient Condition:943836725}    Rehab Potential (if transferring to Rehab): {Prognosis:1196224134}    Recommended Labs or Other Treatments After Discharge: ***    Physician Certification: I certify the above information and transfer of Lola Mcelroy  is necessary for the continuing treatment of the diagnosis listed and that he requires {Admit to Appropriate Level of Care:17134} for {GREATER/LESS:078331885} 30 days.      Update Admission H&P: {CHP DME Changes in DPFE}    PHYSICIAN SIGNATURE:  {Esignature:193385894}

## 2021-11-16 ENCOUNTER — HOSPITAL ENCOUNTER (OUTPATIENT)
Dept: HYPERBARIC MEDICINE | Age: 61
Discharge: HOME | End: 2021-11-16
Payer: MEDICARE

## 2021-11-16 VITALS
TEMPERATURE: 97.9 F | HEART RATE: 67 BPM | DIASTOLIC BLOOD PRESSURE: 90 MMHG | SYSTOLIC BLOOD PRESSURE: 187 MMHG | RESPIRATION RATE: 18 BRPM

## 2021-11-16 DIAGNOSIS — E11.621 DIABETIC ULCER OF OTHER PART OF LEFT FOOT ASSOCIATED WITH TYPE 2 DIABETES MELLITUS, WITH NECROSIS OF BONE (HCC): Primary | ICD-10-CM

## 2021-11-16 DIAGNOSIS — L97.524 DIABETIC ULCER OF OTHER PART OF LEFT FOOT ASSOCIATED WITH TYPE 2 DIABETES MELLITUS, WITH NECROSIS OF BONE (HCC): Primary | ICD-10-CM

## 2021-11-16 LAB
GLUCOSE BLD-MCNC: 145 MG/DL (ref 70–99)
GLUCOSE BLD-MCNC: 174 MG/DL (ref 70–99)
PERFORMED ON: ABNORMAL
PERFORMED ON: ABNORMAL

## 2021-11-16 PROCEDURE — 99183 HYPERBARIC OXYGEN THERAPY: CPT | Performed by: INTERNAL MEDICINE

## 2021-11-16 PROCEDURE — G0277 HBOT, FULL BODY CHAMBER, 30M: HCPCS

## 2021-11-16 RX ORDER — OXYMETAZOLINE HYDROCHLORIDE 0.05 G/100ML
SPRAY NASAL
Qty: 1 BOTTLE | Refills: 0 | COMMUNITY
Start: 2021-11-16 | End: 2021-12-03

## 2021-11-16 ASSESSMENT — PAIN SCALES - GENERAL: PAINLEVEL_OUTOF10: 0

## 2021-11-16 NOTE — PLAN OF CARE
Patient seen for HBOT treatment  39 / 48    today. Patient assessment complete and cleared for HBOT. Patient was compressed in HBO chamber to 2.0 STEPHANIE at 2.0 psi/min. Patient is tolerating treatment well and is currently resting comfortably and watching television. HBO RN at chamber side, will continue to monitor.

## 2021-11-16 NOTE — PROGRESS NOTES
[address]  Hyperbaric Oxygen    Del Scott     : 1960    DATE OF VISIT:  11/15/2021    Subjective     Del Scott is a 59 y.o. male who  has a past medical history of Abscess of left hand (2017), Acute kidney injury superimposed on chronic kidney disease ([facility] (2021), Adhesive capsulitis of shoulder (2014), Fractures, Gas gangrene of foot ([facility] (2021), History of hyperbaric oxygen therapy (2021), and PNA (pneumonia) (2016). He presents to the [facility] [place] today for hyperbaric oxygen treatment of his Shelby Ovalle 4 diabetic foot, which is refractory to standard therapy for 30 days. Patient denies fever. Patient denies nausea, vomiting or diarrhea; no ear troubles and no other new complaints; no fears or anxiety regarding treatment today. Objective     Vitals:    11/15/21 0821 11/15/21 1015   BP: 133/85 137/71   Pulse: 92 71   Resp: 16 16   Temp: 98.8 °F (37.1 °C) 97.6 °F (36.4 °C)   TempSrc: Oral Oral       General:  Alert, cooperative, no distress. Ears: External otic canals are within acceptable limits. Teed grade 0 on the right and 0 on the left pre-treatment. Teed grade 0 on the right and 0 on the left post-treatment. Lungs:  Clear to auscultation bilaterally. Ulcer: Not examined today in HBO, if applicable. Recent Labs     11/15/21  0817 11/15/21  1019 21  0812   POCGLU 131* 108* 174*       Assessment     Del Scott is a 59 y.o. male who presented to the [facility] [place] today for hyperbaric oxygen treatment #38 of 50 for the diagnosis as stated above. Treatment given at 2 STEPHANIE for 90 minutes, with no air breaks. Total Treatment Time (min): 106 today, including compression, 100% oxygen at pressure, air breaks if applicable, and decompression.     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 2 diabetes mellitus, with necrosis of bone ([facility]) E11.621, L97.524    Diabetic polyneuropathy associated with type 2 diabetes mellitus ([facility]) E11.42    Elevated PSA R97.20    Mixed hyperlipidemia E78.2    Stage 3a chronic kidney disease (HCC) N18.31    Acute osteomyelitis of metatarsal bone of left foot ([facility]) M86.172    Staphylococcus aureus infection (cultures also with Anaerococcus) A49.01    Pressure ulcer of left lateral forefoot, stage 4 ([facility]) Y99.454    Nonhealing surgical wound, initial encounter (small left dorsal foot wound) T81.89XA    Chronic low back pain M54.50, G89.29    Dental bridge present Z97.2    Callus of foot L84    Hyperkalemia E87.5       In my clinical judgement, ongoing HBO therapy is necessary at this time, given a threat to patient function, limb or life from the current condition. Plan     1. Hyperbaric Oxygen - Del Hernandez tolerated the treatment well today without complications. Continue HBO treatment as outlined above. 2. Other - based on his wound healing progress as of Friday, we might be stopping HBO at the end of this week -- will see how the foot is looking then. I was present on these premises and immediately available to furnish assistance & direction throughout the procedure.      -- Electronically signed by Mindy Shelton MD on 11/16/2021 at 9:01 AM

## 2021-11-17 ENCOUNTER — HOSPITAL ENCOUNTER (OUTPATIENT)
Dept: HYPERBARIC MEDICINE | Age: 61
Discharge: HOME | End: 2021-11-17
Payer: MEDICARE

## 2021-11-17 VITALS
RESPIRATION RATE: 16 BRPM | SYSTOLIC BLOOD PRESSURE: 155 MMHG | TEMPERATURE: 98.7 F | DIASTOLIC BLOOD PRESSURE: 70 MMHG | HEART RATE: 52 BPM

## 2021-11-17 DIAGNOSIS — E11.621 DIABETIC ULCER OF OTHER PART OF LEFT FOOT ASSOCIATED WITH TYPE 2 DIABETES MELLITUS, WITH NECROSIS OF BONE (HCC): Primary | ICD-10-CM

## 2021-11-17 DIAGNOSIS — L97.524 DIABETIC ULCER OF OTHER PART OF LEFT FOOT ASSOCIATED WITH TYPE 2 DIABETES MELLITUS, WITH NECROSIS OF BONE (HCC): Primary | ICD-10-CM

## 2021-11-17 LAB
GLUCOSE BLD-MCNC: 113 MG/DL (ref 70–99)
GLUCOSE BLD-MCNC: 134 MG/DL (ref 70–99)
PERFORMED ON: ABNORMAL
PERFORMED ON: ABNORMAL

## 2021-11-17 PROCEDURE — G0277 HBOT, FULL BODY CHAMBER, 30M: HCPCS

## 2021-11-17 PROCEDURE — 99183 HYPERBARIC OXYGEN THERAPY: CPT | Performed by: EMERGENCY MEDICINE

## 2021-11-17 RX ORDER — LIDOCAINE 50 MG/G
OINTMENT TOPICAL ONCE
Status: CANCELLED | OUTPATIENT
Start: 2021-11-17 | End: 2021-11-17

## 2021-11-17 RX ORDER — LIDOCAINE HYDROCHLORIDE 40 MG/ML
SOLUTION TOPICAL ONCE
Status: CANCELLED | OUTPATIENT
Start: 2021-11-17 | End: 2021-11-17

## 2021-11-17 RX ORDER — BACITRACIN ZINC AND POLYMYXIN B SULFATE 500; 1000 [USP'U]/G; [USP'U]/G
OINTMENT TOPICAL ONCE
Status: CANCELLED | OUTPATIENT
Start: 2021-11-17 | End: 2021-11-17

## 2021-11-17 RX ORDER — LIDOCAINE 40 MG/G
CREAM TOPICAL ONCE
Status: CANCELLED | OUTPATIENT
Start: 2021-11-17 | End: 2021-11-17

## 2021-11-17 ASSESSMENT — PAIN SCALES - GENERAL
PAINLEVEL_OUTOF10: 0
PAINLEVEL_OUTOF10: 0

## 2021-11-17 NOTE — PROGRESS NOTES
[address]  Hyperbaric Oxygen    Del Boss     : 1960    DATE OF VISIT:  2021    Subjective     Del Boss is a 59 y.o. male who  has a past medical history of Abscess of left hand (2017), Acute kidney injury superimposed on chronic kidney disease ([facility] [address] [facility] (2021), Adhesive capsulitis of shoulder (2014), Fractures, Gas gangrene of foot ([facility] (2021), History of hyperbaric oxygen therapy (2021), and PNA (pneumonia) (2016). He presents to the [facility] today for hyperbaric oxygen treatment of his Devika Gutting 4 diabetic foot, which is refractory to standard therapy for 30 days. Patient denies fever. Patient denies nausea, vomiting or diarrhea; no ear troubles and no other new complaints; no fears or anxiety regarding treatment today. Objective     Vitals:    21 0831 21 1045   BP: (!) 147/74 (!) 187/90   Pulse: 72 67   Resp: 18 18   Temp: 97.8 °F (36.6 °C) 97.9 °F (36.6 °C)   TempSrc: Oral Oral       General:  Alert, cooperative, no distress. Ears: External otic canals are within acceptable limits. Teed grade 0 on the right and 0 on the left pre-treatment. Teed grade 0 on the right and 0 on the left post-treatment. Lungs:  Clear to auscultation bilaterally. Ulcer: Not examined today in HBO, if applicable. Recent Labs     11/15/21  0817 11/15/21  1019 21  0812 21  1040   POCGLU 131* 108* 174* 145*       Assessment     Del Boss is a 59 y.o. male who presented to the [facility] today for hyperbaric oxygen treatment #39 of 50 for the diagnosis as stated above. Treatment given at 2 STEPHANIE for 90 minutes, with no air breaks. Total Treatment Time (min): 106 today, including compression, 100% oxygen at pressure, air breaks if applicable, and decompression.     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 2 diabetes mellitus, with necrosis of bone (HCC) E11.621, L97.524    Diabetic polyneuropathy associated with type 2 diabetes mellitus (HCC) E11.42    Elevated PSA R97.20    Mixed hyperlipidemia E78.2    Stage 3a chronic kidney disease (HCC) N18.31    Acute osteomyelitis of metatarsal bone of left foot (HCC) M86.172    Staphylococcus aureus infection (cultures also with Anaerococcus) A49.01    Pressure ulcer of left lateral forefoot, stage 4 ([facility]) Y17.953    Nonhealing surgical wound, initial encounter (small left dorsal foot wound) T81.89XA    Chronic low back pain M54.50, G89.29    Dental bridge present Z97.2    Callus of foot L84    Hyperkalemia E87.5       In my clinical judgement, ongoing HBO therapy is necessary at this time, given a threat to patient function, limb or life from the current condition. Adjunctive Rx, objective weekly progress and goals of Rx will periodically be updated, on Mondays. Plan     1. Hyperbaric Oxygen - Del Hernandez tolerated the treatment well today without complications. Continue HBO treatment as outlined above. 2. Other -     I was present on these premises and immediately available to furnish assistance & direction throughout the procedure.      -- Electronically signed by Belkys Hernández MD on 11/16/2021 at 9:12 PM

## 2021-11-17 NOTE — PLAN OF CARE
Pt to the [address] [place] [address],[address] for HBOT. Assessment completed and patient cleared for treatment. Pt to 2.0 STEPHANIE at a rate of 2.0 psi. Pt to pressure without complaints. Pt watching tv. Nurse at chamber side and will continue to monitor.

## 2021-11-17 NOTE — PROGRESS NOTES
[address]  Hyperbaric Oxygen    Del Soria     : 1960    DATE OF VISIT:  2021    Subjective     Del Soria is a 59 y.o. male who  has a past medical history of Abscess of left hand (2017), [facility] ([facility]), Adhesive capsulitis of shoulder (2014), Fractures, Gas gangrene of foot ([facility] (2021), History of hyperbaric oxygen therapy (2021), and PNA (pneumonia) (2016). He presents to the [facility] today for hyperbaric oxygen treatment of his diabetic foot ulcer  , which is refractory to standard therapy for 30 days. Patient denies fever. Patient denies nausea, vomiting or diarrhea; no ear troubles and no other new complaints; no fears or anxiety regarding treatment today. Objective       Vitals:    21 0806 21 1005   BP: (!) 136/46 (!) 155/70   Pulse: 80 52   Resp: 18 16   Temp: 98.8 °F (37.1 °C) 98.7 °F (37.1 °C)   TempSrc: Oral Oral       General:  Alert, cooperative, no distress. Ears: External otic canals are within acceptable limits. Teed grade 0 on the right and 0 on the left pre-treatment. Teed grade 0 on the right and 0 on the left post-treatment. Lungs:  Clear to auscultation bilaterally. Ulcer: Not examined today in HBO, if applicable. Recent Labs     11/15/21  0817 11/15/21  1019 21  0812 21  1040 21  0805 21  1006   POCGLU 131* 108* 174* 145* 134* 113*       Assessment     Del Soria is a 59 y.o. male who presented to the [facility] today for hyperbaric oxygen treatment #40 of 50 for the diagnosis as stated above. Treatment given at 2 STEPHANIE for 90 minutes, with no air breaks. Total Treatment Time (min): 107 today, including compression, 100% oxygen at pressure, air breaks if applicable, and decompression.     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 2 diabetes mellitus, with necrosis of bone (HCC) E11.621, L97.524    Diabetic polyneuropathy associated with type 2 diabetes mellitus (HCC) E11.42    Elevated PSA R97.20    Mixed hyperlipidemia E78.2    Stage 3a chronic kidney disease (HCC) N18.31    Acute osteomyelitis of metatarsal bone of left foot ([facility]) M86.172    Staphylococcus aureus infection (cultures also with Anaerococcus) A49.01    Pressure ulcer of left lateral forefoot, stage 4 ([facility]) T95.242    Nonhealing surgical wound, initial encounter (small left dorsal foot wound) T81.89XA    Chronic low back pain M54.50, G89.29    Dental bridge present Z97.2    Callus of foot L84    Hyperkalemia E87.5       In my clinical judgement, ongoing HBO therapy is necessary at this time, given a threat to patient function, limb or life from the current condition. Adjunctive Rx, objective weekly progress and goals of Rx will periodically be updated, on Mondays. Plan     1. Hyperbaric Oxygen - Del Hernandez tolerated the treatment well today without complications. Continue HBO treatment as outlined above. 2. Other -     I was present on these premises and immediately available to furnish assistance & direction throughout the procedure.      -- Electronically signed by Aquilino Arellano MD on 11/17/2021 at 11:48 AM

## 2021-11-18 ENCOUNTER — HOSPITAL ENCOUNTER (OUTPATIENT)
Dept: HYPERBARIC MEDICINE | Age: 61
Discharge: HOME | End: 2021-11-18
Payer: MEDICARE

## 2021-11-18 VITALS
HEART RATE: 53 BPM | RESPIRATION RATE: 16 BRPM | DIASTOLIC BLOOD PRESSURE: 72 MMHG | SYSTOLIC BLOOD PRESSURE: 171 MMHG | TEMPERATURE: 97.9 F

## 2021-11-18 DIAGNOSIS — L97.524 DIABETIC ULCER OF OTHER PART OF LEFT FOOT ASSOCIATED WITH TYPE 2 DIABETES MELLITUS, WITH NECROSIS OF BONE (HCC): Primary | ICD-10-CM

## 2021-11-18 DIAGNOSIS — E11.621 DIABETIC ULCER OF OTHER PART OF LEFT FOOT ASSOCIATED WITH TYPE 2 DIABETES MELLITUS, WITH NECROSIS OF BONE (HCC): Primary | ICD-10-CM

## 2021-11-18 LAB
GLUCOSE BLD-MCNC: 114 MG/DL (ref 70–99)
GLUCOSE BLD-MCNC: 137 MG/DL (ref 70–99)
PERFORMED ON: ABNORMAL
PERFORMED ON: ABNORMAL

## 2021-11-18 PROCEDURE — 99183 HYPERBARIC OXYGEN THERAPY: CPT | Performed by: INTERNAL MEDICINE

## 2021-11-18 PROCEDURE — G0277 HBOT, FULL BODY CHAMBER, 30M: HCPCS

## 2021-11-18 ASSESSMENT — PAIN SCALES - GENERAL
PAINLEVEL_OUTOF10: 0
PAINLEVEL_OUTOF10: 0

## 2021-11-18 NOTE — PLAN OF CARE
Pt to the [facility] for HBOT. Assessment completed and patient cleared for treatment. /72 and patient given Hydralazine 25 mg as ordered for BP. Pt to 2.0 STEPHANIE at a rate of 2.0 psi without complaints. Pt watching tv. Nurse at chamber side and will continue to monitor.

## 2021-11-18 NOTE — PROGRESS NOTES
remains on HBOT and VAC therapy, might be ready to stop one or both of those next week if that last bit of bone exposure granulates over; that lateral foot ulcer is looking better, but slowly -- deeper actually, but improvement in necrotic tissue and a bit more granulation, especially peripheral - no signs of infection. Factors contributing to occurrence and/or persistence of the chronic ulcer include edema, diabetes, shear force, obesity and decreased tissue oxygenation. Medical necessity of today's visit is shown by the above documentation. Sharp debridement is indicated today, based upon the exam findings in the wound(s) above. Procedure note:     Consent obtained. Time out performed per [facility] policy. Anesthetic  Anesthetic: 4% Lidocaine Cream     Using a curette, #15 blade scalpel and forceps, I sharply debrided the foot (left , lateral) ulcer(s) down through and including the removal of muscle/fascia. The type(s) of tissue debrided included fibrin, biofilm, slough and necrotic/eschar. Total Surface Area Debrided: 3 sq cm. Using a curette and rongeur, I sharply debrided the foot (left , medial) ulcer(s) down through and including the removal of bone. The type(s) of tissue debrided included fibrin, biofilm and necrotic/eschar. Total Surface Area Debrided: 8 sq cm. The ulcers were then irrigated with normal saline solution. The procedure was completed with a small amount of bleeding, and hemostasis was with pressure. The patient tolerated the procedure well, with no significant complications. The patient's level of pain during and after the procedure was monitored. Post-debridement measurements, if different from pre-debridement, are in the flowsheet as well.     Discharge plan:     Treatment in the wound care center today, per RN documentation: Wound 09/16/21 #1, left medial forefoot, Diabetic ulcer, jenkins 4, onset 8/25/2021-Dressing/Treatment: Other (comment) (NPWT)  Wound 09/16/21 #3, left lateral foot, pressure ulcer, Stage 4, onset 9/2021-Dressing/Treatment: Other (comment) (pso/triad/mepilexborder). Next week will decide on ongoing HBO, perhaps stopping VAC to current wound, perhaps adding VAC to the lateral wound, depending on how everything looks. Continue current VAC and HBO plan at least another week. Home treatment: good handwashing before and after any dressing changes. Cleanse wound with saline or soap & water before dressing change. May use Vaseline (petrolatum), Aquaphor, Aveeno, CeraVe, Cetaphil, Eucerin, Lubriderm, etc for dry skin. Dressing type for home: Vashe and NPWT to the medial ulcer TIW, and then Vashe, periwound ZnO, Santyl, moist gauze and dry gauze to the lateral foot, once daily. Written discharge instructions given to patient. Follow up in 1 week to see me, daily for HBOT.     Electronically signed by Maritza Grijalva MD on 11/18/2021 at 3:07 PM.

## 2021-11-18 NOTE — PROGRESS NOTES
[facility] Progress Note    Del Silverman     : 1960    DATE OF VISIT:  2021    Subjective:     Del Silverman is a 59 y.o. male who has a diabetic ulcer located on the foot (left , medial, forefoot). Significant symptoms or pertinent wound history since last visit: feeling well overall, minimal pain, mild pruritus, mild swelling, no fever, doing well with NPWT and Santyl, doing well with HBO. Additional ulcer(s) noted? yes. The lateral foot pressure ulcer. His current medication list consists of Dulaglutide, Multiple Vitamin, amLODIPine, atorvastatin, collagenase, fish oil, hydrALAZINE, hydroCHLOROthiazide, insulin glargine, metFORMIN, oxymetazoline, and tamsulosin. Allergies: Patient has no known allergies. Objective:     Vitals:    21 1030   BP: (!) 146/69   Pulse: 71   Resp: 16   Temp: 97.8 °F (36.6 °C)   TempSrc: Oral   Weight: 236 lb (107 kg)   Height: 6' 1\" (1.854 m)     Constitutional:  well-developed, well-nourished, NAD  Cardiovascular:  bilateral pedal pulses palpable, feet warm, brisk cap refill; mild left lower extremity edema  Lymphatic:  no inguinal or popliteal adenopathy, no angitis, no cellulitis  Musculoskeletal:  no clubbing, cyanosis or petechiae; RLE and LLE with no gross effusions, joint misalignment or acute arthritis; slight prominence of right 1st met head, with a smaller callus there.   Neuro: decreased pedal sensation to light touch; no allodynia   Elicia-ulcer skin: minor contact dermatis now, otherwise just pink and indurated  Ulcer(s):  Lateral foot with a bit of pink granulation, mostly some sloughy fat necrosis, minor fascial necoris, some fibrin and biofilm, not progressing as quickly as I had hoped with Triad or even the first week of Santyl; primary diabetic ulcer more granular, minimal sloughy SQ debris, some fibrin and biofilm, no definite fascial or muscle necrosis, just a pinpoint of palpable and soft but barely visible 1st met bone, no pus, less distal undermining, still a bit of central depth. Photos also saved in electronic chart.     Today's wound measurements, per RN documentation:  Wound 09/16/21 #1, left medial forefoot, Diabetic ulcer, hercules 4, onset 8/25/2021-Wound Length (cm): 3.3 cm  Wound 09/16/21 #3, left lateral foot, pressure ulcer, Stage 4, onset 9/2021-Wound Length (cm): 2.8 cm    Wound 09/16/21 #1, left medial forefoot, Diabetic ulcer, hercules 4, onset 8/25/2021-Wound Width (cm): 2.5 cm  Wound 09/16/21 #3, left lateral foot, pressure ulcer, Stage 4, onset 9/2021-Wound Width (cm): 1.1 cm    Wound 09/16/21 #1, left medial forefoot, Diabetic ulcer, hercules 4, onset 8/25/2021-Wound Depth (cm): 0.6 cm  Wound 09/16/21 #3, left lateral foot, pressure ulcer, Stage 4, onset 9/2021-Wound Depth (cm): 0.2 cm    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 2 diabetes mellitus, with necrosis of bone ([facility]) E11.621, L97.524    Diabetic polyneuropathy associated with type 2 diabetes mellitus (HCC) E11.42    Elevated PSA R97.20    Mixed hyperlipidemia E78.2    Stage 3a chronic kidney disease (HCC) N18.31    Acute osteomyelitis of metatarsal bone of left foot ([facility]) M86.172    Staphylococcus aureus infection (cultures also with Anaerococcus) A49.01    Pressure ulcer of left lateral forefoot, stage 4 ([facility]) R12.764    Nonhealing surgical wound, initial encounter (small left dorsal foot wound) T81.89XA    Chronic low back pain M54.50, G89.29    Dental bridge present Z97.2    Callus of foot L84    Hyperkalemia E87.5       Assessment of today's active condition(s): well controlled DM2, neuropathy, no PAD, Hercules 4 diabetic foot with resolved cellulitis and abscess, I think resolved osteomyelitis after a partial first ray amp, making good progress toward healing overall; also a lateral pressure ulcer, making a bit less progress, still some necrosis and deep soft tissue exposure, but I don't want to push debridement too quickly and risk exposing his 5th met head. Factors contributing to occurrence and/or persistence of the chronic ulcer include edema, diabetes, chronic pressure, shear force and decreased tissue oxygenation. Medical necessity of today's visit is shown by the above documentation. Sharp debridement is indicated today, based upon the exam findings in the wound(s) above. Procedure note:     Consent obtained. Time out performed per [facility] policy. Anesthetic  Anesthetic: 4% Lidocaine Cream     Using a curette and rongeur, I sharply debrided the foot (left , medial, forefoot) ulcer(s) down through and including the removal of bone. The type(s) of tissue debrided included fibrin, biofilm and necrotic/eschar. Total Surface Area Debrided: 7 sq cm. Using a curette, #15 blade scalpel and forceps, I sharply debrided the foot (left , lateral) ulcer(s) down through and including the removal of muscle/fascia. The type(s) of tissue debrided included fibrin, biofilm, slough and necrotic/eschar. Total Surface Area Debrided: 3 sq cm. The ulcers were then irrigated with normal saline solution. The procedure was completed with a small amount of bleeding, and hemostasis was with pressure and with silver nitrate stick(s). The patient tolerated the procedure well, with no significant complications. The patient's level of pain during and after the procedure was monitored. Post-debridement measurements, if different from pre-debridement, are in the flowsheet as well.     Discharge plan:     Treatment in the wound care center today, per RN documentation: Wound 09/16/21 #1, left medial forefoot, Diabetic ulcer, jenkins 4, onset 8/25/2021-Dressing/Treatment: Other (comment) (NPWT,safetac dressing)  Wound 09/16/21 #3, left lateral foot, pressure ulcer, Stage 4, onset 9/2021-Dressing/Treatment: Other (comment) (triad/dry dressing). Continue HBOT, until that medial ulcer bone is completely covered, and depth is less. Home treatment: good handwashing before and after any dressing changes. Cleanse wound with saline or soap & water before dressing change. May use Vaseline (petrolatum), Aquaphor, Aveeno, CeraVe, Cetaphil, Eucerin, Lubriderm, etc for dry skin. Dressing type for home: for the medial wound, Vashe, VAC foam, Safetac drape, VAC TIW; for the lateral foot, Vashe, periwound ZnO, Santyl, moist gauze, dry gauze, once daily. Written discharge instructions given to patient. Follow up in 1 week to see me, daily for HBOT.     Electronically signed by Ina Jeffries MD on 11/18/2021 at 2:59 PM.

## 2021-11-19 ENCOUNTER — HOSPITAL ENCOUNTER (OUTPATIENT)
Dept: HYPERBARIC MEDICINE | Age: 61
Discharge: HOME | End: 2021-11-19
Payer: MEDICARE

## 2021-11-19 ENCOUNTER — HOSPITAL ENCOUNTER (OUTPATIENT)
Dept: WOUND CARE | Age: 61
Discharge: HOME | End: 2021-11-19
Payer: MEDICARE

## 2021-11-19 VITALS
TEMPERATURE: 97 F | DIASTOLIC BLOOD PRESSURE: 76 MMHG | HEART RATE: 71 BPM | RESPIRATION RATE: 16 BRPM | SYSTOLIC BLOOD PRESSURE: 157 MMHG

## 2021-11-19 VITALS
RESPIRATION RATE: 16 BRPM | TEMPERATURE: 97 F | DIASTOLIC BLOOD PRESSURE: 76 MMHG | SYSTOLIC BLOOD PRESSURE: 157 MMHG | HEART RATE: 71 BPM

## 2021-11-19 DIAGNOSIS — L97.524 DIABETIC ULCER OF OTHER PART OF LEFT FOOT ASSOCIATED WITH TYPE 2 DIABETES MELLITUS, WITH NECROSIS OF BONE (HCC): Primary | ICD-10-CM

## 2021-11-19 DIAGNOSIS — E11.621 DIABETIC ULCER OF OTHER PART OF LEFT FOOT ASSOCIATED WITH TYPE 2 DIABETES MELLITUS, WITH NECROSIS OF BONE (HCC): Primary | ICD-10-CM

## 2021-11-19 LAB
GLUCOSE BLD-MCNC: 121 MG/DL (ref 70–99)
GLUCOSE BLD-MCNC: 157 MG/DL (ref 70–99)
PERFORMED ON: ABNORMAL
PERFORMED ON: ABNORMAL

## 2021-11-19 PROCEDURE — 11043 DBRDMT MUSC&/FSCA 1ST 20/<: CPT

## 2021-11-19 PROCEDURE — 99183 HYPERBARIC OXYGEN THERAPY: CPT | Performed by: INTERNAL MEDICINE

## 2021-11-19 PROCEDURE — 97597 DBRDMT OPN WND 1ST 20 CM/<: CPT

## 2021-11-19 PROCEDURE — 97597 DBRDMT OPN WND 1ST 20 CM/<: CPT | Performed by: INTERNAL MEDICINE

## 2021-11-19 PROCEDURE — 97605 NEG PRS WND THER DME<=50SQCM: CPT | Performed by: INTERNAL MEDICINE

## 2021-11-19 PROCEDURE — 97605 NEG PRS WND THER DME<=50SQCM: CPT

## 2021-11-19 PROCEDURE — G0277 HBOT, FULL BODY CHAMBER, 30M: HCPCS

## 2021-11-19 PROCEDURE — 11043 DBRDMT MUSC&/FSCA 1ST 20/<: CPT | Performed by: INTERNAL MEDICINE

## 2021-11-19 RX ORDER — BACITRACIN ZINC AND POLYMYXIN B SULFATE 500; 1000 [USP'U]/G; [USP'U]/G
OINTMENT TOPICAL ONCE
Status: CANCELLED | OUTPATIENT
Start: 2021-11-19 | End: 2021-11-19

## 2021-11-19 RX ORDER — LIDOCAINE HYDROCHLORIDE 40 MG/ML
SOLUTION TOPICAL ONCE
Status: CANCELLED | OUTPATIENT
Start: 2021-11-19 | End: 2021-11-19

## 2021-11-19 RX ORDER — LIDOCAINE 50 MG/G
OINTMENT TOPICAL ONCE
Status: CANCELLED | OUTPATIENT
Start: 2021-11-19 | End: 2021-11-19

## 2021-11-19 RX ORDER — LIDOCAINE 40 MG/G
CREAM TOPICAL ONCE
Status: CANCELLED | OUTPATIENT
Start: 2021-11-19 | End: 2021-11-19

## 2021-11-19 RX ORDER — LIDOCAINE 40 MG/G
CREAM TOPICAL ONCE
Status: DISCONTINUED | OUTPATIENT
Start: 2021-11-19 | End: 2021-11-20 | Stop reason: HOSPADM

## 2021-11-19 ASSESSMENT — PAIN SCALES - GENERAL
PAINLEVEL_OUTOF10: 0

## 2021-11-19 NOTE — PLAN OF CARE
Continued improvement noted in wounds. Will d/c NPWT to amp sit and begin NPWT to leateral foot. MD to send information to insurance. Continue with HBO next week then more than likely will be done with HBO. Discharge instructions reviewed with patient, all questions answered, copy given to patient. Dressings were applied to all wounds per M.D. Instructions at this visit.

## 2021-11-19 NOTE — PROGRESS NOTES
[address]  Hyperbaric Oxygen    Del Swanson     : 1960    DATE OF VISIT:  2021    Subjective     Del Swanson is a 59 y.o. male who  has a past medical history of Abscess of left hand (2017), Acute kidney injury superimposed on chronic kidney disease ([facility] (2021), Adhesive capsulitis of shoulder (2014), Fractures, Gas gangrene [facility] foot ([facility] (2021), History of hyperbaric oxygen therapy (2021), and PNA (pneumonia) (2016). He presents to the [facility] today for hyperbaric oxygen treatment of his Justyn Shows 4 diabetic foot, which is refractory to standard therapy for 30 days. Patient denies fever. Patient denies nausea, vomiting or diarrhea; no ear troubles and no other new complaints; no fears or anxiety regarding treatment today. Objective     Vitals:    21 0801 21 1010   BP: (!) 153/72 (!) 171/72   Pulse: 81 53   Resp: 18 16   Temp: 99 °F (37.2 °C) 97.9 °F (36.6 °C)   TempSrc: Oral Oral       General:  Alert, cooperative, no distress. Ears: External otic canals are within acceptable limits. Teed grade 0 on the right and 0 on the left pre-treatment. Teed grade 0 on the right and 0 on the left post-treatment. Lungs:  Clear to auscultation bilaterally. Ulcer: Not examined today in HBO, if applicable. Recent Labs     21  0812 21  1040 21  0805 21  1006 21  0757 21  1012   POCGLU 174* 145* 134* 113* 137* 114*       Assessment     Del Swanson is a 59 y.o. male who presented to the [facility] today for hyperbaric oxygen treatment #41 of 50 for the diagnosis as stated above. Treatment given at 2 STEPHANIE for 90 minutes, with no air breaks. Total Treatment Time (min): 106 today, including compression, 100% oxygen at pressure, air breaks if applicable, and decompression.     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 2 diabetes mellitus, with necrosis of bone (HCC) E11.621, L97.524    Diabetic polyneuropathy associated with type 2 diabetes mellitus (HCC) E11.42    Elevated PSA R97.20    Mixed hyperlipidemia E78.2    Stage 3a chronic kidney disease (HCC) N18.31    Acute osteomyelitis of metatarsal bone of left foot (HCC) M86.172    Staphylococcus aureus infection (cultures also with Anaerococcus) A49.01    Pressure ulcer of left lateral forefoot, stage 4 (HCC) Z91.140    Nonhealing surgical wound, initial encounter (small left dorsal foot wound) T81.89XA    Chronic low back pain M54.50, G89.29    Dental bridge present Z97.2    Callus of foot L84    Hyperkalemia E87.5       In my clinical judgement, ongoing HBO therapy is necessary at this time, given a threat to patient function, limb or life from the current condition. Adjunctive Rx, objective weekly progress and goals of Rx will periodically be updated, on Mondays. Plan     1. Hyperbaric Oxygen - Del Hernandez tolerated the treatment well today without complications. Continue HBO treatment as outlined above. 2. Other -     I was present on these premises and immediately available to furnish assistance & direction throughout the procedure.      -- Electronically signed by Randy Kline MD on 11/19/2021 at 7:44 AM\

## 2021-11-19 NOTE — PLAN OF CARE
.Patient seen for HBOT treatment  42 / 48    today. Patient assessment WNL and cleared for HBOT. Patient was compressed in HBO chamber to 2.0 STEPHANIE at 2.0 psi/min. Patient is tolerating treatment well and is currently resting comfortably and watching television. HBO RN at chamber side, will continue to monitor.

## 2021-11-19 NOTE — PROGRESS NOTES
[facility] Physician Orders and Discharge [address]  [address], [address]  [place], [facility]  Telephone: [phone]      Fax: [phone]        Your home care [place]                                                         Your wound-care supplies will be provided by: We are ordering your wound-care supplies from [employer]. Please note, depending on your insurance coverage, you may have out-of-pocket expenses for these supplies. Someone from [facility] should call you to confirm your order and discuss those potential costs before they ship your products -- please anticipate that call. If your out-of-pocket cost could be substantial, [facility] has a financial hardship program for patients who qualify, so please ask about that if you might need a hand. If you have any questions about your supplies or your potential out-of-pocket costs, or if you need to place an order for a refill of supplies (typically monthly), please call [phone].   ([facility] FOR NPWT SUPPLIES)     NAME: Reynold Stevens  DATE of BIRTH:  1960  PRIMARY DIAGNOSIS FOR WOUND CARE CENTER:  Diabetic      Wound cleansing:   Do not scrub or use excessive force. Wash hands with soap and water before and after dressing changes. Prior to applying a clean dressing, cleanse wound with normal saline, wound cleanser, or mild soap and water.  Ask your physician or nurse before getting the wound(s) wet in the shower.                Wound care for home:      Left Lateral Foot:  Dr. Narciso Gunn  used Silver Nitrate on your wound today.  The wound will be black or silver in appearance  for the next several days, this is normal.   Vashe  Foam to wound (you don't need to try to pack it in any spaces just place on wound bed)  NPWT Safetac drape we gave you DO NOT USE SKIN PREP WITH THIS NEW DRAPE!! DO NOT USE DRAPE THAT CAME WITH NPWT      NPWT suction to 125 continuous     Home care to change Monday, Wednesday and [facility] to change on Fridays       Left Amp. Site foot:  Vashe (2-3 sprays on wound bed, don't rinse)  Apply Triad or Desitin (zinc Oxide) to lexi wound to protect  Apply Silver Alginate to the wound   Cover with a dry dressing  Change 3 x week        Please note, all wounds (unless stated otherwise here) were mechanically debrided at the time of cleansing here in the wound-care center today, so a small amount of pain, drainage or bleeding from that process might be expected, and is normal.      All products for home use, including multiple products for a single wound if applicable, are medically necessary in order to achieve the best chance at timely wound healing. See provider documentation for details if needed.     Substituted dressings applied in the [address] [place] [address] [address] today, if applicable:          New orders for this week (labs, imaging, medications, etc.)     PLAN FOR LAST HBO NEXT [facility] 11/24/21  Will change to NPWT on lateral foot wound and Dr. May Franco is sending information to your insurance for this change       Additional instructions for specific diagnoses:        General comments for diabetic / neuropathic ulcers:  *  Unless you've been instructed not to remove your dressings, be sure to inspect your feet daily, and notify us of any major changes. *  If you do not have a long-term podiatrist, be sure to let us know. *  Moisturize your skin regularly with Vaseline, Aquaphor, Aveeno, CeraVe, Cetaphil, Eucerin, Lubriderm, etc; but keep the skin between your toes dry. Je Niño allow your wound-care doctor or podiatrist to cut nails, calluses & corns. *  Be sure to always wear footwear that fits well, and NEVER go without shoes and socks. *  Do you know your Hemoglobin A1c level? You should! Please ask if you have questions. *  Be sure to adhere to any recommendations from your PCP or endocrinologist when it comes to diabetes medications and diet.  If you have questions, please ask. *  If you smoke, your wound can not heal properly -- please talk with us when you're ready to quit. *  Do not soak your feet unless specifically instructed to do so by us.              F/U Appointment is with Dr. Beni Ballesteros 1 week , on                                   at                       .     Your nurse  is Kirk Smalls RN      If we applied slip-resistant hospital socks today, be sure to remove them at least once a day to inspect your toes or feet, even if you're not changing the wraps or dressings underneath.  If you see anything concerning (redness, excess moisture, etc), please call and let us know right away.     Should you experience any significant changes in your wound(s) (including redness, increased warmth, increased pain, increased drainage, odor, or fever) or have questions about your wound care, please contact the [facility] at [phone] Monday-Thursday from 8:00 am  4:30 pm, or Friday from 8:00 am - 2:30 pm.  If you need help with your wound outside these hours and cannot wait until we are again available, contact your home-care company (if applicable), your PCP, or go to the nearest emergency room

## 2021-11-20 NOTE — PROGRESS NOTES
[address]  Hyperbaric Oxygen    Del Lr     : 1960    DATE OF VISIT:  2021    Subjective     Del Lr is a 59 y.o. male who  has a past medical history of Abscess of left hand (2017), Acute kidney injury superimposed on chronic kidney disease ([facility] (2021), Adhesive capsulitis of shoulder (2014), Fractures, Gas gangrene of foot ([facility] (2021), History of hyperbaric oxygen therapy (2021), and PNA (pneumonia) (2016). He presents to the [facility] today for hyperbaric oxygen treatment of his Gerardo Braver 4 diabetic foot, which is refractory to standard therapy for 30 days. Patient denies fever. Patient denies nausea, vomiting or diarrhea; no ear troubles and no other new complaints; no fears or anxiety regarding treatment today. Objective     Vitals:    21 0814 21 1010   BP: (!) 153/76 (!) 157/76   Pulse: 87 71   Resp: 16 16   Temp: 98 °F (36.7 °C) 97 °F (36.1 °C)   TempSrc: Oral Oral       General:  Alert, cooperative, no distress. Ears: External otic canals are within acceptable limits. Teed grade 0 on the right and 0 on the left pre-treatment. Teed grade 0 on the right and 0 on the left post-treatment. Lungs:  Clear to auscultation bilaterally. Ulcer: Not examined today in HBO, if applicable. Recent Labs     21  0805 21  1006 21  0757 21  1012 21  0811 21  1018   POCGLU 134* 113* 137* 114* 157* 121*       Assessment     Del Lr is a 59 y.o. male who presented to the [facility] today for hyperbaric oxygen treatment #42 of 50 for the diagnosis as stated above. Treatment given at 2 STEPHANIE for 90 minutes, with no air breaks. today, including compression, 100% oxygen at pressure, air breaks if applicable, and decompression.     Patient Active Problem List   Diagnosis Code    Hypertension I10    BPH (benign

## 2021-11-22 ENCOUNTER — HOSPITAL ENCOUNTER (OUTPATIENT)
Dept: HYPERBARIC MEDICINE | Age: 61
Discharge: HOME | End: 2021-11-22
Payer: MEDICARE

## 2021-11-22 VITALS
TEMPERATURE: 98 F | SYSTOLIC BLOOD PRESSURE: 167 MMHG | RESPIRATION RATE: 16 BRPM | HEART RATE: 71 BPM | DIASTOLIC BLOOD PRESSURE: 79 MMHG

## 2021-11-22 DIAGNOSIS — E11.621 DIABETIC ULCER OF OTHER PART OF LEFT FOOT ASSOCIATED WITH TYPE 2 DIABETES MELLITUS, WITH NECROSIS OF BONE (HCC): Primary | ICD-10-CM

## 2021-11-22 DIAGNOSIS — L97.524 DIABETIC ULCER OF OTHER PART OF LEFT FOOT ASSOCIATED WITH TYPE 2 DIABETES MELLITUS, WITH NECROSIS OF BONE (HCC): Primary | ICD-10-CM

## 2021-11-22 LAB
GLUCOSE BLD-MCNC: 107 MG/DL (ref 70–99)
GLUCOSE BLD-MCNC: 115 MG/DL (ref 70–99)
PERFORMED ON: ABNORMAL
PERFORMED ON: ABNORMAL

## 2021-11-22 PROCEDURE — 99183 HYPERBARIC OXYGEN THERAPY: CPT | Performed by: INTERNAL MEDICINE

## 2021-11-22 PROCEDURE — G0277 HBOT, FULL BODY CHAMBER, 30M: HCPCS

## 2021-11-22 RX ORDER — OXYMETAZOLINE HYDROCHLORIDE 0.05 G/100ML
SPRAY NASAL
Qty: 1 BOTTLE | Refills: 0 | COMMUNITY
Start: 2021-11-22 | End: 2021-11-23

## 2021-11-22 ASSESSMENT — PAIN SCALES - GENERAL
PAINLEVEL_OUTOF10: 0
PAINLEVEL_OUTOF10: 0

## 2021-11-22 NOTE — DISCHARGE INSTR - COC
Continuity of Care Form    Patient Name: Marcus Covarrubias   :  1960  MRN:  [id number]    Admit date:  2021  Discharge date:  ***    Code Status Order: Prior   Advance Directives:      Admitting Physician:  No admitting provider for patient encounter. PCP: Dillon Barrios MD    Discharging Nurse: [facility] Unit/Room#: No information available for this encounter. Discharging Unit Phone Number: ***    Emergency Contact:   Extended Emergency Contact Information  Primary Emergency Contact: Alex Johnson  Address: [address] Phone: [phone]  Relation: Spouse  Secondary Emergency Contact: Varun Gifford Phone: [phone]  Relation: Brother/Sister    Past Surgical History:  Past Surgical History:   Procedure Laterality Date    FOOT DEBRIDEMENT Left 2021    LEFT FOOT DEBRIDEMENT INCISION AND DRAINAGE performed by Hayder Fregoso DPM at [address] [place] [address] Left 2021    STAGED INCISION AND DEBRIDEMENT LEFT FOOT WITH PARTIAL FIRST RAY AMPUTATION performed by Hayder Fregoso DPM at [address] Left 2017    LEFT HAND DEBRIDEMENT INCISION AND DRAINAGE    NECK SURGERY      30s year    TOE AMPUTATION Left     hallux    WRIST FRACTURE SURGERY         Immunization History: There is no immunization history on file for this patient.     Active Problems:  Patient Active Problem List   Diagnosis Code    Hypertension I10    BPH (benign prostatic hyperplasia) N40.0    Osteoarthritis M19.90    Class 1 obesity due to excess calories with serious comorbidity and body mass index (BMI) of 31.0 to 31.9 in adult E66.09, Z68.31    Diabetic ulcer of left foot associated with type 2 diabetes mellitus, with necrosis of bone (HCC) E11.621, U04.408    Diabetic polyneuropathy associated with type 2 diabetes mellitus (HCC) E11.42    Elevated PSA R97.20    Mixed hyperlipidemia E78.2    Stage 3a chronic kidney disease ([facility]) N18.31    Acute osteomyelitis of metatarsal bone of left foot ([facility]) M86.172    Staphylococcus aureus infection (cultures also with Anaerococcus) A49.01    Pressure ulcer of left lateral forefoot, stage 4 ([facility]) L89.894    Nonhealing surgical wound, initial encounter (small left dorsal foot wound) T81.89XA    Chronic low back pain M54.50, G89.29    Dental bridge present Z97.2    Callus of foot L84    Hyperkalemia E87.5       Isolation/Infection:   Isolation            No Isolation          Patient Infection Status       Infection Onset Added Last Indicated Last Indicated By Review Planned Expiration Resolved Resolved By    None active    Resolved    COVID-19 (Rule Out) 08/30/21 08/30/21 08/30/21 COVID-19, Rapid (Ordered)   08/30/21 Rule-Out Test Resulted            Nurse Assessment:  Last Vital Signs: BP (!) 167/79   Pulse 71   Temp 98 °F (36.7 °C) (Oral)   Resp 16     Last documented pain score (0-10 scale): Pain Level: 0  Last Weight:   Wt Readings from Last 1 Encounters:   11/05/21 236 lb (107 kg)     Mental Status:  {IP PT MENTAL STATUS:20030}    IV Access:  { SUYAPA IV ACCESS:519277002}    Nursing Mobility/ADLs:  Walking   {P DME VEZO:304176083}  Transfer  {CHP DME XDRL:643785999}  Bathing  {CHP DME KJQJ:953341364}  Dressing  {P DME ASAX:416258832}  Toileting  {P DME MAMM:448865604}  Feeding  {P DME DCLQ:996815951}  Med Admin  {P DME ESFA:065482170}  Med Delivery   { SUYAPA MED Delivery:216590030}    Wound Care Documentation and Therapy:  Negative Pressure Wound Therapy Foot Anterior;Left;Medial (Active)   Wound Type Diabetic foot ulcer 11/19/21 1153   Unit Type medela 11/19/21 1153   Dressing Type Black foam 11/19/21 1153   Number of pieces used 1 11/19/21 1153   Cycle Continuous 11/19/21 1153   Target Pressure (mmHg) 125 11/19/21 1153   Canister changed?  Yes 11/19/21 1153   Dressing Status Clean; Dry; Intact 11/19/21 1153   Dressing Changed Changed/New 11/19/21 1153   Number of in Wound Size % (l*w) 64.94 11/19/21 1043   Wound Volume (cm^3) 1.08 cm^3 11/19/21 1043   Wound Healing % -40 11/19/21 1043   Post-Procedure Length (cm) 3 cm 11/19/21 1104   Post-Procedure Width (cm) 0.9 cm 11/19/21 1104   Post-Procedure Depth (cm) 0.4 cm 11/19/21 1104   Post-Procedure Surface Area (cm^2) 2.7 cm^2 11/19/21 1104   Post-Procedure Volume (cm^3) 1.08 cm^3 11/19/21 1104   Distance Tunneling (cm) 0 cm 11/19/21 1043   Tunneling Position ___ O'Clock 0 11/12/21 1030   Undermining Starts ___ O'Clock 0 11/12/21 1030   Undermining Ends___ O'Clock 0 11/12/21 1030   Undermining Maxium Distance (cm) 0 11/19/21 1043   Wound Assessment Slough; [place]/red 11/19/21 1043   Drainage Amount Scant 11/19/21 1043   Drainage Description Serous; Yellow 11/19/21 1043   Odor None 11/19/21 1043   Elicia-wound Assessment Maceration; Blanchable erythema 11/19/21 1043   Number of days: 67        Elimination:  Continence: Bowel: {YES / WM:19136}  Bladder: {YES / WK:44418}  Urinary Catheter: {Urinary Catheter:479313547}   Colostomy/Ileostomy/Ileal Conduit: {YES / SP:27885}       Date of Last BM: ***  No intake or output data in the 24 hours ending 11/22/21 1148  No intake/output data recorded.     Safety Concerns:     [address] [facility] Safety Concerns:123919062}    Impairments/Disabilities:      [address] Medical Impairments/Disabilities:579752468}    Nutrition Therapy:  Current Nutrition Therapy:   [address] Medical Diet List:400373402}    Routes of Feeding: {CHP DME Other Feedings:344823529}  Liquids: {Slp liquid thickness:31423}  Daily Fluid Restriction: {CHP DME Yes amt example:714157816}  Last Modified Barium Swallow with Video (Video Swallowing Test): {Done Not Done UINX:035841473}    Treatments at the Time of Hospital Discharge:   Respiratory Treatments: ***  Oxygen Therapy:  {Therapy; copd oxygen:89392}  Ventilator:    {JIM MILLER Vent CT:155931810}    Rehab Therapies: {THERAPEUTIC INTERVENTION:9361229783}  Weight Bearing Status/Restrictions: {JIM MILLER Weight

## 2021-11-23 ENCOUNTER — HOSPITAL ENCOUNTER (OUTPATIENT)
Dept: HYPERBARIC MEDICINE | Age: 61
Discharge: HOME | End: 2021-11-23
Payer: MEDICARE

## 2021-11-23 VITALS
SYSTOLIC BLOOD PRESSURE: 169 MMHG | HEART RATE: 70 BPM | DIASTOLIC BLOOD PRESSURE: 74 MMHG | RESPIRATION RATE: 18 BRPM | TEMPERATURE: 97.9 F

## 2021-11-23 DIAGNOSIS — L97.524 DIABETIC ULCER OF OTHER PART OF LEFT FOOT ASSOCIATED WITH TYPE 2 DIABETES MELLITUS, WITH NECROSIS OF BONE (HCC): Primary | ICD-10-CM

## 2021-11-23 DIAGNOSIS — E11.621 DIABETIC ULCER OF OTHER PART OF LEFT FOOT ASSOCIATED WITH TYPE 2 DIABETES MELLITUS, WITH NECROSIS OF BONE (HCC): Primary | ICD-10-CM

## 2021-11-23 LAB
GLUCOSE BLD-MCNC: 121 MG/DL (ref 70–99)
GLUCOSE BLD-MCNC: 139 MG/DL (ref 70–99)
PERFORMED ON: ABNORMAL
PERFORMED ON: ABNORMAL

## 2021-11-23 PROCEDURE — G0277 HBOT, FULL BODY CHAMBER, 30M: HCPCS

## 2021-11-23 PROCEDURE — 99183 HYPERBARIC OXYGEN THERAPY: CPT | Performed by: INTERNAL MEDICINE

## 2021-11-23 RX ORDER — OXYMETAZOLINE HYDROCHLORIDE 0.05 G/100ML
SPRAY NASAL
Qty: 1 BOTTLE | Refills: 0 | COMMUNITY
Start: 2021-11-23 | End: 2021-12-03

## 2021-11-23 ASSESSMENT — PAIN SCALES - GENERAL
PAINLEVEL_OUTOF10: 0
PAINLEVEL_OUTOF10: 0

## 2021-11-23 NOTE — PROGRESS NOTES
[address]  Hyperbaric Oxygen    Del Horn     : 1960    DATE OF VISIT:  2021    Subjective     Del Horn is a 59 y.o. male who  has a past medical history of Abscess of left hand (2017), Acute kidney injury superimposed on chronic kidney disease ([facility] (2021), Adhesive capsulitis of shoulder (2014), Fractures, Gas gangrene of foot ([facility] (2021), History of hyperbaric oxygen therapy (2021), and PNA (pneumonia) (2016). He presents to the [facility] today for hyperbaric oxygen treatment of his Cesia Yobany 4 diabetic foot, which is refractory to standard therapy for 30 days. Patient denies fever. Patient denies nausea, vomiting or diarrhea; no ear troubles and no other new complaints; no fears or anxiety regarding treatment today. Objective     Vitals:    21 0818 21 1012   BP: 131/71 (!) 167/79   Pulse: 77 71   Resp: 16 16   Temp: 99.2 °F (37.3 °C) 98 °F (36.7 °C)   TempSrc: Oral Oral       General:  Alert, cooperative, no distress. Ears: External otic canals are within acceptable limits. Teed grade 0 on the right and 0 on the left pre-treatment. Teed grade 0 on the right and 0 on the left post-treatment. Lungs:  Clear to auscultation bilaterally. Ulcer: Not examined today in HBO, if applicable. Recent Labs     21  0814 21  1017 21  0811   POCGLU 115* 107* 139*       Assessment     Del Horn is a 59 y.o. male who presented to the [facility] today for hyperbaric oxygen treatment #43 of 50 for the diagnosis as stated above. Treatment given at 2 STEPHANIE for 90 minutes, with no air breaks. Total Treatment Time (min): 106 today, including compression, 100% oxygen at pressure, air breaks if applicable, and decompression.     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 2 diabetes mellitus, with necrosis of bone ([facility]) E11.621, L97.524    Diabetic polyneuropathy associated with type 2 diabetes mellitus ([facility]) E11.42    Elevated PSA R97.20    Mixed hyperlipidemia E78.2    Stage 3a chronic kidney disease (HCC) N18.31    Acute osteomyelitis of metatarsal bone of left foot ([facility]) M86.172    Staphylococcus aureus infection (cultures also with Anaerococcus) A49.01    Pressure ulcer of left lateral forefoot, stage 4 ([facility]) V15.624    Nonhealing surgical wound, initial encounter (small left dorsal foot wound) T81.89XA    Chronic low back pain M54.50, G89.29    Dental bridge present Z97.2    Callus of foot L84    Hyperkalemia E87.5       In my clinical judgement, ongoing HBO therapy is necessary at this time, given a threat to patient function, limb or life from the current condition. Adjunctive Rx, objective weekly progress and goals of Rx will periodically be updated, on Mondays. Anticipate stopping HBO this Wednesday, when I see him for his wound care visit, barring some sort of significant surprise on his exam.    Plan     1. Hyperbaric Oxygen - Del Hernandez tolerated the treatment well today without complications. Continue HBO treatment as outlined above. 2. Other -     I was present on these premises and immediately available to furnish assistance & direction throughout the procedure.      -- Electronically signed by Debbie Singh MD on 11/23/2021 at 8:53 AM

## 2021-11-23 NOTE — PLAN OF CARE
Patient seen for HBOT treatment  44 / 48    today. Patient assessment complete and cleared for HBOT. Patient was compressed in HBO chamber to 2.0 STEPHANIE at 2.0 psi/min. Patient is tolerating treatment well and is currently resting comfortably and watching television. HBO RN at chamber side, will continue to monitor.

## 2021-11-24 ENCOUNTER — HOSPITAL ENCOUNTER (OUTPATIENT)
Dept: WOUND CARE | Age: 61
Discharge: HOME | End: 2021-11-24
Payer: MEDICARE

## 2021-11-24 VITALS
BODY MASS INDEX: 30.11 KG/M2 | SYSTOLIC BLOOD PRESSURE: 180 MMHG | TEMPERATURE: 97.2 F | WEIGHT: 227.2 LBS | RESPIRATION RATE: 20 BRPM | HEART RATE: 73 BPM | DIASTOLIC BLOOD PRESSURE: 91 MMHG | HEIGHT: 73 IN

## 2021-11-24 DIAGNOSIS — L97.524 DIABETIC ULCER OF OTHER PART OF LEFT FOOT ASSOCIATED WITH TYPE 2 DIABETES MELLITUS, WITH NECROSIS OF BONE (HCC): Primary | ICD-10-CM

## 2021-11-24 DIAGNOSIS — E11.621 DIABETIC ULCER OF OTHER PART OF LEFT FOOT ASSOCIATED WITH TYPE 2 DIABETES MELLITUS, WITH NECROSIS OF BONE (HCC): Primary | ICD-10-CM

## 2021-11-24 PROCEDURE — 11043 DBRDMT MUSC&/FSCA 1ST 20/<: CPT

## 2021-11-24 PROCEDURE — 97597 DBRDMT OPN WND 1ST 20 CM/<: CPT

## 2021-11-24 PROCEDURE — 97597 DBRDMT OPN WND 1ST 20 CM/<: CPT | Performed by: INTERNAL MEDICINE

## 2021-11-24 PROCEDURE — 11043 DBRDMT MUSC&/FSCA 1ST 20/<: CPT | Performed by: INTERNAL MEDICINE

## 2021-11-24 RX ORDER — LIDOCAINE HYDROCHLORIDE 40 MG/ML
SOLUTION TOPICAL ONCE
Status: CANCELLED | OUTPATIENT
Start: 2021-11-24 | End: 2021-11-24

## 2021-11-24 RX ORDER — LIDOCAINE 40 MG/G
CREAM TOPICAL ONCE
Status: CANCELLED | OUTPATIENT
Start: 2021-11-24 | End: 2021-11-24

## 2021-11-24 RX ORDER — LIDOCAINE 50 MG/G
OINTMENT TOPICAL ONCE
Status: DISCONTINUED | OUTPATIENT
Start: 2021-11-24 | End: 2021-11-25 | Stop reason: HOSPADM

## 2021-11-24 RX ORDER — LIDOCAINE 50 MG/G
OINTMENT TOPICAL ONCE
Status: CANCELLED | OUTPATIENT
Start: 2021-11-24 | End: 2021-11-24

## 2021-11-24 RX ORDER — LIDOCAINE HYDROCHLORIDE 40 MG/ML
SOLUTION TOPICAL ONCE
Status: DISCONTINUED | OUTPATIENT
Start: 2021-11-24 | End: 2021-11-25 | Stop reason: HOSPADM

## 2021-11-24 RX ORDER — BACITRACIN ZINC AND POLYMYXIN B SULFATE 500; 1000 [USP'U]/G; [USP'U]/G
OINTMENT TOPICAL ONCE
Status: DISCONTINUED | OUTPATIENT
Start: 2021-11-24 | End: 2021-11-25 | Stop reason: HOSPADM

## 2021-11-24 RX ORDER — LIDOCAINE 40 MG/G
CREAM TOPICAL ONCE
Status: DISCONTINUED | OUTPATIENT
Start: 2021-11-24 | End: 2021-11-25 | Stop reason: HOSPADM

## 2021-11-24 RX ORDER — BACITRACIN ZINC AND POLYMYXIN B SULFATE 500; 1000 [USP'U]/G; [USP'U]/G
OINTMENT TOPICAL ONCE
Status: CANCELLED | OUTPATIENT
Start: 2021-11-24 | End: 2021-11-24

## 2021-11-24 ASSESSMENT — PAIN SCALES - GENERAL
PAINLEVEL_OUTOF10: 0
PAINLEVEL_OUTOF10: 0

## 2021-11-24 NOTE — PLAN OF CARE
Wife states they had to remove the NPWT on lateral wound yesterday d/t pt. Accidentally pulling loose in his sleep & lexi-wound skin irritation d/t drape. Both wounds stable, debridement per MD & pt. Tolerated well. Pt. To apply Hydrocortisone to rash & apply Santyl to lateral wound, change daily as ordered until Monday 11/29/21. Will hold NPWT until 11/29/21, then re-evaluate on 11/29/21, if rash improved, then may restart NPWT as previously ordered. Medial foot wound will cont. With current wound care regime with dressing changes. Stop HBOT per Dr Jerri Hollingsworth. F/u in [facility] in 1 week as ordered, pt. Aware to call sooner with any changes or questions/concerns. Discharge instructions reviewed with patient & wife, all questions answered, copy given to patient. Dressings were applied to all wounds per M.D. Instructions at this visit.

## 2021-11-24 NOTE — PLAN OF CARE
[address] [facility] Physician Orders and Discharge [address]  [address], Ha[address] 53  [place], [facility]  Telephone: [phone]      Fax: [phone]        Your home care [place]                                                         Your wound-care supplies will be provided by: We are ordering your wound-care supplies from "[employer]. Please note, depending on your insurance coverage, you may have out-of-pocket expenses for these supplies. Someone from [facility] should call you to confirm your order and discuss those potential costs before they ship your products -- please anticipate that call. If your out-of-pocket cost could be substantial, [facility] has a financial hardship program for patients who qualify, so please ask about that if you might need a hand. If you have any questions about your supplies or your potential out-of-pocket costs, or if you need to place an order for a refill of supplies (typically monthly), please call [phone].   ([facility] FOR NPWT SUPPLIES)     NAME: Anjana Kelly  DATE of BIRTH:  1960  PRIMARY DIAGNOSIS FOR WOUND CARE CENTER:  Diabetic      Wound cleansing:   Do not scrub or use excessive force. Wash hands with soap and water before and after dressing changes. Prior to applying a clean dressing, cleanse wound with normal saline, wound cleanser, or mild soap and water.  Ask your physician or nurse before getting the wound(s) wet in the shower.                Wound care for home:      Left Lateral Foot:  Dr. Abida Sullivan Nitrate on your wound today.  The wound will be black or silver in appearance  for the next several days, this is normal.     Apply Hydrocortisone to rash on foot  Vashe spray to wound  Triad to lexi-wound  Santyl to wound   Moistened gauze over [employer] with dry dressing  Change once daily     HOLD NPWT STARTING 11/24/21, MAY RESTART NPWT ON Monday 11/29/21 IF RASH IMPROVED. Foam to wound (you don't need to try to pack it in any spaces just place on wound bed)  NPWT Safetac drape we gave you DO NOT USE SKIN PREP WITH THIS NEW DRAPE!! DO NOT USE DRAPE THAT CAME WITH NPWT   NPWT suction to -125 mmHg continuous  Home care to change Monday, Wednesday and [facility] to change on Fridays         Left Amp. Site foot:  Vashe (2-3 sprays on wound bed, don't rinse)  Apply Triad or Desitin (zinc Oxide) to lexi wound to protect  Apply Silver Alginate to the wound   Cover with a dry dressing  Change 3 x week        Please note, all wounds (unless stated otherwise here) were mechanically debrided at the time of cleansing here in the wound-care center today, so a small amount of pain, drainage or bleeding from that process might be expected, and is normal.      All products for home use, including multiple products for a single wound if applicable, are medically necessary in order to achieve the best chance at timely wound healing. See provider documentation for details if needed.     Substituted dressings applied in the [address] today, if applicable:     Triamcinolone today in [address] in place of Hydrocortisone  Triad/PSO to wound in place of Santyl today in [address]      New orders for this week (labs, imaging, medications, etc.)     Stop HBOT as of 11/24/21. Hold NPWT 11/24/21, may attempt to restart NPWT on Monday 11/29/21 if rash improved.       Additional instructions for specific diagnoses:        General comments for diabetic / neuropathic ulcers:  *  Unless you've been instructed not to remove your dressings, be sure to inspect your feet daily, and notify us of any major changes. *  If you do not have a long-term podiatrist, be sure to let us know. *  Moisturize your skin regularly with Vaseline, Aquaphor, Aveeno, CeraVe, Cetaphil, Eucerin, Lubriderm, etc; but keep the skin between your toes dry. Hurs[place] Ridges allow your wound-care doctor or podiatrist to cut nails, calluses & corns.    *  Be sure to always wear footwear that fits well, and NEVER go without shoes and socks. *  Do you know your Hemoglobin A1c level? You should! Please ask if you have questions. *  Be sure to adhere to any recommendations from your PCP or endocrinologist when it comes to diabetes medications and diet. If you have questions, please ask. *  If you smoke, your wound can not heal properly -- please talk with us when you're ready to quit. *  Do not soak your feet unless specifically instructed to do so by us.              F/U Appointment is with Dr. Anya Chiu 1 week , on                                   at                       .     Your nurse  is Negar Luis RN      If we applied slip-resistant hospital socks today, be sure to remove them at least once a day to inspect your toes or feet, even if you're not changing the wraps or dressings underneath.  If you see anything concerning (redness, excess moisture, etc), please call and let us know right away.     Should you experience any significant changes in your wound(s) (including redness, increased warmth, increased pain, increased drainage, odor, or fever) or have questions about your wound care, please contact the [employer] at [phone] Monday-Thursday from 8:00 am  4:30 pm, or Friday from 8:00 am - 2:30 pm.  If you need help with your wound outside these hours and cannot wait until we are again available, contact your home-care company (if applicable), your PCP, or go to the nearest emergency room

## 2021-11-24 NOTE — PROGRESS NOTES
[address]  Hyperbaric Oxygen    Del Scott     : 1960    DATE OF VISIT:  2021    Subjective     Del Scott is a 59 y.o. male who  has a past medical history of Abscess of left hand (2017), Acute kidney injury superimposed on chronic kidney disease ([facility] [address] (2021), Adhesive capsulitis of shoulder (2014), Fractures, Gas gangrene of foot ([facility] (2021), History of hyperbaric oxygen therapy (2021), and PNA (pneumonia) (2016). He presents to the [facility] today for hyperbaric oxygen treatment of his Charna Solar 4 diabetic foot, which is refractory to standard therapy for 30 days. Patient denies fever. Patient denies nausea, vomiting or diarrhea; no ear troubles and no other new complaints; no fears or anxiety regarding treatment today. Objective     Vitals:    21 0815 21 1010   BP: 138/74 (!) 169/74   Pulse: 76 70   Resp: 18 18   Temp: 97.8 °F (36.6 °C) 97.9 °F (36.6 °C)   TempSrc: Oral Oral       General:  Alert, cooperative, no distress. Ears: External otic canals are within acceptable limits. Teed grade 0 on the right and 0 on the left pre-treatment. Teed grade 0 on the right and 0 on the left post-treatment. Lungs:  Clear to auscultation bilaterally. Ulcer: Not examined today in HBO, if applicable. Recent Labs     21  0814 21  1017 21  0811 21  1009   POCGLU 115* 107* 139* 121*       Assessment     Del Scott is a 59 y.o. male who presented to the [facility] today for hyperbaric oxygen treatment #44 of 50 for the diagnosis as stated above. Treatment given at 2 STEPHANIE for 90 minutes, with no air breaks. Total Treatment Time (min): 106 today, including compression, 100% oxygen at pressure, air breaks if applicable, and decompression.     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 2 diabetes mellitus, with necrosis of bone (HCC) E11.621, L97.524    Diabetic polyneuropathy associated with type 2 diabetes mellitus (HCC) E11.42    Elevated PSA R97.20    Mixed hyperlipidemia E78.2    Stage 3a chronic kidney disease (HCC) N18.31    Acute osteomyelitis of metatarsal bone of left foot (HCC) M86.172    Staphylococcus aureus infection (cultures also with Anaerococcus) A49.01    Pressure ulcer of left lateral forefoot, stage 4 ([facility]) O77.168    Nonhealing surgical wound, initial encounter (small left dorsal foot wound) T81.89XA    Chronic low back pain M54.50, G89.29    Dental bridge present Z97.2    Callus of foot L84    Hyperkalemia E87.5       In my clinical judgement, ongoing HBO therapy is necessary at this time, given a threat to patient function, limb or life from the current condition. Adjunctive Rx, objective weekly progress and goals of Rx will periodically be updated, on Mondays. Plan     1. Hyperbaric Oxygen - Del Hernandez tolerated the treatment well today without complications. Continue HBO treatment as outlined above. 2. Other -     I was present on these premises and immediately available to furnish assistance & direction throughout the procedure.      -- Electronically signed by Angie Guerra MD on 11/23/2021 at 9:17 PM

## 2021-11-26 PROBLEM — M86.172 ACUTE OSTEOMYELITIS OF METATARSAL BONE OF LEFT FOOT (HCC): Status: RESOLVED | Noted: 2021-09-16 | Resolved: 2021-11-26

## 2021-11-26 PROBLEM — A49.01 STAPHYLOCOCCUS AUREUS INFECTION: Status: RESOLVED | Noted: 2021-09-16 | Resolved: 2021-11-26

## 2021-11-27 NOTE — PROGRESS NOTES
documentation:  Wound 09/16/21 #3, left lateral foot, pressure ulcer, Stage 4, onset 9/2021-Wound Length (cm): 3 cm  Wound 09/16/21 #1, left medial forefoot, Diabetic ulcer, hercules 4, onset 8/25/2021-Wound Length (cm): 3.4 cm    Wound 09/16/21 #3, left lateral foot, pressure ulcer, Stage 4, onset 9/2021-Wound Width (cm): 0.9 cm  Wound 09/16/21 #1, left medial forefoot, Diabetic ulcer, hercules 4, onset 8/25/2021-Wound Width (cm): 2.5 cm    Wound 09/16/21 #3, left lateral foot, pressure ulcer, Stage 4, onset 9/2021-Wound Depth (cm): 0.4 cm  Wound 09/16/21 #1, left medial forefoot, Diabetic ulcer, hercules 4, onset 8/25/2021-Wound Depth (cm): 0.2 cm    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 2 diabetes mellitus, with necrosis of bone (HCC) E11.621, L97.524    Diabetic polyneuropathy associated with type 2 diabetes mellitus (HCC) E11.42    Elevated PSA R97.20    Mixed hyperlipidemia E78.2    Stage 3a chronic kidney disease (HCC) N18.31    Pressure ulcer of left lateral forefoot, stage 4 (HCC) N49.877    Nonhealing surgical wound, initial encounter (small left dorsal foot wound) T81.89XA    Chronic low back pain M54.50, G89.29    Dental bridge present Z97.2    Callus of foot L84    Hyperkalemia E87.5       Assessment of today's active condition(s): well controlled DM2, neuropathy, no large-vessel PAD, healing Hercules 4 diabetic foot ulcer, osteomyelitis seems to be resolved, wound making good progress; lateral foot pressure ulcer, more slowly getting less necrotic, and I think actually that side might now benefit more from NPWT, if I can get it cleaned up well today.  Factors contributing to occurrence and/or persistence of the chronic ulcer include edema, diabetes, shear force, obesity and decreased tissue oxygenation. Medical necessity of today's visit is shown by the above documentation. Sharp debridement is indicated today, based upon the exam findings in the wound(s) above. Procedure note:     Consent obtained. Time out performed per [facility] policy. Anesthetic  Anesthetic: 4% Lidocaine Cream     Using a curette, I sharply debrided the foot (left , medial, forefoot) ulcer(s) down through and including the removal of dermis. The type(s) of tissue debrided included fibrin and biofilm. Total Surface Area Debrided: 6 sq cm. Using a curette, #15 blade scalpel and forceps, I sharply debrided the foot (left , lateral) ulcer(s) down through and including the removal of muscle/fascia. The type(s) of tissue debrided included fibrin, biofilm, slough and necrotic/eschar. Total Surface Area Debrided: 3 sq cm. The ulcers were then irrigated with normal saline solution. The procedure was completed with a small amount of bleeding, and hemostasis was with pressure. The patient tolerated the procedure well, with no significant complications. The patient's level of pain during and after the procedure was monitored. Post-debridement measurements, if different from pre-debridement, are in the flowsheet as well. Discharge plan:     Treatment in the wound care center today, per RN documentation: Wound 09/16/21 #3, left lateral foot, pressure ulcer, Stage 4, onset 9/2021-Dressing/Treatment: Other (comment) (NPWT)  Wound 09/16/21 #1, left medial forefoot, Diabetic ulcer, jenkins 4, onset 8/25/2021-Dressing/Treatment: Other (comment) (triad lexi,OpAg,4x4,ABD,kerlix). Will be stopping NPWT to the diabetic ulcer now, with deeper tissues covered, and the wound all granular. Will plan on stopping HBOT next week for the same reasons. Move NPWT to the lateral foot ulcer for now; can send updated info to [facility] if needed.   I did make a slight modification to his OrthoWedge shoe today, and he can start to take some additional steps around the house now. Keep up the good work with glucose control. Home treatment: good handwashing before and after any dressing changes. Cleanse wound with saline or soap & water before dressing change. May use Vaseline (petrolatum), Aquaphor, Aveeno, CeraVe, Cetaphil, Eucerin, Lubriderm, etc for dry skin. Dressing type for home: as above, TIW for the NPWT, and either TIW or daily for the medial foot ulcer, depending on the level of drainage. Written discharge instructions given to patient. Follow up in 1 week to see me, daily for HBOT.     Electronically signed by Fatemeh Jang MD on 11/26/2021 at 7:44 PM.

## 2021-11-28 NOTE — PROGRESS NOTES
[facility] Progress Note    Del Jones     : 1960    DATE OF VISIT:  2021    Subjective:     Del Jones is a 59 y.o. male who has a diabetic ulcer located on the foot (left , medial, forefoot). Significant symptoms or pertinent wound history since last visit: feeling well overall, no real foot pain, no fever, tolerating HBO well, stable wound drainage, but worse NPWT drape dermatitis this week, after we switched the NPWT to the lateral wound. Additional ulcer(s) noted? Yes - the lateral foot pressure ulcer    His current medication list consists of Dulaglutide, Multiple Vitamin, amLODIPine, atorvastatin, collagenase, fish oil, hydrALAZINE, hydroCHLOROthiazide, insulin glargine, metFORMIN, oxymetazoline, and tamsulosin. Allergies: Patient has no known allergies. Objective:     Vitals:    21 0804   BP: (!) 180/91   Pulse: 73   Resp: 20   Temp: 97.2 °F (36.2 °C)   TempSrc: Oral   Weight: 227 lb 3.2 oz (103.1 kg)   Height: 6' 1\" (1.854 m)     Constitutional:  well-developed, well-nourished, NAD   Cardiovascular:  bilateral pedal pulses palpable, feet warm, brisk cap refill; mild left lower extremity edema  Lymphatic:  no inguinal or popliteal adenopathy, no angitis, no cellulitis  Musculoskeletal:  no clubbing, cyanosis or petechiae; RLE and LLE with no gross effusions, joint misalignment or acute arthritis  Elicia-ulcer skin: pink and indurated, mild maceration medially; increased contact dermatitis lateral  Ulcer(s):  Lateral foot with a bit more pink and red granulation, less some sloughy fat necrosis, minor fascial necrosis, a couple of pockets of depth, probably can see a bit of capsule, some fibrin and biofilm; primary diabetic ulcer more granular, some fibrin and biofilm, no definite deeper tissue exposure, no pus, less distal undermining, definitely less central depth this week, getting a bit smaller. Photos also saved in electronic chart.     Today's dressing changes. Cleanse wound with saline or soap & water before dressing change. May use Vaseline (petrolatum), Aquaphor, Aveeno, CeraVe, Cetaphil, Eucerin, Lubriderm, etc for dry skin. Dressing type for home: Vashe, periwound ZnO, silver alginate and a dry dressing to the medial wound every 1-2 days; for a few days, go back to Vashe, periwound ZnO, Santyl, moist gauze and dry gauze to the lateral foot, once daily. Use some OTC hydrocortisone cream to the NPWT rash, and if that improves by the weekend, resume the NPWT to the lateral foot for next week (Vashe, black foam, SafeTac drape, 125 mmHg continuous negative pressure, TIW). Written discharge instructions given to patient. Follow up in 1 week.     Electronically signed by Janis Tiwari MD on 11/28/2021 at 2:07 PM.

## 2021-12-03 ENCOUNTER — HOSPITAL ENCOUNTER (OUTPATIENT)
Dept: WOUND CARE | Age: 61
Discharge: HOME | End: 2021-12-03
Payer: MEDICARE

## 2021-12-03 VITALS
DIASTOLIC BLOOD PRESSURE: 83 MMHG | SYSTOLIC BLOOD PRESSURE: 158 MMHG | TEMPERATURE: 98.3 F | RESPIRATION RATE: 20 BRPM | BODY MASS INDEX: 32.07 KG/M2 | HEART RATE: 72 BPM | WEIGHT: 242 LBS | HEIGHT: 73 IN

## 2021-12-03 DIAGNOSIS — L97.522 DIABETIC ULCER OF OTHER PART OF LEFT FOOT ASSOCIATED WITH TYPE 2 DIABETES MELLITUS, WITH FAT LAYER EXPOSED (HCC): ICD-10-CM

## 2021-12-03 DIAGNOSIS — L97.524 DIABETIC ULCER OF OTHER PART OF LEFT FOOT ASSOCIATED WITH TYPE 2 DIABETES MELLITUS, WITH NECROSIS OF BONE (HCC): Primary | ICD-10-CM

## 2021-12-03 DIAGNOSIS — E11.621 DIABETIC ULCER OF OTHER PART OF LEFT FOOT ASSOCIATED WITH TYPE 2 DIABETES MELLITUS, WITH FAT LAYER EXPOSED (HCC): ICD-10-CM

## 2021-12-03 DIAGNOSIS — E11.621 DIABETIC ULCER OF OTHER PART OF LEFT FOOT ASSOCIATED WITH TYPE 2 DIABETES MELLITUS, WITH NECROSIS OF BONE (HCC): Primary | ICD-10-CM

## 2021-12-03 PROCEDURE — 11043 DBRDMT MUSC&/FSCA 1ST 20/<: CPT | Performed by: INTERNAL MEDICINE

## 2021-12-03 PROCEDURE — 97597 DBRDMT OPN WND 1ST 20 CM/<: CPT | Performed by: INTERNAL MEDICINE

## 2021-12-03 PROCEDURE — 11043 DBRDMT MUSC&/FSCA 1ST 20/<: CPT

## 2021-12-03 PROCEDURE — 97597 DBRDMT OPN WND 1ST 20 CM/<: CPT

## 2021-12-03 RX ORDER — LIDOCAINE 50 MG/G
OINTMENT TOPICAL ONCE
Status: CANCELLED | OUTPATIENT
Start: 2021-12-03 | End: 2021-12-03

## 2021-12-03 RX ORDER — LIDOCAINE HYDROCHLORIDE 40 MG/ML
SOLUTION TOPICAL ONCE
Status: CANCELLED | OUTPATIENT
Start: 2021-12-03 | End: 2021-12-03

## 2021-12-03 RX ORDER — LIDOCAINE 40 MG/G
CREAM TOPICAL ONCE
Status: CANCELLED | OUTPATIENT
Start: 2021-12-03 | End: 2021-12-03

## 2021-12-03 RX ORDER — LIDOCAINE 40 MG/G
CREAM TOPICAL ONCE
Status: DISCONTINUED | OUTPATIENT
Start: 2021-12-03 | End: 2021-12-04 | Stop reason: HOSPADM

## 2021-12-03 RX ORDER — TRIAMCINOLONE ACETONIDE 1 MG/G
CREAM TOPICAL
Qty: 28.4 G | Refills: 0 | Status: SHIPPED | OUTPATIENT
Start: 2021-12-03 | End: 2022-04-27 | Stop reason: ALTCHOICE

## 2021-12-03 RX ORDER — BACITRACIN ZINC AND POLYMYXIN B SULFATE 500; 1000 [USP'U]/G; [USP'U]/G
OINTMENT TOPICAL ONCE
Status: CANCELLED | OUTPATIENT
Start: 2021-12-03 | End: 2021-12-03

## 2021-12-03 ASSESSMENT — PAIN SCALES - GENERAL
PAINLEVEL_OUTOF10: 0
PAINLEVEL_OUTOF10: 0

## 2021-12-03 NOTE — PLAN OF CARE
MD calling in Triamcinolone for rash of left dorsal foot. Wounds debrided and patient tolerated well. MD also discussed the benefits in the near future of skin sub for wound healing. Wound care regime at home as noted on AVS.  Discharge instructions reviewed with patient, all questions answered, copy given to patient. Dressings were applied to all wounds per M.D. Instructions at this visit.

## 2021-12-03 NOTE — PROGRESS NOTES
[address] [facility] Physician Orders and Discharge [address], [address] [facility]  Telephone: [phone]      Fax: [phone]        Your home care [address] [place]                                                     Your wound-care supplies will be provided by: We are ordering your wound-care supplies from [employer]. Please note, depending on your insurance coverage, you may have out-of-pocket expenses for these supplies. Someone from [facility] should call you to confirm your order and discuss those potential costs before they ship your products -- please anticipate that call. If your out-of-pocket cost could be substantial, [facility] has a financial hardship program for patients who qualify, so please ask about that if you might need a hand. If you have any questions about your supplies or your potential out-of-pocket costs, or if you need to place an order for a refill of supplies (typically monthly), please call [phone].   ([facility] FOR NPWT SUPPLIES)     NAME: Rafael Valle  DATE of BIRTH:  1960  PRIMARY DIAGNOSIS FOR WOUND CARE CENTER:  Diabetic      Wound cleansing:   Do not scrub or use excessive force. Wash hands with soap and water before and after dressing changes. Prior to applying a clean dressing, cleanse wound with normal saline, wound cleanser, or mild soap and water. Ask your physician or nurse before getting the wound(s) wet in the shower.                Wound care for home:      Left Lateral Foot:  Apply Hydrocortisone to rash on foot  Vashe spray to wound  Triad to lexi-wound  Santyl to wound   Moistened gauze over [employer] with dry dressing  Change once daily              Left Amp.  Site foot:  Vashe (2-3 sprays on wound bed, don't rinse)  Apply Triad or Desitin (zinc Oxide) to lexi wound to protect  Apply Silver Alginate to the wound   Cover with a dry dressing  Change 3 x week        Please note, all wounds (unless stated otherwise here) were mechanically debrided at the time of cleansing here in the wound-care center today, so a small amount of pain, drainage or bleeding from that process might be expected, and is normal.      All products for home use, including multiple products for a single wound if applicable, are medically necessary in order to achieve the best chance at timely wound healing. See provider documentation for details if needed.     Substituted dressings applied in the [facility] today, if applicable:     Triamcinolone today in [facility] in place of Hydrocortisone  Triad/PSO to wound in place of Santyl today in [facility]      New orders for this week (labs, imaging, medications, etc.)     Dr. Kristy Schreiber is calling in Triamcinolone to apply topically to dorsal foot rash   We will contact your insurance to see about pre auth for skin sub dressing     DISCONTINUE NPWT AND HOME CARE AT THIS TIME     Additional instructions for specific diagnoses:        General comments for diabetic / neuropathic ulcers:  *  Unless you've been instructed not to remove your dressings, be sure to inspect your feet daily, and notify us of any major changes. *  If you do not have a long-term podiatrist, be sure to let us know. *  Moisturize your skin regularly with Vaseline, Aquaphor, Aveeno, CeraVe, Cetaphil, Eucerin, Lubriderm, etc; but keep the skin between your toes dry. [employer] allow your wound-care doctor or podiatrist to cut nails, calluses & corns. *  Be sure to always wear footwear that fits well, and NEVER go without shoes and socks. *  Do you know your Hemoglobin A1c level? You should! Please ask if you have questions. *  Be sure to adhere to any recommendations from your PCP or endocrinologist when it comes to diabetes medications and diet. If you have questions, please ask.   *  If you smoke, your wound can not heal properly -- please talk with us when you're ready to quit. *  Do not soak your feet unless specifically instructed to do so by us.              F/U Appointment is with Dr. Vee Woodward 1 week , on Wednesday 12/8/2021      at                       .     Your nurse  is Jude Romo RN      If we applied slip-resistant hospital socks today, be sure to remove them at least once a day to inspect your toes or feet, even if you're not changing the wraps or dressings underneath.  If you see anything concerning (redness, excess moisture, etc), please call and let us know right away.     Should you experience any significant changes in your wound(s) (including redness, increased warmth, increased pain, increased drainage, odor, or fever) or have questions about your wound care, please contact the [address] [place] [address] at [phone] Monday-Thursday from 8:00 am  4:30 pm, or Friday from 8:00 am - 2:30 pm.  If you need help with your wound outside these hours and cannot wait until we are again available, contact your home-care company (if applicable), your PCP, or go to the nearest emergency room

## 2021-12-06 ENCOUNTER — TELEPHONE (OUTPATIENT)
Dept: WOUND CARE | Age: 61
End: 2021-12-06

## 2021-12-06 PROBLEM — T81.89XA NONHEALING SURGICAL WOUND, INITIAL ENCOUNTER: Status: RESOLVED | Noted: 2021-09-16 | Resolved: 2021-12-06

## 2021-12-06 PROBLEM — L97.522 DIABETIC ULCER OF LEFT FOOT ASSOCIATED WITH TYPE 2 DIABETES MELLITUS, WITH FAT LAYER EXPOSED (HCC): Status: ACTIVE | Noted: 2021-09-16

## 2021-12-06 NOTE — PROGRESS NOTES
[facility] Progress Note    Del Webb     : 1960    DATE OF VISIT:  12/3/2021    Subjective:     Modesta Johnston is a 59 y.o. male who has a diabetic ulcer located on the foot (left , medial, forefoot). Significant symptoms or pertinent wound history since last visit: feeling well overall, minimal pain, mild swelling, stable drainage, no fevers. That dorsal foot rash came back again this past week, I think started from his NPWT drape, may have inadvertently persisted due to self-treatment with Neosporin, not too much pruritus. Walking more in his offloading shoe, doing well. Additional ulcer(s) noted? Yes - the lateral foot pressure ulcer. His current medication list consists of Dulaglutide, Multiple Vitamin, Multiple Vitamins-Minerals, amLODIPine, atorvastatin, fish oil, hydroCHLOROthiazide, insulin glargine, metFORMIN, tamsulosin. Allergies: Patient has no known allergies.     Objective:     Vitals:    21 1001 21 1013   BP: (!) 158/83    Pulse: 72    Resp: 20    Temp: 98.3 °F (36.8 °C)    TempSrc: Oral    Weight:  242 lb (109.8 kg)   Height:  6' 1\" (1.854 m)     Constitutional:  well-developed, well-nourished, NAD   Cardiovascular:  bilateral pedal pulses palpable, feet warm, brisk cap refill; mild left lower extremity edema  Lymphatic:  no inguinal or popliteal adenopathy, no angitis, no cellulitis  Musculoskeletal:  no clubbing, cyanosis or petechiae; RLE and LLE with no gross effusions, joint misalignment or acute arthritis  Elicia-ulcer skin: pink and indurated; increased contact dermatitis dorsally  Ulcer(s):  Lateral foot with a bit more pink and red granulation, less sloughy fat necrosis, minor fascial or tendon sheath necrosis, a couple of pockets of depth, capsule might now be covered, some fibrin and biofilm; primary diabetic ulcer more granular, some fibrin and biofilm, no definite deeper tissue exposure, no pus, less distal undermining, definitely less central depth, getting a bit smaller. Photos also saved in electronic chart. Today's wound measurements, per RN documentation:  Wound 09/16/21 #1, left medial forefoot, Diabetic ulcer, jenkins 4, onset 8/25/2021-Wound Length (cm): 2.7 cm  Wound 09/16/21 #3, left lateral foot, pressure ulcer, Stage 4, onset 9/2021-Wound Length (cm): 2.8 cm    Wound 09/16/21 #1, left medial forefoot, Diabetic ulcer, jenkins 4, onset 8/25/2021-Wound Width (cm): 2.7 cm  Wound 09/16/21 #3, left lateral foot, pressure ulcer, Stage 4, onset 9/2021-Wound Width (cm): 1.2 cm    Wound 09/16/21 #1, left medial forefoot, Diabetic ulcer, jenkins 4, onset 8/25/2021-Wound Depth (cm): 0.5 cm  Wound 09/16/21 #3, left lateral foot, pressure ulcer, Stage 4, onset 9/2021-Wound Depth (cm): 0.2 cm   _____________________    Wound #1 healing trajectory in the last month or so --      _____________________    Wound # 3 healing trajectory in the last two months or so --        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 2 diabetes mellitus, with necrosis of bone (HCC) E11.621, L97.524    Diabetic polyneuropathy associated with type 2 diabetes mellitus (HCC) E11.42    Elevated PSA R97.20    Mixed hyperlipidemia E78.2    Stage 3a chronic kidney disease (HCC) N18.31    Pressure ulcer of left lateral forefoot, stage 4 (HCC) L89.894    Chronic low back pain M54.50, G89.29    Dental bridge present Z97.2    Callus of foot L84    Hyperkalemia E87.5       Assessment of today's active condition(s): well controlled DM2, neuropathy, no large-vessel PAD;  Hx of aggressive soft tissue infection at medial foot, with rapid progression to gangrene and acute osteo, now 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 Also a lateral pressure ulcer, I think from an initial post-op dressing - also some more progress there recently, though a bit slow; I think it's borderline whether he needs the NPWT any longer, and I think the persistent and recurrent rash pushes us in the direction of not continuing NPWT at this point. Factors contributing to occurrence and/or persistence of the chronic ulcer include edema, diabetes, shear force and decreased tissue oxygenation. Medical necessity of today's visit is shown by the above documentation. Sharp debridement is indicated today, based upon the exam findings in the wound(s) above. By way of an overall summary of his wound-care to date, when thinking to what other adjunctive therapy might be helpful for him --    -- He had an arterial Duplex study on Sep 1 with a left MYRNA of 0.93, he's had palpable pulses, a warm left foot and good capillary refill throughout his time here, and he had a laser skin perfusion test in September as well, with normal PVR waveforms, and skin perfusion pressures of 90 - 136 mmHg, at various points on his left foot. -- Necrotic tissue is debrided weekly as needed, and is now minimal at the primary medial ulcer; there was still a bit of tendon sheath / fascial tissue exposure at the lateral pressure ulcer, but I expect that to be resolved in a week or two. -- He completed approximately 5 weeks of post-op Abx for presumed residual 1st metatarsal osteomyelitis, following his open partial ray resection, with a good response in his inflammatory lab markers, no residual bone exposure, and no signs of soft tissue infection in the last couple of months.     -- Edema in the foot has been mild, controlled with periodic elevation, and has not been a barrier to wound healing.    -- Wound moisture balance has generally been good, currently with an alginate medially and Santyl and moist gauze laterally; some occasional mild maceration is kept to a minimum with ZnO paste. -- Tissue growth has been addressed so far by HBOT and NPWT, and I think could be enhanced further by a short series of skin substitutes / cellular-tissue products. We stopped HBOT when the primary ulcer was completely granular with no exposed deep structures and less wound depth, and stopped NPWT when part of that wound was borderline hypergranular, and when additional help for the wound seemed balanced by potential harm to the periwound skin. -- Offloading has been with a wheelchair for the most part, recent increase in ambulation, but with a foam-modified Darco OrthoWedge shoe, and he has no ecchymosis, callus, etc.     -- Pain has been minimal, and not a barrier to wound care. -- Systemically, things have been in excellent shape, with no smoking, no immune compromising medications or conditions (beyond his DM), excellent glucose control (Hgb A1c 6.4% in September of this year), and a serum albumin level of 3.7 in October of this year. Compliance with recommended treatments has been excellent. Procedure note:     Consent obtained. Time out performed per [facility] policy. Anesthetic  Anesthetic: 4% Lidocaine Cream     Using a curette, I sharply debrided the foot (left , medial, forefoot) ulcer(s) down through and including the removal of dermis. The type(s) of tissue debrided included fibrin, biofilm, necrotic/eschar and a bit of epibole. Total Surface Area Debrided: 6 sq cm. Using a curette, #15 blade scalpel and forceps, I sharply debrided the foot (left , lateral) ulcer(s) down through and including the removal of muscle/fascia. The type(s) of tissue debrided included fibrin, biofilm, slough and necrotic/eschar. Total Surface Area Debrided: 3 sq cm.     The ulcers were then irrigated with normal saline solution. The procedure was completed with a small amount of bleeding, and hemostasis was with pressure. The patient tolerated the procedure well, with no significant complications. The patient's level of pain during and after the procedure was monitored. Post-debridement measurements, if different from pre-debridement, are in the flowsheet as well. Discharge plan:     Treatment in the wound care center today, per RN documentation: Wound 09/16/21 #1, left medial forefoot, Diabetic ulcer, jenkins 4, onset 8/25/2021-Dressing/Treatment: Other (comment) (Triad to lexi wound, Optceil ag, 4x4's, kerlix)  Wound 09/16/21 #3, left lateral foot, pressure ulcer, Stage 4, onset 9/2021-Dressing/Treatment: Other (comment) (Triad to lexi wound, Santyl, moistened 4x4, dry 4x4's kerlix). No additional plans for NPWT -- will make arrangements to return the pump to [facility]. I'll check with his insurance company's medical coverage policy and see what cellular-tissue products might be covered, send information to them this coming week. Keep up the good work with glucose control, protein intake, periodic leg elevation, use of offloading shoe, and avoiding excessive ambulation. Will call a bit of triamcinolone to his local pharmacy, for that pedal rash. Home treatment: good handwashing before and after any dressing changes. Cleanse wound with saline or soap & water before dressing change. May use Vaseline (petrolatum), Aquaphor, Aveeno, CeraVe, Cetaphil, Eucerin, Lubriderm, etc for dry skin. Dressing type for home: Vashe to both wounds, triamcinolone to dorsal foot rash, periwound ZnO, silver alginate to medial wound, Santyl and moist gauze to the lateral wound, then a dry cover dressing, once daily. Written discharge instructions given to patient. Follow up in 1 week.     Electronically signed by Syd Alcocer MD on 12/6/2021 at 4:03 PM.

## 2021-12-06 NOTE — TELEPHONE ENCOUNTER
Call placed to [facility] to inform of discontinuation of NPWT 12/3/2021 and need for set up of returning unit. Had to leave a voicemail and asked for a return call to confirm they recieved message.

## 2021-12-08 ENCOUNTER — HOSPITAL ENCOUNTER (OUTPATIENT)
Dept: WOUND CARE | Age: 61
Discharge: HOME | End: 2021-12-08
Payer: MEDICARE

## 2021-12-08 VITALS
RESPIRATION RATE: 20 BRPM | WEIGHT: 240.13 LBS | BODY MASS INDEX: 31.82 KG/M2 | TEMPERATURE: 97.6 F | HEIGHT: 73 IN | DIASTOLIC BLOOD PRESSURE: 81 MMHG | HEART RATE: 72 BPM | SYSTOLIC BLOOD PRESSURE: 151 MMHG

## 2021-12-08 DIAGNOSIS — E11.621 DIABETIC ULCER OF OTHER PART OF LEFT FOOT ASSOCIATED WITH TYPE 2 DIABETES MELLITUS, WITH FAT LAYER EXPOSED (HCC): Primary | ICD-10-CM

## 2021-12-08 DIAGNOSIS — L89.894 PRESSURE ULCER OF LEFT FOOT, STAGE 4 (HCC): ICD-10-CM

## 2021-12-08 DIAGNOSIS — L97.522 DIABETIC ULCER OF OTHER PART OF LEFT FOOT ASSOCIATED WITH TYPE 2 DIABETES MELLITUS, WITH FAT LAYER EXPOSED (HCC): Primary | ICD-10-CM

## 2021-12-08 LAB — PREALBUMIN: 30.5 MG/DL (ref 20–40)

## 2021-12-08 PROCEDURE — 11042 DBRDMT SUBQ TIS 1ST 20SQCM/<: CPT

## 2021-12-08 PROCEDURE — 97597 DBRDMT OPN WND 1ST 20 CM/<: CPT

## 2021-12-08 PROCEDURE — 17250 CHEM CAUT OF GRANLTJ TISSUE: CPT

## 2021-12-08 PROCEDURE — 36415 COLL VENOUS BLD VENIPUNCTURE: CPT

## 2021-12-08 PROCEDURE — 97597 DBRDMT OPN WND 1ST 20 CM/<: CPT | Performed by: INTERNAL MEDICINE

## 2021-12-08 PROCEDURE — 84134 ASSAY OF PREALBUMIN: CPT

## 2021-12-08 PROCEDURE — 11042 DBRDMT SUBQ TIS 1ST 20SQCM/<: CPT | Performed by: INTERNAL MEDICINE

## 2021-12-08 RX ORDER — LIDOCAINE 50 MG/G
OINTMENT TOPICAL ONCE
Status: CANCELLED | OUTPATIENT
Start: 2021-12-08 | End: 2021-12-08

## 2021-12-08 RX ORDER — LIDOCAINE 50 MG/G
OINTMENT TOPICAL ONCE
Status: DISCONTINUED | OUTPATIENT
Start: 2021-12-08 | End: 2021-12-09 | Stop reason: HOSPADM

## 2021-12-08 RX ORDER — LIDOCAINE 40 MG/G
CREAM TOPICAL ONCE
Status: DISCONTINUED | OUTPATIENT
Start: 2021-12-08 | End: 2021-12-09 | Stop reason: HOSPADM

## 2021-12-08 RX ORDER — LIDOCAINE 40 MG/G
CREAM TOPICAL ONCE
Status: CANCELLED | OUTPATIENT
Start: 2021-12-08 | End: 2021-12-08

## 2021-12-08 RX ORDER — BACITRACIN ZINC AND POLYMYXIN B SULFATE 500; 1000 [USP'U]/G; [USP'U]/G
OINTMENT TOPICAL ONCE
Status: CANCELLED | OUTPATIENT
Start: 2021-12-08 | End: 2021-12-08

## 2021-12-08 RX ORDER — BACITRACIN ZINC AND POLYMYXIN B SULFATE 500; 1000 [USP'U]/G; [USP'U]/G
OINTMENT TOPICAL ONCE
Status: DISCONTINUED | OUTPATIENT
Start: 2021-12-08 | End: 2021-12-09 | Stop reason: HOSPADM

## 2021-12-08 RX ORDER — LIDOCAINE HYDROCHLORIDE 40 MG/ML
SOLUTION TOPICAL ONCE
Status: CANCELLED | OUTPATIENT
Start: 2021-12-08 | End: 2021-12-08

## 2021-12-08 RX ORDER — LIDOCAINE HYDROCHLORIDE 40 MG/ML
SOLUTION TOPICAL ONCE
Status: DISCONTINUED | OUTPATIENT
Start: 2021-12-08 | End: 2021-12-09 | Stop reason: HOSPADM

## 2021-12-08 ASSESSMENT — PAIN SCALES - GENERAL: PAINLEVEL_OUTOF10: 0

## 2021-12-08 NOTE — PLAN OF CARE
Wounds stable, debridement per MD & pt. Tolerated well. Will cont. With current wound care regime with dressings. Pt. Using orthowedge for off-loading. Will obtain prealbumin today prior to skin sub application. Will plan to place EpiFix next week. F/u in [facility] in 1 week as ordered, pt. Aware to call sooner with any changes or questions/concerns. Discharge instructions reviewed with patient & wife, all questions answered, copy given to patient. Dressings were applied to all wounds per M.D. Instructions at this visit.

## 2021-12-13 NOTE — PROGRESS NOTES
Photos also saved in electronic chart. Today's wound measurements, per RN documentation:  Wound 09/16/21 #3, left lateral foot, pressure ulcer, Stage 4, onset 9/2021-Wound Length (cm): 2.6 cm  Wound 09/16/21 #1, left medial forefoot, Diabetic ulcer, hercules 4, onset 8/25/2021-Wound Length (cm): 2.5 cm    Wound 09/16/21 #3, left lateral foot, pressure ulcer, Stage 4, onset 9/2021-Wound Width (cm): 1 cm  Wound 09/16/21 #1, left medial forefoot, Diabetic ulcer, hercules 4, onset 8/25/2021-Wound Width (cm): 1.7 cm    Wound 09/16/21 #3, left lateral foot, pressure ulcer, Stage 4, onset 9/2021-Wound Depth (cm): 0.2 cm  Wound 09/16/21 #1, left medial forefoot, Diabetic ulcer, hercules 4, onset 8/25/2021-Wound Depth (cm): 0.3 cm    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 2 diabetes mellitus, with fat layer exposed ([facility] [address] E11.621, D43.271    Diabetic polyneuropathy associated with type 2 diabetes mellitus (HCC) E11.42    Elevated PSA R97.20    Mixed hyperlipidemia E78.2    Stage 3a chronic kidney disease (HCC) N18.31    Pressure ulcer of left lateral forefoot, stage 4 (HCC) L89.894    Chronic low back pain M54.50, G89.29    Dental bridge present Z97.2    Callus of foot L84    Hyperkalemia E87.5       Assessment of today's active condition(s): well controlled DM2, neuropathy, no large-vessel PAD, Hercules 4 diabetic foot, making some good progress overall, though it HAS been somewhat slow, despite surgery, IV ABx, debridements, offloading, NPWT, HBOT etc. The lateral pressure ulcer has made a bit more progress recently as well.  Hoping for approval to use cellular-tissue products at least for that diabetic ulcer, but his insurance requires a prealbumin level in addition to the other usual baseline data; we just had that drawn today, level is 30.5 (well into the normal range), will forward that to [facility], [facility] Factors contributing to occurrence and/or persistence of the chronic ulcer include edema, diabetes, shear force, obesity and decreased tissue oxygenation. Medical necessity of today's visit is shown by the above documentation. Sharp debridement is indicated today, based upon the exam findings in the wound(s) above. Procedure note:     Consent obtained. Time out performed per [address] policy. Anesthetic  Anesthetic: 4% Lidocaine Cream     Using a curette, I sharply debrided the foot (left , medial) ulcer(s) down through and including the removal of dermis. The type(s) of tissue debrided included fibrin, biofilm and exudate. Total Surface Area Debrided: 4 sq cm. Using a curette, #15 blade scalpel and forceps, I sharply debrided the foot (left , lateral) ulcer(s) down through and including the removal of subcutaneous tissue. The type(s) of tissue debrided included fibrin, biofilm and necrotic/eschar. Total Surface Area Debrided: 3 sq cm. The ulcers were then irrigated with normal saline solution. The procedure was completed with a small amount of bleeding, and hemostasis was with pressure. The patient tolerated the procedure well, with no significant complications. The patient's level of pain during and after the procedure was monitored. Post-debridement measurements, if different from pre-debridement, are in the flowsheet as well.     Discharge plan:     Treatment in the wound care center today, per RN documentation: Wound 09/16/21 #3, left lateral foot, pressure ulcer, Stage 4, onset 9/2021-Dressing/Treatment: Other (comment) (vashe,triad-lexi,PSO/triad,dry dressing)  Wound 09/16/21 #1, left medial forefoot, Diabetic ulcer, jenkins 4, onset 8/25/2021-Dressing/Treatment: Other (comment) (vashe,triad-lexi,opticell ag, dry dressing ). A few more days of triamcinolone to the dorsal foot rash. Keep up the good work with glucoses. Modified OrthoWedge shoe for limited ambulation. [facility]. Home treatment: good handwashing before and after any dressing changes. Cleanse wound with saline or soap & water before dressing change. May use Vaseline (petrolatum), Aquaphor, Aveeno, CeraVe, Cetaphil, Eucerin, Lubriderm, etc for dry skin. Dressing type for home: basically as above (at home, substituting Santyl for PSO-Triad to the lateral wound), once daily. Written discharge instructions given to patient. Follow up in 1 week.     Electronically signed by Ronnie Mora MD on 12/13/2021 at 1:54 PM.

## 2021-12-17 ENCOUNTER — HOSPITAL ENCOUNTER (OUTPATIENT)
Dept: WOUND CARE | Age: 61
Discharge: HOME | End: 2021-12-17
Payer: MEDICARE

## 2021-12-17 VITALS
SYSTOLIC BLOOD PRESSURE: 154 MMHG | HEART RATE: 67 BPM | HEIGHT: 73 IN | RESPIRATION RATE: 20 BRPM | BODY MASS INDEX: 32.05 KG/M2 | DIASTOLIC BLOOD PRESSURE: 82 MMHG | TEMPERATURE: 98 F | WEIGHT: 241.8 LBS

## 2021-12-17 DIAGNOSIS — L89.894 PRESSURE ULCER OF LEFT FOOT, STAGE 4 (HCC): ICD-10-CM

## 2021-12-17 DIAGNOSIS — L97.522 DIABETIC ULCER OF OTHER PART OF LEFT FOOT ASSOCIATED WITH TYPE 2 DIABETES MELLITUS, WITH FAT LAYER EXPOSED (HCC): Primary | ICD-10-CM

## 2021-12-17 DIAGNOSIS — E11.621 DIABETIC ULCER OF OTHER PART OF LEFT FOOT ASSOCIATED WITH TYPE 2 DIABETES MELLITUS, WITH FAT LAYER EXPOSED (HCC): Primary | ICD-10-CM

## 2021-12-17 PROCEDURE — 97597 DBRDMT OPN WND 1ST 20 CM/<: CPT | Performed by: INTERNAL MEDICINE

## 2021-12-17 PROCEDURE — 11042 DBRDMT SUBQ TIS 1ST 20SQCM/<: CPT | Performed by: INTERNAL MEDICINE

## 2021-12-17 PROCEDURE — 97597 DBRDMT OPN WND 1ST 20 CM/<: CPT

## 2021-12-17 PROCEDURE — 11042 DBRDMT SUBQ TIS 1ST 20SQCM/<: CPT

## 2021-12-17 RX ORDER — BACITRACIN ZINC AND POLYMYXIN B SULFATE 500; 1000 [USP'U]/G; [USP'U]/G
OINTMENT TOPICAL ONCE
Status: CANCELLED | OUTPATIENT
Start: 2021-12-17 | End: 2021-12-17

## 2021-12-17 RX ORDER — LIDOCAINE 40 MG/G
CREAM TOPICAL ONCE
Status: CANCELLED | OUTPATIENT
Start: 2021-12-17 | End: 2021-12-17

## 2021-12-17 RX ORDER — LIDOCAINE 40 MG/G
CREAM TOPICAL ONCE
Status: DISCONTINUED | OUTPATIENT
Start: 2021-12-17 | End: 2021-12-18 | Stop reason: HOSPADM

## 2021-12-17 RX ORDER — LIDOCAINE 50 MG/G
OINTMENT TOPICAL ONCE
Status: CANCELLED | OUTPATIENT
Start: 2021-12-17 | End: 2021-12-17

## 2021-12-17 RX ORDER — LIDOCAINE HYDROCHLORIDE 40 MG/ML
SOLUTION TOPICAL ONCE
Status: CANCELLED | OUTPATIENT
Start: 2021-12-17 | End: 2021-12-17

## 2021-12-17 ASSESSMENT — PAIN SCALES - GENERAL: PAINLEVEL_OUTOF10: 0

## 2021-12-17 NOTE — PLAN OF CARE
Wounds debrided per MD and patient tolerated well. MD modified felt and blue memory foam to Darco shoe to get optimal offloading of wounds. Discharge instructions reviewed with patient, all questions answered, copy given to patient. Dressings were applied to all wounds per M.D. Instructions at this visit.

## 2021-12-23 ENCOUNTER — HOSPITAL ENCOUNTER (OUTPATIENT)
Dept: WOUND CARE | Age: 61
Discharge: HOME | End: 2021-12-23
Payer: MEDICARE

## 2021-12-23 VITALS
BODY MASS INDEX: 32.63 KG/M2 | TEMPERATURE: 97.8 F | HEIGHT: 73 IN | HEART RATE: 69 BPM | SYSTOLIC BLOOD PRESSURE: 145 MMHG | DIASTOLIC BLOOD PRESSURE: 76 MMHG | RESPIRATION RATE: 20 BRPM | WEIGHT: 246.2 LBS

## 2021-12-23 DIAGNOSIS — L89.894 PRESSURE ULCER OF LEFT FOOT, STAGE 4 (HCC): ICD-10-CM

## 2021-12-23 DIAGNOSIS — L97.522 DIABETIC ULCER OF OTHER PART OF LEFT FOOT ASSOCIATED WITH TYPE 2 DIABETES MELLITUS, WITH FAT LAYER EXPOSED (HCC): Primary | ICD-10-CM

## 2021-12-23 DIAGNOSIS — E11.621 DIABETIC ULCER OF OTHER PART OF LEFT FOOT ASSOCIATED WITH TYPE 2 DIABETES MELLITUS, WITH FAT LAYER EXPOSED (HCC): Primary | ICD-10-CM

## 2021-12-23 PROCEDURE — 97597 DBRDMT OPN WND 1ST 20 CM/<: CPT

## 2021-12-23 PROCEDURE — C5271 LOW COST SKIN SUBSTITUTE APP: HCPCS

## 2021-12-23 PROCEDURE — 15271 SKIN SUB GRAFT TRNK/ARM/LEG: CPT

## 2021-12-23 PROCEDURE — 15275 SKIN SUB GRAFT FACE/NK/HF/G: CPT | Performed by: INTERNAL MEDICINE

## 2021-12-23 RX ORDER — LIDOCAINE 40 MG/G
CREAM TOPICAL ONCE
Status: CANCELLED | OUTPATIENT
Start: 2021-12-23 | End: 2021-12-23

## 2021-12-23 RX ORDER — BACITRACIN ZINC AND POLYMYXIN B SULFATE 500; 1000 [USP'U]/G; [USP'U]/G
OINTMENT TOPICAL ONCE
Status: CANCELLED | OUTPATIENT
Start: 2021-12-23 | End: 2021-12-23

## 2021-12-23 RX ORDER — LIDOCAINE 50 MG/G
OINTMENT TOPICAL ONCE
Status: CANCELLED | OUTPATIENT
Start: 2021-12-23 | End: 2021-12-23

## 2021-12-23 RX ORDER — LIDOCAINE 40 MG/G
CREAM TOPICAL ONCE
Status: DISCONTINUED | OUTPATIENT
Start: 2021-12-23 | End: 2021-12-24 | Stop reason: HOSPADM

## 2021-12-23 RX ORDER — LIDOCAINE HYDROCHLORIDE 40 MG/ML
SOLUTION TOPICAL ONCE
Status: CANCELLED | OUTPATIENT
Start: 2021-12-23 | End: 2021-12-23

## 2021-12-23 ASSESSMENT — PAIN SCALES - GENERAL: PAINLEVEL_OUTOF10: 0

## 2021-12-23 NOTE — PROGRESS NOTES
88 [facility] Progress Note    Del Mendez     : 1960    DATE OF VISIT:  2021    Subjective:     Del Mendez is a 59 y.o. male who has a diabetic ulcer located on the foot (left , medial, forefoot). Significant symptoms or pertinent wound history since last visit: feeling well overall, little foot pain, a mild sense of friction in the current offloading shoe, as he's walking a bit more. Minimal swelling, moderate drainage, mild recurrent rash, no pruritus, no fever. Additional ulcer(s) noted? Yes - the left lateral foot pressure ulcer. His current medication list consists of Dulaglutide, Multiple Vitamin, Multiple Vitamins-Minerals, amLODIPine, atorvastatin, fish oil, hydroCHLOROthiazide, insulin glargine, metFORMIN, tamsulosin, and triamcinolone. Allergies: Patient has no known allergies.     Objective:     Vitals:    21 0940   BP: (!) 154/82   Pulse: 67   Resp: 20   Temp: 98 °F (36.7 °C)   TempSrc: Oral   Weight: 241 lb 12.8 oz (109.7 kg)   Height: 6' 1\" (1.854 m)     Constitutional:  well-developed, well-nourished, NAD   Cardiovascular:  bilateral pedal pulses palpable, feet warm, brisk cap refill; very mild left lower extremity edema  Lymphatic:  no inguinal or popliteal adenopathy, no angitis, no cellulitis  Musculoskeletal:  no clubbing, cyanosis or petechiae; RLE and LLE with no gross effusions, joint misalignment or acute arthritis  Elicia-ulcer skin: pink and indurated; much improved contact dermatitis dorsally, milder hyperkeratosis around the lateral wound; medially a small area of increased focal skin thickening and hyperkeratosis, I think from a bit of friction in the shoe  Ulcer(s):  Lateral foot with a bit more pink and red granulation, a bit smaller, less sloughy fat necrosis, perhaps resolved fascial or tendon sheath exposure, a couple of pockets of depth, some fibrin and biofilm; primary diabetic ulcer more granular, some fibrin and biofilm, no authorization for EpiFix to try to help push this the rest of the way to being healed; need to make a small shoe adjustment for that one area of friction change; lateral pressure ulcer also making steadier progress recently, might still benefit from [facility] for another week or so; could also benefit from EpiFix, but his insurance doesn't cover skin subs for pressure ulcers. Factors contributing to occurrence and/or persistence of the chronic ulcer include diabetes, shear force and decreased tissue oxygenation. Medical necessity of today's visit is shown by the above documentation. Sharp debridement is indicated today, based upon the exam findings in the wound(s) above. Procedure note:     Consent obtained. Time out performed per [facility] policy. Anesthetic  Anesthetic: 4% Lidocaine Cream     Using a curette, I sharply debrided the foot (left , medial, forefoot) ulcer(s) down through and including the removal of dermis. The type(s) of tissue debrided included fibrin, biofilm, necrotic/eschar and epibole. Total Surface Area Debrided: 3 sq cm. Using a curette, #15 blade scalpel and forceps, I sharply debrided the foot (left , lateral) ulcer(s) down through and including the removal of subcutaneous tissue. The type(s) of tissue debrided included fibrin, biofilm and necrotic/eschar. Total Surface Area Debrided: 2 sq cm. The ulcers were then irrigated with normal saline solution. The procedure was completed with a small amount of bleeding, and hemostasis was with pressure and with silver nitrate stick(s). The patient tolerated the procedure well, with no significant complications. The patient's level of pain during and after the procedure was monitored. Post-debridement measurements, if different from pre-debridement, are in the flowsheet as well.     Discharge plan:     Treatment in the wound care center today, per RN documentation: Wound 09/16/21 #1, left medial forefoot, Diabetic ulcer, jenkins 4, onset 8/25/2021-Dressing/Treatment: Other (comment) (triad silver alginate dry dressing)  Wound 09/16/21 #3, left lateral foot, pressure ulcer, Stage 4, onset 9/2021-Dressing/Treatment: Other (comment) (triad PSO dry dressing). Await decision on [place] for EpiFix. Added a bit of offloading felt to the current shoe, and modified the foam from 2 weeks ago. Keep up the great work with glucoses and protein intake. Home treatment: good handwashing before and after any dressing changes. Cleanse wound with saline or soap & water before dressing change. May use Vaseline (petrolatum), Aquaphor, Aveeno, CeraVe, Cetaphil, Eucerin, Lubriderm, etc for dry skin. Dressing type for home: Vashe, periwound ZnO, alginate to the medial, Santyl and moist gauze to the lateral, then dry cover dressing, once daily. Written discharge instructions given to patient. Follow up in 1 week.     Electronically signed by Norma Lr MD on 12/23/2021 at 12:28 PM.

## 2021-12-23 NOTE — PLAN OF CARE
Wound debridement per Dr. Maureen Qiu, Pt tolerated well. Epifix #1 placed to left medial foot wound. Plan of care reviewed with Pt and wife at bedside. Follow up in [address] [place] [address] in 1 week as ordered. Pt. Aware to call sooner with any problems or questions/concerns.   MD orders/D/C instructions reviewed with patient, all questions answered; copy of instructions given to patient

## 2021-12-29 ENCOUNTER — HOSPITAL ENCOUNTER (OUTPATIENT)
Dept: WOUND CARE | Age: 61
Discharge: HOME | End: 2021-12-29
Payer: MEDICARE

## 2021-12-29 VITALS
TEMPERATURE: 97.5 F | WEIGHT: 245 LBS | RESPIRATION RATE: 18 BRPM | DIASTOLIC BLOOD PRESSURE: 79 MMHG | HEART RATE: 66 BPM | BODY MASS INDEX: 32.47 KG/M2 | SYSTOLIC BLOOD PRESSURE: 163 MMHG | HEIGHT: 73 IN

## 2021-12-29 DIAGNOSIS — L97.522 DIABETIC ULCER OF OTHER PART OF LEFT FOOT ASSOCIATED WITH TYPE 2 DIABETES MELLITUS, WITH FAT LAYER EXPOSED (HCC): Primary | ICD-10-CM

## 2021-12-29 DIAGNOSIS — E11.621 DIABETIC ULCER OF OTHER PART OF LEFT FOOT ASSOCIATED WITH TYPE 2 DIABETES MELLITUS, WITH FAT LAYER EXPOSED (HCC): Primary | ICD-10-CM

## 2021-12-29 DIAGNOSIS — L89.894 PRESSURE ULCER OF LEFT FOOT, STAGE 4 (HCC): ICD-10-CM

## 2021-12-29 PROCEDURE — 97597 DBRDMT OPN WND 1ST 20 CM/<: CPT

## 2021-12-29 PROCEDURE — 15275 SKIN SUB GRAFT FACE/NK/HF/G: CPT | Performed by: INTERNAL MEDICINE

## 2021-12-29 PROCEDURE — 15275 SKIN SUB GRAFT FACE/NK/HF/G: CPT

## 2021-12-29 PROCEDURE — 97597 DBRDMT OPN WND 1ST 20 CM/<: CPT | Performed by: INTERNAL MEDICINE

## 2021-12-29 RX ORDER — BACITRACIN ZINC AND POLYMYXIN B SULFATE 500; 1000 [USP'U]/G; [USP'U]/G
OINTMENT TOPICAL ONCE
Status: DISCONTINUED | OUTPATIENT
Start: 2021-12-29 | End: 2021-12-30 | Stop reason: HOSPADM

## 2021-12-29 RX ORDER — BACITRACIN ZINC AND POLYMYXIN B SULFATE 500; 1000 [USP'U]/G; [USP'U]/G
OINTMENT TOPICAL ONCE
Status: CANCELLED | OUTPATIENT
Start: 2021-12-29 | End: 2021-12-29

## 2021-12-29 RX ORDER — LIDOCAINE 50 MG/G
OINTMENT TOPICAL ONCE
Status: CANCELLED | OUTPATIENT
Start: 2021-12-29 | End: 2021-12-29

## 2021-12-29 RX ORDER — LIDOCAINE HYDROCHLORIDE 40 MG/ML
SOLUTION TOPICAL ONCE
Status: DISCONTINUED | OUTPATIENT
Start: 2021-12-29 | End: 2021-12-30 | Stop reason: HOSPADM

## 2021-12-29 RX ORDER — LIDOCAINE 50 MG/G
OINTMENT TOPICAL ONCE
Status: DISCONTINUED | OUTPATIENT
Start: 2021-12-29 | End: 2021-12-30 | Stop reason: HOSPADM

## 2021-12-29 RX ORDER — LIDOCAINE HYDROCHLORIDE 40 MG/ML
SOLUTION TOPICAL ONCE
Status: CANCELLED | OUTPATIENT
Start: 2021-12-29 | End: 2021-12-29

## 2021-12-29 RX ORDER — LIDOCAINE 40 MG/G
CREAM TOPICAL ONCE
Status: CANCELLED | OUTPATIENT
Start: 2021-12-29 | End: 2021-12-29

## 2021-12-29 RX ORDER — LIDOCAINE 40 MG/G
CREAM TOPICAL ONCE
Status: DISCONTINUED | OUTPATIENT
Start: 2021-12-29 | End: 2021-12-30 | Stop reason: HOSPADM

## 2021-12-29 ASSESSMENT — PAIN SCALES - GENERAL: PAINLEVEL_OUTOF10: 0

## 2021-12-29 NOTE — PLAN OF CARE
Left foot wounds stable, debridement per MD, EpiFix #2 applied to medial foot wound. Dressings applied per MD orders & to remain in place for the week. Pt. Using orthowedge shoe & felt for off-loading. F/u in [facility] in 1 week as ordered, pt. Aware to call sooner with any changes or questions/concerns. Discharge instructions reviewed with patient, all questions answered, copy given to patient. Dressings were applied to all wounds per M.D. Instructions at this visit.

## 2022-01-01 NOTE — PROGRESS NOTES
88 [facility] Progress Note    Del Horn     : 1960    DATE OF VISIT:  2021    Subjective:     Del Horn is a 59 y.o. male who has a diabetic ulcer located on the foot (left , medial, forefoot). Significant symptoms or pertinent wound history since last visit: doing well overall, no foot pain, no fever, light-mod drainage, no pruritus, minor residual dorsal foot contact dermatitis. Modified shoe feeling better to him, with less of a sense that there's any pressure on the ball of his foot. Received authorization for EpiFix this past week. Additional ulcer(s) noted? yes. Lateral forefoot pressure ulcer. His current medication list consists of Dulaglutide, Multiple Vitamin, Multiple Vitamins-Minerals, amLODIPine, atorvastatin, fish oil, hydroCHLOROthiazide, insulin glargine, metFORMIN, tamsulosin, and triamcinolone. Allergies: Patient has no known allergies.     Objective:     Vitals:    21 1321   BP: (!) 145/76   Pulse: 69   Resp: 20   Temp: 97.8 °F (36.6 °C)   TempSrc: Oral   Weight: 246 lb 3.2 oz (111.7 kg)   Height: 6' 1\" (1.854 m)     Constitutional:  well-developed, well-nourished, NAD   Cardiovascular:  bilateral pedal pulses palpable, feet warm, brisk cap refill; very mild left lower extremity edema  Lymphatic:  no inguinal or popliteal adenopathy, no angitis, no cellulitis  Musculoskeletal:  no clubbing, cyanosis or petechiae; RLE and LLE with no gross effusions, joint misalignment or acute arthritis  Elicia-ulcer skin: pink and indurated; much improved contact dermatitis dorsally, milder hyperkeratosis around the lateral wound; medially a small area of increased focal skin thickening and hyperkeratosis, I think from a bit of friction in the shoe - that looks less prominent this week  Ulcer(s):  Lateral foot with a bit more pink and red granulation, a bit smaller, less sloughy fat necrosis, resolved fascial or tendon sheath exposure, one remaining pocket of depth, some fibrin and biofilm; primary diabetic ulcer more granular, some fibrin and biofilm, no deeper tissue exposure, no pus, less distal undermining, definitely less central depth, getting a bit smaller, though some medial epibole at that one segment of thickened wound edge. Photos also saved in electronic chart.     Today's wound measurements, per RN documentation:  Wound 09/16/21 #1, left medial forefoot, Diabetic ulcer, jenkins 4, onset 8/25/2021-Wound Length (cm): 2 cm  Wound 09/16/21 #3, left lateral foot, pressure ulcer, Stage 4, onset 9/2021-Wound Length (cm): 2.5 cm    Wound 09/16/21 #1, left medial forefoot, Diabetic ulcer, jenkins 4, onset 8/25/2021-Wound Width (cm): 1.3 cm  Wound 09/16/21 #3, left lateral foot, pressure ulcer, Stage 4, onset 9/2021-Wound Width (cm): 1.1 cm    Wound 09/16/21 #1, left medial forefoot, Diabetic ulcer, jenkins 4, onset 8/25/2021-Wound Depth (cm): 0.1 cm  Wound 09/16/21 #3, left lateral foot, pressure ulcer, Stage 4, onset 9/2021-Wound Depth (cm): 0.2 cm    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 2 diabetes mellitus, with fat layer exposed ([facility] [address] E11.621, W07.217    Diabetic polyneuropathy associated with type 2 diabetes mellitus (HCC) E11.42    Elevated PSA R97.20    Mixed hyperlipidemia E78.2    Stage 3a chronic kidney disease (HCC) N18.31    Pressure ulcer of left lateral forefoot, stage 4 (HCC) L89.894    Chronic low back pain M54.50, G89.29    Dental bridge present Z97.2    Callus of foot L84    Hyperkalemia E87.5       Assessment of today's active condition(s): well controlled Dm2, neuropathy, no PAD, Hx of aggressive soft tissue infection and gangrene with acute osteo, treated with partial 1st ray amp, then completed IV Abx, HBO, NPWT, still making slow progress, should start to do even better with EpiFix. Also that lateral foot pressure ulcer (from snug post-op dressings a couple of months ago), making some steady progress too. Factors contributing to occurrence and/or persistence of the chronic ulcer include edema, diabetes, shear force and decreased tissue oxygenation. Medical necessity of today's visit is shown by the above documentation. Sharp debridement is indicated today, based upon the exam findings in the wound(s) above. Procedure note:     Consent obtained. Time out performed per [facility] policy. Anesthetic  Anesthetic: 4% Lidocaine Cream     Using a curette, I sharply debrided the foot (left , medial, lateral, forefoot) ulcer(s) down through and including the removal of dermis. The type(s) of tissue debrided included fibrin, biofilm, necrotic/eschar and callus. Total Surface Area Debrided: 6 sq cm. The ulcers were then irrigated with normal saline solution. The procedure was completed with a small amount of bleeding, and hemostasis was with pressure. The patient tolerated the procedure well, with no significant complications. The patient's level of pain during and after the procedure was monitored. Post-debridement measurements, if different from pre-debridement, are in the flowsheet as well.  ____________    Because this patient's left medial foot diabetic ulcer has failed to respond to standard wound-care measures (including treatment of infection, debridement of necrosis, treatment of edema, provision of a moist wound environment, good compliance with offloading, medical treatment of diabetes) for more than 4 weeks, I recommended EpiFix as adjunctive therapy to help speed healing of this ulcer. As per previous documentation, peripheral pulse exam is acceptable, and/or MYRNA is at least 0.65. There is no evidence of untreated infection today.       Suhas Pratt does not currently smoke. After cleansing the left medial foot ulcer with saline, spraying the ulcer with HClO and applying Skin-Prep to the lexi-wound skin, a 6 sqcm piece of EpiFix was cut to fit the ulcer, placed into the ulcer bed, affixed to the surrounding skin with Steri-Strips, moistened with saline and covered with Mepitel Ag. Three sqcm of the product was used in the ulcer, and three sqcm of the product was discarded. If applicable, any wastage is due to the fact the smallest available product was larger than what was needed for his ulcer(s). The patient tolerated the procedure well, without complications. Discharge plan:     Treatment in the wound care center today, per RN documentation: Wound 09/16/21 #1, left medial forefoot, Diabetic ulcer, jenkins 4, onset 8/25/2021-Dressing/Treatment: Other (comment) (Triamcinolone to rash,cast padding,kerlix,GSpanda)  Wound 09/16/21 #3, left lateral foot, pressure ulcer, Stage 4, onset 9/2021-Dressing/Treatment: Other (comment) (Triamcinolone to rash,Mepitel Ag, cast padding,kerlix,GSpanda). Keep dressing in place for the week. Keep up the good work with glucose control and protein intake. Continue current modified surgical shoe (foam and felt), keep ambulation to a reasonable minimum (ADLs). Written discharge instructions given to patient. Follow up in 1 week.     Electronically signed by Mirna Mohs, MD on 1/1/2022 at 10:15 AM.

## 2022-01-03 NOTE — PROGRESS NOTES
88 [facility] Progress Note    Del Rios     : 1960    DATE OF VISIT:  2021    Subjective:     Del Rios is a 59 y.o. male who has a diabetic ulcer located on the foot (left , medial, forefoot). Significant symptoms or pertinent wound history since last visit: feeling well overall, no foot pain, no fever, no falls, moderate drainage, no pruritus. Additional ulcer(s) noted? yes. Lateral forefoot pressure ulcer    His current medication list consists of Dulaglutide, Multiple Vitamin, Multiple Vitamins-Minerals, amLODIPine, atorvastatin, fish oil, hydroCHLOROthiazide, insulin glargine, metFORMIN, tamsulosin, and triamcinolone. Allergies: Patient has no known allergies. Objective:     Vitals:    21 0809   BP: (!) 163/79   Pulse: 66   Resp: 18   Temp: 97.5 °F (36.4 °C)   TempSrc: Oral   Weight: 245 lb (111.1 kg)   Height: 6' 1\" (1.854 m)     Constitutional:  well-developed, well-nourished, NAD   Cardiovascular:  bilateral pedal pulses palpable, feet warm, brisk cap refill; very mild left lower extremity edema  Lymphatic:  no inguinal or popliteal adenopathy, no angitis, no cellulitis  Musculoskeletal:  no clubbing, cyanosis or petechiae; RLE and LLE with no gross effusions, joint misalignment or acute arthritis  Elicia-ulcer skin: pink and indurated; a bit of recurrent contact dermatitis again laterally, but not medially this week, despite nearly identical dressings last week  Ulcer(s):  Lateral foot with a bit more pink and red granulation, a bit smaller, no sloughy fat necrosis now, one remaining pocket of depth and fibrosis, some fibrin and biofilm; primary diabetic ulcer more granular, some fibrin and biofilm, no deeper tissue exposure, no pus, less distal undermining, definitely less central depth, getting a bit smaller, though some medial epibole at that one segment of thickened wound edge - less undermining there.  Photos also saved in electronic chart.    Today's wound measurements, per RN documentation:  Wound 09/16/21 #1, left medial forefoot, Diabetic ulcer, hercules 4, onset 8/25/2021-Wound Length (cm): 2 cm  Wound 09/16/21 #3, left lateral foot, pressure ulcer, Stage 4, onset 9/2021-Wound Length (cm): 2.5 cm    Wound 09/16/21 #1, left medial forefoot, Diabetic ulcer, hercules 4, onset 8/25/2021-Wound Width (cm): 0.6 cm  Wound 09/16/21 #3, left lateral foot, pressure ulcer, Stage 4, onset 9/2021-Wound Width (cm): 1 cm    Wound 09/16/21 #1, left medial forefoot, Diabetic ulcer, hercules 4, onset 8/25/2021-Wound Depth (cm): 0.2 cm  Wound 09/16/21 #3, left lateral foot, pressure ulcer, Stage 4, onset 9/2021-Wound Depth (cm): 0.3 cm    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 2 diabetes mellitus, with [facility] layer exposed [facility] K95.656, C95.801    Diabetic polyneuropathy associated with type 2 diabetes mellitus (HCC) E11.42    Elevated PSA R97.20    Mixed hyperlipidemia E78.2    Stage 3a chronic kidney disease (HCC) N18.31    Pressure ulcer of left lateral forefoot, stage 4 (HCC) L89.894    Chronic low back pain M54.50, G89.29    Dental bridge present Z97.2    Callus of foot L84    Hyperkalemia E87.5       Assessment of today's active condition(s): well controlled DM2, neuropathy, no PAD; Hx Hercules 4 diabetic foot, Hx of partial ray amp, slowly healing diabetic ulcer, perhaps some early improvement with EpiFix already; also a lateral foot pressure ulcer, still one small area of depth and fibrosis, but also making some progress.  Factors contributing to occurrence and/or persistence of the chronic ulcer include edema, diabetes, shear force and decreased tissue oxygenation. Medical necessity of today's visit is shown by the above documentation. Sharp debridement is indicated today, based upon the exam findings in the wound(s) above. Procedure note:     Consent obtained. Time out performed per [facility] policy. Anesthetic  Anesthetic: 4% Lidocaine Cream     Using a curette, I sharply debrided the foot (left , medial, lateral, midfoot) ulcer(s) down through and including the removal of dermis. The type(s) of tissue debrided included fibrin, biofilm and necrotic/eschar. Total Surface Area Debrided: 3 sq cm. The ulcers were then irrigated with normal saline solution. The procedure was completed with a small amount of bleeding, and hemostasis was with pressure. The patient tolerated the procedure well, with no significant complications. The patient's level of pain during and after the procedure was monitored. Post-debridement measurements, if different from pre-debridement, are in the flowsheet as well.  _______________    Because this patient's diabetic foot ulcer has failed to respond to standard wound-care measures (including treatment of infection, debridement of necrosis, treatment of edema, provision of a moist wound environment, good compliance with offloading, medical treatment of diabetes) for more than 4 weeks, I recommended EpiFix as adjunctive therapy to help speed healing of this ulcer. As per previous documentation, peripheral pulse exam is acceptable, and/or MYRNA is at least 0.65. There is no evidence of untreated infection today. Mr. Rudolph Zazueta does not currently smoke. After cleansing the left medial forefoot ulcer with saline, spraying the ulcer with HClO and applying Skin-Prep to the lexi-wound skin, a 6 sqcm piece of EpiFix was cut to fit the ulcer, placed into the ulcer bed, affixed to the surrounding skin with Steri-Strips, moistened with saline and covered with Mepitel Ag.  Three sqcm of the product was used in the ulcer, and 3 sqcm of the product was discarded. If applicable, any wastage is due to the fact the smallest available product was larger than what was needed for his ulcer(s). The patient tolerated the procedure well, without complications. Discharge plan:     Treatment in the wound care center today, per RN documentation: Wound 09/16/21 #1, left medial forefoot, Diabetic ulcer, jenkins 4, onset 8/25/2021-Dressing/Treatment: Other (comment) (4x4's cast padding kerlix spandagrip)  Wound 09/16/21 #3, left lateral foot, pressure ulcer, Stage 4, onset 9/2021-Dressing/Treatment: Other (comment) (Vashe, Ulises, Mepitel Ag, 4x4's cast padding kerlix spandagrip). Keep dressing in place for the week. Will decide next week if he needs another EpiFix right away, or if we can skip a week to see how much momentum he develops. Continue current modified offloading shoe; keep walking to a reasonable minimum. Plan to refer to [facility] once healed, for diabetic shoes and inserts. Keep up the good work with glucose control. Written discharge instructions given to patient. Follow up in 1 week.     Electronically signed by Maritza Grijalva MD on 1/3/2022 at 3:22 PM.

## 2022-01-05 ENCOUNTER — HOSPITAL ENCOUNTER (OUTPATIENT)
Dept: WOUND CARE | Age: 62
Discharge: HOME | End: 2022-01-05
Payer: MEDICARE

## 2022-01-05 VITALS
HEIGHT: 73 IN | BODY MASS INDEX: 32.29 KG/M2 | TEMPERATURE: 98 F | WEIGHT: 243.6 LBS | HEART RATE: 67 BPM | SYSTOLIC BLOOD PRESSURE: 185 MMHG | DIASTOLIC BLOOD PRESSURE: 88 MMHG | RESPIRATION RATE: 18 BRPM

## 2022-01-05 DIAGNOSIS — E11.621 DIABETIC ULCER OF OTHER PART OF LEFT FOOT ASSOCIATED WITH TYPE 2 DIABETES MELLITUS, WITH FAT LAYER EXPOSED (HCC): Primary | ICD-10-CM

## 2022-01-05 DIAGNOSIS — L97.522 DIABETIC ULCER OF OTHER PART OF LEFT FOOT ASSOCIATED WITH TYPE 2 DIABETES MELLITUS, WITH FAT LAYER EXPOSED (HCC): Primary | ICD-10-CM

## 2022-01-05 DIAGNOSIS — L89.894 PRESSURE ULCER OF LEFT FOOT, STAGE 4 (HCC): ICD-10-CM

## 2022-01-05 PROCEDURE — 99212 OFFICE O/P EST SF 10 MIN: CPT | Performed by: INTERNAL MEDICINE

## 2022-01-05 PROCEDURE — 99212 OFFICE O/P EST SF 10 MIN: CPT

## 2022-01-05 RX ORDER — LIDOCAINE 50 MG/G
OINTMENT TOPICAL ONCE
Status: CANCELLED | OUTPATIENT
Start: 2022-01-05 | End: 2022-01-05

## 2022-01-05 RX ORDER — LIDOCAINE 40 MG/G
CREAM TOPICAL ONCE
Status: DISCONTINUED | OUTPATIENT
Start: 2022-01-05 | End: 2022-01-06 | Stop reason: HOSPADM

## 2022-01-05 RX ORDER — LIDOCAINE HYDROCHLORIDE 40 MG/ML
SOLUTION TOPICAL ONCE
Status: DISCONTINUED | OUTPATIENT
Start: 2022-01-05 | End: 2022-01-06 | Stop reason: HOSPADM

## 2022-01-05 RX ORDER — LIDOCAINE 40 MG/G
CREAM TOPICAL ONCE
Status: CANCELLED | OUTPATIENT
Start: 2022-01-05 | End: 2022-01-05

## 2022-01-05 RX ORDER — LIDOCAINE HYDROCHLORIDE 40 MG/ML
SOLUTION TOPICAL ONCE
Status: CANCELLED | OUTPATIENT
Start: 2022-01-05 | End: 2022-01-05

## 2022-01-05 RX ORDER — BACITRACIN ZINC AND POLYMYXIN B SULFATE 500; 1000 [USP'U]/G; [USP'U]/G
OINTMENT TOPICAL ONCE
Status: DISCONTINUED | OUTPATIENT
Start: 2022-01-05 | End: 2022-01-06 | Stop reason: HOSPADM

## 2022-01-05 RX ORDER — LIDOCAINE 50 MG/G
OINTMENT TOPICAL ONCE
Status: DISCONTINUED | OUTPATIENT
Start: 2022-01-05 | End: 2022-01-06 | Stop reason: HOSPADM

## 2022-01-05 RX ORDER — BACITRACIN ZINC AND POLYMYXIN B SULFATE 500; 1000 [USP'U]/G; [USP'U]/G
OINTMENT TOPICAL ONCE
Status: CANCELLED | OUTPATIENT
Start: 2022-01-05 | End: 2022-01-05

## 2022-01-05 ASSESSMENT — PAIN SCALES - GENERAL
PAINLEVEL_OUTOF10: 0
PAINLEVEL_OUTOF10: 0

## 2022-01-05 NOTE — PLAN OF CARE
Wounds stable, no debridement, wounds stimulated with 27 gauge needle per MD & pt. Tolerated well. Will apply collagen to wounds, otherwise cont. With dressings as ordered & leave in place for the week. Pt. Using  modified orthowedge shoes for off-loading. F/u in [address] [place] [address],[address] in 1 week as ordered, pt. Aware to call sooner with any changes or questions/concerns. Discharge instructions reviewed with patient & wife, all questions answered, copy given to patient. Dressings were applied to all wounds per M.D. Instructions at this visit.

## 2022-01-07 NOTE — PROGRESS NOTES
88 [facility] Progress Note    Del Howard     : 1960    DATE OF VISIT:  2022    Subjective:     Feliberto Flowers is a 59 y.o. male who has a diabetic and  pressure ulcer located on the foot (forefoot). Current complaint of pain in this ulcer? no.    Other significant symptoms or pertinent ulcer history: feeling well overall, minimal swelling, mild-mod drainage, less pruritus, no fever. Doing well with his shoe, glucoses doing well. Additional ulcer(s) noted? no.      Mr. Jeet Howard has a past medical history of Abscess of left hand, Acute kidney injury superimposed on chronic kidney disease ([address] Acute osteomyelitis of metatarsal bone of left foot ([address] Adhesive capsulitis of shoulder, Fractures, Gas gangrene of foot ([address] History of hyperbaric oxygen therapy, and PNA (pneumonia). He has a past surgical history that includes Wrist fracture surgery; Toe amputation (Left, ); Neck surgery; Hand surgery (Left, 2017); Foot Debridement (Left, 2021); and Foot Debridement (Left, 2021). His family history includes Asthma in his daughter; Cancer in his father; Diabetes in his mother; Heart Failure in his mother. Mr. Jeet Howard reports that he has never smoked. He has never used smokeless tobacco. He reports that he does not drink alcohol and does not use drugs. His current medication list consists of Dulaglutide, Multiple Vitamin, Multiple Vitamins-Minerals, amLODIPine, atorvastatin, fish oil, hydroCHLOROthiazide, insulin glargine, metFORMIN, tamsulosin, and triamcinolone. Allergies: Patient has no known allergies. Pertinent items from the review of systems are discussed in the HPI; the remainder of the ROS was reviewed and is negative.      Objective:     Vitals:    22 0837   BP: (!) 185/88   Pulse: 67   Resp: 18   Temp: 98 °F (36.7 °C)   TempSrc: Oral   Weight: 243 lb 9.6 oz (110.5 kg)   Height: 6' 1\" (1.854 m)       Constitutional:  well-developed, well-nourished, NAD   Cardiovascular:  bilateral pedal pulses palpable, feet warm, brisk cap refill; very mild left lower extremity edema  Lymphatic:  no inguinal or popliteal adenopathy, no angitis, no cellulitis  Musculoskeletal:  no clubbing, cyanosis or petechiae; RLE and LLE with no gross effusions, joint misalignment or acute arthritis  Elicia-ulcer skin: pink and indurated; a bit of persistent contact dermatitis again, but not as severe as last week  Ulcer(s):  both are looking good - a bit smaller, a bit more red granulation, less depth, some residual EpiFix material at the medial diabetic ulcer, and only a bit of loose biofilm and serous exudate at the lateral. Photos also saved in electronic chart.     Today's Wound Measurements, per RN documentation:  Wound 09/16/21 #1, left medial forefoot, Diabetic ulcer, jenkins 4, onset 8/25/2021-Wound Length (cm): 1.9 cm (MD to measure)  Wound 09/16/21 #3, left lateral foot, pressure ulcer, Stage 4, onset 9/2021-Wound Length (cm): 2.4 cm (MD to measure)    Wound 09/16/21 #1, left medial forefoot, Diabetic ulcer, jenkins 4, onset 8/25/2021-Wound Width (cm): 0.8 cm  Wound 09/16/21 #3, left lateral foot, pressure ulcer, Stage 4, onset 9/2021-Wound Width (cm): 0.9 cm    Wound 09/16/21 #1, left medial forefoot, Diabetic ulcer, jenkins 4, onset 8/25/2021-Wound Depth (cm): 0.2 cm  Wound 09/16/21 #3, left lateral foot, pressure ulcer, Stage 4, onset 9/2021-Wound Depth (cm): 0.3 cm  ______________________________    Lab Results   Component Value Date    LABALBU 3.7 10/04/2021     Lab Results   Component Value Date    CREATININE 1.4 (H) 10/15/2021     Lab Results   Component Value Date    HGB 10.9 (L) 10/04/2021     Lab Results   Component Value Date    LABA1C 6.4 09/01/2021     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 2 diabetes mellitus, with fat layer exposed [facility] E11.621, N91.732    Diabetic polyneuropathy associated with type 2 diabetes mellitus (HCC) E11.42    Elevated PSA R97.20    Mixed hyperlipidemia E78.2    Stage 3a chronic kidney disease (HCC) N18.31    Pressure ulcer of left lateral forefoot, stage 4 (HCC) L89.894    Chronic low back pain M54.50, G89.29    Dental bridge present Z97.2    Callus of foot L84    Hyperkalemia E87.5       Assessment of today's active condition(s): DM2, neuropathy, healing Hercules 4 diabetic foot, plus a small stage 4 lateral foot pressure ulcer, no signs of ischemia or infection, diabetic ulcer doing well with EpiFix in addition to standard care, and pressure ulcer doing well with standard care. Factors contributing to occurrence and/or persistence of the chronic ulcer include edema, diabetes and shear force. Medical necessity of today's visit is shown by the above documentation. Sharp debridement is not indicated today, based upon the exam findings in the wound(s) above.   ____________    I just used a 27g needle to puncture a few tiny holes through the graft and into the underlying wound bed, to encourage a small amount of bleeding (which stopped spontaneously), and allow the wound bed to continue to be stimulated by the graft material.     Discharge plan:     Treatment in the wound care center today, per RN documentation: Wound 09/16/21 #1, left medial forefoot, Diabetic ulcer, hercules 4, onset 8/25/2021-Dressing/Treatment: Other (comment) (Vashe, Ulises-lexi, Purachl Ag, 4x4's, castpadding,kerlix)  Wound 09/16/21 #3, left lateral foot, pressure ulcer, Stage 4, onset 9/2021-Dressing/Treatment: Other (comment) (Vashe, Ulises-lexi, Purachl Ag, 4x4's, castpadding,kerlix). Leave dressing in place for the week.    Keep up the good work with glucose control. Modified OrthoWedge shoe for ambulation, but keep that to a reasonable minimum. Might repeat an EpiFix next week, depending on how the foot looks. Written discharge instructions given to patient. Follow up in 1 week.     Electronically signed by Ashley Palacios MD on 1/7/2022 at 1:01 PM.

## 2022-01-12 ENCOUNTER — HOSPITAL ENCOUNTER (OUTPATIENT)
Dept: WOUND CARE | Age: 62
Discharge: HOME | End: 2022-01-12
Payer: MEDICARE

## 2022-01-12 VITALS
HEART RATE: 73 BPM | DIASTOLIC BLOOD PRESSURE: 76 MMHG | TEMPERATURE: 98 F | SYSTOLIC BLOOD PRESSURE: 146 MMHG | BODY MASS INDEX: 32.05 KG/M2 | RESPIRATION RATE: 16 BRPM | WEIGHT: 241.8 LBS | HEIGHT: 73 IN

## 2022-01-12 DIAGNOSIS — L97.522 DIABETIC ULCER OF OTHER PART OF LEFT FOOT ASSOCIATED WITH TYPE 2 DIABETES MELLITUS, WITH FAT LAYER EXPOSED (HCC): Primary | ICD-10-CM

## 2022-01-12 DIAGNOSIS — L89.894 PRESSURE ULCER OF LEFT FOOT, STAGE 4 (HCC): ICD-10-CM

## 2022-01-12 DIAGNOSIS — E11.621 DIABETIC ULCER OF OTHER PART OF LEFT FOOT ASSOCIATED WITH TYPE 2 DIABETES MELLITUS, WITH FAT LAYER EXPOSED (HCC): Primary | ICD-10-CM

## 2022-01-12 DIAGNOSIS — L92.9 HYPERGRANULATION: ICD-10-CM

## 2022-01-12 PROCEDURE — 97597 DBRDMT OPN WND 1ST 20 CM/<: CPT

## 2022-01-12 PROCEDURE — 17250 CHEM CAUT OF GRANLTJ TISSUE: CPT

## 2022-01-12 PROCEDURE — 17250 CHEM CAUT OF GRANLTJ TISSUE: CPT | Performed by: INTERNAL MEDICINE

## 2022-01-12 PROCEDURE — 97597 DBRDMT OPN WND 1ST 20 CM/<: CPT | Performed by: INTERNAL MEDICINE

## 2022-01-12 RX ORDER — LIDOCAINE 40 MG/G
CREAM TOPICAL ONCE
Status: CANCELLED | OUTPATIENT
Start: 2022-01-12 | End: 2022-01-12

## 2022-01-12 RX ORDER — BACITRACIN ZINC AND POLYMYXIN B SULFATE 500; 1000 [USP'U]/G; [USP'U]/G
OINTMENT TOPICAL ONCE
Status: CANCELLED | OUTPATIENT
Start: 2022-01-12 | End: 2022-01-12

## 2022-01-12 RX ORDER — LIDOCAINE HYDROCHLORIDE 40 MG/ML
SOLUTION TOPICAL ONCE
Status: CANCELLED | OUTPATIENT
Start: 2022-01-12 | End: 2022-01-12

## 2022-01-12 RX ORDER — LIDOCAINE 40 MG/G
CREAM TOPICAL ONCE
Status: DISCONTINUED | OUTPATIENT
Start: 2022-01-12 | End: 2022-01-13 | Stop reason: HOSPADM

## 2022-01-12 RX ORDER — LIDOCAINE HYDROCHLORIDE 40 MG/ML
SOLUTION TOPICAL ONCE
Status: DISCONTINUED | OUTPATIENT
Start: 2022-01-12 | End: 2022-01-13 | Stop reason: HOSPADM

## 2022-01-12 RX ORDER — BACITRACIN ZINC AND POLYMYXIN B SULFATE 500; 1000 [USP'U]/G; [USP'U]/G
OINTMENT TOPICAL ONCE
Status: DISCONTINUED | OUTPATIENT
Start: 2022-01-12 | End: 2022-01-13 | Stop reason: HOSPADM

## 2022-01-12 RX ORDER — LIDOCAINE 50 MG/G
OINTMENT TOPICAL ONCE
Status: CANCELLED | OUTPATIENT
Start: 2022-01-12 | End: 2022-01-12

## 2022-01-12 RX ORDER — LIDOCAINE 50 MG/G
OINTMENT TOPICAL ONCE
Status: DISCONTINUED | OUTPATIENT
Start: 2022-01-12 | End: 2022-01-13 | Stop reason: HOSPADM

## 2022-01-12 ASSESSMENT — PAIN SCALES - GENERAL: PAINLEVEL_OUTOF10: 0

## 2022-01-12 NOTE — PLAN OF CARE
Wounds stable & showing improvement, debridement & Ag Nitrate per MD & pt. Tolerated well. Will hold on skin substitute this week. Will cont. With current wound care regime with dressings. Pt. Using modified orthowedge shoe for off-loading. F/u in [facility] in 1 week as ordered, pt. Aware to call sooner with any changes or questions/concerns. Discharge instructions reviewed with patient & spouse, all questions answered, copy given to patient. Dressings were applied to all wounds per M.D. Instructions at this visit.

## 2022-01-16 PROBLEM — L92.9 HYPERGRANULATION: Status: ACTIVE | Noted: 2022-01-16

## 2022-01-16 NOTE — PROGRESS NOTES
[facility] Progress Note    Del Mtz     : 1960    DATE OF VISIT:  2022    Subjective:     Tae Roe is a 59 y.o. male who has a diabetic ulcer located on the foot (left , medial, forefoot). Significant symptoms or pertinent wound history since last visit: feeling well, no pain, mild pruritus, no swelling, moderate drainage, no fever. Additional ulcer(s) noted? yes. Lateral foot pressure ulcer. His current medication list consists of Dulaglutide, Multiple Vitamin, Multiple Vitamins-Minerals, amLODIPine, atorvastatin, fish oil, hydroCHLOROthiazide, insulin glargine, metFORMIN, tamsulosin, and triamcinolone. Allergies: Patient has no known allergies. Objective:     Vitals:    22 1011   BP: (!) 146/76   Pulse: 73   Resp: 16   Temp: 98 °F (36.7 °C)   TempSrc: Oral   Weight: 241 lb 12.8 oz (109.7 kg)   Height: 6' 1\" (1.854 m)     Constitutional:  well-developed, well-nourished, NAD   Cardiovascular:  bilateral pedal pulses palpable, feet warm, brisk cap refill; very mild left lower extremity edema  Lymphatic:  no inguinal or popliteal adenopathy, no angitis, no cellulitis  Musculoskeletal:  no clubbing, cyanosis or petechiae; RLE and LLE with no gross effusions, joint misalignment or acute arthritis  Elicia-ulcer skin: pink and indurated; a bit of persistent contact dermatitis again, but not as severe as last 2 weeks  Ulcer(s):  medial diabetic ulcer smaller, red, hypergranular, a bit of loose fibrin; lateral pressure ulcer still with a bit of distal depth and fibrosis, otherwise granular, a bit smaller, fibrin, biofilm.  Photos also saved in electronic chart    Today's wound measurements, per RN documentation:  Wound 21 #1, left medial forefoot, Diabetic ulcer, jenkins 4, onset 2021-Wound Length (cm): 1.6 cm  Wound 21 #3, left lateral foot, pressure ulcer, Stage 4, onset 2021-Wound Length (cm): 2.3 cm    Wound 21 #1, left medial forefoot, Diabetic ulcer, hercules 4, onset 8/25/2021-Wound Width (cm): 0.6 cm  Wound 09/16/21 #3, left lateral foot, pressure ulcer, Stage 4, onset 9/2021-Wound Width (cm): 0.7 cm    Wound 09/16/21 #1, left medial forefoot, Diabetic ulcer, hercules 4, onset 8/25/2021-Wound Depth (cm): 0.2 cm  Wound 09/16/21 #3, left lateral foot, pressure ulcer, Stage 4, onset 9/2021-Wound Depth (cm): 0.3 cm    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 2 diabetes mellitus, with fat layer exposed ([facility] [address] E11.621, V53.408    Diabetic polyneuropathy associated with type 2 diabetes mellitus (HCC) E11.42    Elevated PSA R97.20    Mixed hyperlipidemia E78.2    Stage 3a chronic kidney disease (HCC) N18.31    Pressure ulcer of left lateral forefoot, stage 4 ([facility]) L89.894    Chronic low back pain M54.50, G89.29    Dental bridge present Z97.2    Callus of foot L84    Hyperkalemia E87.5    Hypergranulation L92.9       Assessment of today's active condition(s): well controlled DM2, neuropathy, no PAD, healing Hercules 4 diabetic lesion, also more slowly healing stage 4 lateral foot pressure ulcer. Factors contributing to occurrence and/or persistence of the chronic ulcer include edema, diabetes, shear force, obesity and decreased tissue oxygenation. Medical necessity of today's visit is shown by the above documentation. Sharp debridement is indicated today, based upon the exam findings in the wound(s) above. Procedure note:     Consent obtained. Time out performed per [facility] policy.     Anesthetic  Anesthetic: 4% Lidocaine Cream     Using a curette, I sharply debrided the foot (left , lateral) ulcer(s) down through and including the removal of dermis. The type(s) of tissue debrided included fibrin and biofilm. Total Surface Area Debrided: 2 sq cm. The ulcers were then irrigated with normal saline solution. The procedure was completed without any bleeding, and hemostasis was with pressure. The patient tolerated the procedure well, with no significant complications. The patient's level of pain during and after the procedure was monitored. Post-debridement measurements, if different from pre-debridement, are in the flowsheet as well.  ____________    To encourage better epithelial cell coverage, I did use AgNO3 to chemically cauterize hypergranulation tissue on the foot (left , medial) ulcer(s), after application of 4% lidocaine topical solution. This was tolerated well, with no pain or skin injury. Discharge plan:     Treatment in the wound care center today, per RN documentation: Wound 09/16/21 #1, left medial forefoot, Diabetic ulcer, jenkins 4, onset 8/25/2021-Dressing/Treatment: Other (comment) (calmoseptine purachol ag 4x4's cast padding spandagrip)  Wound 09/16/21 #3, left lateral foot, pressure ulcer, Stage 4, onset 9/2021-Dressing/Treatment: Other (comment) (calmoseptine purachol ag 4x4's cast padding spandagrip). Keep dressing in place for the week. Keep up with good work in glucose control. Continue modified insert in [employer] shoe for now. Written discharge instructions given to patient. Follow up in 1 week.     Electronically signed by Tessa Gould MD on 1/16/2022 at 4:45 PM.

## 2022-01-19 ENCOUNTER — HOSPITAL ENCOUNTER (OUTPATIENT)
Dept: WOUND CARE | Age: 62
Discharge: HOME | End: 2022-01-19
Payer: MEDICARE

## 2022-01-19 VITALS
DIASTOLIC BLOOD PRESSURE: 85 MMHG | HEIGHT: 73 IN | WEIGHT: 242.6 LBS | HEART RATE: 74 BPM | SYSTOLIC BLOOD PRESSURE: 174 MMHG | BODY MASS INDEX: 32.15 KG/M2 | RESPIRATION RATE: 18 BRPM | TEMPERATURE: 98.4 F

## 2022-01-19 DIAGNOSIS — E11.621 DIABETIC ULCER OF OTHER PART OF LEFT FOOT ASSOCIATED WITH TYPE 2 DIABETES MELLITUS, WITH FAT LAYER EXPOSED (HCC): Primary | ICD-10-CM

## 2022-01-19 DIAGNOSIS — L97.522 DIABETIC ULCER OF OTHER PART OF LEFT FOOT ASSOCIATED WITH TYPE 2 DIABETES MELLITUS, WITH FAT LAYER EXPOSED (HCC): Primary | ICD-10-CM

## 2022-01-19 DIAGNOSIS — L89.894 PRESSURE ULCER OF LEFT FOOT, STAGE 4 (HCC): ICD-10-CM

## 2022-01-19 PROCEDURE — 11042 DBRDMT SUBQ TIS 1ST 20SQCM/<: CPT | Performed by: INTERNAL MEDICINE

## 2022-01-19 PROCEDURE — 11042 DBRDMT SUBQ TIS 1ST 20SQCM/<: CPT

## 2022-01-19 PROCEDURE — 97597 DBRDMT OPN WND 1ST 20 CM/<: CPT

## 2022-01-19 PROCEDURE — 17250 CHEM CAUT OF GRANLTJ TISSUE: CPT

## 2022-01-19 PROCEDURE — 97597 DBRDMT OPN WND 1ST 20 CM/<: CPT | Performed by: INTERNAL MEDICINE

## 2022-01-19 RX ORDER — LIDOCAINE 50 MG/G
OINTMENT TOPICAL ONCE
Status: CANCELLED | OUTPATIENT
Start: 2022-01-19 | End: 2022-01-19

## 2022-01-19 RX ORDER — LIDOCAINE 40 MG/G
CREAM TOPICAL ONCE
Status: CANCELLED | OUTPATIENT
Start: 2022-01-19 | End: 2022-01-19

## 2022-01-19 RX ORDER — BACITRACIN ZINC AND POLYMYXIN B SULFATE 500; 1000 [USP'U]/G; [USP'U]/G
OINTMENT TOPICAL ONCE
Status: DISCONTINUED | OUTPATIENT
Start: 2022-01-19 | End: 2022-01-20 | Stop reason: HOSPADM

## 2022-01-19 RX ORDER — LIDOCAINE HYDROCHLORIDE 40 MG/ML
SOLUTION TOPICAL ONCE
Status: DISCONTINUED | OUTPATIENT
Start: 2022-01-19 | End: 2022-01-20 | Stop reason: HOSPADM

## 2022-01-19 RX ORDER — LIDOCAINE HYDROCHLORIDE 40 MG/ML
SOLUTION TOPICAL ONCE
Status: CANCELLED | OUTPATIENT
Start: 2022-01-19 | End: 2022-01-19

## 2022-01-19 RX ORDER — BACITRACIN ZINC AND POLYMYXIN B SULFATE 500; 1000 [USP'U]/G; [USP'U]/G
OINTMENT TOPICAL ONCE
Status: CANCELLED | OUTPATIENT
Start: 2022-01-19 | End: 2022-01-19

## 2022-01-19 RX ORDER — LIDOCAINE 50 MG/G
OINTMENT TOPICAL ONCE
Status: DISCONTINUED | OUTPATIENT
Start: 2022-01-19 | End: 2022-01-20 | Stop reason: HOSPADM

## 2022-01-19 RX ORDER — LIDOCAINE 40 MG/G
CREAM TOPICAL ONCE
Status: DISCONTINUED | OUTPATIENT
Start: 2022-01-19 | End: 2022-01-20 | Stop reason: HOSPADM

## 2022-01-19 ASSESSMENT — PAIN SCALES - GENERAL
PAINLEVEL_OUTOF10: 0
PAINLEVEL_OUTOF10: 0

## 2022-01-19 NOTE — PLAN OF CARE
Wounds stable & showing improvement, debridement per MD & pt. Tolerated well. Will add Triamcinolone to rash, otherwise cont. With current wound care regime with dressings. Pt. Using modified orthowedge shoe for off-loading. Dr Claire Camejo will work on sending paperwork to pts. PCP to complete for patient to be able to get new diabetic shoes & inserts after healed. F/u in [facility] in 1 week as ordered, pt. Aware to call sooner with any changes or questions/concerns. Discharge instructions reviewed with patient & wife, all questions answered, copy given to patient. Dressings were applied to all wounds per M.D. Instructions at this visit.

## 2022-01-23 NOTE — PROGRESS NOTES
88 [facility] Progress Note    Del Vides Pro     : 1960    DATE OF VISIT:  2022    Subjective:     Katie Verde is a 59 y.o. male who has a diabetic ulcer located on the foot (left , medial, forefoot). Significant symptoms or pertinent wound history since last visit: feeling well, minimal pain, minimal pruritus, small-mod drainage, no fever, no falls, does notice that he's rolling his foot and ankle laterally just a bit, with the current shoe modification. Additional ulcer(s) noted? yes. The lateral foot pressure ulcer    His current medication list consists of Dulaglutide, Multiple Vitamin, Multiple Vitamins-Minerals, amLODIPine, atorvastatin, fish oil, hydroCHLOROthiazide, insulin glargine, metFORMIN, tamsulosin, and triamcinolone. Allergies: Patient has no known allergies. Objective:     Vitals:    22 0859 22 0906   BP: (!) 174/85    Pulse: 74    Resp: 18    Temp: 98.4 °F (36.9 °C)    TempSrc: Oral    Weight:  242 lb 9.6 oz (110 kg)   Height:  6' 1\" (1.854 m)     Constitutional:  well-developed, well-nourished, NAD   Cardiovascular:  bilateral pedal pulses palpable, feet warm, brisk cap refill; very mild left lower extremity edema  Lymphatic:  no inguinal or popliteal adenopathy, no angitis, no cellulitis  Musculoskeletal:  no clubbing, cyanosis or petechiae; RLE and LLE with no gross effusions, joint misalignment or acute arthritis  Elicia-ulcer skin: pink and indurated; a bit of persistent contact dermatitis again, but not as severe as last 2 weeks; there IS a bit of new hyperkeratosis and callus laterally this week also  Ulcer(s):  medial diabetic ulcer smaller, red, hypergranular, a bit of loose fibrin; lateral pressure ulcer still with a bit of distal depth and fibrosis, otherwise granular, a bit smaller, fibrin, biofilm.  Photos also saved in electronic chart    Today's wound measurements, per RN documentation:  Wound 21 #1, left medial forefoot, Diabetic ulcer, hercules 4, onset 8/25/2021-Wound Length (cm): 0.6 cm  Wound 09/16/21 #3, left lateral foot, pressure ulcer, Stage 4, onset 9/2021-Wound Length (cm): 1.9 cm    Wound 09/16/21 #1, left medial forefoot, Diabetic ulcer, hercules 4, onset 8/25/2021-Wound Width (cm): 0.3 cm  Wound 09/16/21 #3, left lateral foot, pressure ulcer, Stage 4, onset 9/2021-Wound Width (cm): 0.5 cm    Wound 09/16/21 #1, left medial forefoot, Diabetic ulcer, hercules 4, onset 8/25/2021-Wound Depth (cm): 0.1 cm (remeasured per MD)  Wound 09/16/21 #3, left lateral foot, pressure ulcer, Stage 4, onset 9/2021-Wound Depth (cm): 0.3 cm    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 2 diabetes mellitus, with [facility] layer exposed ([facility] E11.621, E93.801    Diabetic polyneuropathy associated with type 2 diabetes mellitus (HCC) E11.42    Elevated PSA R97.20    Mixed hyperlipidemia E78.2    Stage 3a chronic kidney disease (HCC) N18.31    Pressure ulcer of left lateral forefoot, stage 4 (HCC) L89.894    Chronic low back pain M54.50, G89.29    Dental bridge present Z97.2    Callus of foot L84    Hyperkalemia E87.5    Hypergranulation L92.9       Assessment of today's active condition(s): DM2, healing Hercules 4 foot, more slowly healing lateral foot pressure ulcer, shoe needs a bit of modification there; showed definite benefit from all of HBO, VAC, EpiFix, medial foot should be healed in 1-2 weeks. Factors contributing to occurrence and/or persistence of the chronic ulcer include edema, diabetes, chronic pressure, shear force and decreased tissue oxygenation. Medical necessity of today's visit is shown by the above documentation.  Sharp debridement is indicated today, based upon the exam findings in the wound(s) above. Procedure note:     Consent obtained. Time out performed per [facility]  policy. Anesthetic  Anesthetic: 4% Lidocaine Cream     Using a #15 blade scalpel, I sharply debrided the foot (left , medial, forefoot) ulcer(s) down through and including the removal of dermis. The type(s) of tissue debrided included fibrin and biofilm. Total Surface Area Debrided: 1 sq cm. Using a curette and #15 blade scalpel, I sharply debrided the foot (left , lateral, forefoot) ulcer(s) down through and including the removal of subcutaneous tissue. The type(s) of tissue debrided included fibrin, biofilm, necrotic/eschar and callus. Total Surface Area Debrided: 2 sq cm. The ulcers were then irrigated with normal saline solution. The procedure was completed with a small amount of bleeding, and hemostasis was with pressure. The patient tolerated the procedure well, with no significant complications. The patient's level of pain during and after the procedure was monitored. Post-debridement measurements, if different from pre-debridement, are in the flowsheet as well.  ____________    To encourage better epithelial cell coverage, I did use AgNO3 to chemically cauterize hypergranulation tissue on the foot (left , medial, forefoot) ulcer(s), after application of 4% lidocaine topical solution. This was tolerated well, with no pain or skin injury. Discharge plan:     Treatment in the wound care center today, per RN documentation: Wound 09/16/21 #1, left medial forefoot, Diabetic ulcer, jenkins 4, onset 8/25/2021-Dressing/Treatment: Other (comment) (Ulises-lexi, Purachol Ag, 4x4's, Cast Padding, Kerlix)  Wound 09/16/21 #3, left lateral foot, pressure ulcer, Stage 4, onset 9/2021-Dressing/Treatment: Other (comment) (Ulises-lexi, Purachol Ag, 4x4's, Cast Padding, Kerlix). I took a bit of the offloading felt out of the lateral portion of the shoe insert today.   Keep the dressing on for a week. Avoid excessive ambulation. Keep up the good work with glucoses. Written discharge instructions given to patient. Follow up in 1 week.     Electronically signed by Briana Andersen MD on 1/23/2022 at 9:24 AM.

## 2022-01-26 ENCOUNTER — HOSPITAL ENCOUNTER (OUTPATIENT)
Dept: WOUND CARE | Age: 62
Discharge: HOME | End: 2022-01-26
Payer: MEDICARE

## 2022-01-26 VITALS
HEART RATE: 63 BPM | DIASTOLIC BLOOD PRESSURE: 74 MMHG | TEMPERATURE: 97.3 F | BODY MASS INDEX: 32.34 KG/M2 | WEIGHT: 244 LBS | SYSTOLIC BLOOD PRESSURE: 159 MMHG | HEIGHT: 73 IN | RESPIRATION RATE: 18 BRPM

## 2022-01-26 DIAGNOSIS — E11.621 DIABETIC ULCER OF OTHER PART OF LEFT FOOT ASSOCIATED WITH TYPE 2 DIABETES MELLITUS, WITH FAT LAYER EXPOSED (HCC): Primary | ICD-10-CM

## 2022-01-26 DIAGNOSIS — L89.894 PRESSURE ULCER OF LEFT FOOT, STAGE 4 (HCC): ICD-10-CM

## 2022-01-26 DIAGNOSIS — L97.522 DIABETIC ULCER OF OTHER PART OF LEFT FOOT ASSOCIATED WITH TYPE 2 DIABETES MELLITUS, WITH FAT LAYER EXPOSED (HCC): Primary | ICD-10-CM

## 2022-01-26 PROCEDURE — 11042 DBRDMT SUBQ TIS 1ST 20SQCM/<: CPT | Performed by: INTERNAL MEDICINE

## 2022-01-26 PROCEDURE — 11042 DBRDMT SUBQ TIS 1ST 20SQCM/<: CPT

## 2022-01-26 RX ORDER — LIDOCAINE 50 MG/G
OINTMENT TOPICAL ONCE
Status: DISCONTINUED | OUTPATIENT
Start: 2022-01-26 | End: 2022-01-27 | Stop reason: HOSPADM

## 2022-01-26 RX ORDER — LIDOCAINE HYDROCHLORIDE 40 MG/ML
SOLUTION TOPICAL ONCE
Status: DISCONTINUED | OUTPATIENT
Start: 2022-01-26 | End: 2022-01-27 | Stop reason: HOSPADM

## 2022-01-26 RX ORDER — BACITRACIN ZINC AND POLYMYXIN B SULFATE 500; 1000 [USP'U]/G; [USP'U]/G
OINTMENT TOPICAL ONCE
Status: CANCELLED | OUTPATIENT
Start: 2022-01-26 | End: 2022-01-26

## 2022-01-26 RX ORDER — LIDOCAINE 50 MG/G
OINTMENT TOPICAL ONCE
Status: CANCELLED | OUTPATIENT
Start: 2022-01-26 | End: 2022-01-26

## 2022-01-26 RX ORDER — BACITRACIN ZINC AND POLYMYXIN B SULFATE 500; 1000 [USP'U]/G; [USP'U]/G
OINTMENT TOPICAL ONCE
Status: DISCONTINUED | OUTPATIENT
Start: 2022-01-26 | End: 2022-01-27 | Stop reason: HOSPADM

## 2022-01-26 RX ORDER — LIDOCAINE HYDROCHLORIDE 40 MG/ML
SOLUTION TOPICAL ONCE
Status: CANCELLED | OUTPATIENT
Start: 2022-01-26 | End: 2022-01-26

## 2022-01-26 RX ORDER — LIDOCAINE 40 MG/G
CREAM TOPICAL ONCE
Status: DISCONTINUED | OUTPATIENT
Start: 2022-01-26 | End: 2022-01-27 | Stop reason: HOSPADM

## 2022-01-26 RX ORDER — LIDOCAINE 40 MG/G
CREAM TOPICAL ONCE
Status: CANCELLED | OUTPATIENT
Start: 2022-01-26 | End: 2022-01-26

## 2022-01-26 ASSESSMENT — PAIN SCALES - GENERAL: PAINLEVEL_OUTOF10: 0

## 2022-01-26 NOTE — PLAN OF CARE
Medial foot wound healed, will cont. To protect with dressing for an additional week with PSO & mepilex border. Lateral wound showing improvement, debridement per MD & pt. Tolerated well. Will cont. With collagen to wound, cover with mepilex border to also remain in place for the week. Pt. Wearing orthowedge shoe for off-loading. Dr Merissa Joshua will work on getting patient scheduled with [address] [place] [address] in the near future for diabetic shoes & inserts. F/u in [address] [place] [address] in 1 week as ordered, pt. Aware to call sooner with any changes or questions/concerns. Discharge instructions reviewed with patient & wife, all questions answered, copy given to patient. Dressings were applied to all wounds per M.D. Instructions at this visit.

## 2022-01-27 NOTE — PROGRESS NOTES
88 [facility] Progress Note    Del Santoyo     : 1960    DATE OF VISIT:  2022    Subjective:     Del Santoyo is a 59 y.o. male who has a pressure ulcer located on the foot (left , lateral, forefoot). Significant symptoms or pertinent wound history since last visit: feeling well overall, minimal pain, mild-mod drainage, a bit of a pruritic rash still, no fever, no falls, feels about the same in his current shoe. Additional ulcer(s) noted? no. That medial diabetic ulcer looks delicately healed today. His current medication list consists of Dulaglutide, Multiple Vitamin, Multiple Vitamins-Minerals, amLODIPine, atorvastatin, fish oil, hydroCHLOROthiazide, insulin glargine, metFORMIN, tamsulosin, and triamcinolone. Allergies: Patient has no known allergies.     Objective:     Vitals:    22 0854   BP: (!) 159/74   Pulse: 63   Resp: 18   Temp: 97.3 °F (36.3 °C)   TempSrc: Oral   Weight: 244 lb (110.7 kg)   Height: 6' 1\" (1.854 m)     Constitutional:  well-developed, well-nourished, NAD   Cardiovascular:  bilateral pedal pulses palpable, feet warm, brisk cap refill; very mild left lower extremity edema  Lymphatic:  no inguinal or popliteal adenopathy, no angitis, no cellulitis  Musculoskeletal:  no clubbing, cyanosis or petechiae; RLE and LLE with no gross effusions, joint misalignment or acute arthritis  Elicia-ulcer skin: pink and indurated; a bit of persistent contact dermatitis again  Ulcer(s):  medial diabetic ulcer delicately healed; lateral pressure ulcer a bit smaller, mostly granular, still a bit of distal SQ fibronecrosis, fibrin and biofilm, one edge with stubborn epibole and a couple of mm of undermining Photos also saved in electronic chart    Today's wound measurements, per RN documentation:  [REMOVED] Wound 21 #1, left medial forefoot, Diabetic ulcer, jenkins 4, onset 2021-Wound Length (cm): 0 cm  Wound 21 #3, left lateral foot, pressure ulcer, Stage 4, onset 9/2021-Wound Length (cm): 1.1 cm    [REMOVED] Wound 09/16/21 #1, left medial forefoot, Diabetic ulcer, hercules 4, onset 8/25/2021-Wound Width (cm): 0 cm  Wound 09/16/21 #3, left lateral foot, pressure ulcer, Stage 4, onset 9/2021-Wound Width (cm): 0.5 cm    [REMOVED] Wound 09/16/21 #1, left medial forefoot, Diabetic ulcer, hercules 4, onset 8/25/2021-Wound Depth (cm): 0 cm  Wound 09/16/21 #3, left lateral foot, pressure ulcer, Stage 4, onset 9/2021-Wound Depth (cm): 0.2 cm    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 2 diabetes mellitus, with fat layer exposed ([facility] [address] E11.621, E21.497    Diabetic polyneuropathy associated with type 2 diabetes mellitus (HCC) E11.42    Elevated PSA R97.20    Mixed hyperlipidemia E78.2    Stage 3a chronic kidney disease (HCC) N18.31    Pressure ulcer of left lateral forefoot, stage 4 (HCC) L89.894    Chronic low back pain M54.50, G89.29    Dental bridge present Z97.2    Callus of foot L84    Hyperkalemia E87.5    Hypergranulation L92.9       Assessment of today's active condition(s): well controlled DM2, neuropathy, no signs of large-vessel PAD; delicately healed Hercules 4 diabetic ulcer, healing stage 4 lateral forefoot pressure ulcer, no signs of infection. Factors contributing to occurrence and/or persistence of the chronic ulcer include edema, diabetes, shear force, obesity and decreased tissue oxygenation. Medical necessity of today's visit is shown by the above documentation. Sharp debridement is indicated today, based upon the exam findings in the wound(s) above. Procedure note:     Consent obtained.  Time out performed per [facility] policy. Anesthetic  Anesthetic: 4% Lidocaine Cream     Using a curette and scissors, I sharply debrided the foot (left ) ulcer(s) down through and including the removal of subcutaneous tissue. The type(s) of tissue debrided included fibrin, biofilm, necrotic/eschar and one edge with epibole. Total Surface Area Debrided: 1 sq cm. The ulcers were then irrigated with normal saline solution. The procedure was completed with a small amount of bleeding, and hemostasis was with pressure. The patient tolerated the procedure well, with no significant complications. The patient's level of pain during and after the procedure was monitored. Post-debridement measurements, if different from pre-debridement, are in the flowsheet as well. Discharge plan:     Treatment in the wound care center today, per RN documentation: Britany Synel Wound 09/16/21 #1, left medial forefoot, Diabetic ulcer, jenkins 4, onset 8/25/2021-Dressing/Treatment: Other (comment) (antibioitc ointment benzoin mepilex border)  Wound 09/16/21 #3, left lateral foot, pressure ulcer, Stage 4, onset 9/2021-Dressing/Treatment: Other (comment) (collagen benzoin mepilex border). Keep dressings in place for the week. Continue current modified surgical shoe. Planning to refer to  for long-term inserts and shoes once healed. Keep up the great work with glucoses and protein intake. Written discharge instructions given to patient. Follow up in 1 week.     Electronically signed by Brodie Solomon MD on 1/27/2022 at 11:53 AM.

## 2022-02-02 ENCOUNTER — HOSPITAL ENCOUNTER (OUTPATIENT)
Dept: WOUND CARE | Age: 62
Discharge: HOME | End: 2022-02-02
Payer: MEDICARE

## 2022-02-02 VITALS
SYSTOLIC BLOOD PRESSURE: 149 MMHG | DIASTOLIC BLOOD PRESSURE: 72 MMHG | HEART RATE: 63 BPM | TEMPERATURE: 97.8 F | RESPIRATION RATE: 18 BRPM | HEIGHT: 73 IN | BODY MASS INDEX: 31.94 KG/M2 | WEIGHT: 241 LBS

## 2022-02-02 DIAGNOSIS — L89.894 PRESSURE ULCER OF LEFT FOOT, STAGE 4 (HCC): ICD-10-CM

## 2022-02-02 DIAGNOSIS — L97.522 DIABETIC ULCER OF OTHER PART OF LEFT FOOT ASSOCIATED WITH TYPE 2 DIABETES MELLITUS, WITH FAT LAYER EXPOSED (HCC): Primary | ICD-10-CM

## 2022-02-02 DIAGNOSIS — E11.621 DIABETIC ULCER OF OTHER PART OF LEFT FOOT ASSOCIATED WITH TYPE 2 DIABETES MELLITUS, WITH FAT LAYER EXPOSED (HCC): Primary | ICD-10-CM

## 2022-02-02 PROCEDURE — 11042 DBRDMT SUBQ TIS 1ST 20SQCM/<: CPT | Performed by: INTERNAL MEDICINE

## 2022-02-02 PROCEDURE — 11042 DBRDMT SUBQ TIS 1ST 20SQCM/<: CPT

## 2022-02-02 RX ORDER — BACITRACIN ZINC AND POLYMYXIN B SULFATE 500; 1000 [USP'U]/G; [USP'U]/G
OINTMENT TOPICAL ONCE
Status: DISCONTINUED | OUTPATIENT
Start: 2022-02-02 | End: 2022-02-03 | Stop reason: HOSPADM

## 2022-02-02 RX ORDER — LIDOCAINE HYDROCHLORIDE 40 MG/ML
SOLUTION TOPICAL ONCE
Status: DISCONTINUED | OUTPATIENT
Start: 2022-02-02 | End: 2022-02-03 | Stop reason: HOSPADM

## 2022-02-02 RX ORDER — LIDOCAINE 40 MG/G
CREAM TOPICAL ONCE
Status: CANCELLED | OUTPATIENT
Start: 2022-02-02 | End: 2022-02-02

## 2022-02-02 RX ORDER — BACITRACIN ZINC AND POLYMYXIN B SULFATE 500; 1000 [USP'U]/G; [USP'U]/G
OINTMENT TOPICAL ONCE
Status: CANCELLED | OUTPATIENT
Start: 2022-02-02 | End: 2022-02-02

## 2022-02-02 RX ORDER — LIDOCAINE 50 MG/G
OINTMENT TOPICAL ONCE
Status: CANCELLED | OUTPATIENT
Start: 2022-02-02 | End: 2022-02-02

## 2022-02-02 RX ORDER — LIDOCAINE 40 MG/G
CREAM TOPICAL ONCE
Status: DISCONTINUED | OUTPATIENT
Start: 2022-02-02 | End: 2022-02-03 | Stop reason: HOSPADM

## 2022-02-02 RX ORDER — LIDOCAINE 50 MG/G
OINTMENT TOPICAL ONCE
Status: DISCONTINUED | OUTPATIENT
Start: 2022-02-02 | End: 2022-02-03 | Stop reason: HOSPADM

## 2022-02-02 RX ORDER — LIDOCAINE HYDROCHLORIDE 40 MG/ML
SOLUTION TOPICAL ONCE
Status: CANCELLED | OUTPATIENT
Start: 2022-02-02 | End: 2022-02-02

## 2022-02-02 ASSESSMENT — PAIN SCALES - GENERAL: PAINLEVEL_OUTOF10: 0

## 2022-02-02 NOTE — PLAN OF CARE
Medial foot wound remains healed, no dressing needed at this time, may apply moisturizer daily with Aquaphor or Vaseline. Lateral foot wound stable, debridement per MD & pt. Tolerated well. Will change to using Multidex & Puracol Ag to wound, cover with dry dressing, change once daily. Pt. Will purchase Multidex today. Pt. Using modified orthowedge shoe for off-loading. F/u in [facility] in 1 week as ordered, pt. Aware to call sooner with any changes or questions/concerns. Discharge instructions reviewed with patient & wife, all questions answered, copy given to patient. Dressings were applied to all wounds per M.D. Instructions at this visit.

## 2022-02-06 NOTE — PROGRESS NOTES
88 [facility] Progress Note    Del Domalika Shade     : 1960    DATE OF VISIT:  2022    Subjective:     Tiffany Pham is a 59 y.o. male who has a pressure ulcer located on the foot (left , lateral, forefoot). Significant symptoms or pertinent wound history since last visit: feeling ok, minimal pain, no pruritus, minimal swelling, modest drainage, no fever. Additional ulcer(s) noted? no. Medial diabetic ulcer healed. His current medication list consists of Dulaglutide, Multiple Vitamin, Multiple Vitamins-Minerals, amLODIPine, atorvastatin, fish oil, hydroCHLOROthiazide, insulin glargine, metFORMIN, tamsulosin, and triamcinolone. Allergies: Patient has no known allergies. Objective:     Vitals:    22 0905   BP: (!) 149/72   Pulse: 63   Resp: 18   Temp: 97.8 °F (36.6 °C)   TempSrc: Oral   Weight: 241 lb (109.3 kg)   Height: 6' 1\" (1.854 m)     Constitutional:  well-developed, well-nourished, NAD   Cardiovascular:  bilateral pedal pulses palpable, feet warm, brisk cap refill; very mild left lower extremity edema  Lymphatic:  no inguinal or popliteal adenopathy, no angitis, no cellulitis  Musculoskeletal:  no clubbing, cyanosis or petechiae; RLE and LLE with no gross effusions, joint misalignment or acute arthritis  Elicia-ulcer skin: pink and indurated; a bit of persistent contact dermatitis   Ulcer(s):  medial diabetic ulcer healed with hyperkeratosis; lateral pressure ulcer a bit smaller, mostly granular, still a bit of distal SQ fibronecrosis, fibrin and biofilm, one edge a bit steep, minimally undermined.  Photos also saved in electronic chart    Today's wound measurements, per RN documentation:  Wound 21 #3, left lateral foot, pressure ulcer, Stage 4, onset 2021-Wound Length (cm): 0.6 cm    Wound 21 #3, left lateral foot, pressure ulcer, Stage 4, onset 2021-Wound Width (cm): 0.5 cm    Wound 21 #3, left lateral foot, pressure ulcer, Stage 4, onset 9/2021-Wound Depth (cm): 0.2 cm    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 2 diabetes mellitus, with fat layer exposed ([facility] E11.621, W23.864    Diabetic polyneuropathy associated with type 2 diabetes mellitus (HCC) E11.42    Elevated PSA R97.20    Mixed hyperlipidemia E78.2    Stage 3a chronic kidney disease (HCC) N18.31    Pressure ulcer of left lateral forefoot, stage 4 (HCC) L89.894    Chronic low back pain M54.50, G89.29    Dental bridge present Z97.2    Callus of foot L84    Hyperkalemia E87.5    Hypergranulation L92.9       Assessment of today's active condition(s): well controlled DM2, neuropathy, healed Hercules 4 diabetic ulcer, healing stage 4 left lateral foot pressure ulcer, but it's moving a little slower than I'd like. Factors contributing to occurrence and/or persistence of the chronic ulcer include diabetes, chronic pressure and decreased tissue oxygenation. Medical necessity of today's visit is shown by the above documentation. Sharp debridement is indicated today, based upon the exam findings in the wound(s) above. Procedure note:     Consent obtained. Time out performed per [facility]. Anesthetic  Anesthetic: 4% Lidocaine Cream     Using a curette and #15 blade scalpel, I sharply debrided the foot (left , lateral, forefoot) ulcer(s) down through and including the removal of subcutaneous tissue. The type(s) of tissue debrided included fibrin, biofilm and necrotic/eschar. Total Surface Area Debrided: 1 sq cm. The ulcers were then irrigated with normal saline solution. The procedure was completed with a small amount of bleeding, and hemostasis was with pressure.  The patient tolerated the procedure well, with no significant complications. The patient's level of pain during and after the procedure was monitored. Post-debridement measurements, if different from pre-debridement, are in the flowsheet as well. Discharge plan:     Treatment in the wound care center today, per RN documentation: Wound 09/16/21 #3, left lateral foot, pressure ulcer, Stage 4, onset 9/2021-Dressing/Treatment: Other (comment) (purachol ag 4x4's kerlix ). Will go away from weekly dressings this week, hoping that adding Vashe and a new dressing every day or two can push granulation and closure a bit quicker; adding Multidex to collagen. Continue current offloading shoe. Home treatment: good handwashing before and after any dressing changes. Cleanse wound with saline or soap & water before dressing change. May use Vaseline (petrolatum), Aquaphor, Aveeno, CeraVe, Cetaphil, Eucerin, Lubriderm, etc for dry skin. Dressing type for home: Hypochlorous acid spray, Periwound zinc oxide, Multidex and Sarah, every day or two, as needed. Written discharge instructions given to patient. Follow up in 1 week.     Electronically signed by Evette Gomez MD on 2/6/2022 at 2:05 PM.

## 2022-02-09 ENCOUNTER — HOSPITAL ENCOUNTER (OUTPATIENT)
Dept: WOUND CARE | Age: 62
Discharge: HOME | End: 2022-02-09
Payer: MEDICARE

## 2022-02-09 VITALS
BODY MASS INDEX: 31.83 KG/M2 | DIASTOLIC BLOOD PRESSURE: 81 MMHG | WEIGHT: 240.2 LBS | RESPIRATION RATE: 18 BRPM | TEMPERATURE: 98.1 F | HEIGHT: 73 IN | HEART RATE: 72 BPM | SYSTOLIC BLOOD PRESSURE: 145 MMHG

## 2022-02-09 DIAGNOSIS — L89.894 PRESSURE ULCER OF LEFT FOOT, STAGE 4 (HCC): ICD-10-CM

## 2022-02-09 DIAGNOSIS — E11.621 DIABETIC ULCER OF OTHER PART OF LEFT FOOT ASSOCIATED WITH TYPE 2 DIABETES MELLITUS, WITH FAT LAYER EXPOSED (HCC): Primary | ICD-10-CM

## 2022-02-09 DIAGNOSIS — L97.522 DIABETIC ULCER OF OTHER PART OF LEFT FOOT ASSOCIATED WITH TYPE 2 DIABETES MELLITUS, WITH FAT LAYER EXPOSED (HCC): Primary | ICD-10-CM

## 2022-02-09 PROCEDURE — 11042 DBRDMT SUBQ TIS 1ST 20SQCM/<: CPT

## 2022-02-09 PROCEDURE — 11042 DBRDMT SUBQ TIS 1ST 20SQCM/<: CPT | Performed by: INTERNAL MEDICINE

## 2022-02-09 RX ORDER — LIDOCAINE 40 MG/G
CREAM TOPICAL ONCE
Status: DISCONTINUED | OUTPATIENT
Start: 2022-02-09 | End: 2022-02-10 | Stop reason: HOSPADM

## 2022-02-09 RX ORDER — LIDOCAINE HYDROCHLORIDE 40 MG/ML
SOLUTION TOPICAL ONCE
Status: CANCELLED | OUTPATIENT
Start: 2022-02-09 | End: 2022-02-09

## 2022-02-09 RX ORDER — BACITRACIN ZINC AND POLYMYXIN B SULFATE 500; 1000 [USP'U]/G; [USP'U]/G
OINTMENT TOPICAL ONCE
Status: DISCONTINUED | OUTPATIENT
Start: 2022-02-09 | End: 2022-02-10 | Stop reason: HOSPADM

## 2022-02-09 RX ORDER — LIDOCAINE 40 MG/G
CREAM TOPICAL ONCE
Status: CANCELLED | OUTPATIENT
Start: 2022-02-09 | End: 2022-02-09

## 2022-02-09 RX ORDER — LIDOCAINE 50 MG/G
OINTMENT TOPICAL ONCE
Status: DISCONTINUED | OUTPATIENT
Start: 2022-02-09 | End: 2022-02-10 | Stop reason: HOSPADM

## 2022-02-09 RX ORDER — LIDOCAINE 50 MG/G
OINTMENT TOPICAL ONCE
Status: CANCELLED | OUTPATIENT
Start: 2022-02-09 | End: 2022-02-09

## 2022-02-09 RX ORDER — BACITRACIN ZINC AND POLYMYXIN B SULFATE 500; 1000 [USP'U]/G; [USP'U]/G
OINTMENT TOPICAL ONCE
Status: CANCELLED | OUTPATIENT
Start: 2022-02-09 | End: 2022-02-09

## 2022-02-09 RX ORDER — LIDOCAINE HYDROCHLORIDE 40 MG/ML
SOLUTION TOPICAL ONCE
Status: DISCONTINUED | OUTPATIENT
Start: 2022-02-09 | End: 2022-02-10 | Stop reason: HOSPADM

## 2022-02-09 ASSESSMENT — PAIN SCALES - GENERAL
PAINLEVEL_OUTOF10: 0
PAINLEVEL_OUTOF10: 0

## 2022-02-09 NOTE — PLAN OF CARE
Wound stable & showing improvement, debridement per MD & pt. Tolerated well. Will cont. With current wound care regime with dressing changes. Pt. Using modified orthowedge shoe with off-loading. F/u in [facility] in 1 week as ordered, pt. Aware to call sooner with any changes or questions/concerns. Discharge instructions reviewed with patient & wife, all questions answered, copy given to patient. Dressings were applied to all wounds per M.D. Instructions at this visit.

## 2022-02-14 PROBLEM — E11.621 DIABETIC ULCER OF LEFT FOOT ASSOCIATED WITH TYPE 2 DIABETES MELLITUS, WITH FAT LAYER EXPOSED (HCC): Status: RESOLVED | Noted: 2021-09-16 | Resolved: 2022-02-14

## 2022-02-14 PROBLEM — L97.522 DIABETIC ULCER OF LEFT FOOT ASSOCIATED WITH TYPE 2 DIABETES MELLITUS, WITH FAT LAYER EXPOSED (HCC): Status: RESOLVED | Noted: 2021-09-16 | Resolved: 2022-02-14

## 2022-02-14 NOTE — PROGRESS NOTES
88 [facility] Progress Note    Del Lawler     : 1960    DATE OF VISIT:  2022    Subjective:     Del Lawler is a 59 y.o. male who has a diabetic and  pressure ulcer located on the foot (left , lateral, forefoot). Significant symptoms or pertinent wound history since last visit: feeling well overall, basically no pain, minimal residual rash, modest drainage, no F/C/D, not much drainage, did ok with dressing changes 5-6 days this week. Additional ulcer(s) noted? no. The medial diabetic ulcer is still healed. His current medication list consists of Dulaglutide, Multiple Vitamin, Multiple Vitamins-Minerals, amLODIPine, atorvastatin, fish oil, hydroCHLOROthiazide, insulin glargine, metFORMIN, tamsulosin, and triamcinolone. Allergies: Patient has no known allergies.     Objective:     Vitals:    22 0909   BP: (!) 145/81   Pulse: 72   Resp: 18   Temp: 98.1 °F (36.7 °C)   TempSrc: Oral   Weight: 240 lb 3.2 oz (109 kg)   Height: 6' 1\" (1.854 m)     Constitutional:  well-developed, well-nourished, NAD   Cardiovascular:  bilateral pedal pulses palpable, feet warm, brisk cap refill; very mild left lower extremity edema  Lymphatic:  no inguinal or popliteal adenopathy, no angitis, no cellulitis  Musculoskeletal:  no clubbing, cyanosis or petechiae; RLE and LLE with no gross effusions, joint misalignment or acute arthritis  Elicia-ulcer skin: pink and indurated; nearly resolved dorsal contact dermatitis   Ulcer(s):  medial diabetic ulcer healed with a bit of hyperkeratosis; lateral pressure ulcer a bit smaller, mostly granular, still a bit of distal SQ fibronecrosis, fibrin and biofilm, one edge a bit steep, minimally undermined - I do notice improvement from last week, with a change to more frequent dressing changes. Photos also saved in electronic chart    Today's wound measurements, per RN documentation:  Wound 21 #3, left lateral foot, pressure ulcer, Stage 4, onset 9/2021-Wound Length (cm): 0.7 cm    Wound 09/16/21 #3, left lateral foot, pressure ulcer, Stage 4, onset 9/2021-Wound Width (cm): 0.3 cm    Wound 09/16/21 #3, left lateral foot, pressure ulcer, Stage 4, onset 9/2021-Wound Depth (cm): 0.3 cm    Assessment:     Patient Active Problem List   Diagnosis Code    Hypertension I10    BPH (benign prostatic hyperplasia) N40.0    Osteoarthritis M19.90    Class 1 obesity due to excess calories with serious comorbidity and body mass index (BMI) of 31.0 to 31.9 in adult E66.09, Z68.31    Diabetic polyneuropathy associated with type 2 diabetes mellitus (HCC) E11.42    Elevated PSA R97.20    Mixed hyperlipidemia E78.2    Stage 3a chronic kidney disease (HCC) N18.31    Pressure ulcer of left lateral forefoot, stage 4 (HCC) L89.894    Chronic low back pain M54.50, G89.29    Dental bridge present Z97.2    Callus of foot L84    Hyperkalemia E87.5    Hypergranulation L92.9       Assessment of today's active condition(s): well controlled DM2, neuropathy, no large-vessel PAD, Hx of gas gangrene, partial ray amp, completed treatment for residual infection and the sizeable diabetic ulcer; now just a healing lateral foot pressure ulcer that started in the post-op period, I believe; looking better this week. Factors contributing to occurrence and/or persistence of the chronic ulcer include edema, diabetes, chronic pressure, shear force and decreased tissue oxygenation. Medical necessity of today's visit is shown by the above documentation. Sharp debridement is indicated today, based upon the exam findings in the wound(s) above. Procedure note:     Consent obtained. Time out performed per [facility] policy. Anesthetic  Anesthetic: 4% Lidocaine Cream     Using a curette and #15 blade scalpel, I sharply debrided the foot (left , lateral, forefoot) ulcer(s) down through and including the removal of subcutaneous tissue.  The type(s) of tissue debrided included fibrin, biofilm and necrotic/eschar. Total Surface Area Debrided: 1 sq cm. The ulcers were then irrigated with normal saline solution. The procedure was completed with a small amount of bleeding, and hemostasis was with pressure. The patient tolerated the procedure well, with no significant complications. The patient's level of pain during and after the procedure was monitored. Post-debridement measurements, if different from pre-debridement, are in the flowsheet as well. Discharge plan:     Treatment in the wound care center today, per RN documentation: Wound 09/16/21 #3, left lateral foot, pressure ulcer, Stage 4, onset 9/2021-Dressing/Treatment: Other (comment) (Purachol Ag, Small Border). Just a bit of Aquaphor to that healed medial site. A bit of OTC hydrocortisone to the dorsal rash PRN. Continue current OrthoWedge shoe with felt-foam modification. Home treatment: good handwashing before and after any dressing changes. Cleanse wound with saline or soap & water before dressing change. May use Vaseline (petrolatum), Aquaphor, Aveeno, CeraVe, Cetaphil, Eucerin, Lubriderm, etc for dry skin. Dressing type for home: Hypochlorous acid spray, Periwound zinc oxide, Multidex, Sarah and Dry cover dressing, once daily. Written discharge instructions given to patient. Follow up in 1 week.     Electronically signed by Jonnie Hudson MD on 2/14/2022 at 6:29 PM.

## 2022-02-16 ENCOUNTER — HOSPITAL ENCOUNTER (OUTPATIENT)
Dept: WOUND CARE | Age: 62
Discharge: HOME | End: 2022-02-16
Payer: MEDICARE

## 2022-02-16 VITALS
TEMPERATURE: 98 F | WEIGHT: 242.13 LBS | HEART RATE: 68 BPM | DIASTOLIC BLOOD PRESSURE: 73 MMHG | SYSTOLIC BLOOD PRESSURE: 152 MMHG | RESPIRATION RATE: 18 BRPM | HEIGHT: 73 IN | BODY MASS INDEX: 32.09 KG/M2

## 2022-02-16 DIAGNOSIS — L89.894 PRESSURE ULCER OF LEFT FOOT, STAGE 4 (HCC): Primary | ICD-10-CM

## 2022-02-16 PROCEDURE — 97597 DBRDMT OPN WND 1ST 20 CM/<: CPT

## 2022-02-16 PROCEDURE — 97597 DBRDMT OPN WND 1ST 20 CM/<: CPT | Performed by: INTERNAL MEDICINE

## 2022-02-16 RX ORDER — BACITRACIN ZINC AND POLYMYXIN B SULFATE 500; 1000 [USP'U]/G; [USP'U]/G
OINTMENT TOPICAL ONCE
Status: CANCELLED | OUTPATIENT
Start: 2022-02-16 | End: 2022-02-16

## 2022-02-16 RX ORDER — BACITRACIN ZINC AND POLYMYXIN B SULFATE 500; 1000 [USP'U]/G; [USP'U]/G
OINTMENT TOPICAL ONCE
Status: DISCONTINUED | OUTPATIENT
Start: 2022-02-16 | End: 2022-02-17 | Stop reason: HOSPADM

## 2022-02-16 RX ORDER — LIDOCAINE HYDROCHLORIDE 40 MG/ML
SOLUTION TOPICAL ONCE
Status: CANCELLED | OUTPATIENT
Start: 2022-02-16 | End: 2022-02-16

## 2022-02-16 RX ORDER — LIDOCAINE 50 MG/G
OINTMENT TOPICAL ONCE
Status: CANCELLED | OUTPATIENT
Start: 2022-02-16 | End: 2022-02-16

## 2022-02-16 RX ORDER — LIDOCAINE 40 MG/G
CREAM TOPICAL ONCE
Status: DISCONTINUED | OUTPATIENT
Start: 2022-02-16 | End: 2022-02-17 | Stop reason: HOSPADM

## 2022-02-16 RX ORDER — LIDOCAINE 40 MG/G
CREAM TOPICAL ONCE
Status: CANCELLED | OUTPATIENT
Start: 2022-02-16 | End: 2022-02-16

## 2022-02-16 RX ORDER — LIDOCAINE 50 MG/G
OINTMENT TOPICAL ONCE
Status: DISCONTINUED | OUTPATIENT
Start: 2022-02-16 | End: 2022-02-17 | Stop reason: HOSPADM

## 2022-02-16 RX ORDER — LIDOCAINE HYDROCHLORIDE 40 MG/ML
SOLUTION TOPICAL ONCE
Status: DISCONTINUED | OUTPATIENT
Start: 2022-02-16 | End: 2022-02-17 | Stop reason: HOSPADM

## 2022-02-16 ASSESSMENT — PAIN SCALES - GENERAL: PAINLEVEL_OUTOF10: 0

## 2022-02-16 NOTE — PLAN OF CARE
Wound stable & showing improvement, debridement per MD & Pt. Tolerated well. Will cont. With current wound care regime with Multidex, but change to using gauze & roll gauze to prevent adhesive on skin d/t rash. Pt. Using modified orthowedge shoe for off-loading. F/u in [facility] in 1 week as ordered, pt. Aware to call sooner with any changes or questions/concerns. Discharge instructions reviewed with patient, all questions answered, copy given to patient. Dressings were applied to all wounds per M.D. Instructions at this visit.

## 2022-02-22 NOTE — PROGRESS NOTES
88 [facility] Progress Note    Del Salcido     : 1960    DATE OF VISIT:  2022    Subjective:     Del Salcido is a 59 y.o. male who has a diabetic and  pressure ulcer located on the foot (left , lateral, forefoot). Significant symptoms or pertinent wound history since last visit: feeling well, no real foot pain, no F/C/D, mild pruritus, modest drainage. Additional ulcer(s) noted? no.  Medial DFU remains healed. His current medication list consists of Dulaglutide, Multiple Vitamin, Multiple Vitamins-Minerals, amLODIPine, atorvastatin, fish oil, hydroCHLOROthiazide, insulin glargine, metFORMIN, tamsulosin, and triamcinolone. Allergies: Patient has no known allergies.     Objective:     Vitals:    22 0924   BP: (!) 152/73   Pulse: 68   Resp: 18   Temp: 98 °F (36.7 °C)   TempSrc: Oral   Weight: 242 lb 2 oz (109.8 kg)   Height: 6' 1\" (1.854 m)     Constitutional:  well-developed, well-nourished, NAD   Cardiovascular:  bilateral pedal pulses palpable, feet warm, brisk cap refill; very mild left lower extremity edema  Lymphatic:  no inguinal or popliteal adenopathy, no angitis, no cellulitis  Musculoskeletal:  no clubbing, cyanosis or petechiae; RLE and LLE with no gross effusions, joint misalignment or acute arthritis  Elicia-ulcer skin: pink and indurated; nearly resolved dorsal contact dermatitis   Ulcer(s):  medial diabetic ulcer healed with a bit of hyperkeratosis; lateral pressure ulcer a bit smaller, mostly granular, still a bit of distal SQ fibrosis, fibrin and biofilm, one edge a bit steep, minimally undermined - I do notice improvement from last week, with a change to more frequent dressing changes. Photos also saved in electronic chart    Today's wound measurements, per RN documentation:  Wound 21 #3, left lateral foot, pressure ulcer, Stage 4, onset 2021-Wound Length (cm): 0.5 cm    Wound 21 #3, left lateral foot, pressure ulcer, Stage 4, onset 9/2021-Wound Width (cm): 0.3 cm    Wound 09/16/21 #3, left lateral foot, pressure ulcer, Stage 4, onset 9/2021-Wound Depth (cm): 0.3 cm    Assessment:     Patient Active Problem List   Diagnosis Code    Hypertension I10    BPH (benign prostatic hyperplasia) N40.0    Osteoarthritis M19.90    Class 1 obesity due to excess calories with serious comorbidity and body mass index (BMI) of 31.0 to 31.9 in adult E66.09, Z68.31    Diabetic polyneuropathy associated with type 2 diabetes mellitus (HCC) E11.42    Elevated PSA R97.20    Mixed hyperlipidemia E78.2    Stage 3a chronic kidney disease (HCC) N18.31    Pressure ulcer of left lateral forefoot, stage 4 (HCC) L89.894    Chronic low back pain M54.50, G89.29    Dental bridge present Z97.2    Callus of foot L84    Hyperkalemia E87.5    Hypergranulation L92.9       Assessment of today's active condition(s): well controlled DM2, neuropathy, no PAD, healed medial foot Hercules 4 ulcer, improving lateral foot pressure ulcer, no signs of infection or ischemia. Factors contributing to occurrence and/or persistence of the chronic ulcer include diabetes, chronic pressure, shear force and decreased tissue oxygenation. Medical necessity of today's visit is shown by the above documentation. Sharp debridement is indicated today, based upon the exam findings in the wound(s) above. Procedure note:     Consent obtained. Time out performed per [facility] policy. Anesthetic  Anesthetic: 4% Lidocaine Cream     Using a curette, I sharply debrided the foot (left , lateral, forefoot) ulcer(s) down through and including the removal of dermis. The type(s) of tissue debrided included fibrin, biofilm and necrotic/eschar. Total Surface Area Debrided: 1 sq cm. The ulcers were then irrigated with normal saline solution. The procedure was completed with a small amount of bleeding, and hemostasis was with pressure.  The patient tolerated the procedure well, with no significant complications. The patient's level of pain during and after the procedure was monitored. Post-debridement measurements, if different from pre-debridement, are in the flowsheet as well. Discharge plan:     Treatment in the wound care center today, per RN documentation: Wound 09/16/21 #3, left lateral foot, pressure ulcer, Stage 4, onset 9/2021-Dressing/Treatment: Other (comment) (purachol ag 4x4's sophie tape). Keep up with good work on glucoses. Continue short-term modified Darco shoe for offloading. Home treatment: good handwashing before and after any dressing changes. Cleanse wound with saline or soap & water before dressing change. May use Vaseline (petrolatum), Aquaphor, Aveeno, CeraVe, Cetaphil, Eucerin, Lubriderm, etc for dry skin. Dressing type for home: Hypochlorous acid spray, Periwound zinc oxide, Multidex, Sarah and Dry cover dressing, every day or two, as needed. Written discharge instructions given to patient. Follow up in 1 week.     Electronically signed by Leonie Walsh MD on 2/22/2022 at 4:30 PM.

## 2022-02-23 ENCOUNTER — HOSPITAL ENCOUNTER (OUTPATIENT)
Dept: WOUND CARE | Age: 62
Discharge: HOME | End: 2022-02-23
Payer: MEDICARE

## 2022-02-23 VITALS
RESPIRATION RATE: 18 BRPM | DIASTOLIC BLOOD PRESSURE: 88 MMHG | SYSTOLIC BLOOD PRESSURE: 170 MMHG | WEIGHT: 244 LBS | TEMPERATURE: 98.1 F | HEART RATE: 70 BPM | HEIGHT: 73 IN | BODY MASS INDEX: 32.34 KG/M2

## 2022-02-23 DIAGNOSIS — L89.894 PRESSURE ULCER OF LEFT FOOT, STAGE 4 (HCC): Primary | ICD-10-CM

## 2022-02-23 PROCEDURE — 97597 DBRDMT OPN WND 1ST 20 CM/<: CPT

## 2022-02-23 PROCEDURE — 97597 DBRDMT OPN WND 1ST 20 CM/<: CPT | Performed by: INTERNAL MEDICINE

## 2022-02-23 RX ORDER — LIDOCAINE 50 MG/G
OINTMENT TOPICAL ONCE
Status: CANCELLED | OUTPATIENT
Start: 2022-02-23 | End: 2022-02-23

## 2022-02-23 RX ORDER — BACITRACIN ZINC AND POLYMYXIN B SULFATE 500; 1000 [USP'U]/G; [USP'U]/G
OINTMENT TOPICAL ONCE
Status: CANCELLED | OUTPATIENT
Start: 2022-02-23 | End: 2022-02-23

## 2022-02-23 RX ORDER — LIDOCAINE HYDROCHLORIDE 40 MG/ML
SOLUTION TOPICAL ONCE
Status: CANCELLED | OUTPATIENT
Start: 2022-02-23 | End: 2022-02-23

## 2022-02-23 RX ORDER — LIDOCAINE 50 MG/G
OINTMENT TOPICAL ONCE
Status: DISCONTINUED | OUTPATIENT
Start: 2022-02-23 | End: 2022-02-24 | Stop reason: HOSPADM

## 2022-02-23 RX ORDER — LIDOCAINE 40 MG/G
CREAM TOPICAL ONCE
Status: DISCONTINUED | OUTPATIENT
Start: 2022-02-23 | End: 2022-02-24 | Stop reason: HOSPADM

## 2022-02-23 RX ORDER — LIDOCAINE 40 MG/G
CREAM TOPICAL ONCE
Status: CANCELLED | OUTPATIENT
Start: 2022-02-23 | End: 2022-02-23

## 2022-02-23 RX ORDER — BACITRACIN ZINC AND POLYMYXIN B SULFATE 500; 1000 [USP'U]/G; [USP'U]/G
OINTMENT TOPICAL ONCE
Status: DISCONTINUED | OUTPATIENT
Start: 2022-02-23 | End: 2022-02-24 | Stop reason: HOSPADM

## 2022-02-23 RX ORDER — LIDOCAINE HYDROCHLORIDE 40 MG/ML
SOLUTION TOPICAL ONCE
Status: DISCONTINUED | OUTPATIENT
Start: 2022-02-23 | End: 2022-02-24 | Stop reason: HOSPADM

## 2022-02-23 ASSESSMENT — PAIN SCALES - GENERAL: PAINLEVEL_OUTOF10: 0

## 2022-02-27 PROBLEM — L92.9 HYPERGRANULATION: Status: RESOLVED | Noted: 2022-01-16 | Resolved: 2022-02-27

## 2022-02-27 NOTE — PROGRESS NOTES
88 [facility] Progress Note    Gene Mac Schwab     : 1960    DATE OF VISIT:  2022    Subjective:     Gene Mac Schwab is a 59 y.o. male who has a diabetic and  pressure ulcer located on the foot (left , lateral, forefoot). Significant symptoms or pertinent wound history since last visit: feeling well, minimal pain, mild pruritus, no fever. Not wearing that modified surgical shoe all the time now, but wound healing seems to have slowed down this week. Additional ulcer(s) noted? no.      His current medication list consists of Dulaglutide, Multiple Vitamin, Multiple Vitamins-Minerals, amLODIPine, atorvastatin, fish oil, hydroCHLOROthiazide, insulin glargine, metFORMIN, tamsulosin, and triamcinolone. Allergies: Patient has no known allergies.     Objective:     Vitals:    22 0853   BP: (!) 170/88   Pulse: 70   Resp: 18   Temp: 98.1 °F (36.7 °C)   TempSrc: Oral   Weight: 244 lb (110.7 kg)   Height: 6' 1\" (1.854 m)     Constitutional:  well-developed, well-nourished, NAD   Cardiovascular:  bilateral pedal pulses palpable, feet warm, brisk cap refill; very mild left lower extremity edema  Lymphatic:  no inguinal or popliteal adenopathy, no angitis, no cellulitis  Musculoskeletal:  no clubbing, cyanosis or petechiae; RLE and LLE with no gross effusions, joint misalignment or acute arthritis  Elicia-ulcer skin: pink and indurated; nearly resolved dorsal contact dermatitis   Ulcer(s):  medial diabetic ulcer healed with a bit of hyperkeratosis; lateral pressure ulcer maybe just 1-2 mm larger this week, red, granular, fibrin, biofilm, no signs of deep structure exposure, no signs of infection, minimal undermining at one edge, borderline epibole there. Photos also saved in electronic chart    Today's wound measurements, per RN documentation:  Wound 21 #3, left lateral foot, pressure ulcer, Stage 4, onset 2021-Wound Length (cm): 0.7 cm (remeasured per MD)    Wound 21 #3, left lateral foot, pressure ulcer, Stage 4, onset 9/2021-Wound Width (cm): 0.4 cm    Wound 09/16/21 #3, left lateral foot, pressure ulcer, Stage 4, onset 9/2021-Wound Depth (cm): 0.2 cm    Assessment:     Patient Active Problem List   Diagnosis Code    Hypertension I10    BPH (benign prostatic hyperplasia) N40.0    Osteoarthritis M19.90    Class 1 obesity due to excess calories with serious comorbidity and body mass index (BMI) of 31.0 to 31.9 in adult E66.09, Z68.31    Diabetic polyneuropathy associated with type 2 diabetes mellitus (HCC) E11.42    Elevated PSA R97.20    Mixed hyperlipidemia E78.2    Stage 3a chronic kidney disease (HCC) N18.31    Pressure ulcer of left lateral forefoot, stage 4 (HCC) L89.894    Chronic low back pain M54.50, G89.29    Dental bridge present Z97.2    Callus of foot L84    Hyperkalemia E87.5       Assessment of today's active condition(s): DM2, neuropathy, healed Hercules 4 medial foot ulcer, slowly healing lateral foot pressure ulcer, a bit stagnated or even regressed this week, I think perhaps from offloading not being quite so consistent. Factors contributing to occurrence and/or persistence of the chronic ulcer include edema, diabetes, chronic pressure and shear force. Medical necessity of today's visit is shown by the above documentation. Sharp debridement is indicated today, based upon the exam findings in the wound(s) above. Procedure note:     Consent obtained. Time out performed per [facility] policy. Anesthetic  Anesthetic: 4% Lidocaine Cream     Using a curette, I sharply debrided the foot (left ) ulcer(s) down through and including the removal of dermis. The type(s) of tissue debrided included fibrin, biofilm and exudate. Total Surface Area Debrided: 1 sq cm. The ulcers were then irrigated with normal saline solution. The procedure was completed with a small amount of bleeding, and hemostasis was with pressure.  The patient tolerated the procedure well, with no significant complications. The patient's level of pain during and after the procedure was monitored. Post-debridement measurements, if different from pre-debridement, are in the flowsheet as well. Discharge plan:     Treatment in the wound care center today, per RN documentation: Wound 09/16/21 #3, left lateral foot, pressure ulcer, Stage 4, onset 9/2021-Dressing/Treatment: Other (comment) (puracol ag 2x2's sophie). Be sure to continue to wear that temporary offloading surgical shoe for now. Keep up the great work with glucoses. OTC hydrocortisone PRN if that contact dermatitis from dressing adhesive recurs. Home treatment: good handwashing before and after any dressing changes. Cleanse wound with saline or soap & water before dressing change. May use Vaseline (petrolatum), Aquaphor, Aveeno, CeraVe, Cetaphil, Eucerin, Lubriderm, etc for dry skin. Dressing type for home: Hypochlorous acid spray, Periwound zinc oxide, Multidex, Sarah and Dry cover dressing, every day or two, as needed. Written discharge instructions given to patient. Follow up in 2 weeks, but call sooner with concerns or questions.     Electronically signed by Samantha Gonzales MD on 2/27/2022 at 5:00 PM.

## 2022-03-09 ENCOUNTER — HOSPITAL ENCOUNTER (OUTPATIENT)
Dept: WOUND CARE | Age: 62
Discharge: HOME | End: 2022-03-09
Payer: MEDICARE

## 2022-03-09 VITALS
HEIGHT: 73 IN | TEMPERATURE: 97.4 F | WEIGHT: 240 LBS | BODY MASS INDEX: 31.81 KG/M2 | DIASTOLIC BLOOD PRESSURE: 85 MMHG | RESPIRATION RATE: 18 BRPM | HEART RATE: 78 BPM | SYSTOLIC BLOOD PRESSURE: 160 MMHG

## 2022-03-09 DIAGNOSIS — L89.894 PRESSURE ULCER OF LEFT FOOT, STAGE 4 (HCC): Primary | ICD-10-CM

## 2022-03-09 PROCEDURE — 97597 DBRDMT OPN WND 1ST 20 CM/<: CPT

## 2022-03-09 PROCEDURE — 97597 DBRDMT OPN WND 1ST 20 CM/<: CPT | Performed by: INTERNAL MEDICINE

## 2022-03-09 RX ORDER — LIDOCAINE 50 MG/G
OINTMENT TOPICAL ONCE
Status: DISCONTINUED | OUTPATIENT
Start: 2022-03-09 | End: 2022-03-10 | Stop reason: HOSPADM

## 2022-03-09 RX ORDER — LIDOCAINE 40 MG/G
CREAM TOPICAL ONCE
Status: CANCELLED | OUTPATIENT
Start: 2022-03-09 | End: 2022-03-09

## 2022-03-09 RX ORDER — LIDOCAINE HYDROCHLORIDE 40 MG/ML
SOLUTION TOPICAL ONCE
Status: CANCELLED | OUTPATIENT
Start: 2022-03-09 | End: 2022-03-09

## 2022-03-09 RX ORDER — LIDOCAINE 50 MG/G
OINTMENT TOPICAL ONCE
Status: CANCELLED | OUTPATIENT
Start: 2022-03-09 | End: 2022-03-09

## 2022-03-09 RX ORDER — BACITRACIN ZINC AND POLYMYXIN B SULFATE 500; 1000 [USP'U]/G; [USP'U]/G
OINTMENT TOPICAL ONCE
Status: CANCELLED | OUTPATIENT
Start: 2022-03-09 | End: 2022-03-09

## 2022-03-09 RX ORDER — LIDOCAINE HYDROCHLORIDE 40 MG/ML
SOLUTION TOPICAL ONCE
Status: DISCONTINUED | OUTPATIENT
Start: 2022-03-09 | End: 2022-03-10 | Stop reason: HOSPADM

## 2022-03-09 RX ORDER — LIDOCAINE 40 MG/G
CREAM TOPICAL ONCE
Status: DISCONTINUED | OUTPATIENT
Start: 2022-03-09 | End: 2022-03-10 | Stop reason: HOSPADM

## 2022-03-09 RX ORDER — BACITRACIN ZINC AND POLYMYXIN B SULFATE 500; 1000 [USP'U]/G; [USP'U]/G
OINTMENT TOPICAL ONCE
Status: DISCONTINUED | OUTPATIENT
Start: 2022-03-09 | End: 2022-03-10 | Stop reason: HOSPADM

## 2022-03-09 ASSESSMENT — PAIN SCALES - GENERAL: PAINLEVEL_OUTOF10: 0

## 2022-03-09 NOTE — PLAN OF CARE
Wound stable & showing improvement, debridement per MD & pt. Tolerated well. Will cont. With current wound care regime with dressings. Pt. To cont. Wearing his modified orthowedge shoe for off-loading. Dr Tereso Jimenez will fax information to [facility] for pt. To get new custom diabetic shoes & inserts. [facility] contact information provided. F/u in [facility] in 2 weeks as ordered, pt. Aware to call sooner with any changes or questions/concerns. Discharge instructions reviewed with patient & wife, all questions answered, copy given to patient. Dressings were applied to all wounds per M.D. Instructions at this visit.

## 2022-03-13 NOTE — PROGRESS NOTES
[facility] Progress Note    Del Corea     : 1960    DATE OF VISIT:  3/9/2022    Subjective:     Del Corea is a 59 y.o. male who has a diabetic and  pressure ulcer located on the foot (left , lateral, forefoot). Significant symptoms or pertinent wound history since last visit: feeing ok overall, no fever, no falls, mild pruritus, minimal pain, doing ok in temporary shoe. 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 Additional ulcer(s) noted? no.      His current medication list consists of Dulaglutide, Multiple Vitamin, Multiple Vitamins-Minerals, amLODIPine, atorvastatin, fish oil, hydroCHLOROthiazide, insulin glargine, metFORMIN, tamsulosin, and triamcinolone. Allergies: Patient has no known allergies.     Objective:     Vitals:    22 0854 22 0900   BP:  (!) 160/85   Pulse:  78   Resp: 18    Temp: 97.4 °F (36.3 °C)    TempSrc: Oral    Weight:  240 lb (108.9 kg)   Height:  6' 1\" (1.854 m)     Constitutional:  well-developed, well-nourished, NAD   Cardiovascular:  bilateral pedal pulses palpable, feet warm, brisk cap refill; very mild left lower extremity edema  Lymphatic:  no inguinal or popliteal adenopathy, no angitis, no cellulitis  Musculoskeletal:  no clubbing, cyanosis or petechiae; RLE and LLE with no gross effusions, joint misalignment or acute arthritis  Elicia-ulcer skin: pink and indurated; nearly resolved dorsal contact dermatitis   Ulcer(s):  medial diabetic ulcer healed with a less hyperkeratosis; lateral pressure ulcer a bit smaller again this week, red, granular, fibrin, biofilm, no signs of deep structure exposure, no signs of infection, minimal epibole and steepness at one edge. Photos also saved in electronic chart    Today's wound measurements, per RN documentation:  Wound 09/16/21 #3, left lateral foot, pressure ulcer, Stage 4, onset 9/2021-Wound Length (cm): 0.5 cm    Wound 09/16/21 #3, left lateral foot, pressure ulcer, Stage 4, onset 9/2021-Wound Width (cm): 0.1 cm    Wound 09/16/21 #3, left lateral foot, pressure ulcer, Stage 4, onset 9/2021-Wound Depth (cm): 0.3 cm    Assessment:     Patient Active Problem List   Diagnosis Code    Hypertension I10    BPH (benign prostatic hyperplasia) N40.0    Osteoarthritis M19.90    Class 1 obesity due to excess calories with serious comorbidity and body mass index (BMI) of 31.0 to 31.9 in adult E66.09, Z68.31    Diabetic polyneuropathy associated with type 2 diabetes mellitus (HCC) E11.42    Elevated PSA R97.20    Mixed hyperlipidemia E78.2    Stage 3a chronic kidney disease (HCC) N18.31    Pressure ulcer of left lateral forefoot, stage 4 (HCC) L89.894    Chronic low back pain M54.50, G89.29    Dental bridge present Z97.2    Callus of foot L84    Hyperkalemia E87.5       Assessment of today's active condition(s): well controlled DM2, neuropathy, no PAD; Hx Hercules 4 diabetic foot, also post-op lateral forefoot pressure ulcer, the former healed, the latter getting close. Factors contributing to occurrence and/or persistence of the chronic ulcer include edema, diabetes, chronic pressure and shear force. Medical necessity of today's visit is shown by the above documentation. Sharp debridement is indicated today, based upon the exam findings in the wound(s) above. Procedure note:     Consent obtained. Time out performed per [facility] policy. Anesthetic  Anesthetic: 4% Lidocaine Cream     Using a #15 blade scalpel, I sharply debrided the foot (left , lateral, forefoot) ulcer(s) down through and including the removal of dermis. The type(s) of tissue debrided included fibrin and biofilm. Total Surface Area Debrided: 1 sq cm. The ulcers were then irrigated with normal saline solution.  The procedure was completed with a small amount of bleeding, and hemostasis was with pressure. The patient tolerated the procedure well, with no significant complications. The patient's level of pain during and after the procedure was monitored. Post-debridement measurements, if different from pre-debridement, are in the flowsheet as well. Discharge plan:     Treatment in the wound care center today, per RN documentation: Wound 09/16/21 #3, left lateral foot, pressure ulcer, Stage 4, onset 9/2021-Dressing/Treatment: Other (comment) (Vashe,Puracol Ag,bandaid). I'll send paperwork to  re: diabetic shoes and inserts for him. Keep using temporary shoe for now. Keep up the good work with glucoses. Home treatment: good handwashing before and after any dressing changes. Cleanse wound with saline or soap & water before dressing change. May use Vaseline (petrolatum), Aquaphor, Aveeno, CeraVe, Cetaphil, Eucerin, Lubriderm, etc for dry skin. Dressing type for home: Hypochlorous acid spray, Periwound zinc oxide, Multidex, Sarah and Dry cover dressing, every day or two, as needed. Written discharge instructions given to patient. Follow up in 2 weeks.     Electronically signed by Soraya Watson MD on 3/13/2022 at 3:51 PM.

## 2022-03-23 ENCOUNTER — HOSPITAL ENCOUNTER (OUTPATIENT)
Dept: WOUND CARE | Age: 62
Discharge: HOME | End: 2022-03-23
Payer: MEDICARE

## 2022-03-23 VITALS
HEART RATE: 69 BPM | WEIGHT: 243 LBS | BODY MASS INDEX: 32.2 KG/M2 | RESPIRATION RATE: 20 BRPM | TEMPERATURE: 96.8 F | DIASTOLIC BLOOD PRESSURE: 71 MMHG | HEIGHT: 73 IN | SYSTOLIC BLOOD PRESSURE: 134 MMHG

## 2022-03-23 DIAGNOSIS — L89.894 PRESSURE ULCER OF LEFT FOOT, STAGE 4 (HCC): Primary | ICD-10-CM

## 2022-03-23 PROCEDURE — 97597 DBRDMT OPN WND 1ST 20 CM/<: CPT

## 2022-03-23 PROCEDURE — 97597 DBRDMT OPN WND 1ST 20 CM/<: CPT | Performed by: INTERNAL MEDICINE

## 2022-03-23 RX ORDER — BACITRACIN ZINC AND POLYMYXIN B SULFATE 500; 1000 [USP'U]/G; [USP'U]/G
OINTMENT TOPICAL ONCE
Status: DISCONTINUED | OUTPATIENT
Start: 2022-03-23 | End: 2022-03-24 | Stop reason: HOSPADM

## 2022-03-23 RX ORDER — LIDOCAINE HYDROCHLORIDE 40 MG/ML
SOLUTION TOPICAL ONCE
Status: DISCONTINUED | OUTPATIENT
Start: 2022-03-23 | End: 2022-03-24 | Stop reason: HOSPADM

## 2022-03-23 RX ORDER — LIDOCAINE 40 MG/G
CREAM TOPICAL ONCE
Status: CANCELLED | OUTPATIENT
Start: 2022-03-23 | End: 2022-03-23

## 2022-03-23 RX ORDER — BACITRACIN ZINC AND POLYMYXIN B SULFATE 500; 1000 [USP'U]/G; [USP'U]/G
OINTMENT TOPICAL ONCE
Status: CANCELLED | OUTPATIENT
Start: 2022-03-23 | End: 2022-03-23

## 2022-03-23 RX ORDER — LIDOCAINE 40 MG/G
CREAM TOPICAL ONCE
Status: DISCONTINUED | OUTPATIENT
Start: 2022-03-23 | End: 2022-03-24 | Stop reason: HOSPADM

## 2022-03-23 RX ORDER — LIDOCAINE HYDROCHLORIDE 40 MG/ML
SOLUTION TOPICAL ONCE
Status: CANCELLED | OUTPATIENT
Start: 2022-03-23 | End: 2022-03-23

## 2022-03-23 RX ORDER — LIDOCAINE 50 MG/G
OINTMENT TOPICAL ONCE
Status: CANCELLED | OUTPATIENT
Start: 2022-03-23 | End: 2022-03-23

## 2022-03-23 RX ORDER — LIDOCAINE 50 MG/G
OINTMENT TOPICAL ONCE
Status: DISCONTINUED | OUTPATIENT
Start: 2022-03-23 | End: 2022-03-24 | Stop reason: HOSPADM

## 2022-03-23 ASSESSMENT — PAIN SCALES - GENERAL
PAINLEVEL_OUTOF10: 0
PAINLEVEL_OUTOF10: 0

## 2022-03-23 NOTE — PLAN OF CARE
Left lateral foot wound appears too dry, debridement per MD & pt. Tolerated well. Will stop collagen, change to just using Multidex or antibiotic ointment if remains too dry with Multidex. New wound noted on right medial 3rd toe, no debridement, use xeroform & cast pad to wound, change daily. Pt. Did go to [facility] & awaiting new diabetic shoes & inserts. F/u in [facility] in 1 week as ordered, pt. Aware to call sooner with any changes or questions/concerns. Discharge instructions reviewed with patient & wife, all questions answered, copy given to patient. Dressings were applied to all wounds per M.D. Instructions at this visit.

## 2022-03-29 NOTE — PROGRESS NOTES
[facility] Progress Note    Del Lowry     : 1960    DATE OF VISIT:  3/23/2022    Subjective:     Del Lowry is a 59 y.o. male who has a diabetic and  pressure ulcer located on the foot (left , lateral, forefoot). Significant symptoms or pertinent wound history since last visit: feeling ok overall, little foot pain, no pruritus, no fever, no falls. Went to [facility], had impressions taken for orthotics and shoes, hoping to get them in a couple of weeks. They thought the left foot ulcer was potentially healed in the last couple of days, so haven't put the same dressing materials back on, but it's still open, and now just dried out. Additional ulcer(s) noted? yes. He has a bit of a lesser toe deformity on the right side, and tried to wear one of those silicone toe separator devices, but inadvertently caused a small area of superficial skin necrosis on that toe. His current medication list consists of Dulaglutide, Multiple Vitamin, Multiple Vitamins-Minerals, amLODIPine, atorvastatin, fish oil, hydroCHLOROthiazide, insulin glargine, metFORMIN, tamsulosin, and triamcinolone. Allergies: Patient has no known allergies.     Objective:     Vitals:    22 0905 22 0920   BP:  134/71   Pulse:  69   Resp:  20   Temp: 96.8 °F (36 °C)    TempSrc: Oral    Weight:  243 lb (110.2 kg)   Height:  6' 1\" (1.854 m)     Constitutional:  well-developed, well-nourished, NAD   Cardiovascular:  bilateral pedal pulses palpable, feet warm, brisk cap refill; very mild left lower extremity edema  Lymphatic:  no inguinal or popliteal adenopathy, no angitis, no cellulitis  Musculoskeletal:  no clubbing, cyanosis or petechiae; RLE and LLE with no gross effusions, joint misalignment or acute arthritis  Elicia-ulcer skin: pink and indurated; nearly resolved dorsal contact dermatitis   Ulcer(s):  medial diabetic ulcer healed with a less hyperkeratosis; lateral pressure ulcer seemed perhaps healed at first glance, but it's just got some hypekeratosis and mild epibole, dryness of wound bed with some fibrin and presumed biofilm, probably not really changed in size from 2 weeks ago; right lesser toe with a superficial area of pressure necrosis and slight moisture, stage 2. Photos also saved in electronic chart    Today's wound measurements, per RN documentation:  Wound 09/16/21 #3, left lateral foot, pressure ulcer, Stage 4, onset 9/2021-Wound Length (cm): 0 cm  Wound 03/23/22 #4 right 3rd Toe, Pressure, Stage 2, Onset 03/22/2022-Wound Length (cm): 1.4 cm    Wound 09/16/21 #3, left lateral foot, pressure ulcer, Stage 4, onset 9/2021-Wound Width (cm): 0 cm  Wound 03/23/22 #4 right 3rd Toe, Pressure, Stage 2, Onset 03/22/2022-Wound Width (cm): 0.6 cm    Wound 09/16/21 #3, left lateral foot, pressure ulcer, Stage 4, onset 9/2021-Wound Depth (cm): 0 cm  Wound 03/23/22 #4 right 3rd Toe, Pressure, Stage 2, Onset 03/22/2022-Wound Depth (cm): 0.1 cm    Assessment:     Patient Active Problem List   Diagnosis Code    Hypertension I10    BPH (benign prostatic hyperplasia) N40.0    Osteoarthritis M19.90    Class 1 obesity due to excess calories with serious comorbidity and body mass index (BMI) of 31.0 to 31.9 in adult E66.09, Z68.31    Diabetic polyneuropathy associated with type 2 diabetes mellitus (HCC) E11.42    Elevated PSA R97.20    Mixed hyperlipidemia E78.2    Stage 3a chronic kidney disease (HCC) N18.31    Pressure ulcer of left lateral forefoot, stage 4 (HCC) L89.894    Chronic low back pain M54.50, G89.29    Dental bridge present Z97.2    Callus of foot L84    Hyperkalemia E87.5       Assessment of today's active condition(s): well controlled DM2, neuropathy, no signs of PAD; healed left foot Hercules 4 diabetic ulcer, nearly healed lateral forefoot postop pressure ulcer, and a small new pressure / diabetic ulcer on a right toe, from a silicone toe spacer.  Factors contributing to occurrence and/or persistence of the chronic ulcer include diabetes, chronic pressure and shear force. Medical necessity of today's visit is shown by the above documentation. Sharp debridement is indicated today, based upon the exam findings in the wound(s) above. Procedure note:     Consent obtained. Time out performed per [facility]. Anesthetic  Anesthetic: 4% Lidocaine Cream     Using a curette and #15 blade scalpel, I sharply debrided the foot (left ) ulcer(s) down through and including the removal of dermis. The type(s) of tissue debrided included fibrin, biofilm, necrotic/eschar and callus. Total Surface Area Debrided: 1 sq cm. The ulcers were then irrigated with normal saline solution. The procedure was completed with a small amount of bleeding, and hemostasis was with pressure. The patient tolerated the procedure well, with no significant complications. The patient's level of pain during and after the procedure was monitored. Post-debridement measurements, if different from pre-debridement, are in the flowsheet as well. Discharge plan:     Treatment in the wound care center today, per RN documentation: Wound 09/16/21 #3, left lateral foot, pressure ulcer, Stage 4, onset 9/2021-Dressing/Treatment: Other (comment) (PSO, dry dressing)  Wound 03/23/22 #4 right 3rd Toe, Pressure, Stage 2, Onset 03/22/2022-Dressing/Treatment: Other (comment) (Xeroform, cast padding). Temporary offloading shoe on the left side for now, new diabetic shoes and inserts soon. Keep up the great work with glucose control. Once healed, will be sure he has long-term DPM follow-up. Home treatment: good handwashing before and after any dressing changes. Cleanse wound with saline or soap & water before dressing change. May use Vaseline (petrolatum), Aquaphor, Aveeno, CeraVe, Cetaphil, Eucerin, Lubriderm, etc for dry skin.      Dressing type for home: Xeroform and interdigital cast padding on the right side; Vashe, Multidex and a dry dressing (hold the collagen in case that contributed to the ulcer drying out) to the left side, once daily. Written discharge instructions given to patient. Follow up in 1 week.     Electronically signed by Frandy Gaitan MD on 3/29/2022 at 5:36 PM.

## 2022-03-30 ENCOUNTER — HOSPITAL ENCOUNTER (OUTPATIENT)
Dept: WOUND CARE | Age: 62
Discharge: HOME | End: 2022-03-30

## 2022-04-06 ENCOUNTER — HOSPITAL ENCOUNTER (OUTPATIENT)
Dept: WOUND CARE | Age: 62
Discharge: HOME | End: 2022-04-06
Payer: MEDICARE

## 2022-04-06 VITALS
SYSTOLIC BLOOD PRESSURE: 135 MMHG | HEART RATE: 69 BPM | RESPIRATION RATE: 16 BRPM | BODY MASS INDEX: 32.77 KG/M2 | WEIGHT: 247.25 LBS | DIASTOLIC BLOOD PRESSURE: 76 MMHG | HEIGHT: 73 IN | TEMPERATURE: 97.4 F

## 2022-04-06 DIAGNOSIS — L89.892 PRESSURE ULCER OF TOE OF RIGHT FOOT, STAGE 2 (HCC): ICD-10-CM

## 2022-04-06 DIAGNOSIS — L89.894 PRESSURE ULCER OF LEFT FOOT, STAGE 4 (HCC): Primary | ICD-10-CM

## 2022-04-06 PROCEDURE — 97597 DBRDMT OPN WND 1ST 20 CM/<: CPT

## 2022-04-06 PROCEDURE — 97597 DBRDMT OPN WND 1ST 20 CM/<: CPT | Performed by: INTERNAL MEDICINE

## 2022-04-06 RX ORDER — LIDOCAINE HYDROCHLORIDE 40 MG/ML
SOLUTION TOPICAL ONCE
Status: CANCELLED | OUTPATIENT
Start: 2022-04-06 | End: 2022-04-06

## 2022-04-06 RX ORDER — BACITRACIN ZINC AND POLYMYXIN B SULFATE 500; 1000 [USP'U]/G; [USP'U]/G
OINTMENT TOPICAL ONCE
Status: DISCONTINUED | OUTPATIENT
Start: 2022-04-06 | End: 2022-04-07 | Stop reason: HOSPADM

## 2022-04-06 RX ORDER — LIDOCAINE 40 MG/G
CREAM TOPICAL ONCE
Status: CANCELLED | OUTPATIENT
Start: 2022-04-06 | End: 2022-04-06

## 2022-04-06 RX ORDER — LIDOCAINE HYDROCHLORIDE 40 MG/ML
SOLUTION TOPICAL ONCE
Status: DISCONTINUED | OUTPATIENT
Start: 2022-04-06 | End: 2022-04-07 | Stop reason: HOSPADM

## 2022-04-06 RX ORDER — BACITRACIN ZINC AND POLYMYXIN B SULFATE 500; 1000 [USP'U]/G; [USP'U]/G
OINTMENT TOPICAL ONCE
Status: CANCELLED | OUTPATIENT
Start: 2022-04-06 | End: 2022-04-06

## 2022-04-06 RX ORDER — LIDOCAINE 40 MG/G
CREAM TOPICAL ONCE
Status: DISCONTINUED | OUTPATIENT
Start: 2022-04-06 | End: 2022-04-07 | Stop reason: HOSPADM

## 2022-04-06 RX ORDER — LIDOCAINE 50 MG/G
OINTMENT TOPICAL ONCE
Status: DISCONTINUED | OUTPATIENT
Start: 2022-04-06 | End: 2022-04-07 | Stop reason: HOSPADM

## 2022-04-06 RX ORDER — LIDOCAINE 50 MG/G
OINTMENT TOPICAL ONCE
Status: CANCELLED | OUTPATIENT
Start: 2022-04-06 | End: 2022-04-06

## 2022-04-06 ASSESSMENT — PAIN SCALES - GENERAL: PAINLEVEL_OUTOF10: 0

## 2022-04-06 NOTE — PLAN OF CARE
Left lateral foot wound almost healed, debridement per MD. Will have pt. Apply antibiotic ointment to wound, change daily. New right 2nd & 3rd toe wounds noted, Pt. States they think it was caused by a toe spacer. Debridement per MD & pt. Tolerated well. Will have pt. Start using Triad on toe wounds, cover with dry dressing, change daily. F/u in [address] [place] [address],[address] in 1 week as ordered, pt. Aware to call sooner with any changes or questions/concerns. Discharge instructions reviewed with patient & wife, all questions answered, copy given to patient. Dressings were applied to all wounds per M.D. Instructions at this visit.

## 2022-04-12 PROBLEM — L89.892 PRESSURE ULCER OF TOE OF RIGHT FOOT, STAGE 2 (HCC): Status: ACTIVE | Noted: 2022-04-12

## 2022-04-12 NOTE — PROGRESS NOTES
[facility] Progress Note    Del Lam     : 1960    DATE OF VISIT:  2022    Subjective:     Del Lam is a 59 y.o. male who has a diabetic and  pressure ulcer located on the foot (left , lateral, forefoot). Significant symptoms or pertinent wound history since last visit: feeling well, no real pain or pruritus, no fever, minimal drainage. Gets his new diabetic shoes and inserts today. Additional ulcer(s) noted? yes. Right 2nd and 3rd toe pressure ulcers from a recent silicone toe spacer    His current medication list consists of Dulaglutide, Multiple Vitamin, Multiple Vitamins-Minerals, amLODIPine, atorvastatin, fish oil, hydroCHLOROthiazide, insulin glargine, metFORMIN, tamsulosin, and triamcinolone. Allergies: Patient has no known allergies.     Objective:     Vitals:    22 0915   BP: 135/76   Pulse: 69   Resp: 16   Temp: 97.4 °F (36.3 °C)   TempSrc: Oral   Weight: 247 lb 4 oz (112.2 kg)   Height: 6' 1\" (1.854 m)     Constitutional:  well-developed, well-nourished, NAD   Cardiovascular:  bilateral pedal pulses palpable, feet warm, brisk cap refill; very mild left lower extremity edema  Lymphatic:  no inguinal or popliteal adenopathy, no angitis, no cellulitis  Musculoskeletal:  no clubbing, cyanosis or petechiae; RLE and LLE with no gross effusions, joint misalignment or acute arthritis  Elicia-ulcer skin: pink and indurated; nearly resolved dorsal contact dermatitis   Ulcer(s):  medial diabetic ulcer healed with less hyperkeratosis; lateral pressure ulcer this week does look smaller, modest depth, red granulation, a bit of fibrin, biofilm, minor epidermal edge necrosis; right 2nd and 3rd toe pressure ulcers, stage 2, a bit red-purple and mildly inflamed, starting to epithelialize, no signs of infection. Photos also saved in electronic chart    Today's wound measurements, per RN documentation:  Wound 22 #4 right 3rd Toe, Pressure, Stage 2, Onset 03/22/2022-Wound Length (cm): 1.5 cm  Wound 09/16/21 #3, left lateral foot, pressure ulcer, Stage 4, onset 9/2021-Wound Length (cm): 0.1 cm  Wound 04/06/22 #5 right 2nd toe, pressure, stage 2, 3/2022-Wound Length (cm): 1 cm    Wound 03/23/22 #4 right 3rd Toe, Pressure, Stage 2, Onset 03/22/2022-Wound Width (cm): 0.5 cm  Wound 09/16/21 #3, left lateral foot, pressure ulcer, Stage 4, onset 9/2021-Wound Width (cm): 0.2 cm  Wound 04/06/22 #5 right 2nd toe, pressure, stage 2, 3/2022-Wound Width (cm): 0.5 cm    Wound 03/23/22 #4 right 3rd Toe, Pressure, Stage 2, Onset 03/22/2022-Wound Depth (cm): 0.1 cm  Wound 09/16/21 #3, left lateral foot, pressure ulcer, Stage 4, onset 9/2021-Wound Depth (cm): 0.1 cm  Wound 04/06/22 #5 right 2nd toe, pressure, stage 2, 3/2022-Wound Depth (cm): 0.1 cm    Assessment:     Patient Active Problem List   Diagnosis Code    Hypertension I10    BPH (benign prostatic hyperplasia) N40.0    Osteoarthritis M19.90    Class 1 obesity due to excess calories with serious comorbidity and body mass index (BMI) of 31.0 to 31.9 in adult E66.09, Z68.31    Diabetic polyneuropathy associated with type 2 diabetes mellitus (HCC) E11.42    Elevated PSA R97.20    Mixed hyperlipidemia E78.2    Stage 3a chronic kidney disease (HCC) N18.31    Pressure ulcer of left lateral forefoot, stage 4 (HCC) L89.894    Chronic low back pain M54.50, G89.29    Dental bridge present Z97.2    Callus of foot L84    Hyperkalemia E87.5    Pressure ulcer of toe of right foot, stage 2 (HCC) O54.868       Assessment of today's active condition(s): well controlled DM2, neuropathy, no PAD; healed left medial foot Hercules 4 process, nearly healed lateral foot pressure ulcer, and at least stabilized right toe ulcers this week. Factors contributing to occurrence and/or persistence of the chronic ulcer include diabetes, chronic pressure, shear force and decreased tissue oxygenation.  Medical necessity of today's visit is shown by the above documentation. Sharp debridement is indicated today, based upon the exam findings in the wound(s) above. Procedure note:     Consent obtained. Time out performed per [facility]. Anesthetic  Anesthetic: 4% Lidocaine Cream     Using a #15 blade scalpel, I sharply debrided the foot (left ) ulcer(s) down through and including the removal of dermis. The type(s) of tissue debrided included fibrin, biofilm and necrotic/eschar. Total Surface Area Debrided: 1 sq cm. The ulcers were then irrigated with normal saline solution. The procedure was completed with a small amount of bleeding, and hemostasis was with pressure. The patient tolerated the procedure well, with no significant complications. The patient's level of pain during and after the procedure was monitored. Post-debridement measurements, if different from pre-debridement, are in the flowsheet as well. Discharge plan:     Treatment in the wound care center today, per RN documentation: Wound 03/23/22 #4 right 3rd Toe, Pressure, Stage 2, Onset 03/22/2022-Dressing/Treatment: Other (comment) (triad 4x4)  Wound 09/16/21 #3, left lateral foot, pressure ulcer, Stage 4, onset 9/2021-Dressing/Treatment: Other (comment) (antibiotic 4x4's sophie)  Wound 04/06/22 #5 right 2nd toe, pressure, stage 2, 3/2022-Dressing/Treatment: Other (comment) (Triad 4x4). When he gets his new diabetic shoes and inserts, be sure to break them in slowly, with short periods of wear time at first, checking his feet carefully for new areas of redness, blisters, etc.     Home treatment: good handwashing before and after any dressing changes. Cleanse wound with saline or soap & water before dressing change. May use Vaseline (petrolatum), Aquaphor, Aveeno, CeraVe, Cetaphil, Eucerin, Lubriderm, etc for dry skin. Dressing type for home: Vashe, then as above, once daily. Written discharge instructions given to patient. Follow up in 1 week.     Electronically signed by Regina Hinton MD Boris on 4/12/2022 at 9:49 AM.

## 2022-04-13 ENCOUNTER — HOSPITAL ENCOUNTER (OUTPATIENT)
Dept: WOUND CARE | Age: 62
Discharge: HOME | End: 2022-04-13
Payer: MEDICARE

## 2022-04-13 VITALS
SYSTOLIC BLOOD PRESSURE: 155 MMHG | RESPIRATION RATE: 16 BRPM | DIASTOLIC BLOOD PRESSURE: 66 MMHG | WEIGHT: 241.6 LBS | TEMPERATURE: 97.9 F | HEART RATE: 67 BPM | BODY MASS INDEX: 32.02 KG/M2 | HEIGHT: 73 IN

## 2022-04-13 DIAGNOSIS — L89.894 PRESSURE ULCER OF LEFT FOOT, STAGE 4 (HCC): Primary | ICD-10-CM

## 2022-04-13 PROCEDURE — 11042 DBRDMT SUBQ TIS 1ST 20SQCM/<: CPT | Performed by: INTERNAL MEDICINE

## 2022-04-13 PROCEDURE — 11042 DBRDMT SUBQ TIS 1ST 20SQCM/<: CPT

## 2022-04-13 RX ORDER — LIDOCAINE HYDROCHLORIDE 40 MG/ML
SOLUTION TOPICAL ONCE
Status: CANCELLED | OUTPATIENT
Start: 2022-04-13 | End: 2022-04-13

## 2022-04-13 RX ORDER — LIDOCAINE 50 MG/G
OINTMENT TOPICAL ONCE
Status: DISCONTINUED | OUTPATIENT
Start: 2022-04-13 | End: 2022-04-14 | Stop reason: HOSPADM

## 2022-04-13 RX ORDER — LIDOCAINE HYDROCHLORIDE 40 MG/ML
SOLUTION TOPICAL ONCE
Status: DISCONTINUED | OUTPATIENT
Start: 2022-04-13 | End: 2022-04-14 | Stop reason: HOSPADM

## 2022-04-13 RX ORDER — LIDOCAINE 50 MG/G
OINTMENT TOPICAL ONCE
Status: CANCELLED | OUTPATIENT
Start: 2022-04-13 | End: 2022-04-13

## 2022-04-13 RX ORDER — BACITRACIN ZINC AND POLYMYXIN B SULFATE 500; 1000 [USP'U]/G; [USP'U]/G
OINTMENT TOPICAL ONCE
Status: CANCELLED | OUTPATIENT
Start: 2022-04-13 | End: 2022-04-13

## 2022-04-13 RX ORDER — BACITRACIN ZINC AND POLYMYXIN B SULFATE 500; 1000 [USP'U]/G; [USP'U]/G
OINTMENT TOPICAL ONCE
Status: DISCONTINUED | OUTPATIENT
Start: 2022-04-13 | End: 2022-04-14 | Stop reason: HOSPADM

## 2022-04-13 RX ORDER — LIDOCAINE 40 MG/G
CREAM TOPICAL ONCE
Status: CANCELLED | OUTPATIENT
Start: 2022-04-13 | End: 2022-04-13

## 2022-04-13 RX ORDER — LIDOCAINE 40 MG/G
CREAM TOPICAL ONCE
Status: DISCONTINUED | OUTPATIENT
Start: 2022-04-13 | End: 2022-04-14 | Stop reason: HOSPADM

## 2022-04-13 ASSESSMENT — PAIN SCALES - GENERAL
PAINLEVEL_OUTOF10: 0
PAINLEVEL_OUTOF10: 0

## 2022-04-13 NOTE — PLAN OF CARE
Left lateral foot wound healed this week, no dressing needed. Right toe wounds debrided per MD & pt. Tolerated well. Will cont. With current wound care regime with dressings. Pt. States he did get his new diabetic shoes & inserts. F/u in [address] [place] [address],[address] in 1 week as ordered, pt. Aware to call sooner with any changes or questions/concerns. Discharge instructions reviewed with patient & wife, all questions answered, copy given to patient. Dressings were applied to all wounds per M.D. Instructions at this visit.

## 2022-04-19 PROBLEM — L89.893 PRESSURE ULCER OF TOE OF RIGHT FOOT, STAGE 3 (HCC): Status: ACTIVE | Noted: 2022-04-12

## 2022-04-19 NOTE — PROGRESS NOTES
[facility] Progress Note    Del Donato     : 1960    DATE OF VISIT:  2022    Subjective:     Del Donato is a 59 y.o. male who has a pressure ulcer located on the right, 2nd toe, 3rd toe. Significant symptoms or pertinent wound history since last visit: feeling well overall, no real foot pain, modest drainage, no fever, no pruritus or rash. Picked up his diabetic shoes and inserts recently, which feel very comfortable (though he's just wearing slippers here today. ..). Additional ulcer(s) noted? no. That left lateral foot ulcer does appear to be healed now. His current medication list consists of Dulaglutide, Multiple Vitamin, Multiple Vitamins-Minerals, amLODIPine, atorvastatin, fish oil, hydroCHLOROthiazide, insulin glargine, metFORMIN, tamsulosin, and triamcinolone. Allergies: Patient has no known allergies.     Objective:     Vitals:    22 0838   BP: (!) 155/66   Pulse: 67   Resp: 16   Temp: 97.9 °F (36.6 °C)   TempSrc: Oral   Weight: 241 lb 9.6 oz (109.6 kg)   Height: 6' 1\" (1.854 m)       Constitutional:  well-developed, well-nourished, NAD   Cardiovascular:  bilateral pedal pulses palpable, feet warm, brisk cap refill; very mild left lower extremity edema  Lymphatic:  no inguinal or popliteal adenopathy, no angitis, no cellulitis  Musculoskeletal:  no clubbing, cyanosis or petechiae; RLE and LLE with no gross effusions, joint misalignment or acute arthritis  Elicia-ulcer skin: pink and indurated; nearly resolved dorsal contact dermatitis   Ulcer(s):  medial left foot diabetic ulcer healed with less hyperkeratosis; lateral left foot pressure ulcer appears healed this week also, with just a bit of hyperkeratosis; right 2nd and 3rd toe pressure ulcers, stage 3 now, less red-purple and inflamed, edges looking healthier, but in the center, just a bit of depth, a bit of dermal and fat necrosis, fibrin, and we might be getting down to the level of tendon or capsule at the IPJ on the 3rd toe actually; no signs of infection, still, I think just very mild reactive erythema and edema at toe #3. Photos also saved in electronic chart    Today's wound measurements, per RN documentation:  [REMOVED] Wound 09/16/21 #3, left lateral foot, pressure ulcer, Stage 4, onset 9/2021-Wound Length (cm): 0 cm  Wound 04/06/22 #5 right 2nd toe, pressure, stage 3, 3/2022-Wound Length (cm): 1 cm  Wound 03/23/22 #4 right 3rd Toe, Pressure, Stage 3, Onset 03/22/2022-Wound Length (cm): 1.4 cm    [REMOVED] Wound 09/16/21 #3, left lateral foot, pressure ulcer, Stage 4, onset 9/2021-Wound Width (cm): 0 cm  Wound 04/06/22 #5 right 2nd toe, pressure, stage 3, 3/2022-Wound Width (cm): 0.3 cm  Wound 03/23/22 #4 right 3rd Toe, Pressure, Stage 3, Onset 03/22/2022-Wound Width (cm): 0.6 cm    [REMOVED] Wound 09/16/21 #3, left lateral foot, pressure ulcer, Stage 4, onset 9/2021-Wound Depth (cm): 0 cm  Wound 04/06/22 #5 right 2nd toe, pressure, stage 3, 3/2022-Wound Depth (cm): 0.1 cm  Wound 03/23/22 #4 right 3rd Toe, Pressure, Stage 3, Onset 03/22/2022-Wound Depth (cm): 0.2 cm    Assessment:     Patient Active Problem List   Diagnosis Code    Hypertension I10    BPH (benign prostatic hyperplasia) N40.0    Osteoarthritis M19.90    Class 1 obesity due to excess calories with serious comorbidity and body mass index (BMI) of 31.0 to 31.9 in adult E66.09, Z68.31    Diabetic polyneuropathy associated with type 2 diabetes mellitus (HCC) E11.42    Elevated PSA R97.20    Mixed hyperlipidemia E78.2    Stage 3a chronic kidney disease (HCC) N18.31    Pressure ulcer of left lateral forefoot, stage 4 (HCC) L89.894    Chronic low back pain M54.50, G89.29    Dental bridge present Z97.2    Callus of foot L84    Hyperkalemia E87.5    Pressure ulcer of toe of right foot, stage 3 (HCC) L89.893       Assessment of today's active condition(s): well controlled DM2, neuropathy, no PAD; healed Hercules 4 left medial forefoot ulcer, healed lateral foot stage 4 pressure ulcer, but two new pressure ulcers on a couple of right lesser toes, from use of a silicone toe spacer device at home; no signs of ischemia or definite infection, but need to watch closely; a bit more necrotic tissue this week, but also edges that look healthier and better demarcated. Factors contributing to occurrence and/or persistence of the chronic ulcer include diabetes, chronic pressure and shear force. Medical necessity of today's visit is shown by the above documentation. Sharp debridement is indicated today, based upon the exam findings in the wound(s) above. Procedure note:     Consent obtained. Time out performed per [facility] policy. Anesthetic  Anesthetic: 4% Lidocaine Cream     Using a curette and #15 blade scalpel, I sharply debrided the right, 2nd toe, 3rd toe ulcer(s) down through and including the removal of subcutaneous tissue. The type(s) of tissue debrided included fibrin, slough and necrotic/eschar. Total Surface Area Debrided: 2 sq cm. The ulcers were then irrigated with normal saline solution. The procedure was completed with a small amount of bleeding, and hemostasis was with pressure. The patient tolerated the procedure well, with no significant complications. The patient's level of pain during and after the procedure was monitored. Post-debridement measurements, if different from pre-debridement, are in the flowsheet as well. Discharge plan:     Treatment in the wound care center today, per RN documentation: Wound 04/06/22 #5 right 2nd toe, pressure, stage 3, 3/2022-Dressing/Treatment: Other (comment) (Triad, 4x4)  Wound 03/23/22 #4 right 3rd Toe, Pressure, Stage 3, Onset 03/22/2022-Dressing/Treatment: Other (comment) (Triad, 4x4). Keep up the good work with glucoses. No dressings needed on the left foot now. Encouraged him to steadily wear his diabetic shoes and inserts more often.      Home treatment: good handwashing before and after any dressing changes. Cleanse wound with saline or soap & water before dressing change. May use Vaseline (petrolatum), Aquaphor, Aveeno, CeraVe, Cetaphil, Eucerin, Lubriderm, etc for dry skin. Dressing type for home: Hypochlorous acid spray, Triad dressing and Dry cover dressing, once daily. Written discharge instructions given to patient. Follow up in 1 week.     Electronically signed by Abel Evans MD on 4/19/2022 at 11:58 AM.

## 2022-04-20 ENCOUNTER — HOSPITAL ENCOUNTER (OUTPATIENT)
Dept: WOUND CARE | Age: 62
Discharge: HOME | End: 2022-04-20
Payer: MEDICARE

## 2022-04-20 VITALS
RESPIRATION RATE: 18 BRPM | TEMPERATURE: 98.3 F | BODY MASS INDEX: 32.6 KG/M2 | SYSTOLIC BLOOD PRESSURE: 143 MMHG | HEART RATE: 53 BPM | HEIGHT: 73 IN | DIASTOLIC BLOOD PRESSURE: 69 MMHG | WEIGHT: 246 LBS

## 2022-04-20 DIAGNOSIS — L89.894: ICD-10-CM

## 2022-04-20 DIAGNOSIS — L89.894 PRESSURE ULCER OF LEFT FOOT, STAGE 4 (HCC): Primary | ICD-10-CM

## 2022-04-20 PROCEDURE — 97597 DBRDMT OPN WND 1ST 20 CM/<: CPT | Performed by: INTERNAL MEDICINE

## 2022-04-20 PROCEDURE — 97597 DBRDMT OPN WND 1ST 20 CM/<: CPT

## 2022-04-20 PROCEDURE — 11043 DBRDMT MUSC&/FSCA 1ST 20/<: CPT | Performed by: INTERNAL MEDICINE

## 2022-04-20 PROCEDURE — 11043 DBRDMT MUSC&/FSCA 1ST 20/<: CPT

## 2022-04-20 RX ORDER — LIDOCAINE HYDROCHLORIDE 40 MG/ML
SOLUTION TOPICAL ONCE
Status: DISCONTINUED | OUTPATIENT
Start: 2022-04-20 | End: 2022-04-21 | Stop reason: HOSPADM

## 2022-04-20 RX ORDER — LIDOCAINE HYDROCHLORIDE 40 MG/ML
SOLUTION TOPICAL ONCE
Status: CANCELLED | OUTPATIENT
Start: 2022-04-20 | End: 2022-04-20

## 2022-04-20 RX ORDER — LIDOCAINE 40 MG/G
CREAM TOPICAL ONCE
Status: DISCONTINUED | OUTPATIENT
Start: 2022-04-20 | End: 2022-04-21 | Stop reason: HOSPADM

## 2022-04-20 RX ORDER — LIDOCAINE 50 MG/G
OINTMENT TOPICAL ONCE
Status: DISCONTINUED | OUTPATIENT
Start: 2022-04-20 | End: 2022-04-21 | Stop reason: HOSPADM

## 2022-04-20 RX ORDER — BACITRACIN ZINC AND POLYMYXIN B SULFATE 500; 1000 [USP'U]/G; [USP'U]/G
OINTMENT TOPICAL ONCE
Status: DISCONTINUED | OUTPATIENT
Start: 2022-04-20 | End: 2022-04-21 | Stop reason: HOSPADM

## 2022-04-20 RX ORDER — LIDOCAINE 50 MG/G
OINTMENT TOPICAL ONCE
Status: CANCELLED | OUTPATIENT
Start: 2022-04-20 | End: 2022-04-20

## 2022-04-20 RX ORDER — LIDOCAINE 40 MG/G
CREAM TOPICAL ONCE
Status: CANCELLED | OUTPATIENT
Start: 2022-04-20 | End: 2022-04-20

## 2022-04-20 RX ORDER — BACITRACIN ZINC AND POLYMYXIN B SULFATE 500; 1000 [USP'U]/G; [USP'U]/G
OINTMENT TOPICAL ONCE
Status: CANCELLED | OUTPATIENT
Start: 2022-04-20 | End: 2022-04-20

## 2022-04-20 NOTE — PLAN OF CARE
Left lateral foot wound reopened, pt. To return to using PSO to wound, cover with dry dressing, change once daily. Dr Noemi Priest reinforced importance of using his new diabetic shoes & inserts for off-loading, pt. & wife verbalizing understanding. Right 2nd & 3rd toes debrided per MD & pt. Tolerated well. Bone exposure noted in right 3rd toe. Will monitor & pt.&/or wife to call if any changes noted in right 3rd toe this week, both verbalize understanding. F/u in [facility] in 1 week as ordered, pt. Aware to call sooner with any changes or questions/concerns. Discharge instructions reviewed with patient & wife, all questions answered, copy given to patient. Dressings were applied to all wounds per M.D. Instructions at this visit.

## 2022-04-24 PROBLEM — L89.894: Status: ACTIVE | Noted: 2022-04-24

## 2022-04-24 NOTE — PROGRESS NOTES
88 [facility] Progress Note    Del Horton     : 1960    DATE OF VISIT:  2022    Subjective:     Del Horton is a 59 y.o. male who has a diabetic and  pressure ulcer located on the right, 2nd toe, 3rd toe. Significant symptoms or pertinent wound history since last visit: feeling well overall, no real foot pain. Still rarely wearing his diabetic shoes and orthotics for some reason - has slippers on again here today. Not much wound drainage, no fever, no spreading redness, no malodor. Additional ulcer(s) noted? Yes - the left lateral foot is still a bit open, with some crusted debris and hyperkeratotic. His current medication list consists of Dulaglutide, Multiple Vitamin, Multiple Vitamins-Minerals, amLODIPine, atorvastatin, fish oil, hydroCHLOROthiazide, insulin glargine, metFORMIN, tamsulosin, and triamcinolone. Allergies: Patient has no known allergies.     Objective:     Vitals:    22 0834 22 0913   BP:  (!) 143/69   Pulse:  53   Resp: 18    Temp: 98.3 °F (36.8 °C)    TempSrc: Oral    Weight: 246 lb (111.6 kg)    Height: 6' 1\" (1.854 m)      Constitutional:  well-developed, well-nourished, NAD   Cardiovascular:  bilateral pedal pulses palpable, feet warm, brisk cap refill; no real LE edema  Lymphatic:  no inguinal or popliteal adenopathy, no angitis, no cellulitis, just a bit of dull pink erythema of the right 3rd toe, from some recent peeling epidermis  Musculoskeletal:  no clubbing, cyanosis or petechiae; RLE and LLE with no gross effusions, joint misalignment or acute arthritis  Elicia-ulcer skin: pink and indurated; nearly resolved dorsal contact dermatitis   Ulcer(s):  left lateral foot ulcer with a bit of crusted debris and loose hyperkeratosis, removed during cleansing; right 2nd toe ulcer about the same size, mostly pink and red now, a bit of pale fibrotic SQ tissue; 3rd toe ulcer with some pink and red tissue, but proximally down into a small focus of necrotic fascia / tendon, with bone not far away; modest serous exudate. Photos also saved in electronic chart    Today's wound measurements, per RN documentation:  Wound 09/16/21 #3, left lateral foot, pressure ulcer, Stage 4, onset 9/2021-Wound Length (cm): 0.3 cm  Wound 04/06/22 #5 right 2nd toe, pressure, stage 3, 3/2022-Wound Length (cm): 0.9 cm  Wound 03/23/22 #4 right 3rd Toe, Pressure, Stage 4, Onset 03/22/2022-Wound Length (cm): 1.5 cm (remeasured per MD)    Wound 09/16/21 #3, left lateral foot, pressure ulcer, Stage 4, onset 9/2021-Wound Width (cm): 0.2 cm  Wound 04/06/22 #5 right 2nd toe, pressure, stage 3, 3/2022-Wound Width (cm): 0.4 cm  Wound 03/23/22 #4 right 3rd Toe, Pressure, Stage 4, Onset 03/22/2022-Wound Width (cm): 0.5 cm    Wound 09/16/21 #3, left lateral foot, pressure ulcer, Stage 4, onset 9/2021-Wound Depth (cm): 0.1 cm  Wound 04/06/22 #5 right 2nd toe, pressure, stage 3, 3/2022-Wound Depth (cm): 0.1 cm  Wound 03/23/22 #4 right 3rd Toe, Pressure, Stage 4, Onset 03/22/2022-Wound Depth (cm): 0.2 cm    Assessment:     Patient Active Problem List   Diagnosis Code    Hypertension I10    BPH (benign prostatic hyperplasia) N40.0    Osteoarthritis M19.90    Class 1 obesity due to excess calories with serious comorbidity and body mass index (BMI) of 31.0 to 31.9 in adult E66.09, Z68.31    Diabetic polyneuropathy associated with type 2 diabetes mellitus (HCC) E11.42    Elevated PSA R97.20    Mixed hyperlipidemia E78.2    Stage 3a chronic kidney disease (HCC) N18.31    Pressure ulcer of left lateral forefoot, stage 4 (HCC) L89.894    Chronic low back pain M54.50, G89.29    Dental bridge present Z97.2    Callus of foot L84    Hyperkalemia E87.5    Pressure ulcer of toe of right foot, stage 3 ([facility]) L89.893    Pressure ulcer of toe of right foot, stage 4 ([facility]) L89.894       Assessment of today's active condition(s): well controlled DM2, neuropathy, no significant PAD; healed left foot Hercules 4 process, nearly healed stage 4 lateral pressure ulcer, but two newer right lesser toe pressure ulcers from a silicone toe spacer - no signs of infection or ischemia, 2nd toe doing ok, 3rd toe partly healthier, but a bit of proximal necrotic tissue now, deeper than it appeared 1-2 weeks ago. Factors contributing to occurrence and/or persistence of the chronic ulcer include diabetes, chronic pressure and shear force. Medical necessity of today's visit is shown by the above documentation. Sharp debridement is indicated today, based upon the exam findings in the wound(s) above. Procedure note:     Consent obtained. Time out performed per [facility]. Anesthetic  Anesthetic: 4% Lidocaine Cream     Using a curette, I sharply debrided the right, 2nd toe ulcer(s) down through and including the removal of dermis. The type(s) of tissue debrided included fibrin and biofilm. Total Surface Area Debrided: 1 sq cm. Using a curette, #15 blade scalpel and forceps, I sharply debrided the right, 3rd toe ulcer(s) down through and including the removal of muscle/fascia. The type(s) of tissue debrided included fibrin, biofilm and necrotic/eschar. Total Surface Area Debrided: 1 sq cm. The ulcers were then irrigated with normal saline solution. The procedure was completed with a small amount of bleeding, and hemostasis was with pressure. The patient tolerated the procedure well, with no significant complications. The patient's level of pain during and after the procedure was monitored. Post-debridement measurements, if different from pre-debridement, are in the flowsheet as well.     Discharge plan:     Treatment in the wound care center today, per RN documentation: Wound 09/16/21 #3, left lateral foot, pressure ulcer, Stage 4, onset 9/2021-Dressing/Treatment: Other (comment) (antibiotic ointment 2x2's medipore tape)  Wound 04/06/22 #5 right 2nd toe, pressure, stage 3, 3/2022-Dressing/Treatment: Other (comment) (triad 4x4)  Wound 03/23/22 #4 right 3rd Toe, Pressure, Stage 4, Onset 03/22/2022-Dressing/Treatment: Other (comment) (triad 4x4's). Keep up the good work with protein intake and glucose control. Would still keep ambulation to a reasonable minimum, but when he IS up and walking, would increase the time that he's wearing his diabetic shoes. Home treatment: good handwashing before and after any dressing changes. Cleanse wound with saline or soap & water before dressing change. May use Vaseline (petrolatum), Aquaphor, Aveeno, CeraVe, Cetaphil, Eucerin, Lubriderm, etc for dry skin. Dressing type for home: Vashe, then as above, once daily. Written discharge instructions given to patient. Follow up in 1 week.     Electronically signed by Shayy Rodriguez MD on 4/24/2022 at 3:31 PM.

## 2022-04-27 ENCOUNTER — HOSPITAL ENCOUNTER (OUTPATIENT)
Dept: GENERAL RADIOLOGY | Age: 62
Discharge: HOME | DRG: 616 | End: 2022-04-27
Payer: MEDICARE

## 2022-04-27 ENCOUNTER — HOSPITAL ENCOUNTER (INPATIENT)
Age: 62
LOS: 5 days | Discharge: HOME | DRG: 616 | End: 2022-05-02
Attending: INTERNAL MEDICINE | Admitting: INTERNAL MEDICINE
Payer: MEDICARE

## 2022-04-27 ENCOUNTER — HOSPITAL ENCOUNTER (OUTPATIENT)
Dept: WOUND CARE | Age: 62
Discharge: HOME | DRG: 616 | End: 2022-04-27
Payer: MEDICARE

## 2022-04-27 VITALS
TEMPERATURE: 97.1 F | RESPIRATION RATE: 20 BRPM | SYSTOLIC BLOOD PRESSURE: 145 MMHG | DIASTOLIC BLOOD PRESSURE: 75 MMHG | WEIGHT: 241 LBS | HEIGHT: 73 IN | HEART RATE: 75 BPM | BODY MASS INDEX: 31.94 KG/M2

## 2022-04-27 DIAGNOSIS — L89.894: Primary | ICD-10-CM

## 2022-04-27 DIAGNOSIS — L89.894 PRESSURE ULCER OF LEFT FOOT, STAGE 4 (HCC): ICD-10-CM

## 2022-04-27 DIAGNOSIS — M86.171 ACUTE OSTEOMYELITIS OF TOE OF RIGHT FOOT (HCC): ICD-10-CM

## 2022-04-27 DIAGNOSIS — Z78.9 FAILURE OF OUTPATIENT TREATMENT: ICD-10-CM

## 2022-04-27 DIAGNOSIS — I99.8 ISCHEMIC TOE: ICD-10-CM

## 2022-04-27 DIAGNOSIS — L89.894: ICD-10-CM

## 2022-04-27 DIAGNOSIS — L03.115 CELLULITIS OF RIGHT FOOT: ICD-10-CM

## 2022-04-27 PROBLEM — E87.5 HYPERKALEMIA: Status: RESOLVED | Noted: 2021-09-28 | Resolved: 2022-04-27

## 2022-04-27 PROBLEM — E78.5 HYPERLIPIDEMIA: Status: ACTIVE | Noted: 2021-09-16

## 2022-04-27 PROBLEM — L08.9 DIABETIC FOOT INFECTION (HCC): Status: ACTIVE | Noted: 2022-04-27

## 2022-04-27 PROBLEM — E11.628 DIABETIC FOOT INFECTION (HCC): Status: ACTIVE | Noted: 2022-04-27

## 2022-04-27 LAB
A/G RATIO: 1.4 (ref 1.1–2.2)
ALBUMIN SERPL-MCNC: 3.9 G/DL (ref 3.4–5)
ALP BLD-CCNC: 89 U/L (ref 40–129)
ALT SERPL-CCNC: 14 U/L (ref 10–40)
ANION GAP SERPL CALCULATED.3IONS-SCNC: 15 MMOL/L (ref 3–16)
AST SERPL-CCNC: 12 U/L (ref 15–37)
BASOPHILS ABSOLUTE: 0.1 K/UL (ref 0–0.2)
BASOPHILS RELATIVE PERCENT: 0.7 %
BILIRUB SERPL-MCNC: 0.5 MG/DL (ref 0–1)
BUN BLDV-MCNC: 55 MG/DL (ref 7–20)
CALCIUM SERPL-MCNC: 9.3 MG/DL (ref 8.3–10.6)
CHLORIDE BLD-SCNC: 104 MMOL/L (ref 99–110)
CO2: 21 MMOL/L (ref 21–32)
CREAT SERPL-MCNC: 2 MG/DL (ref 0.8–1.3)
EKG ATRIAL RATE: 71 BPM
EKG DIAGNOSIS: NORMAL
EKG P AXIS: 46 DEGREES
EKG P-R INTERVAL: 176 MS
EKG Q-T INTERVAL: 368 MS
EKG QRS DURATION: 98 MS
EKG QTC CALCULATION (BAZETT): 399 MS
EKG R AXIS: -20 DEGREES
EKG T AXIS: 67 DEGREES
EKG VENTRICULAR RATE: 71 BPM
EOSINOPHILS ABSOLUTE: 0.3 K/UL (ref 0–0.6)
EOSINOPHILS RELATIVE PERCENT: 2.7 %
GFR AFRICAN AMERICAN: 41
GFR NON-AFRICAN AMERICAN: 34
GLUCOSE BLD-MCNC: 120 MG/DL (ref 70–99)
GLUCOSE BLD-MCNC: 133 MG/DL (ref 70–99)
GLUCOSE BLD-MCNC: 137 MG/DL (ref 70–99)
GLUCOSE BLD-MCNC: 196 MG/DL (ref 70–99)
HCT VFR BLD CALC: 33.1 % (ref 40.5–52.5)
HEMOGLOBIN: 11.4 G/DL (ref 13.5–17.5)
LACTIC ACID: 1 MMOL/L (ref 0.4–2)
LYMPHOCYTES ABSOLUTE: 1.2 K/UL (ref 1–5.1)
LYMPHOCYTES RELATIVE PERCENT: 12.6 %
MCH RBC QN AUTO: 30.7 PG (ref 26–34)
MCHC RBC AUTO-ENTMCNC: 34.3 G/DL (ref 31–36)
MCV RBC AUTO: 89.5 FL (ref 80–100)
MONOCYTES ABSOLUTE: 0.6 K/UL (ref 0–1.3)
MONOCYTES RELATIVE PERCENT: 6.2 %
NEUTROPHILS ABSOLUTE: 7.7 K/UL (ref 1.7–7.7)
NEUTROPHILS RELATIVE PERCENT: 77.8 %
PDW BLD-RTO: 14.8 % (ref 12.4–15.4)
PERFORMED ON: ABNORMAL
PLATELET # BLD: 243 K/UL (ref 135–450)
PMV BLD AUTO: 9.6 FL (ref 5–10.5)
POTASSIUM SERPL-SCNC: 4.7 MMOL/L (ref 3.5–5.1)
RBC # BLD: 3.7 M/UL (ref 4.2–5.9)
SARS-COV-2, NAAT: NOT DETECTED
SODIUM BLD-SCNC: 140 MMOL/L (ref 136–145)
TOTAL PROTEIN: 6.7 G/DL (ref 6.4–8.2)
WBC # BLD: 9.9 K/UL (ref 4–11)

## 2022-04-27 PROCEDURE — 83605 ASSAY OF LACTIC ACID: CPT

## 2022-04-27 PROCEDURE — 93005 ELECTROCARDIOGRAM TRACING: CPT | Performed by: NURSE PRACTITIONER

## 2022-04-27 PROCEDURE — 85025 COMPLETE CBC W/AUTO DIFF WBC: CPT

## 2022-04-27 PROCEDURE — 83036 HEMOGLOBIN GLYCOSYLATED A1C: CPT

## 2022-04-27 PROCEDURE — 87040 BLOOD CULTURE FOR BACTERIA: CPT

## 2022-04-27 PROCEDURE — 99215 OFFICE O/P EST HI 40 MIN: CPT | Performed by: INTERNAL MEDICINE

## 2022-04-27 PROCEDURE — 73660 X-RAY EXAM OF TOE(S): CPT

## 2022-04-27 PROCEDURE — 87070 CULTURE OTHR SPECIMN AEROBIC: CPT

## 2022-04-27 PROCEDURE — 36415 COLL VENOUS BLD VENIPUNCTURE: CPT

## 2022-04-27 PROCEDURE — 93010 ELECTROCARDIOGRAM REPORT: CPT | Performed by: INTERNAL MEDICINE

## 2022-04-27 PROCEDURE — 87077 CULTURE AEROBIC IDENTIFY: CPT

## 2022-04-27 PROCEDURE — 11042 DBRDMT SUBQ TIS 1ST 20SQCM/<: CPT

## 2022-04-27 PROCEDURE — 2580000003 HC RX 258: Performed by: NURSE PRACTITIONER

## 2022-04-27 PROCEDURE — 87635 SARS-COV-2 COVID-19 AMP PRB: CPT

## 2022-04-27 PROCEDURE — 87186 SC STD MICRODIL/AGAR DIL: CPT

## 2022-04-27 PROCEDURE — 2580000003 HC RX 258: Performed by: INTERNAL MEDICINE

## 2022-04-27 PROCEDURE — 6360000002 HC RX W HCPCS: Performed by: INTERNAL MEDICINE

## 2022-04-27 PROCEDURE — 2500000003 HC RX 250 WO HCPCS: Performed by: INTERNAL MEDICINE

## 2022-04-27 PROCEDURE — 87205 SMEAR GRAM STAIN: CPT

## 2022-04-27 PROCEDURE — 80053 COMPREHEN METABOLIC PANEL: CPT

## 2022-04-27 PROCEDURE — 1200000000 HC SEMI PRIVATE

## 2022-04-27 PROCEDURE — 99221 1ST HOSP IP/OBS SF/LOW 40: CPT

## 2022-04-27 PROCEDURE — 6370000000 HC RX 637 (ALT 250 FOR IP): Performed by: NURSE PRACTITIONER

## 2022-04-27 PROCEDURE — 0JBQ0ZZ EXCISION OF RIGHT FOOT SUBCUTANEOUS TISSUE AND FASCIA, OPEN APPROACH: ICD-10-PCS | Performed by: PODIATRIST

## 2022-04-27 PROCEDURE — 11042 DBRDMT SUBQ TIS 1ST 20SQCM/<: CPT | Performed by: INTERNAL MEDICINE

## 2022-04-27 RX ORDER — CLINDAMYCIN PHOSPHATE 600 MG/50ML
600 INJECTION INTRAVENOUS EVERY 8 HOURS
Status: COMPLETED | OUTPATIENT
Start: 2022-04-27 | End: 2022-04-30

## 2022-04-27 RX ORDER — LIDOCAINE HYDROCHLORIDE 40 MG/ML
SOLUTION TOPICAL ONCE
Status: DISCONTINUED | OUTPATIENT
Start: 2022-04-27 | End: 2022-04-28 | Stop reason: HOSPADM

## 2022-04-27 RX ORDER — ACETAMINOPHEN 650 MG/1
650 SUPPOSITORY RECTAL EVERY 6 HOURS PRN
Status: DISCONTINUED | OUTPATIENT
Start: 2022-04-27 | End: 2022-05-02 | Stop reason: HOSPADM

## 2022-04-27 RX ORDER — BACITRACIN ZINC AND POLYMYXIN B SULFATE 500; 1000 [USP'U]/G; [USP'U]/G
OINTMENT TOPICAL ONCE
Status: DISCONTINUED | OUTPATIENT
Start: 2022-04-27 | End: 2022-04-28 | Stop reason: HOSPADM

## 2022-04-27 RX ORDER — LIDOCAINE HYDROCHLORIDE 40 MG/ML
SOLUTION TOPICAL ONCE
Status: CANCELLED | OUTPATIENT
Start: 2022-04-27 | End: 2022-04-27

## 2022-04-27 RX ORDER — BACITRACIN ZINC AND POLYMYXIN B SULFATE 500; 1000 [USP'U]/G; [USP'U]/G
OINTMENT TOPICAL ONCE
Status: CANCELLED | OUTPATIENT
Start: 2022-04-27 | End: 2022-04-27

## 2022-04-27 RX ORDER — SODIUM CHLORIDE 0.9 % (FLUSH) 0.9 %
5-40 SYRINGE (ML) INJECTION PRN
Status: DISCONTINUED | OUTPATIENT
Start: 2022-04-27 | End: 2022-05-02 | Stop reason: HOSPADM

## 2022-04-27 RX ORDER — LIDOCAINE 50 MG/G
OINTMENT TOPICAL ONCE
Status: CANCELLED | OUTPATIENT
Start: 2022-04-27 | End: 2022-04-27

## 2022-04-27 RX ORDER — SODIUM CHLORIDE 9 MG/ML
INJECTION, SOLUTION INTRAVENOUS CONTINUOUS
Status: DISCONTINUED | OUTPATIENT
Start: 2022-04-27 | End: 2022-05-01

## 2022-04-27 RX ORDER — INSULIN LISPRO 100 [IU]/ML
0-6 INJECTION, SOLUTION INTRAVENOUS; SUBCUTANEOUS
Status: DISCONTINUED | OUTPATIENT
Start: 2022-04-27 | End: 2022-05-02 | Stop reason: HOSPADM

## 2022-04-27 RX ORDER — DEXTROSE MONOHYDRATE 25 G/50ML
12.5 INJECTION, SOLUTION INTRAVENOUS PRN
Status: DISCONTINUED | OUTPATIENT
Start: 2022-04-27 | End: 2022-04-27

## 2022-04-27 RX ORDER — ACETAMINOPHEN 325 MG/1
650 TABLET ORAL EVERY 6 HOURS PRN
Status: DISCONTINUED | OUTPATIENT
Start: 2022-04-27 | End: 2022-05-02 | Stop reason: HOSPADM

## 2022-04-27 RX ORDER — SODIUM CHLORIDE 9 MG/ML
INJECTION, SOLUTION INTRAVENOUS PRN
Status: DISCONTINUED | OUTPATIENT
Start: 2022-04-27 | End: 2022-05-02 | Stop reason: HOSPADM

## 2022-04-27 RX ORDER — LIDOCAINE 40 MG/G
CREAM TOPICAL ONCE
Status: DISCONTINUED | OUTPATIENT
Start: 2022-04-27 | End: 2022-04-28 | Stop reason: HOSPADM

## 2022-04-27 RX ORDER — DEXTROSE MONOHYDRATE 50 MG/ML
100 INJECTION, SOLUTION INTRAVENOUS PRN
Status: DISCONTINUED | OUTPATIENT
Start: 2022-04-27 | End: 2022-05-02 | Stop reason: HOSPADM

## 2022-04-27 RX ORDER — AMLODIPINE BESYLATE 5 MG/1
10 TABLET ORAL DAILY
Status: DISCONTINUED | OUTPATIENT
Start: 2022-04-27 | End: 2022-05-02 | Stop reason: HOSPADM

## 2022-04-27 RX ORDER — ONDANSETRON 4 MG/1
4 TABLET, ORALLY DISINTEGRATING ORAL EVERY 8 HOURS PRN
Status: DISCONTINUED | OUTPATIENT
Start: 2022-04-27 | End: 2022-05-02 | Stop reason: HOSPADM

## 2022-04-27 RX ORDER — INSULIN LISPRO 100 [IU]/ML
0-3 INJECTION, SOLUTION INTRAVENOUS; SUBCUTANEOUS NIGHTLY
Status: DISCONTINUED | OUTPATIENT
Start: 2022-04-27 | End: 2022-05-02 | Stop reason: HOSPADM

## 2022-04-27 RX ORDER — DOXYCYCLINE HYCLATE 100 MG/1
100 CAPSULE ORAL 2 TIMES DAILY
COMMUNITY
End: 2022-05-09 | Stop reason: ALTCHOICE

## 2022-04-27 RX ORDER — POLYETHYLENE GLYCOL 3350 17 G/17G
17 POWDER, FOR SOLUTION ORAL DAILY PRN
Status: DISCONTINUED | OUTPATIENT
Start: 2022-04-27 | End: 2022-05-02 | Stop reason: HOSPADM

## 2022-04-27 RX ORDER — SODIUM CHLORIDE 0.9 % (FLUSH) 0.9 %
5-40 SYRINGE (ML) INJECTION EVERY 12 HOURS SCHEDULED
Status: DISCONTINUED | OUTPATIENT
Start: 2022-04-27 | End: 2022-05-02 | Stop reason: HOSPADM

## 2022-04-27 RX ORDER — ATORVASTATIN CALCIUM 40 MG/1
40 TABLET, FILM COATED ORAL DAILY
Status: DISCONTINUED | OUTPATIENT
Start: 2022-04-27 | End: 2022-05-02 | Stop reason: HOSPADM

## 2022-04-27 RX ORDER — LIDOCAINE 50 MG/G
OINTMENT TOPICAL ONCE
Status: DISCONTINUED | OUTPATIENT
Start: 2022-04-27 | End: 2022-04-28 | Stop reason: HOSPADM

## 2022-04-27 RX ORDER — ENOXAPARIN SODIUM 100 MG/ML
30 INJECTION SUBCUTANEOUS 2 TIMES DAILY
Status: DISCONTINUED | OUTPATIENT
Start: 2022-04-27 | End: 2022-05-02 | Stop reason: HOSPADM

## 2022-04-27 RX ORDER — INSULIN GLARGINE 100 [IU]/ML
42 INJECTION, SOLUTION SUBCUTANEOUS NIGHTLY
Status: DISCONTINUED | OUTPATIENT
Start: 2022-04-27 | End: 2022-05-02 | Stop reason: HOSPADM

## 2022-04-27 RX ORDER — LIDOCAINE 40 MG/G
CREAM TOPICAL ONCE
Status: CANCELLED | OUTPATIENT
Start: 2022-04-27 | End: 2022-04-27

## 2022-04-27 RX ORDER — ONDANSETRON 2 MG/ML
4 INJECTION INTRAMUSCULAR; INTRAVENOUS EVERY 6 HOURS PRN
Status: DISCONTINUED | OUTPATIENT
Start: 2022-04-27 | End: 2022-05-02 | Stop reason: HOSPADM

## 2022-04-27 RX ORDER — TAMSULOSIN HYDROCHLORIDE 0.4 MG/1
0.4 CAPSULE ORAL DAILY
Status: DISCONTINUED | OUTPATIENT
Start: 2022-04-27 | End: 2022-05-02 | Stop reason: HOSPADM

## 2022-04-27 RX ADMIN — VANCOMYCIN HYDROCHLORIDE 1500 MG: 10 INJECTION, POWDER, LYOPHILIZED, FOR SOLUTION INTRAVENOUS at 14:37

## 2022-04-27 RX ADMIN — SODIUM CHLORIDE: 9 INJECTION, SOLUTION INTRAVENOUS at 13:59

## 2022-04-27 RX ADMIN — CLINDAMYCIN IN 5 PERCENT DEXTROSE 600 MG: 12 INJECTION, SOLUTION INTRAVENOUS at 14:00

## 2022-04-27 RX ADMIN — INSULIN LISPRO 1 UNITS: 100 INJECTION, SOLUTION INTRAVENOUS; SUBCUTANEOUS at 16:59

## 2022-04-27 RX ADMIN — CLINDAMYCIN IN 5 PERCENT DEXTROSE 600 MG: 12 INJECTION, SOLUTION INTRAVENOUS at 21:34

## 2022-04-27 RX ADMIN — MEROPENEM 1000 MG: 1 INJECTION, POWDER, FOR SOLUTION INTRAVENOUS at 16:58

## 2022-04-27 ASSESSMENT — PAIN SCALES - GENERAL
PAINLEVEL_OUTOF10: 0
PAINLEVEL_OUTOF10: 3

## 2022-04-27 ASSESSMENT — PAIN DESCRIPTION - ORIENTATION: ORIENTATION: RIGHT

## 2022-04-27 ASSESSMENT — PAIN DESCRIPTION - LOCATION: LOCATION: TOE (COMMENT WHICH ONE)

## 2022-04-27 ASSESSMENT — LIFESTYLE VARIABLES: HOW OFTEN DO YOU HAVE A DRINK CONTAINING ALCOHOL: NEVER

## 2022-04-27 NOTE — PROGRESS NOTES
4 Eyes Skin Assessment     The patient is being assess for   Admission    I agree that 2 RN's have performed a thorough Head to Toe Skin Assessment on the patient. ALL assessment sites listed below have been assessed. Areas assessed for pressure by both nurses:   [x]   Head, Face, and Ears   [x]   Shoulders, Back, and Chest, Abdomen  [x]   Arms, Elbows, and Hands   [x]   Coccyx, Sacrum, and Ischium  [x]   Legs, Feet, and Heels    See wound care notes, wounds have been treated and covered per wound care prior to the admit from wound care department. Skin Assessed Under all Medical Devices by both nurses:  See wound care documents              All Mepilex Borders were peeled back and area peeked at by both nurses:  No: NA  Please list where Mepilex Borders are located:  NA               **SHARE this note so that the co-signing nurse is able to place an eSignature**    Co-signer eSignature: Electronically signed by Maria Del Carmen Zapien RN on 4/29/22 at 7:19 PM EDT    Does the Patient have Skin Breakdown related to pressure?   No              John Prevention initiated:  NA   Wound Care Orders initiated:  NA      [facility] nurse consulted for Pressure Injury (Stage 3,4, Unstageable, DTI, NWPT, Complex wounds)and New or Established Ostomies:  NA      Primary Nurse eSignature: Electronically signed by Jair Levy RN on 4/27/22 at 1:32 PM EDT

## 2022-04-27 NOTE — FLOWSHEET NOTE
04/27/22 1300   Vital Signs   Temp 97 °F (36.1 °C)   Temp Source Oral   Pulse 82   Heart Rate Source Monitor   Resp 18   BP (!) 149/72   BP Location Left upper arm   MAP (Calculated) 97.67   Patient Position Sitting   Level of Consciousness Alert (0)   MEWS Score 1   Oxygen Therapy   SpO2 97 %   Pulse Oximeter Device Mode Intermittent   Pulse Oximeter Device Location Finger   O2 Device None (Room air)   Height and Weight   Height 6' 1\" (1.854 m)   Weight 242 lb (109.8 kg)   Weight Method Stated;Standing scale   BSA (Calculated - sq m) 2.38 sq meters   BMI (Calculated) 32     Pt arrived to the unit in stable condition, VS as shown above. Patient admitted to room 307 from wound care. Patient oriented to room, call light, bed rails, phone, lights and bathroom. Patient instructed about the schedule of the day including: vital sign frequency, lab draws, possible tests, frequency of MD and staff rounds, daily weights, I &O's and prescribed diet. Bed locked, in lowest position, side rails up 2/4, call light within reach. Recliner Assessment  Patient is able to demonstrate the ability to move from a reclining position to an upright position within the recliner.

## 2022-04-27 NOTE — H&P
Hospital Medicine History & Physical      PCP: Mumtaz aL MD    Date of Admission: 4/27/2022    Date of Service: Pt seen/examined on 4/27/2022      Chief Complaint:  No chief complaint on file. History Of Present Illness: The patient is a 59 y.o. male with DMII with chronic diabetic foot ulcer, CKD IIIa, HTN, HLD and BPH who presented to [facility] as a direct admit from Dr. Jai Peng with concern for diabetic foot ulcer with necrosis. He states over the weekend the top of his right foot became red and he developed pain with an ulcer between his 2nd and 3rd digit. He presented to [facility] who gave him oral doxycycline without improvement. He follows with Dr. Jai Peng as an outpatient and saw him today. Dr. Jai Peng was concerned for possible osteo and requested admission for IV abx and podiatry consult. He received a wound cx, cbc, cmp, lactate and blood cxs as well as a right foot XR at Dr. Jeffery Epley office. Patient denies fevers, chills, chest pain, SOB, n/v/d, numbness, tingling or swelling. He is admitted for further care.      Past Medical History:        Diagnosis Date    Abscess of left hand 06/20/2017    Acute kidney injury superimposed on chronic kidney disease ([address] 08/2021    Acute osteomyelitis of metatarsal bone of left foot ([address] 09/16/2021    MSSA and Anaerococcus    Adhesive capsulitis of shoulder 11/13/2014    Fractures     Gas gangrene of foot ([address] 08/31/2021    chronic diabetic ulcer there finally healed Jan 2022    History of hyperbaric oxygen therapy 09/21/2021    Martina Stains 4 diabetic foot    PNA (pneumonia) 03/28/2016    Pressure ulcer of left lateral forefoot, stage 4 ([address] 9/16/2021       Past Surgical History:        Procedure Laterality Date    FOOT DEBRIDEMENT Left 08/31/2021    LEFT FOOT DEBRIDEMENT INCISION AND DRAINAGE performed by Lata Conde DPM at [address] [place] [address] Left 09/03/2021    STAGED INCISION AND DEBRIDEMENT LEFT FOOT WITH PARTIAL FIRST RAY AMPUTATION performed by Yfn Green DPM at [facility] HAND SURGERY Left 06/19/2017    LEFT HAND DEBRIDEMENT INCISION AND DRAINAGE    NECK SURGERY      30s year    TOE AMPUTATION Left 2009    hallux    WRIST FRACTURE SURGERY         Medications Prior to Admission:    Prior to Admission medications    Medication Sig Start Date End Date Taking? Authorizing Provider   doxycycline hyclate (VIBRAMYCIN) 100 MG capsule Take 100 mg by mouth 2 times daily    Historical Provider, MD   Multiple Vitamins-Minerals (ONE-A-DAY VITACRAVES IMMUNITY PO) Take by mouth daily    Historical Provider, MD   amLODIPine (NORVASC) 10 MG tablet Take 10 mg by mouth daily     Historical Provider, MD   hydroCHLOROthiazide (HYDRODIURIL) 25 MG tablet Take 1 tablet by mouth daily 10/5/21   Ivan Escalante MD   Dulaglutide (TRULICITY) 1.5 GA/8.5VL SOPN Inject 1.5 mg into the skin once a week    Historical Provider, MD   atorvastatin (LIPITOR) 40 MG tablet Take 40 mg by mouth daily    Historical Provider, MD   insulin glargine (LANTUS) 100 UNIT/ML injection vial Inject 35 Units into the skin nightly  Patient taking differently: Inject 42 Units into the skin nightly  4/6/16   Kate Gaitan MD   Omega-3 Fatty Acids (FISH OIL) 1000 MG CAPS Take 3,000 mg by mouth nightly. Historical Provider, MD   metFORMIN (GLUCOPHAGE) 500 MG tablet TAKE TWO TABLETS BY MOUTH TWICE A DAY 10/7/13   Historical Provider, MD   Multiple Vitamins-Minerals (MULTIVITAMIN PO) Take  by mouth daily. Historical Provider, MD   tamsulosin (FLOMAX) 0.4 MG capsule Take 0.4 mg by mouth daily. 11/6/12   Historical Provider, MD       Allergies:  Patient has no known allergies. Social History:  The patient currently lives at home with wife    TOBACCO:   reports that he has never smoked. He has never used smokeless tobacco.  ETOH:   reports no history of alcohol use.       Family History:   Positive as follows:        Problem Relation Age of Onset    Heart Failure Mother  Diabetes Mother     Cancer Father         throat cancer    Asthma Daughter         Sports induced as a child    Emphysema Neg Hx     Hypertension Neg Hx        REVIEW OF SYSTEMS:       Constitutional: Negative for fever   HENT: Negative for sore throat   Eyes: Negative for redness   Respiratory: Negative  for dyspnea, cough   Cardiovascular: Negative for chest pain   Gastrointestinal: Negative for vomiting, diarrhea   Genitourinary: Negative for hematuria   Musculoskeletal: Negative for arthralgias   Skin: + Rash, + R foot ulcer  Neurological: Negative for syncope   Hematological: Negative for adenopathy   Psychiatric/Behavorial: Negative for anxiety    PHYSICAL EXAM:    BP (!) 149/72   Pulse 82   Temp 97 °F (36.1 °C) (Oral)   Resp 18   Ht 6' 1\" (1.854 m)   Wt 242 lb (109.8 kg)   SpO2 97%   BMI 31.93 kg/m²     Gen: No distress. Alert. Eyes: PERRL. No sclera icterus. No conjunctival injection. ENT: No discharge. Pharynx clear. Neck: Trachea midline. Resp: No accessory muscle use. No crackles. No wheezes. No rhonchi. CV: Regular rate. Regular rhythm. No murmur. No rub. No edema. Capillary Refill: Brisk,< 3 seconds   Peripheral Pulses: +2 palpable, equal bilaterally   GI: Non-tender. Non-distended. No masses. No organomegaly. Normal bowel sounds. No hernia. Skin: Warm and dry. No nodule on exposed extremities. Right foot in walking boot and wrapped in clean and dry dressing. See media tab for ulcer. S/P left great toe amp. M/S: No cyanosis. No joint deformity. No clubbing. Neuro: Awake. Grossly nonfocal    Psych: Oriented x 3. No anxiety or agitation.      CBC:   Recent Labs     04/27/22  0929   WBC 9.9   HGB 11.4*   HCT 33.1*   MCV 89.5        BMP:   Recent Labs     04/27/22 0929      K 4.7      CO2 21   BUN 55*   CREATININE 2.0*     LIVER PROFILE:   Recent Labs     04/27/22 0929   AST 12*   ALT 14   BILITOT 0.5   ALKPHOS 89     CULTURES  Results for Dionicio Casillas (MRN [id number]) as of 4/27/2022 14:59   Ref. Range 4/27/2022 11:07   SARS-CoV-2, NAAT Latest Ref Range: Not Detected  Not Detected     Wound Cx 4/27  Gram Stain Result 2+ Gram positive cocci   No WBC's seen        EKG:  I have reviewed the EKG with the following interpretation:   Normal sinus rhythm  Normal ECG  When compared with ECG of 21-SEP-2021 11:21,  No significant change was found    RADIOLOGY  XR TOE RIGHT 4/27  Impression   Moderate soft tissue edema greatest involving the 3rd digit, region of stated   ulceration.  Ulceration not identified.       Recent fracture at the base of the 5th proximal phalanx.  Correlation to   history of injury recommended.       Significant degenerative change greatest involving the 1st MTP joint and 1st   interphalangeal joint.       For concern of osteomyelitis, MRI would be recommended. ASSESSMENT/PLAN:  #DMII with diabetic foot ulcer and surrounding cellulitis; right foot  -BG controlled here  -XR above  -Lantus 42 units nightly  -Low dose SSI  -ID consulted  -Abx per Dr. Rhonda Leonard; Cleocin, merrem and vanco  -Podiatry consulted  --Plan for surgery once cellulitis has improved    #CAMRYN on CKD IIIa  -Baseline Cr 1.4  -Cr 2.0 on admission  -IVF and monitor     #HTN  -BP mildly elevated  -Continue Norvasc    #HLD   -Continue statin    #BPH   -Continue flomax    DVT Prophylaxis: Lovenox  Diet: ADULT DIET; Regular; 4 carb choices (60 gm/meal);  Low Potassium (Less than 3000 mg/day)  Diet NPO  Diet NPO  Code Status: Full Code    Ricardo Luong PA-C  4/27/2022 5:32 PM

## 2022-04-27 NOTE — PROGRESS NOTES
Consult has been perfect served to Dr. Annalee Ward on 4/27/22. @. 2:05 PM    Jeremy Trinidad  4/27/2022

## 2022-04-27 NOTE — CARE COORDINATION
Review of chart for any potential discharge needs. Pt has PCP and insurance. IPTA from home with spouse. No needs identified for discharge intervention at this time. MD and bedside RN  if needs arise please consult case management for discharge intervention. CM not following at this time.

## 2022-04-27 NOTE — CONSULTS
CONSULT    Admit Date:  4/27/2022    Subjective:  59 y.o. male who is seen for evaluation of cellulitis and ulcer on the right foot. Patient has been followed by Dr. Jose L Gray in the [facility]. Developed worsening wound and infection and admitted today. Patient states has had issues with wounds for years. Did have amputation of the left great toe due to wound. States DM is doing well. Does not have much feeling in his feet.        Past Medical History:        Diagnosis Date    Abscess of left hand 06/20/2017    Acute kidney injury superimposed on chronic kidney disease ([address] 08/2021    Acute osteomyelitis of metatarsal bone of left foot ([address] 09/16/2021    MSSA and Anaerococcus    Adhesive capsulitis of shoulder 11/13/2014    Fractures     Gas gangrene of foot ([address] 08/31/2021    chronic diabetic ulcer there finally healed Jan 2022    History of hyperbaric oxygen therapy 09/21/2021    Hercules 4 diabetic foot    PNA (pneumonia) 03/28/2016    Pressure ulcer of left lateral forefoot, stage 4 ([address] 9/16/2021       Past Surgical History:        Procedure Laterality Date    FOOT DEBRIDEMENT Left 08/31/2021    LEFT FOOT DEBRIDEMENT INCISION AND DRAINAGE performed by Katie Abbasi DPM at [address] [place] [address] Left 09/03/2021    STAGED INCISION AND DEBRIDEMENT LEFT FOOT WITH PARTIAL FIRST RAY AMPUTATION performed by Katie [facility]asi DPM at [facility] HAND SURGERY Left 06/19/2017    LEFT HAND DEBRIDEMENT INCISION AND DRAINAGE    NECK SURGERY      30s year    TOE AMPUTATION Left 2009    hallux    WRIST FRACTURE SURGERY         Current Medications:     vancomycin  1,500 mg IntraVENous Once    clindamycin (CLEOCIN) IV  600 mg IntraVENous Q8H    meropenem  1,000 mg IntraVENous Q8H    amLODIPine  10 mg Oral Daily    atorvastatin  40 mg Oral Daily    insulin glargine  42 Units SubCUTAneous Nightly    tamsulosin  0.4 mg Oral Daily    sodium chloride flush  5-40 mL IntraVENous 2 times per day    enoxaparin 30 mg SubCUTAneous BID    insulin lispro  0-6 Units SubCUTAneous TID WC    insulin lispro  0-3 Units SubCUTAneous Nightly       Allergies:  Patient has no known allergies. Social History:    Social History     Tobacco Use    Smoking status: Never Smoker    Smokeless tobacco: Never Used   Vaping Use    Vaping Use: Never used   Substance Use Topics    Alcohol use: No     Alcohol/week: 0.0 standard drinks    Drug use: No       Family History:       Problem Relation Age of Onset    Heart Failure Mother     Diabetes Mother     Cancer Father         throat cancer    Asthma Daughter         Sports induced as a child    Emphysema Neg Hx     Hypertension Neg Hx        Review of Systems    CONSTITUTIONAL:  negative  EYES:  negative  HEENT:  negative  RESPIRATORY:  negative  CARDIOVASCULAR:  negative  GASTROINTESTINAL:  negative  GENITOURINARY:  negative  INTEGUMENT/BREAST:  positive for ulcer, redness  MUSCULOSKELETAL:  negative  NEUROLOGICAL:  positive for numbness      Objective:   BP (!) 149/72   Pulse 82   Temp 97 °F (36.1 °C) (Oral)   Resp 18   Ht 6' 1\" (1.854 m)   Wt 242 lb (109.8 kg)   SpO2 97%   BMI 31.93 kg/m²     Data:  CBC:   Recent Labs     04/27/22  0929   WBC 9.9   HGB 11.4*   HCT 33.1*   MCV 89.5        BMP:   Recent Labs     04/27/22  0929      K 4.7      CO2 21   BUN 55*   CREATININE 2.0*     LIVER PROFILE:   Recent Labs     04/27/22  0929   AST 12*   ALT 14   BILITOT 0.5   ALKPHOS 89     PT/INR:   Recent Labs     04/27/22  0929   PROT 6.7     HgBA1c:  Lab Results   Component Value Date    LABA1C 6.4 09/01/2021       Cultures: wound - in progress    Blood - NGSF    Imaging: xray right toes -   There is deformity from subacute ununited fracture at the base of the 5th   proximal phalanx. There is some angular deformity seen better on the lateral   view. Moderate soft tissue edema is seen involving the 2nd 3rd and 4th   digits.   There is no air in the region of the 3rd digit specifically. No   foreign body. Focus of stated ulceration not identified. Lateral projection   does show moderate soft tissue edema greater at the 3rd digit. In addition   there is deformity of the 4th digit, middle and distal phalanges with medial   angulation. Moderate to severe degenerative change of the 1st MTP joint and   interphalangeal joint. Vascular calcifications are present. Impression   Moderate soft tissue edema greatest involving the 3rd digit, region of stated   ulceration. Ulceration not identified. Recent fracture at the base of the 5th proximal phalanx. Correlation to   history of injury recommended. Significant degenerative change greatest involving the 1st MTP joint and 1st   interphalangeal joint. For concern of osteomyelitis, MRI would be recommended. My read - questionable erosion on the medial aspect of 3rd digit at PIPJ. Physical Exam:    General Appearance: alert and oriented to person, place and time, well developed and well- nourished, in no acute distress  Head: normocephalic and atraumatic  Eyes: pupils equal, round  Neck: trachea midline  Pulmonary/Chest: no wheezes  Cardiovascular: normal rate, regular rhythm  Abdomen: soft, non-tender, non-distended    DP/PT palpable bilateral  Ulcer on the medial aspect right 3rd digit with dark discoloration in central aspect. Small area of exposed bone noted. + periwound and erythema of the digit and extending to the forefoot. No increase in skin temperature noted. No malodor noted. No purulence noted. Purple discoloration of the distal aspect of the digit noted. Partial left 1st ray amputation noted.          Assessment:  Patient Active Problem List   Diagnosis Code    Hypertension I10    BPH (benign prostatic hyperplasia) N40.0    Acute kidney injury superimposed on CKD ([facility] N17.9, N18.9    Osteoarthritis M19.90    Class 1 obesity due to excess calories with serious comorbidity and body mass index (BMI) of 31.0 to 31.9 in adult E66.09, Z68.31    Diabetic polyneuropathy associated with type 2 diabetes mellitus ([facility]) E11.42    Elevated PSA R97.20    Mixed hyperlipidemia E78.2    Stage 3a chronic kidney disease (HCC) N18.31    Chronic low back pain M54.50, G89.29    Dental bridge present Z97.2    Callus of foot L84    Pressure ulcer of toe of right foot, stage 3 (HCC) L89.893    Pressure ulcer of toe of right foot, stage 4 ([facility]) N08.559    Cellulitis of right foot L03.115    Acute osteomyelitis of toe of right foot ([facility]) M86.171    Failure of outpatient treatment Z78.9    Ischemic toe I99.8    Diabetic foot infection ([facility]) E11.628, L08.9     Cellulitis right foot  diabetic foot ulcer right secondary to peripheral neuropathy   Probable osteomyelitis right 3rd digit  diabetes mellitus     Plan  Patient examined. Reviewed labs and imaging. Reviewed [facility] notes. Continue IV antibiotics as per Dr. Dutch Andrade. Will allow cellulitis to resolve and then reevaluate the ulcer. As per the patient the purple discoloration of the toe has improved a little so the digit could possibly continue to improve from that aspect. Certainly still has the concern for osteomyelitis of the digit as well. Discussed conservative and surgical possibilities and patient is ready to proceed if needs to have the toe amputated. Elevation of the leg to decrease edema. Control glucose levels to prevent future complications. Will place NPO at midnight in case the cellulitis resolves enough to proceed with surgery tomorrow if needed. Discussed with Dr. Dutch Andrade. Thank you for allowing me to participate in the care of your patient.            Electronically signed by Yasmeen Hernandez DPM on 4/27/2022 at 2:36 PM.

## 2022-04-27 NOTE — PROGRESS NOTES
Pharmacy Note  Vancomycin Consult    Gene Marina Woodruff is a 59 y.o. male started on Vancomycin for Bone/Joint infection; consult received from Dr. Yaz Payton to manage therapy. Also receiving the following antibiotics: Meropenem,Clindamycin    Allergies:  Patient has no known allergies. Tmax: 97    Recent Labs     04/27/22  0929   CREATININE 2.0*       Recent Labs     04/27/22  0929   WBC 9.9       Estimated Creatinine Clearance: 49 mL/min (A) (based on SCr of 2 mg/dL (H)). No intake or output data in the 24 hours ending 04/27/22 1455    Wt Readings from Last 1 Encounters:   04/27/22 242 lb (109.8 kg)         Body mass index is 31.93 kg/m². Culture Date      Source                       Results  4/27                   wound                       gram positive cocci    Loading dose (critically ill or in ICU, require dialysis or renal replacement therapy): Vancomycin 25 mg/kg IVPB x 1 (maximum 3000 mg). Maintenance dose: 15 mg/kg (maximum: 2000 mg/dose and 4500 mg/day) starting at the next dosing interval determined by renal function  Pulse dose: fluctuating renal function, CAMRYN, ESRD   Goal Vancomycin trough: 10-15 mcg/mL or 15-20 mcg/mL   Goal Vancomycin AUC: 400-600     Assessment/Plan:  Will initiate Vancomycin with a one time loading dose of 1500mg x1, followed by 1250 mg IV every 24 hours. Calculated . Vancomycin trough ordered for 4/29 at 1400. Timing of trough level will be determined based on culture results, renal function, and clinical response. Thank you for the consult.

## 2022-04-27 NOTE — PROGRESS NOTES
[facility] Progress Note    Del Villegas     : 1960    DATE OF VISIT:  2022    Subjective:     Del Villegas is a 59 y.o. male who has a diabetic and  pressure ulcer located on the right, 2nd toe, 3rd toe. Current complaint of pain in this ulcer? yes. Quality of pain: burning  Timing: intermittent  Severity: mild  Associated Signs/Symptoms:  swelling, redness, drainage (moderate, cloudy), numbness and tingling  Other significant symptoms or pertinent ulcer history: Mr. Andrew Villegas went to the ER over the weekend with some increased redness and increased drainage from the toes, he thinks mainly the third. No diagnostic testing done, just a clinical exam, and he was given oral doxycycline for cellulitis, with the knowledge that he was going to be seeing me again within the week. In the last few days, it sounds like he had some dusky purple color of part of the toe, which maybe improved, but now looks a bit more prominent again distally. It also sounds like the redness on the dorsal foot was improving, but is now more prominent today. He felt some mild chills last night, nothing severe. No sense of fever. No N/V/D, energy level is good, appetite is fine, not lightheaded, no CP or SOB. Additional ulcer(s) noted? no. That left lateral foot looks to be healed up (again). Mr. Andrew Villegas has a past medical history of Abscess of left hand, Acute kidney injury superimposed on chronic kidney disease [facility]), Acute osteomyelitis of metatarsal bone of left foot ([address] Adhesive capsulitis of shoulder, Fractures, Gas gangrene of foot ([address] History of hyperbaric oxygen therapy, PNA (pneumonia), and Pressure ulcer of left lateral forefoot, stage 4 ([address] He has a past surgical history that includes Wrist fracture surgery; Toe amputation (Left, ); Neck surgery; Hand surgery (Left, 2017); Foot Debridement (Left, 2021); and Foot Debridement (Left, 2021).     His family history includes Asthma in his daughter; Cancer in his father; Diabetes in his mother; Heart Failure in his mother. Mr. Geovanni Mccrary reports that he has never smoked. He has never used smokeless tobacco. He reports that he does not drink alcohol and does not use drugs. His current medication list consists of Dulaglutide, Multiple Vitamin, Multiple Vitamins-Minerals, amLODIPine, atorvastatin, doxycycline hyclate, fish oil, hydroCHLOROthiazide, insulin glargine, metFORMIN, tamsulosin, and triamcinolone. Doxy started over the weekend. Allergies: Patient has no known allergies. Pertinent items from the review of systems are discussed in the HPI; the remainder of the ROS was reviewed and is negative. Objective:     Vitals:    04/27/22 0827 04/27/22 0840   BP:  (!) 145/75   Pulse:  75   Resp:  20   Temp:  97.1 °F (36.2 °C)   TempSrc:  Oral   Weight: 241 lb (109.3 kg)    Height: 6' 1\" (1.854 m)        Constitutional:  well-developed, well-nourished, NAD, maybe just a bit fatigued  Cardiovascular:  bilateral pedal pulses palpable, feet warm, brisk cap refill, apart from that right 3rd toe which is cool, purple, dusky, especially distal  Lymphatic:  no inguinal or popliteal adenopathy, but he has a couple of streaks of lymphangitis up the dorsal foot and anterior ankle, and a pretty angry cellulitis across the dorsal foot, radiating from the 3rd toe  Musculoskeletal:  no clubbing, cyanosis or petechiae; RLE and LLE with no gross effusions, joint misalignment or acute arthritis  Elicia-ulcer skin: cellulitic, distally ischemic appearing, some epidermal lysis and mild maceration  Ulcer(s):  left lateral foot delicately healed; right 2nd toe looks a bit better, superficial, some fibrinous necrosis and presumed biofilm; 3rd toe looks worse, proximal aspect deep, necrotic, some discoloration, focally exposed bone, a bit of cloudy exudate. Photos also saved in electronic chart.      Today's Wound Measurements, per RN documentation:  Wound 04/06/22 #5 right 2nd toe, pressure, stage 3, 3/2022-Wound Length (cm): 0.8 cm  Wound 03/23/22 #4 right 3rd Toe, DFU, Hercules 3, Onset 03/22/2022-Wound Length (cm): 1.5 cm  [REMOVED] Wound 09/16/21 #3, left lateral foot, pressure ulcer, Stage 4, onset 9/2021-Wound Length (cm): 0 cm    Wound 04/06/22 #5 right 2nd toe, pressure, stage 3, 3/2022-Wound Width (cm): 0.3 cm  Wound 03/23/22 #4 right 3rd Toe, DFU, Hercules 3, Onset 03/22/2022-Wound Width (cm): 0.8 cm  [REMOVED] Wound 09/16/21 #3, left lateral foot, pressure ulcer, Stage 4, onset 9/2021-Wound Width (cm): 0 cm    Wound 04/06/22 #5 right 2nd toe, pressure, stage 3, 3/2022-Wound Depth (cm): 0.2 cm  Wound 03/23/22 #4 right 3rd Toe, DFU, Hercules 3, Onset 03/22/2022-Wound Depth (cm): 0.4 cm  [REMOVED] Wound 09/16/21 #3, left lateral foot, pressure ulcer, Stage 4, onset 9/2021-Wound Depth (cm): 0 cm  _____________________________    Lab Results   Component Value Date    LABALBU 3.7 10/04/2021     Lab Results   Component Value Date    CREATININE 1.4 (H) 10/15/2021     Lab Results   Component Value Date    HGB 10.9 (L) 10/04/2021     Lab Results   Component Value Date    LABA1C 6.4 09/01/2021       Left foot Cx back in August had MSSA and Anaerococcus. A hand wound from several years ago had MSSA as well.     XR from today -- not yet reviewed by radiology, but I think I see a cortical erosion of the 3rd middle phalanx, proximal and medial.    Assessment:     Patient Active Problem List   Diagnosis Code    Hypertension I10    BPH (benign prostatic hyperplasia) N40.0    Osteoarthritis M19.90    Class 1 obesity due to excess calories with serious comorbidity and body mass index (BMI) of 31.0 to 31.9 in adult E66.09, Z68.31    Diabetic polyneuropathy associated with type 2 diabetes mellitus (HCC) E11.42    Elevated PSA R97.20    Mixed hyperlipidemia E78.2    Stage 3a chronic kidney disease (HCC) N18.31    Chronic low back pain M54.50, G89.29    Dental bridge present Z97.2    Callus of foot L84    Hyperkalemia E87.5    Pressure ulcer of toe of right foot, stage 3 ([facility]) L89.893    Pressure ulcer of toe of right foot, stage 4 ([facility]) L89.894    Cellulitis of right foot L03.115    Acute osteomyelitis of toe of right foot ([facility]) M86.171    Failure of outpatient treatment Z78.9    Ischemic toe I99.8       Assessment of today's active condition(s):     -- Background of well controlled DM2, neuropathy, no known large-vessel PAD. -- [place] So 4 left foot last August, that sounds like it started from minor trauma and then skin and soft tissue infection, rapidly spreading to localized gangrene, osteo of the 1st ray, treated with an open partial ray resection, eventually healed after more debridement, completion of IV Abx, a round of HBOT, VAC therapy, additional local care. Now with diabetic shoes and custom orthotics. -- Also a small but slowly healing left lateral forefoot pressure ulcer, I think starting from a post-op dressing late last year, finally healed just these last couple of weeks. -- New pressure / neuropathic ulcers of right 2nd and 3rd toes, resulting from a silicone toe separator, which might have gotten bunched up in his prior shoes. Last week with a bit of increased depth at the 3rd toe ulcer, then over the weekend development of cellulitis, now worse cellulitis with lymphangitis, probably acute osteo of the 3rd middle phalanx, and a degree of ischemia in the toe (I think from swelling and small vessel disease, more than proximal large-vessel PAD, since the rest of his foot is so warm. Mild degree of systemic illness with some chills last night, but a limb-threatening infection. Factors contributing to occurrence and/or persistence of the chronic ulcer include edema, diabetes, chronic pressure, shear force and decreased tissue oxygenation. Medical necessity of today's visit is shown by the above documentation.  Merly Cosme debridement is indicated today, based upon the exam findings in the ulcer(s) above. Procedure note:     Consent obtained. Time out performed per [facility] policy. Anesthetic  Anesthetic: 4% Lidocaine Cream     Using a rongeur, I sharply debrided the right, 3rd toe ulcer(s) down through and including the removal of subcutaneous tissue. The type(s) of tissue debrided included slough and necrotic/eschar. Total Surface Area Debrided: 1 sq cm. I used that specimen to send for culture as well. The ulcers were then irrigated with normal saline solution. The procedure was completed with a small amount of bleeding, and hemostasis was with pressure. The patient tolerated the procedure well, with no significant complications. The patient's level of pain during and after the procedure was monitored. Post-debridement measurements, if different from pre-debridement, are in the flowsheet as well. Discharge plan:     Treatment in the wound care center today, per RN documentation: just Betadine and a dry dressing for now. Recommend admission for IV ABx and podiatry eval, and he agrees. Sent a small amount of tissue from the 3rd toe ulcer for Gram stain and culture. XR already done; I think he has middle phalanx osteo, official report pending. CBC-diff, CMP, lactate, two blood cultures. Admit to med-surg. Called Dr. Martha Cuadra. Consult with Dr. Deretha Habermann for possible toe amp, but maybe not until his cellulitis has had a couple of days to calm down. It might most likely just be a beta-Strep that is the main pathogen and the reason he didn't improve with doxy, but I'm going to go rather broad with Abx for now, and see how he does -- vancomycin, meropenem, clinda. I'll follow along with him upstairs of course.       Electronically signed by Agnes Camacho MD on 4/27/2022 at 9:23 AM.

## 2022-04-28 ENCOUNTER — ANESTHESIA EVENT (OUTPATIENT)
Dept: OPERATING ROOM | Age: 62
DRG: 616 | End: 2022-04-28
Payer: MEDICARE

## 2022-04-28 LAB
ANION GAP SERPL CALCULATED.3IONS-SCNC: 15 MMOL/L (ref 3–16)
BASOPHILS ABSOLUTE: 0.1 K/UL (ref 0–0.2)
BASOPHILS RELATIVE PERCENT: 1.2 %
BUN BLDV-MCNC: 46 MG/DL (ref 7–20)
CALCIUM SERPL-MCNC: 9.6 MG/DL (ref 8.3–10.6)
CHLORIDE BLD-SCNC: 105 MMOL/L (ref 99–110)
CO2: 21 MMOL/L (ref 21–32)
CREAT SERPL-MCNC: 1.7 MG/DL (ref 0.8–1.3)
EOSINOPHILS ABSOLUTE: 0.3 K/UL (ref 0–0.6)
EOSINOPHILS RELATIVE PERCENT: 3.6 %
ESTIMATED AVERAGE GLUCOSE: 111.2 MG/DL
GFR AFRICAN AMERICAN: 49
GFR NON-AFRICAN AMERICAN: 41
GLUCOSE BLD-MCNC: 134 MG/DL (ref 70–99)
GLUCOSE BLD-MCNC: 136 MG/DL (ref 70–99)
GLUCOSE BLD-MCNC: 152 MG/DL (ref 70–99)
GLUCOSE BLD-MCNC: 206 MG/DL (ref 70–99)
GLUCOSE BLD-MCNC: 245 MG/DL (ref 70–99)
HBA1C MFR BLD: 5.5 %
HCT VFR BLD CALC: 33.1 % (ref 40.5–52.5)
HEMOGLOBIN: 11.5 G/DL (ref 13.5–17.5)
LYMPHOCYTES ABSOLUTE: 1.7 K/UL (ref 1–5.1)
LYMPHOCYTES RELATIVE PERCENT: 19.9 %
MCH RBC QN AUTO: 31 PG (ref 26–34)
MCHC RBC AUTO-ENTMCNC: 34.6 G/DL (ref 31–36)
MCV RBC AUTO: 89.7 FL (ref 80–100)
MONOCYTES ABSOLUTE: 0.6 K/UL (ref 0–1.3)
MONOCYTES RELATIVE PERCENT: 7.5 %
NEUTROPHILS ABSOLUTE: 5.8 K/UL (ref 1.7–7.7)
NEUTROPHILS RELATIVE PERCENT: 67.8 %
PDW BLD-RTO: 14.7 % (ref 12.4–15.4)
PERFORMED ON: ABNORMAL
PLATELET # BLD: 282 K/UL (ref 135–450)
PMV BLD AUTO: 9.3 FL (ref 5–10.5)
POTASSIUM REFLEX MAGNESIUM: 4.7 MMOL/L (ref 3.5–5.1)
RBC # BLD: 3.69 M/UL (ref 4.2–5.9)
SODIUM BLD-SCNC: 141 MMOL/L (ref 136–145)
WBC # BLD: 8.6 K/UL (ref 4–11)

## 2022-04-28 PROCEDURE — 2500000003 HC RX 250 WO HCPCS: Performed by: INTERNAL MEDICINE

## 2022-04-28 PROCEDURE — 2580000003 HC RX 258: Performed by: NURSE PRACTITIONER

## 2022-04-28 PROCEDURE — 36415 COLL VENOUS BLD VENIPUNCTURE: CPT

## 2022-04-28 PROCEDURE — 80048 BASIC METABOLIC PNL TOTAL CA: CPT

## 2022-04-28 PROCEDURE — 1200000000 HC SEMI PRIVATE

## 2022-04-28 PROCEDURE — 85025 COMPLETE CBC W/AUTO DIFF WBC: CPT

## 2022-04-28 PROCEDURE — 99232 SBSQ HOSP IP/OBS MODERATE 35: CPT | Performed by: INTERNAL MEDICINE

## 2022-04-28 PROCEDURE — 2580000003 HC RX 258: Performed by: INTERNAL MEDICINE

## 2022-04-28 PROCEDURE — 6360000002 HC RX W HCPCS: Performed by: INTERNAL MEDICINE

## 2022-04-28 PROCEDURE — 6370000000 HC RX 637 (ALT 250 FOR IP): Performed by: NURSE PRACTITIONER

## 2022-04-28 RX ADMIN — INSULIN LISPRO 1 UNITS: 100 INJECTION, SOLUTION INTRAVENOUS; SUBCUTANEOUS at 20:36

## 2022-04-28 RX ADMIN — INSULIN GLARGINE 42 UNITS: 100 INJECTION, SOLUTION SUBCUTANEOUS at 20:36

## 2022-04-28 RX ADMIN — MEROPENEM 1000 MG: 1 INJECTION, POWDER, FOR SOLUTION INTRAVENOUS at 00:33

## 2022-04-28 RX ADMIN — CLINDAMYCIN IN 5 PERCENT DEXTROSE 600 MG: 12 INJECTION, SOLUTION INTRAVENOUS at 13:55

## 2022-04-28 RX ADMIN — SODIUM CHLORIDE: 9 INJECTION, SOLUTION INTRAVENOUS at 21:31

## 2022-04-28 RX ADMIN — AMLODIPINE BESYLATE 10 MG: 5 TABLET ORAL at 08:20

## 2022-04-28 RX ADMIN — TAMSULOSIN HYDROCHLORIDE 0.4 MG: 0.4 CAPSULE ORAL at 08:20

## 2022-04-28 RX ADMIN — VANCOMYCIN HYDROCHLORIDE 1250 MG: 10 INJECTION, POWDER, LYOPHILIZED, FOR SOLUTION INTRAVENOUS at 14:37

## 2022-04-28 RX ADMIN — INSULIN LISPRO 2 UNITS: 100 INJECTION, SOLUTION INTRAVENOUS; SUBCUTANEOUS at 11:50

## 2022-04-28 RX ADMIN — MEROPENEM 1000 MG: 1 INJECTION, POWDER, FOR SOLUTION INTRAVENOUS at 16:13

## 2022-04-28 RX ADMIN — CLINDAMYCIN IN 5 PERCENT DEXTROSE 600 MG: 12 INJECTION, SOLUTION INTRAVENOUS at 05:43

## 2022-04-28 RX ADMIN — SODIUM CHLORIDE: 9 INJECTION, SOLUTION INTRAVENOUS at 05:44

## 2022-04-28 RX ADMIN — ATORVASTATIN CALCIUM 40 MG: 40 TABLET, FILM COATED ORAL at 08:20

## 2022-04-28 RX ADMIN — CLINDAMYCIN IN 5 PERCENT DEXTROSE 600 MG: 12 INJECTION, SOLUTION INTRAVENOUS at 21:30

## 2022-04-28 RX ADMIN — MEROPENEM 1000 MG: 1 INJECTION, POWDER, FOR SOLUTION INTRAVENOUS at 08:19

## 2022-04-28 ASSESSMENT — PAIN SCALES - GENERAL: PAINLEVEL_OUTOF10: 0

## 2022-04-28 NOTE — FLOWSHEET NOTE
04/28/22 1401   Encounter Summary   Encounter Overview/Reason  Initial Encounter; Advance Care Planning   Service Provided For: Patient   Referral/Consult From: Nurse   Support System Spouse; Children;Family members   Last Encounter  04/28/22  (ACP conversation)   Complexity of Encounter Moderate   Begin Time 1315   End Time  1405   Total Time Calculated 50 min   Encounter    Type Initial Screen/Assessment   Advance Care Planning   Type ACP conversation   Assessment/Intervention/Outcome   Assessment Calm;Coping   Intervention Active listening;Discussed illness injury and its impact; Discussed belief system/Yarsani practices/amy;Discussed relationship with God;Explored/Affirmed feelings, thoughts, concerns;Prayer (assurance of)/[place]   Outcome Coping;Expressed feelings, needs, and concerns;Receptive      provided listening, support, and prayer for patient, who states that his approach to his health issues is to \"just roll with it. \"  Writer affirmed pt's positivity and offered prayer for his family, per his request.

## 2022-04-28 NOTE — CARE COORDINATION
Case Management Assessment  Initial Evaluation      Patient Name: Tia De Luna  YOB: 1960  Diagnosis: Diabetic foot infection ([facility] [E11.628, L08.9]  Date / Time: 4/27/2022  1:27 PM    Admission status/Date:4/27/2022  Chart Reviewed: Yes      Patient Interviewed: Yes   Family Interviewed:  No      Hospitalization in the last 30 days:  No      Health Care Decision Maker :   Primary Decision Maker: Alexa Becker - Spouse - [phone]    (CM - must 1st enter selection under Navigator - emergency contact- [place] Relationship and pick relationship)   Who do you trust or have selected to make healthcare decisions for you      Met with: pt  Interview conducted  (bedside/phone): bedside    Current PCP: Lorna Caba MD      Financial  [employer] Medicare  Precert required for SNF : Y, N          3 night stay required - Y, N    ADLS  Support Systems/Care Needs:    Transportation: self    Meal Preparation: self    Housing  Living Arrangements: ranch with wife  Steps: 0  Intent for return to present living arrangements: Yes  Identified Issues: Andrew Cardona  Active with [address] : No Agency:(Services)     Passport/Waiver : No  :                      Phone Number:    Passport/Waiver Services: n/a          Durable Medical Equiptment   DME Provider: n/a  Equipment: yes--Pt has these items at home, but does not actively use them  Walker__X_Cane___RTS___ BSC___Shower Chair___Hospital Bed___W/C__X__Other________  02 at ____Liter(s)---wears(frequency)_______ [facility] ___ CPAP___ BiPap___   N/A____      Home O2 Use :  No    If No for home O2---if presently on O2 during hospitalization:  No  if yes CM to follow for potential DC O2 need  Informed of need for care provider to bring portable home O2 tank on day of discharge for nursing to connect prior to leaving:   Not Indicated  Verbalized agreement/Understanding:   Not Indicated    Community Service Affiliation  Dialysis:  No    · Agency:  · Location:  · Dialysis Schedule:  · Phone:   · Fax: Other Community Services: (ex:PT/OT,[facility] Aditi Curry)  Dr. Narciso Madrid with [address] [place] [address],[address]    DISCHARGE PLAN: Explained Case Management role/services. Reviewed chart. Role of discharge planner explained and patient verbalized understanding. CM was not following pt, but will now follow pt. [place] is [place] a ranch with wife and plans to return. Pt is having surgery on 4/29/2022 to remove Right middle toe. Pt has had a similar surgery on the left foot years ago. Pt is independent. Pt currently declines the need for HC. Plan is for po abx at d/c.

## 2022-04-28 NOTE — PROGRESS NOTES
[facility] Progress Note      Del Beard     : 1960    DATE OF VISIT:  2022  DATE OF ADMISSION:  2022       Subjective:     Del Beard is a 59 y.o. male whom I've been seeing for a diabetic / pressure ulcer of the right 2nd and 3rd toes, with deeper necrosis of the 3rd, cellulitis, probably acute phalangeal osteo, and some distal toe ischemia. Since I last saw him, he's been feeling fine, no real pain, no F/C/D, tolerating Abx well, no sore throat or mouth, no N/V/D, rash, pruritus, IV pain. Mr. Fatemeh Beard has a past medical history of Abscess of left hand, Acute kidney injury superimposed on chronic kidney disease [facility]), Acute osteomyelitis of metatarsal bone of left foot ([facility] Adhesive capsulitis of shoulder, Fractures, Gas gangrene of foot ([facility] [address] [facility] History of hyperbaric oxygen therapy, PNA (pneumonia), and Pressure ulcer of left lateral forefoot, stage 4 ([facility] [address]     Current Facility-Administered Medications: clindamycin (CLEOCIN) 600 mg in dextrose 5 % 50 mL IVPB, 600 mg, IntraVENous, Q8H  meropenem (MERREM) 1,000 mg in sodium chloride 0.9 % 100 mL IVPB (mini-bag), 1,000 mg, IntraVENous, Q8H  amLODIPine (NORVASC) tablet 10 mg, 10 mg, Oral, Daily  atorvastatin (LIPITOR) tablet 40 mg, 40 mg, Oral, Daily  insulin glargine (LANTUS) injection vial 42 Units, 42 Units, SubCUTAneous, Nightly  tamsulosin (FLOMAX) capsule 0.4 mg, 0.4 mg, Oral, Daily  glucose chewable tablet 16 g, 16 g, Oral, PRN  glucagon (rDNA) injection 1 mg, 1 mg, IntraMUSCular, PRN  dextrose 5 % solution, 100 mL/hr, IntraVENous, PRN  sodium chloride flush 0.9 % injection 5-40 mL, 5-40 mL, IntraVENous, 2 times per day  sodium chloride flush 0.9 % injection 5-40 mL, 5-40 mL, IntraVENous, PRN  0.9 % sodium chloride infusion, , IntraVENous, PRN  enoxaparin Sodium (LOVENOX) injection 30 mg, 30 mg, SubCUTAneous, BID  ondansetron (ZOFRAN-ODT) disintegrating tablet 4 mg, 4 mg, Oral, Q8H PRN **OR** ondansetron [facility]) injection 4 mg, 4 mg, IntraVENous, Q6H PRN  polyethylene glycol (GLYCOLAX) packet 17 g, 17 g, Oral, Daily PRN  acetaminophen (TYLENOL) tablet 650 mg, 650 mg, Oral, Q6H PRN **OR** acetaminophen (TYLENOL) suppository 650 mg, 650 mg, Rectal, Q6H PRN  0.9 % sodium chloride infusion, , IntraVENous, Continuous  insulin lispro (HUMALOG) injection vial 0-6 Units, 0-6 Units, SubCUTAneous, TID WC  insulin lispro (HUMALOG) injection vial 0-3 Units, 0-3 Units, SubCUTAneous, Nightly  dextrose bolus (hypoglycemia) 10% 125 mL, 125 mL, IntraVENous, PRN **OR** dextrose bolus (hypoglycemia) 10% 250 mL, 250 mL, IntraVENous, PRN  vancomycin (VANCOCIN) 1,250 mg in dextrose 5 % 250 mL IVPB, 1,250 mg, IntraVENous, Q24H     This is day 2 of vanco - meropenem - clinda. Allergies: Patient has no known allergies. Pertinent items from the review of systems are discussed in the HPI; the remainder of the ROS was reviewed and is negative.      Objective:     Vital signs over the last 24 hours:  Temp  Av.1 °F (36.2 °C)  Min: 97 °F (36.1 °C)  Max: 97.4 °F (36.3 °C)  Pulse  Av.5  Min: 71  Max: 82  Systolic (99TYD), GKK:391 , Min:144 , JID:805   Diastolic (83REX), LND:80, Min:72, Max:81  Resp  Av  Min: 18  Max: 18  SpO2  Av.5 %  Min: 96 %  Max: 97 %    Constitutional:  well-developed, well-nourished, NAD  Cardiovascular:  bilateral pedal pulses palpable, feet warm, brisk cap refill, apart from that right 3rd toe which is cool, purple, dusky, especially distal - that does look just a bit better than yesterday  Lymphatic:  no inguinal or popliteal adenopathy, but he has a couple of streaks of lymphangitis up the dorsal foot and anterior ankle, and a pretty angry cellulitis across the dorsal foot, radiating from the 3rd toe - that is also a bit better than yesterday  Musculoskeletal:  no clubbing, other cyanosis or petechiae; RLE and LLE with no gross effusions, joint misalignment or acute arthritis  Elicia-ulcer skin: cellulitic, distally ischemic appearing, some epidermal lysis and improved maceration  Ulcer(s):  left lateral foot delicately healed; right 2nd toe looks a bit better, superficial, some fibrinous necrosis and presumed biofilm; 3rd toe looks maybe just a bit less acutely inflamed and drier today, still with that one proximal focus of deeper and darker necrotic tissue of course; no pus or malodor. Photos also saved in electronic chart.   ______________________________    Recent Labs     04/28/22  0518 04/27/22  0929   WBC 8.6 9.9   HGB 11.5* 11.4*   HCT 33.1* 33.1*   MCV 89.7 89.5    243     Lab Results   Component Value Date    CREATININE 1.7 (H) 04/28/2022     Lab Results   Component Value Date    LABALBU 3.9 04/27/2022     Lab Results   Component Value Date    ALT 14 04/27/2022    AST 12 (L) 04/27/2022    ALKPHOS 89 04/27/2022    BILITOT 0.5 04/27/2022      Lab Results   Component Value Date    LABA1C 5.5 04/27/2022     Other recent pertinent labs: Admission lactate only 1.0.        Glucoses in the 100s - 245. [address] from 7700 to 5800.  ______________________________    Recent pertinent micro results:  BCx negative x 2 so far. Gram stain from right 3rd toe wound with GPC (no WBCs), Cx this AM was still pending, but this afternoon (as I write this note), there is heavy growth of Staph aureus. ______________________________    Recent imaging results (last 7 days):     XR TOE RIGHT (MIN 2 VIEWS)    Result Date: 4/27/2022  Moderate soft tissue edema greatest involving the 3rd digit, region of stated ulceration. Ulceration not identified. Recent fracture at the base of the 5th proximal phalanx. Correlation to history of injury recommended. Significant degenerative change greatest involving the 1st MTP joint and 1st interphalangeal joint. For concern of osteomyelitis, MRI would be recommended.  [I do think I see a small erosion c/w osteomyelitis at the medial aspect of the proximal part of the 3rd middle phalanx.]     Assessment:     Patient Active Problem List   Diagnosis Code    Hypertension I10    BPH (benign prostatic hyperplasia) N40.0    Acute kidney injury superimposed on CKD ([facility] [address] N17.9, N18.9    Osteoarthritis M19.90    Class 1 obesity due to excess calories with serious comorbidity and body mass index (BMI) of 31.0 to 31.9 in adult E66.09, Z68.31    Diabetic polyneuropathy associated with type 2 diabetes mellitus (HCC) E11.42    Elevated PSA R97.20    Hyperlipidemia E78.5    Stage 3a chronic kidney disease (HCC) N18.31    Chronic low back pain M54.50, G89.29    Dental bridge present Z97.2    Callus of foot L84    Pressure ulcer of toe of right foot, stage 3 (HCC) L89.893    Pressure ulcer of toe of right foot, stage 4 (HCC) L89.894    Cellulitis of right foot L03.115    Acute osteomyelitis of toe of right foot ([facility]) M86.171    Failure of outpatient treatment Z78.9    Ischemic toe I99.8    Diabetic foot infection (HCC) E11.628, L08.9     Assessment of today's active condition(s):      --          Background of well controlled DM2, neuropathy, no known large-vessel PAD.     --          Hercules 4 left foot last August, that sounds like it started from minor trauma and then skin and soft tissue infection, rapidly spreading to localized gangrene, osteo of the 1st ray, treated with an open partial ray resection, eventually healed after more debridement, completion of IV Abx, a round of HBOT, VAC therapy, additional local care. Now with diabetic shoes and custom orthotics.    --          Also a small but slowly healing left lateral forefoot pressure ulcer, I think starting from a post-op dressing late last year, finally healed just these last couple of weeks.     --          New pressure / neuropathic ulcers of right 2nd and 3rd toes, resulting from a silicone toe separator, which might have gotten bunched up in his prior shoes.  Last week with a bit of increased depth at the 3rd toe ulcer, then over the weekend development of cellulitis, now worse cellulitis with lymphangitis, probably acute osteo of the 3rd middle phalanx, and a degree of ischemia in the toe (I think from swelling and small vessel disease, more than proximal large-vessel PAD, since the rest of his foot is so warm. Mild degree of systemic illness with some chills Tuesday night, but a limb-threatening infection.       -- The foot looks just a bit better today; Staph aureus on culture, which would generally make sense, though I'm a bit surprised that things seemed to steadily worsen earlier this week even when he was on doxycycline. Treatment recs:     No change in Abx today. I'll definitely be narrowing things down tomorrow. Await final bedside WCx and the two BCx. Possible plans for 3rd toe amputation tomorrow, depending on his foot exam tomorrow AM -- Dr. Kelsea Bui and I will see him again early tomorrow. Keep up the good work with glucoses. I currently do NOT anticipate him needing to be discharged with IV Abx. Probably a short course of orals, once the cultures are back, surgery is done, and he's looking stable for a day or two post-op. D/W Dr. Kelsea Bui.      Electronically signed by Gagan Byrd MD on 4/28/2022 at 8:51 AM.

## 2022-04-28 NOTE — PROGRESS NOTES
Bedside report and transfer of care given to [facility], RN Pt currently resting in bed with the call light within reach. Pt denies any other care needs at this time. Pt stable at this time.

## 2022-04-28 NOTE — PROGRESS NOTES
Vancomycin Day: 2  Current Regimen: 1250mg IV every 24hours    Patient's labs, cultures, vitals, and vancomycin regimen reviewed.    SCr decreased slightly from 2.0 to 1.7  U/O adequate (> 0.5 to 1.0 mL/kg/hr)  [employer] updated    Plan:   Order level for 4/29 at 1400

## 2022-04-28 NOTE — FLOWSHEET NOTE
04/28/22 0815   Vital Signs   Temp 97 °F (36.1 °C)   Temp Source Oral   Pulse 71   Heart Rate Source Monitor   Resp 18   BP (!) 144/73   BP Location Left upper arm   MAP (Calculated) 96.67   Patient Position Sitting   Level of Consciousness Alert (0)   MEWS Score 1   Oxygen Therapy   SpO2 96 %   Pulse Oximeter Device Mode Intermittent   Pulse Oximeter Device Location Finger   O2 Device None (Room air)     Shift assessment complete - see flow sheet, wound care to right foot completed via ID/podiatry at bedside, pt denies needs, call light with in reach.

## 2022-04-28 NOTE — FLOWSHEET NOTE
Pt appears to be resting comfortably. VSS. Per pt, no new needs at this time.         04/28/22 0033   Vital Signs   Temp 97.4 °F (36.3 °C)   Temp Source Oral   Pulse 73   Heart Rate Source Monitor   Resp 18   BP (!) 159/78   BP Location Left upper arm   MAP (Calculated) 105   Patient Position Semi fowlers   Level of Consciousness Alert (0)   MEWS Score 1   Pain Assessment   Pain Assessment None - Denies Pain   Pain Level 0   Oxygen Therapy   SpO2 96 %   O2 Device None (Room air)

## 2022-04-28 NOTE — ACP (ADVANCE CARE PLANNING)
Advance Care Planning     Advance Care Planning Inpatient Note  Spiritual Care Department    Today's Date: 4/28/2022  Unit: [address] PCU TELEMETRY    Received request from IDT Member. Upon review of chart and communication with care team, patient's decision making abilities are not in question. . Patient was/were present in the room during visit. Goals of ACP Conversation:  Discuss advance care planning documents    Health Care Decision Makers:     No healthcare decision makers have been documented. Click here to complete [address] including selection of the Healthcare Decision Maker Relationship (ie \"Primary\")  Summary:  Documented Next of Kin, per patient report    Advance Care Planning Documents (Patient Wishes):  Healthcare Power of /Advance Directive Appointment of [address]  Living Will/Advance Directive     Assessment:   discussed [address] Will documents with patient along with the [facility] for [facility], [facility]. Patient states that he is comfortable with his wife/NOKEON Hernández) serving as his decision maker, followed by the majority of his 3 adult children. When writer inquired about patient's relationships with his wife and children, he stated that they are all close and have already had conversations about his wishes for health care.  provided patient with blank ACP documents to review with his wife and consider whether or not he would benefit from completing them. Patient was also given instructions for contacting Spiritual Care in either the inpatient or outpatient setting for assistance with completion if/when desired.       Interventions:  Provided education on documents for clarity and greater understanding  Discussed and provided education on state decision maker hierarchy  Reviewed but did not complete ACP document    Care Preferences Communicated:   No    Outcomes/Plan:  Patient provided with blank ACP documents to review and instructed to contact Spiritual Care (IP or AMB) for assistance with completion if/when desired.     Electronically signed by Ronny Evans on 4/28/2022 at 1:50 PM

## 2022-04-28 NOTE — FLOWSHEET NOTE
Shift assessment complete. See flowsheet for full assessment. Pt denies any needs, call light within reach.      04/27/22 1949   Vital Signs   Temp 97 °F (36.1 °C)   Temp Source Oral   Pulse 72   Heart Rate Source Monitor   Resp 18   BP (!) 153/81   BP Location Right upper arm   MAP (Calculated) 105   Patient Position Semi fowlers   Oxygen Therapy   SpO2 97 %   O2 Device None (Room air)

## 2022-04-28 NOTE — PROGRESS NOTES
PROGRESS NOTE    Admit Date:  4/27/2022    Subjective:  59 y.o. male who is seen for evaluation of cellulitis and ulcer on the right foot. Patient has been followed by Dr. Anisha Gan in the [address] [place] [address],[address]. States feeling OK today. No issues with antibiotics.        Past Medical History:        Diagnosis Date    Abscess of left hand 06/20/2017    Acute kidney injury superimposed on chronic kidney disease ([address] 08/2021    Acute osteomyelitis of metatarsal bone of left foot ([address] 09/16/2021    MSSA and Anaerococcus    Adhesive capsulitis of shoulder 11/13/2014    Fractures     Gas gangrene of foot ([facility] [address] 08/31/2021    chronic diabetic ulcer there finally healed Jan 2022    History of hyperbaric oxygen therapy 09/21/2021    Hercules 4 diabetic foot    PNA (pneumonia) 03/28/2016    Pressure ulcer of left lateral forefoot, stage 4 ([address] 9/16/2021       Past Surgical History:        Procedure Laterality Date    FOOT DEBRIDEMENT Left 08/31/2021    LEFT FOOT DEBRIDEMENT INCISION AND DRAINAGE performed by Zachary Pineda DPM at [address] [place] [address] Left 09/03/2021    STAGED INCISION AND DEBRIDEMENT LEFT FOOT WITH PARTIAL FIRST RAY AMPUTATION performed by Zachary Pineda DPM at [place] [address] HAND SURGERY Left 06/19/2017    LEFT HAND DEBRIDEMENT INCISION AND DRAINAGE    NECK SURGERY      30s year    TOE AMPUTATION Left 2009    hallux    WRIST FRACTURE SURGERY         Current Medications:     clindamycin (CLEOCIN) IV  600 mg IntraVENous Q8H    meropenem  1,000 mg IntraVENous Q8H    amLODIPine  10 mg Oral Daily    atorvastatin  40 mg Oral Daily    insulin glargine  42 Units SubCUTAneous Nightly    tamsulosin  0.4 mg Oral Daily    sodium chloride flush  5-40 mL IntraVENous 2 times per day    enoxaparin  30 mg SubCUTAneous BID    insulin lispro  0-6 Units SubCUTAneous TID WC    insulin lispro  0-3 Units SubCUTAneous Nightly    vancomycin  1,250 mg IntraVENous Q24H       Allergies:  Patient has no known allergies. Social History:    Social History     Tobacco Use    Smoking status: Never Smoker    Smokeless tobacco: Never Used   Vaping Use    Vaping Use: Never used   Substance Use Topics    Alcohol use: No     Alcohol/week: 0.0 standard drinks    Drug use: No       Family History:       Problem Relation Age of Onset    Heart Failure Mother     Diabetes Mother     Cancer Father         throat cancer    Asthma Daughter         Sports induced as a child    Emphysema Neg Hx     Hypertension Neg Hx        Review of Systems    CONSTITUTIONAL:  negative  EYES:  negative  HEENT:  negative  RESPIRATORY:  negative  CARDIOVASCULAR:  negative  GASTROINTESTINAL:  negative  GENITOURINARY:  negative  INTEGUMENT/BREAST:  positive for ulcer, redness  MUSCULOSKELETAL:  negative  NEUROLOGICAL:  positive for numbness      Objective:   BP (!) 159/78   Pulse 73   Temp 97.4 °F (36.3 °C) (Oral)   Resp 18   Ht 6' 1\" (1.854 m)   Wt 244 lb 1.6 oz (110.7 kg)   SpO2 96%   BMI 32.21 kg/m²     Data:  CBC:   Recent Labs     04/27/22  0929 04/28/22  0518   WBC 9.9 8.6   HGB 11.4* 11.5*   HCT 33.1* 33.1*   MCV 89.5 89.7    282     BMP:   Recent Labs     04/27/22  0929 04/28/22  0518    141   K 4.7 4.7    105   CO2 21 21   BUN 55* 46*   CREATININE 2.0* 1.7*     LIVER PROFILE:   Recent Labs     04/27/22  0929   AST 12*   ALT 14   BILITOT 0.5   ALKPHOS 89     PT/INR:   Recent Labs     04/27/22  0929   PROT 6.7     HgBA1c:  Lab Results   Component Value Date    LABA1C 6.4 09/01/2021       Cultures: wound - in progress    Blood - NGSF    Imaging: xray right toes -   There is deformity from subacute ununited fracture at the base of the 5th   proximal phalanx. There is some angular deformity seen better on the lateral   view. Moderate soft tissue edema is seen involving the 2nd 3rd and 4th   digits. There is no air in the region of the 3rd digit specifically. No   foreign body.   Focus of stated ulceration not identified. Lateral projection   does show moderate soft tissue edema greater at the 3rd digit. In addition   there is deformity of the 4th digit, middle and distal phalanges with medial   angulation. Moderate to severe degenerative change of the 1st MTP joint and   interphalangeal joint. Vascular calcifications are present. Impression   Moderate soft tissue edema greatest involving the 3rd digit, region of stated   ulceration. Ulceration not identified. Recent fracture at the base of the 5th proximal phalanx. Correlation to   history of injury recommended. Significant degenerative change greatest involving the 1st MTP joint and 1st   interphalangeal joint. For concern of osteomyelitis, MRI would be recommended. My read - questionable erosion on the medial aspect of 3rd digit at PIPJ. + calcified vessels in foot. Physical Exam:    General Appearance: alert and oriented to person, place and time, well developed and well- nourished, in no acute distress  Head: normocephalic and atraumatic  Eyes: pupils equal, round  Neck: trachea midline  Pulmonary/Chest: no wheezes  Cardiovascular: normal rate, regular rhythm  Abdomen: soft, non-tender, non-distended    DP/PT palpable bilateral  Ulcer on the lateral aspect right 2nd digit with mild fibrotic tissue. Probes to soft tissue only. Mild serous drainage noted. No malodor noted. Ulcer on the medial aspect right 3rd digit with dark discoloration in central aspect. Mild serous drainage noted. Small area of exposed bone noted. + periwound and erythema of the digit and extending to the forefoot. No increase in skin temperature noted. No malodor noted. No purulence noted. Purple discoloration of the distal aspect of the digit noted. Lysing skin on the dorsal aspect of the digit. Partial left 1st ray amputation noted.          Assessment:  Patient Active Problem List   Diagnosis Code    Hypertension I10    BPH (benign prostatic hyperplasia) N40.0    Acute kidney injury superimposed on CKD ([facility] [address] N17.9, N18.9    Osteoarthritis M19.90    Class 1 obesity due to excess calories with serious comorbidity and body mass index (BMI) of 31.0 to 31.9 in adult E66.09, Z68.31    Diabetic polyneuropathy associated with type 2 diabetes mellitus (HCC) E11.42    Elevated PSA R97.20    Hyperlipidemia E78.5    Stage 3a chronic kidney disease (HCC) N18.31    Chronic low back pain M54.50, G89.29    Dental bridge present Z97.2    Callus of foot L84    Pressure ulcer of toe of right foot, stage 3 (HCC) L89.893    Pressure ulcer of toe of right foot, stage 4 (HCC) Z80.780    Cellulitis of right foot L03.115    Acute osteomyelitis of toe of right foot (HCC) M86.171    Failure of outpatient treatment Z78.9    Ischemic toe I99.8    Diabetic foot infection (HCC) E11.628, L08.9     Cellulitis right foot - slowly improving  diabetic foot ulcers right secondary to peripheral neuropathy   Osteomyelitis right 3rd digit  diabetes mellitus     Plan  Patient examined. Reviewed labs and imaging. Continue IV antibiotics as per Dr. Noemi Priest. Elevation of the leg to decrease edema. Control glucose levels to prevent future complications. Seen in conjunction with Dr. Noemi Priest. Cellulitis is still resolving and would like to see it improve more prior to surgery. No abscess noted at this time so he would benefit from healthier skin at the time of surgery. Discussed risks, complications, alternatives and benefits with patient. Understands chance of nonhealing wound, infection, need for further surgery, loss of limb or life. Questions answered. Will place NPO at midnight in anticipation for surgery tomorrow.    Discussed with Dr. Ilsa Guevara.           Electronically signed by Janie Birch DPM on 4/28/2022 at 8:00 AM.

## 2022-04-28 NOTE — PROGRESS NOTES
Admit: 2022    Name:  Ramón Horton  Room:  /6438-77  MRN:    [id number]     Daily Progress Note for 2022     Admitted with diabetic foot ulcer and cellulitis. Interval History:       Scheduled Meds:   clindamycin (CLEOCIN) IV  600 mg IntraVENous Q8H    meropenem  1,000 mg IntraVENous Q8H    amLODIPine  10 mg Oral Daily    atorvastatin  40 mg Oral Daily    insulin glargine  42 Units SubCUTAneous Nightly    tamsulosin  0.4 mg Oral Daily    sodium chloride flush  5-40 mL IntraVENous 2 times per day    enoxaparin  30 mg SubCUTAneous BID    insulin lispro  0-6 Units SubCUTAneous TID WC    insulin lispro  0-3 Units SubCUTAneous Nightly    vancomycin  1,250 mg IntraVENous Q24H       Continuous Infusions:   dextrose      sodium chloride      sodium chloride 75 mL/hr at 22 0544       PRN Meds:  glucose, glucagon (rDNA), dextrose, sodium chloride flush, sodium chloride, ondansetron **OR** ondansetron, polyethylene glycol, acetaminophen **OR** acetaminophen, dextrose bolus (hypoglycemia) **OR** dextrose bolus (hypoglycemia)                  Objective:     Temp  Av.1 °F (36.2 °C)  Min: 97 °F (36.1 °C)  Max: 97.4 °F (36.3 °C)  Pulse  Av.5  Min: 71  Max: 82  BP  Min: 144/73  Max: 159/78  SpO2  Av.5 %  Min: 96 %  Max: 97 %  Patient Vitals for the past 4 hrs:   BP Temp Temp src Pulse Resp SpO2   22 0815 (!) 144/73 97 °F (36.1 °C) Oral 71 18 96 %         Intake/Output Summary (Last 24 hours) at 2022 0842  Last data filed at 2022 1748  Gross per 24 hour   Intake 240 ml   Output --   Net 240 ml       Physical Exam:    Gen: No distress. Alert. Eyes: PERRL. No sclera icterus. No conjunctival injection. ENT: No discharge. Pharynx clear. Neck: Trachea midline. Resp: No accessory muscle use. No crackles. No wheezes. No rhonchi. CV: Regular rate. Regular rhythm. No murmur. No rub. No edema.    Capillary Refill: Brisk,< 3 seconds   Peripheral Pulses: +2 palpable, equal bilaterally   GI: Non-tender. Non-distended. No masses. No organomegaly. Normal bowel sounds. No hernia. Skin: Warm and dry. No nodule on exposed extremities. Right foot in walking boot and wrapped in clean and dry dressing. See media tab for ulcer. S/P left great toe amp. M/S: No cyanosis. No joint deformity. No clubbing. Neuro: Awake. Grossly nonfocal    Psych: Oriented x 3. No anxiety or agitation. Lab Data:  CBC:   Recent Labs     04/27/22 0929 04/28/22 0518   WBC 9.9 8.6   RBC 3.70* 3.69*   HGB 11.4* 11.5*   HCT 33.1* 33.1*   MCV 89.5 89.7   RDW 14.8 14.7    282     BMP:   Recent Labs     04/27/22 0929 04/28/22 0518    141   K 4.7 4.7    105   CO2 21 21   BUN 55* 46*   CREATININE 2.0* 1.7*     BNP: No results for input(s): BNP in the last 72 hours. PT/INR: No results for input(s): PROTIME, INR in the last 72 hours. APTT:No results for input(s): APTT in the last 72 hours. CARDIAC ENZYMES: No results for input(s): CKMB, CKMBINDEX, TROPONINI in the last 72 hours.     Invalid input(s): CKTOTAL;3  FASTING LIPID PANEL:No results found for: CHOL, HDL, TRIG  LIVER PROFILE:   Recent Labs     04/27/22 0929   AST 12*   ALT 14   BILITOT 0.5   ALKPHOS 89         No orders to display         Assessment & Plan:     Patient Active Problem List    Diagnosis Date Noted    Cellulitis of right foot 04/27/2022    Acute osteomyelitis of toe of right foot ([address] 04/27/2022    Failure of outpatient treatment 04/27/2022    Ischemic toe 04/27/2022    Diabetic foot infection ([address] 04/27/2022    Pressure ulcer of toe of right foot, stage 4 ([address] 04/24/2022    Pressure ulcer of toe of right foot, stage 3 ([address] 04/12/2022    Elevated PSA 09/16/2021    Hyperlipidemia 09/16/2021    Stage 3a chronic kidney disease ([facility] 09/16/2021    Callus of foot 09/16/2021    Chronic low back pain     Dental bridge present     Acute kidney injury superimposed on CKD (HCC)     Osteoarthritis     Class 1 obesity due to excess calories with serious comorbidity and body mass index (BMI) of 31.0 to 31.9 in adult     Hypertension 03/28/2016    BPH (benign prostatic hyperplasia) 03/28/2016    Diabetic polyneuropathy associated with type 2 diabetes mellitus ([facility] [address] 04/17/2013     #DMII with diabetic foot ulcer and surrounding cellulitis; right foot  -BG controlled here  -XR above  -Lantus 42 units nightly  -Low dose SSI  -ID consulted  -Abx per Dr. Jose L Gray; Cleocin, merrem and vanco  -Podiatry consulted  Toe amputation tomorrow      #CAMRYN on CKD IIIa  -Baseline Cr 1.4  -Cr 2.0 on admission--> 1.7   -IVF and monitor      #HTN  -BP mildly elevated  -Continue Norvasc     #HLD   -Continue statin     #BPH   -Continue flomax     DVT Prophylaxis: Lovenox  Diet: ADULT DIET; Regular; 4 carb choices (60 gm/meal);  Low Potassium (Less than 3000 mg/day)  Code Status: Full Code      Gerard Glass MD

## 2022-04-29 ENCOUNTER — APPOINTMENT (OUTPATIENT)
Dept: GENERAL RADIOLOGY | Age: 62
DRG: 616 | End: 2022-04-29
Attending: INTERNAL MEDICINE
Payer: MEDICARE

## 2022-04-29 ENCOUNTER — ANESTHESIA (OUTPATIENT)
Dept: OPERATING ROOM | Age: 62
DRG: 616 | End: 2022-04-29
Payer: MEDICARE

## 2022-04-29 VITALS
DIASTOLIC BLOOD PRESSURE: 57 MMHG | RESPIRATION RATE: 31 BRPM | OXYGEN SATURATION: 98 % | SYSTOLIC BLOOD PRESSURE: 107 MMHG

## 2022-04-29 PROBLEM — A49.02 MRSA INFECTION: Status: ACTIVE | Noted: 2021-09-16

## 2022-04-29 LAB
ANION GAP SERPL CALCULATED.3IONS-SCNC: 12 MMOL/L (ref 3–16)
BASOPHILS ABSOLUTE: 0.1 K/UL (ref 0–0.2)
BASOPHILS RELATIVE PERCENT: 1.2 %
BUN BLDV-MCNC: 39 MG/DL (ref 7–20)
CALCIUM SERPL-MCNC: 9.1 MG/DL (ref 8.3–10.6)
CHLORIDE BLD-SCNC: 108 MMOL/L (ref 99–110)
CO2: 22 MMOL/L (ref 21–32)
CREAT SERPL-MCNC: 1.6 MG/DL (ref 0.8–1.3)
EOSINOPHILS ABSOLUTE: 0.4 K/UL (ref 0–0.6)
EOSINOPHILS RELATIVE PERCENT: 4.6 %
GFR AFRICAN AMERICAN: 53
GFR NON-AFRICAN AMERICAN: 44
GLUCOSE BLD-MCNC: 110 MG/DL (ref 70–99)
GLUCOSE BLD-MCNC: 114 MG/DL (ref 70–99)
GLUCOSE BLD-MCNC: 116 MG/DL (ref 70–99)
GLUCOSE BLD-MCNC: 131 MG/DL (ref 70–99)
GLUCOSE BLD-MCNC: 143 MG/DL (ref 70–99)
GLUCOSE BLD-MCNC: 154 MG/DL (ref 70–99)
GRAM STAIN RESULT: ABNORMAL
HCT VFR BLD CALC: 30.2 % (ref 40.5–52.5)
HEMOGLOBIN: 10.7 G/DL (ref 13.5–17.5)
LYMPHOCYTES ABSOLUTE: 1.7 K/UL (ref 1–5.1)
LYMPHOCYTES RELATIVE PERCENT: 20 %
MCH RBC QN AUTO: 31.4 PG (ref 26–34)
MCHC RBC AUTO-ENTMCNC: 35.3 G/DL (ref 31–36)
MCV RBC AUTO: 88.7 FL (ref 80–100)
MONOCYTES ABSOLUTE: 0.7 K/UL (ref 0–1.3)
MONOCYTES RELATIVE PERCENT: 7.9 %
NEUTROPHILS ABSOLUTE: 5.5 K/UL (ref 1.7–7.7)
NEUTROPHILS RELATIVE PERCENT: 66.3 %
ORGANISM: ABNORMAL
PDW BLD-RTO: 15 % (ref 12.4–15.4)
PERFORMED ON: ABNORMAL
PLATELET # BLD: 273 K/UL (ref 135–450)
PMV BLD AUTO: 9.4 FL (ref 5–10.5)
POTASSIUM REFLEX MAGNESIUM: 5.2 MMOL/L (ref 3.5–5.1)
RBC # BLD: 3.4 M/UL (ref 4.2–5.9)
SODIUM BLD-SCNC: 142 MMOL/L (ref 136–145)
VANCOMYCIN TROUGH: 8.5 UG/ML (ref 10–20)
WBC # BLD: 8.3 K/UL (ref 4–11)
WOUND/ABSCESS: ABNORMAL

## 2022-04-29 PROCEDURE — 3700000000 HC ANESTHESIA ATTENDED CARE: Performed by: PODIATRIST

## 2022-04-29 PROCEDURE — 73620 X-RAY EXAM OF FOOT: CPT

## 2022-04-29 PROCEDURE — 2709999900 HC NON-CHARGEABLE SUPPLY: Performed by: PODIATRIST

## 2022-04-29 PROCEDURE — 6360000002 HC RX W HCPCS: Performed by: PODIATRIST

## 2022-04-29 PROCEDURE — 2580000003 HC RX 258: Performed by: INTERNAL MEDICINE

## 2022-04-29 PROCEDURE — 6360000002 HC RX W HCPCS: Performed by: INTERNAL MEDICINE

## 2022-04-29 PROCEDURE — 36415 COLL VENOUS BLD VENIPUNCTURE: CPT

## 2022-04-29 PROCEDURE — 6360000002 HC RX W HCPCS

## 2022-04-29 PROCEDURE — 7100000011 HC PHASE II RECOVERY - ADDTL 15 MIN: Performed by: PODIATRIST

## 2022-04-29 PROCEDURE — 88311 DECALCIFY TISSUE: CPT

## 2022-04-29 PROCEDURE — 3600000002 HC SURGERY LEVEL 2 BASE: Performed by: PODIATRIST

## 2022-04-29 PROCEDURE — 87070 CULTURE OTHR SPECIMN AEROBIC: CPT

## 2022-04-29 PROCEDURE — A4217 STERILE WATER/SALINE, 500 ML: HCPCS | Performed by: PODIATRIST

## 2022-04-29 PROCEDURE — 99232 SBSQ HOSP IP/OBS MODERATE 35: CPT | Performed by: INTERNAL MEDICINE

## 2022-04-29 PROCEDURE — 0Y6M0ZC DETACHMENT AT RIGHT FOOT, PARTIAL 3RD RAY, OPEN APPROACH: ICD-10-PCS | Performed by: PODIATRIST

## 2022-04-29 PROCEDURE — 2580000003 HC RX 258: Performed by: PODIATRIST

## 2022-04-29 PROCEDURE — 2500000003 HC RX 250 WO HCPCS: Performed by: PODIATRIST

## 2022-04-29 PROCEDURE — 3700000001 HC ADD 15 MINUTES (ANESTHESIA): Performed by: PODIATRIST

## 2022-04-29 PROCEDURE — 2500000003 HC RX 250 WO HCPCS: Performed by: INTERNAL MEDICINE

## 2022-04-29 PROCEDURE — 88305 TISSUE EXAM BY PATHOLOGIST: CPT

## 2022-04-29 PROCEDURE — 6370000000 HC RX 637 (ALT 250 FOR IP): Performed by: NURSE PRACTITIONER

## 2022-04-29 PROCEDURE — 80202 ASSAY OF VANCOMYCIN: CPT

## 2022-04-29 PROCEDURE — 87075 CULTR BACTERIA EXCEPT BLOOD: CPT

## 2022-04-29 PROCEDURE — 3600000012 HC SURGERY LEVEL 2 ADDTL 15MIN: Performed by: PODIATRIST

## 2022-04-29 PROCEDURE — 80048 BASIC METABOLIC PNL TOTAL CA: CPT

## 2022-04-29 PROCEDURE — 2500000003 HC RX 250 WO HCPCS

## 2022-04-29 PROCEDURE — 1200000000 HC SEMI PRIVATE

## 2022-04-29 PROCEDURE — 6370000000 HC RX 637 (ALT 250 FOR IP): Performed by: INTERNAL MEDICINE

## 2022-04-29 PROCEDURE — 7100000010 HC PHASE II RECOVERY - FIRST 15 MIN: Performed by: PODIATRIST

## 2022-04-29 PROCEDURE — 2580000003 HC RX 258

## 2022-04-29 PROCEDURE — 85025 COMPLETE CBC W/AUTO DIFF WBC: CPT

## 2022-04-29 RX ORDER — SODIUM CHLORIDE 0.9 % (FLUSH) 0.9 %
5-40 SYRINGE (ML) INJECTION EVERY 12 HOURS SCHEDULED
Status: DISCONTINUED | OUTPATIENT
Start: 2022-04-29 | End: 2022-04-29 | Stop reason: HOSPADM

## 2022-04-29 RX ORDER — FAMOTIDINE 10 MG/ML
INJECTION, SOLUTION INTRAVENOUS
Status: COMPLETED
Start: 2022-04-29 | End: 2022-04-29

## 2022-04-29 RX ORDER — SODIUM CHLORIDE 9 MG/ML
25 INJECTION, SOLUTION INTRAVENOUS PRN
Status: DISCONTINUED | OUTPATIENT
Start: 2022-04-29 | End: 2022-04-29

## 2022-04-29 RX ORDER — SODIUM CHLORIDE 0.9 % (FLUSH) 0.9 %
5-40 SYRINGE (ML) INJECTION PRN
Status: DISCONTINUED | OUTPATIENT
Start: 2022-04-29 | End: 2022-04-29 | Stop reason: HOSPADM

## 2022-04-29 RX ORDER — OXYCODONE HYDROCHLORIDE 5 MG/1
5 TABLET ORAL PRN
Status: DISCONTINUED | OUTPATIENT
Start: 2022-04-29 | End: 2022-04-29

## 2022-04-29 RX ORDER — LIDOCAINE HYDROCHLORIDE 20 MG/ML
INJECTION, SOLUTION EPIDURAL; INFILTRATION; INTRACAUDAL; PERINEURAL PRN
Status: DISCONTINUED | OUTPATIENT
Start: 2022-04-29 | End: 2022-04-29 | Stop reason: SDUPTHER

## 2022-04-29 RX ORDER — SODIUM CHLORIDE 9 MG/ML
250 INJECTION, SOLUTION INTRAVENOUS PRN
Status: DISCONTINUED | OUTPATIENT
Start: 2022-04-29 | End: 2022-04-29 | Stop reason: HOSPADM

## 2022-04-29 RX ORDER — SODIUM CHLORIDE, SODIUM LACTATE, POTASSIUM CHLORIDE, CALCIUM CHLORIDE 600; 310; 30; 20 MG/100ML; MG/100ML; MG/100ML; MG/100ML
INJECTION, SOLUTION INTRAVENOUS CONTINUOUS PRN
Status: DISCONTINUED | OUTPATIENT
Start: 2022-04-29 | End: 2022-04-29 | Stop reason: SDUPTHER

## 2022-04-29 RX ORDER — ONDANSETRON 2 MG/ML
INJECTION INTRAMUSCULAR; INTRAVENOUS PRN
Status: DISCONTINUED | OUTPATIENT
Start: 2022-04-29 | End: 2022-04-29 | Stop reason: SDUPTHER

## 2022-04-29 RX ORDER — OXYCODONE HYDROCHLORIDE 5 MG/1
10 TABLET ORAL PRN
Status: DISCONTINUED | OUTPATIENT
Start: 2022-04-29 | End: 2022-04-29

## 2022-04-29 RX ORDER — ONDANSETRON 2 MG/ML
4 INJECTION INTRAMUSCULAR; INTRAVENOUS
Status: DISCONTINUED | OUTPATIENT
Start: 2022-04-29 | End: 2022-04-29

## 2022-04-29 RX ORDER — PROPOFOL 10 MG/ML
INJECTION, EMULSION INTRAVENOUS PRN
Status: DISCONTINUED | OUTPATIENT
Start: 2022-04-29 | End: 2022-04-29 | Stop reason: SDUPTHER

## 2022-04-29 RX ORDER — HYDROCODONE BITARTRATE AND ACETAMINOPHEN 5; 325 MG/1; MG/1
1 TABLET ORAL EVERY 6 HOURS PRN
Status: DISCONTINUED | OUTPATIENT
Start: 2022-04-29 | End: 2022-05-02 | Stop reason: HOSPADM

## 2022-04-29 RX ORDER — MEPERIDINE HYDROCHLORIDE 25 MG/ML
12.5 INJECTION INTRAMUSCULAR; INTRAVENOUS; SUBCUTANEOUS EVERY 5 MIN PRN
Status: DISCONTINUED | OUTPATIENT
Start: 2022-04-29 | End: 2022-04-29 | Stop reason: HOSPADM

## 2022-04-29 RX ORDER — MAGNESIUM HYDROXIDE 1200 MG/15ML
LIQUID ORAL CONTINUOUS PRN
Status: COMPLETED | OUTPATIENT
Start: 2022-04-29 | End: 2022-04-29

## 2022-04-29 RX ORDER — SODIUM CHLORIDE, SODIUM LACTATE, POTASSIUM CHLORIDE, CALCIUM CHLORIDE 600; 310; 30; 20 MG/100ML; MG/100ML; MG/100ML; MG/100ML
INJECTION, SOLUTION INTRAVENOUS CONTINUOUS
Status: DISCONTINUED | OUTPATIENT
Start: 2022-04-29 | End: 2022-04-29 | Stop reason: HOSPADM

## 2022-04-29 RX ADMIN — PROPOFOL 80 MG: 10 INJECTION, EMULSION INTRAVENOUS at 11:26

## 2022-04-29 RX ADMIN — SODIUM ZIRCONIUM CYCLOSILICATE 5 G: 5 POWDER, FOR SUSPENSION ORAL at 21:23

## 2022-04-29 RX ADMIN — PROPOFOL 40 MG: 10 INJECTION, EMULSION INTRAVENOUS at 11:32

## 2022-04-29 RX ADMIN — VANCOMYCIN HYDROCHLORIDE 1500 MG: 10 INJECTION, POWDER, LYOPHILIZED, FOR SOLUTION INTRAVENOUS at 16:06

## 2022-04-29 RX ADMIN — CLINDAMYCIN IN 5 PERCENT DEXTROSE 600 MG: 12 INJECTION, SOLUTION INTRAVENOUS at 21:22

## 2022-04-29 RX ADMIN — INSULIN LISPRO 1 UNITS: 100 INJECTION, SOLUTION INTRAVENOUS; SUBCUTANEOUS at 21:24

## 2022-04-29 RX ADMIN — TAMSULOSIN HYDROCHLORIDE 0.4 MG: 0.4 CAPSULE ORAL at 08:10

## 2022-04-29 RX ADMIN — CLINDAMYCIN IN 5 PERCENT DEXTROSE 600 MG: 12 INJECTION, SOLUTION INTRAVENOUS at 06:03

## 2022-04-29 RX ADMIN — SODIUM CHLORIDE, POTASSIUM CHLORIDE, SODIUM LACTATE AND CALCIUM CHLORIDE: 600; 310; 30; 20 INJECTION, SOLUTION INTRAVENOUS at 11:11

## 2022-04-29 RX ADMIN — SODIUM CHLORIDE: 9 INJECTION, SOLUTION INTRAVENOUS at 13:01

## 2022-04-29 RX ADMIN — INSULIN LISPRO 1 UNITS: 100 INJECTION, SOLUTION INTRAVENOUS; SUBCUTANEOUS at 16:03

## 2022-04-29 RX ADMIN — ATORVASTATIN CALCIUM 40 MG: 40 TABLET, FILM COATED ORAL at 08:10

## 2022-04-29 RX ADMIN — FAMOTIDINE 20 MG: 10 INJECTION INTRAVENOUS at 10:36

## 2022-04-29 RX ADMIN — SODIUM ZIRCONIUM CYCLOSILICATE 5 G: 5 POWDER, FOR SUSPENSION ORAL at 13:05

## 2022-04-29 RX ADMIN — ONDANSETRON HYDROCHLORIDE 4 MG: 2 INJECTION, SOLUTION INTRAMUSCULAR; INTRAVENOUS at 11:17

## 2022-04-29 RX ADMIN — PROPOFOL 20 MG: 10 INJECTION, EMULSION INTRAVENOUS at 11:19

## 2022-04-29 RX ADMIN — CLINDAMYCIN IN 5 PERCENT DEXTROSE 600 MG: 12 INJECTION, SOLUTION INTRAVENOUS at 13:01

## 2022-04-29 RX ADMIN — INSULIN GLARGINE 42 UNITS: 100 INJECTION, SOLUTION SUBCUTANEOUS at 21:24

## 2022-04-29 RX ADMIN — AMLODIPINE BESYLATE 10 MG: 5 TABLET ORAL at 08:10

## 2022-04-29 RX ADMIN — MEROPENEM 1000 MG: 1 INJECTION, POWDER, FOR SOLUTION INTRAVENOUS at 00:36

## 2022-04-29 RX ADMIN — LIDOCAINE HYDROCHLORIDE 40 MG: 20 INJECTION, SOLUTION EPIDURAL; INFILTRATION; INTRACAUDAL; PERINEURAL at 11:17

## 2022-04-29 RX ADMIN — PROPOFOL 100 MG: 10 INJECTION, EMULSION INTRAVENOUS at 11:17

## 2022-04-29 ASSESSMENT — PULMONARY FUNCTION TESTS
PIF_VALUE: 0
PIF_VALUE: 2
PIF_VALUE: 0
PIF_VALUE: 1
PIF_VALUE: 0
PIF_VALUE: 2
PIF_VALUE: 31
PIF_VALUE: 26
PIF_VALUE: 4

## 2022-04-29 ASSESSMENT — PAIN DESCRIPTION - LOCATION: LOCATION: OTHER (COMMENT)

## 2022-04-29 ASSESSMENT — PAIN SCALES - GENERAL
PAINLEVEL_OUTOF10: 0
PAINLEVEL_OUTOF10: 0

## 2022-04-29 ASSESSMENT — LIFESTYLE VARIABLES: SMOKING_STATUS: 0

## 2022-04-29 ASSESSMENT — PAIN DESCRIPTION - ORIENTATION: ORIENTATION: OTHER (COMMENT)

## 2022-04-29 ASSESSMENT — ENCOUNTER SYMPTOMS: SHORTNESS OF BREATH: 0

## 2022-04-29 NOTE — FLOWSHEET NOTE
Shift assessment complete. See flowsheet for full assessment. BP rechecked and improved. Pt denies any needs, call light within reach.        04/28/22 1929   Vital Signs   Temp 97.4 °F (36.3 °C)   Temp Source Oral   Heart Rate Source Monitor   Resp 18   BP (!) 170/81   BP Location Left upper arm   MAP (Calculated) 110.67   Patient Position Semi fowlers   Oxygen Therapy   SpO2 98 %   O2 Device None (Room air)

## 2022-04-29 NOTE — FLOWSHEET NOTE
04/29/22 0800   Vital Signs   Temp 97 °F (36.1 °C)   Temp Source Oral   Pulse 66   Heart Rate Source Monitor   Resp 18   BP (!) 151/71   BP Location Left upper arm   MAP (Calculated) 97.67   Patient Position Semi fowlers   Level of Consciousness Alert (0)   MEWS Score 1   Oxygen Therapy   SpO2 97 %   Pulse Oximeter Device Mode Intermittent   Pulse Oximeter Device Location Finger   O2 Device None (Room air)     Shift assessment complete - see flow sheet, pt denies needs, call light within reach.

## 2022-04-29 NOTE — PROGRESS NOTES
Bedside report and transfer of care given to [facility]. Pt currently resting in bed with the call light within reach. Pt denies any other care needs at this time. Pt stable at this time.

## 2022-04-29 NOTE — PROGRESS NOTES
Admit: 2022    Name:  Loreto Olivera  Room:  /9309-60  MRN:    [id number]     Daily Progress Note for 2022     Admitted with diabetic foot ulcer and cellulitis. Interval History:     Scheduled for partial right foot amputation today    Scheduled Meds:   clindamycin (CLEOCIN) IV  600 mg IntraVENous Q8H    amLODIPine  10 mg Oral Daily    atorvastatin  40 mg Oral Daily    insulin glargine  42 Units SubCUTAneous Nightly    tamsulosin  0.4 mg Oral Daily    sodium chloride flush  5-40 mL IntraVENous 2 times per day    enoxaparin  30 mg SubCUTAneous BID    insulin lispro  0-6 Units SubCUTAneous TID WC    insulin lispro  0-3 Units SubCUTAneous Nightly    vancomycin  1,250 mg IntraVENous Q24H       Continuous Infusions:   dextrose      sodium chloride      sodium chloride 75 mL/hr at 22 2131       PRN Meds:  glucose, glucagon (rDNA), dextrose, sodium chloride flush, sodium chloride, ondansetron **OR** ondansetron, polyethylene glycol, acetaminophen **OR** acetaminophen, dextrose bolus (hypoglycemia) **OR** dextrose bolus (hypoglycemia)                  Objective:     Temp  Av.3 °F (36.3 °C)  Min: 97 °F (36.1 °C)  Max: 97.6 °F (36.4 °C)  Pulse  Av  Min: 66  Max: 68  BP  Min: 132/63  Max: 170/81  SpO2  Av.8 %  Min: 95 %  Max: 98 %  Patient Vitals for the past 4 hrs:   BP Temp Temp src Pulse Resp SpO2   22 0800 (!) 151/71 97 °F (36.1 °C) Oral 66 18 97 %         Intake/Output Summary (Last 24 hours) at 2022 1002  Last data filed at 2022 0911  Gross per 24 hour   Intake 480 ml   Output --   Net 480 ml       Physical Exam:    Gen: No distress. Alert. Eyes: PERRL. No sclera icterus. No conjunctival injection. ENT: No discharge. Pharynx clear. Neck: Trachea midline. Resp: No accessory muscle use. No crackles. No wheezes. No rhonchi. CV: Regular rate. Regular rhythm. No murmur. No rub. No edema.    Capillary Refill: Brisk,< 3 seconds   Peripheral Pulses: +2 palpable, equal bilaterally   GI: Non-tender. Non-distended. No masses. No organomegaly. Normal bowel sounds. No hernia. Skin: Warm and dry. No nodule on exposed extremities. Right foot in walking boot and wrapped in clean and dry dressing. See media tab for ulcer. S/P left great toe amp. M/S: No cyanosis. No joint deformity. No clubbing. Neuro: Awake. Grossly nonfocal    Psych: Oriented x 3. No anxiety or agitation. Lab Data:  CBC:   Recent Labs     04/27/22 0929 04/28/22 0518 04/29/22 0425   WBC 9.9 8.6 8.3   RBC 3.70* 3.69* 3.40*   HGB 11.4* 11.5* 10.7*   HCT 33.1* 33.1* 30.2*   MCV 89.5 89.7 88.7   RDW 14.8 14.7 15.0    282 273     BMP:   Recent Labs     04/27/22 0929 04/28/22 0518 04/29/22 0425    141 142   K 4.7 4.7 5.2*    105 108   CO2 21 21 22   BUN 55* 46* 39*   CREATININE 2.0* 1.7* 1.6*     BNP: No results for input(s): BNP in the last 72 hours. PT/INR: No results for input(s): PROTIME, INR in the last 72 hours. APTT:No results for input(s): APTT in the last 72 hours. CARDIAC ENZYMES: No results for input(s): CKMB, CKMBINDEX, TROPONINI in the last 72 hours.     Invalid input(s): CKTOTAL;3  FASTING LIPID PANEL:No results found for: CHOL, HDL, TRIG  LIVER PROFILE:   Recent Labs     04/27/22 0929   AST 12*   ALT 14   BILITOT 0.5   ALKPHOS 89         No orders to display         Assessment & Plan:     Patient Active Problem List    Diagnosis Date Noted    Cellulitis of right lower extremity 04/27/2022    Acute osteomyelitis of toe of right foot ([facility] 04/27/2022    Failure of outpatient treatment 04/27/2022    Ischemic toe 04/27/2022    Diabetic foot infection ([facility] 04/27/2022    Pressure ulcer of toe of right foot, stage 4 ([facility] 04/24/2022    Pressure ulcer of toe of right foot, stage 3 ([facility] 04/12/2022    Elevated PSA 09/16/2021    Hyperlipidemia 09/16/2021    Stage 3a chronic kidney disease ([facility] 09/16/2021    MRSA infection 09/16/2021    Callus of foot 09/16/2021    Chronic low back pain     Dental bridge present     Acute kidney injury superimposed on CKD ([facility]     Osteoarthritis     Class 1 obesity due to excess calories with serious comorbidity and body mass index (BMI) of 31.0 to 31.9 in adult     Hypertension 03/28/2016    BPH (benign prostatic hyperplasia) 03/28/2016    Diabetic polyneuropathy associated with type 2 diabetes mellitus ([facility] [address] 04/17/2013     #DMII with diabetic foot ulcer and surrounding cellulitis; right foot  -BG controlled here  -XR above  -Lantus 42 units nightly  -Low dose SSI  -ID consulted  -Abx per Dr. Jose L Gray; Cleocin, merrem and vanco  -Podiatry consulted  Toe amputation today  Meropenem dc ed. dc clinda after surgery     #CAMRYN on CKD IIIa  -Baseline Cr 1.4  -Cr 2.0 on admission--> 1.7 --> 1.6   -IVF and monitor     Mild hyperkalemia   lokelma     #HTN  -BP mildly elevated  -Continue Norvasc     #HLD   -Continue statin     #BPH   -Continue flomax     DVT Prophylaxis: Lovenox  Diet: ADULT DIET; Regular; 4 carb choices (60 gm/meal);  Low Potassium (Less than 3000 mg/day)  Code Status: Full Code      Gerard Glass MD

## 2022-04-29 NOTE — ANESTHESIA POSTPROCEDURE EVALUATION
Department of Anesthesiology  Postprocedure Note    Patient: Asher Mcneal  MRN: [id number]  YOB: 1960  Date of evaluation: 4/29/2022  Time:  3:39 PM     Procedure Summary     Date: 04/29/22 Room / Location: [place] [address] / [facility] [place]    Anesthesia Start: 1111 Anesthesia Stop:     Procedure: PARTIAL RIGHT FOOT AMPUTATION, ULCER DEBRIDEMENT RIGHT FOOT (Right Foot) Diagnosis: (DIABETIC FOOT ULCER, OSTEOMYELITIS)    Surgeons: Janie Birch DPM Responsible Provider: Luc Bender MD    Anesthesia Type: general, TIVA ASA Status: 3          Anesthesia Type: No value filed. Jon Phase I:      Jon Phase II: Jon Score: 10    Last vitals: Reviewed and per EMR flowsheets.      Vitals:    04/29/22 1218 04/29/22 1235 04/29/22 1244 04/29/22 1300   BP: (!) 145/75 (!) 148/75 (!) 149/68 (!) 155/76   Pulse: 63 60 68    Resp: 16  16 16   Temp: 97.7 °F (36.5 °C)  98 °F (36.7 °C)    TempSrc: Temporal  Oral    SpO2: 98%  97% 97%   Weight:       Height:         Anesthesia Post Evaluation    Patient location during evaluation: bedside  Patient participation: complete - patient participated  Level of consciousness: awake and alert  Airway patency: patent  Nausea & Vomiting: no nausea  Complications: no  Cardiovascular status: hemodynamically stable  Respiratory status: acceptable  Hydration status: euvolemic

## 2022-04-29 NOTE — FLOWSHEET NOTE
Pt appears to be resting comfortably. VSS. Per pt, no new needs at this time.         04/29/22 0038   Vital Signs   Temp 97.6 °F (36.4 °C)   Temp Source Oral   Pulse 68   Heart Rate Source Monitor   Resp 19   /66   BP Location Left upper arm   MAP (Calculated) 90.33   Patient Position Semi fowlers   Level of Consciousness Alert (0)   MEWS Score 1   Oxygen Therapy   SpO2 95 %   O2 Device None (Room air)   Height and Weight   Weight 248 lb 1.6 oz (112.5 kg)   Weight Method Bed scale   BMI (Calculated) 32.8

## 2022-04-29 NOTE — BRIEF OP NOTE
Brief Postoperative Note      Patient: Tsering Mayfield  YOB: 1960  MRN: [id number]    Date of Procedure: 4/29/2022    Pre-Op Diagnosis: DIABETIC FOOT ULCER, OSTEOMYELITIS    Post-Op Diagnosis: Same       Procedure(s):  PARTIAL RIGHT FOOT AMPUTATION, ULCER DEBRIDEMENT RIGHT FOOT    Surgeon(s):  Corby Castrejon DPM    Assistant:  * No surgical staff found *    Anesthesia: Monitor Anesthesia Care    Estimated Blood Loss (mL): less than 848     Complications: None    Specimens:   ID Type Source Tests Collected by Time Destination   1 : THIRD TOE RIGHT FOOT Bone Bone CULTURE, SURGICAL Corby Castrejon DPM 4/29/2022 1127    A : THRID TOE RIGHT FOOT Bone Bone SURGICAL PATHOLOGY Corby Castrejon DPM 4/29/2022 1128        Implants:  * No implants in log *      Drains: * No LDAs found *    Findings: ulcers right foot, purulence, osteomyelitis    Electronically signed by Corby Castrejon DPM on 4/29/2022 at 11:46 AM

## 2022-04-29 NOTE — ANESTHESIA PRE PROCEDURE
Department of Anesthesiology  Preprocedure Note       Name:  Judson Mcdaniels   Age:  59 y.o.  :  1960                                          MRN:  [id number]         Date:  2022      Surgeon: Zaid Reich):  Mya Abad DPM    Procedure: Procedure(s):  PARTIAL RIGHT FOOT AMPUTATION, ULCER DEBRIDEMENT RIGHT FOOT    Medications prior to admission:   Prior to Admission medications    Medication Sig Start Date End Date Taking? Authorizing Provider   doxycycline hyclate (VIBRAMYCIN) 100 MG capsule Take 100 mg by mouth 2 times daily    Historical Provider, MD   Multiple Vitamins-Minerals (ONE-A-DAY VITACRAVES IMMUNITY PO) Take by mouth daily    Historical Provider, MD   amLODIPine (NORVASC) 10 MG tablet Take 10 mg by mouth daily     Historical Provider, MD   hydroCHLOROthiazide (HYDRODIURIL) 25 MG tablet Take 1 tablet by mouth daily 10/5/21   Stephanie Escalante MD   Dulaglutide (TRULICITY) 1.5 NZ/0.1XA SOPN Inject 1.5 mg into the skin once a week    Historical Provider, MD   atorvastatin (LIPITOR) 40 MG tablet Take 40 mg by mouth daily    Historical Provider, MD   insulin glargine (LANTUS) 100 UNIT/ML injection vial Inject 35 Units into the skin nightly  Patient taking differently: Inject 42 Units into the skin nightly  16   Onesimo Painting MD   Omega-3 Fatty Acids (FISH OIL) 1000 MG CAPS Take 3,000 mg by mouth nightly. Historical Provider, MD   metFORMIN (GLUCOPHAGE) 500 MG tablet TAKE TWO TABLETS BY MOUTH TWICE A DAY 10/7/13   Historical Provider, MD   Multiple Vitamins-Minerals (MULTIVITAMIN PO) Take  by mouth daily. Historical Provider, MD   tamsulosin (FLOMAX) 0.4 MG capsule Take 0.4 mg by mouth daily.  12   Historical Provider, MD       Current medications:    Current Facility-Administered Medications   Medication Dose Route Frequency Provider Last Rate Last Admin    famotidine (PEPCID) 20 mg in sodium chloride (PF) 10 mL injection  20 mg IntraVENous Once MD Clara Morton lactated ringers infusion   IntraVENous Continuous Leilani Hernandez MD        sodium chloride flush 0.9 % injection 5-40 mL  5-40 mL IntraVENous 2 times per day Leilani Hernandez MD        sodium chloride flush 0.9 % injection 5-40 mL  5-40 mL IntraVENous PRN Leilani Hernandez MD        0.9 % sodium chloride infusion  250 mL IntraVENous PRN Leilani Hernandez MD        sodium zirconium cyclosilicate [facility]) oral suspension 5 g  5 g Oral TID Marlene Wright MD        clindamycin (CLEOCIN) 600 mg in dextrose 5 % 50 mL IVPB  600 mg IntraVENous Q8H Venkatesh Tavera MD   Stopped at 04/29/22 0704    amLODIPine (NORVASC) tablet 10 mg  10 mg Oral Daily [facility], APRN - CNP   10 mg at 04/29/22 0810    atorvastatin (LIPITOR) tablet 40 mg  40 mg Oral Daily [facility], APRN - CNP   40 mg at 04/29/22 0810    insulin glargine (LANTUS) injection vial 42 Units  42 Units SubCUTAneous Nightly [facility], APRN - CNP   42 Units at 04/28/22 2036    tamsulosin (FLOMAX) capsule 0.4 mg  0.4 mg Oral Daily [facility], APRN - CNP   0.4 mg at 04/29/22 0810    glucose chewable tablet 16 g  16 g Oral PRN Noble Mims, APRN - CNP        glucagon (rDNA) injection 1 mg  1 mg IntraMUSCular PRN [place], APRN - CNP        dextrose 5 % solution  100 mL/hr IntraVENous PRN Noble Mims, APRN - CNP        sodium chloride flush 0.9 % injection 5-40 mL  5-40 mL IntraVENous 2 times per day [place], APRN - CNP        sodium chloride flush 0.9 % injection 5-40 mL  5-40 mL IntraVENous PRN [facility], APRN - CNP        0.9 % sodium chloride infusion   IntraVENous PRN Esasami Bermans, APRN - CNP        enoxaparin Sodium (LOVENOX) injection 30 mg  30 mg SubCUTAneous BID [place], APRN - CNP        ondansetron (ZOFRAN-ODT) disintegrating tablet 4 mg  4 mg Oral Q8H PRN Harris Prasanna, APRN - CNP        Or    ondansetron [facility]) injection 4 mg  4 mg IntraVENous Q6H PRN SHAILESH Masters - GIA        polyethylene glycol (GLYCOLAX) packet 17 g  17 g Oral Daily PRN [place], APRN - CNP        acetaminophen (TYLENOL) tablet 650 mg  650 mg Oral Q6H PRN [place], APRN - CNP        Or    acetaminophen (TYLENOL) suppository 650 mg  650 mg Rectal Q6H PRN [place] Andrea, APRN - CNP        0.9 % sodium chloride infusion   IntraVENous Continuous [place], APRN - CNP 75 mL/hr at 04/28/22 2131 New Bag at 04/28/22 2131    insulin lispro (HUMALOG) injection vial 0-6 Units  0-6 Units SubCUTAneous TID WC [place], APRN - CNP   2 Units at 04/28/22 1150    insulin lispro (HUMALOG) injection vial 0-3 Units  0-3 Units SubCUTAneous Nightly [place] Andrea, APRN - CNP   1 Units at 04/28/22 2036    dextrose bolus (hypoglycemia) 10% 125 mL  125 mL IntraVENous PRN Amanda Rivera MD        Or    dextrose bolus (hypoglycemia) 10% 250 mL  250 mL IntraVENous PRN Amanda Rivera MD        vancomycin (VANCOCIN) 1,250 mg in dextrose 5 % 250 mL IVPB  1,250 mg IntraVENous Q24H Thiago Muller MD   Stopped at 04/28/22 1607       Allergies:  No Known Allergies    Problem List:    Patient Active Problem List   Diagnosis Code    Hypertension I10    BPH (benign prostatic hyperplasia) N40.0    Acute kidney injury superimposed on CKD ([facility] N17.9, N18.9    Osteoarthritis M19.90    Class 1 obesity due to excess calories with serious comorbidity and body mass index (BMI) of 31.0 to 31.9 in adult E66.09, Z68.31    Diabetic polyneuropathy associated with type 2 diabetes mellitus (HCC) E11.42    Elevated PSA R97.20    Hyperlipidemia E78.5    Stage 3a chronic kidney disease (HCC) N18.31    MRSA infection A49.02    Chronic low back pain M54.50, G89.29    Dental bridge present Z97.2    Callus of foot L84    Pressure ulcer of toe of right foot, stage 3 (HCC) L89.893    Pressure ulcer of toe of right foot, stage 4 (HCC) E37.734    Cellulitis of right lower extremity L03.115    Acute osteomyelitis of toe of right foot ([facility] M86.171  Failure of outpatient treatment Z78.9    Ischemic toe I99.8    Diabetic foot infection ([address] E11.628, L08.9       Past Medical History:        Diagnosis Date    Abscess of left hand 06/20/2017    Acute kidney injury superimposed on chronic kidney disease ([address] 08/2021    Acute osteomyelitis of metatarsal bone of left foot ([address] 09/16/2021    MSSA and Anaerococcus    Adhesive capsulitis of shoulder 11/13/2014    Fractures     Gas gangrene of foot ([address] 08/31/2021    chronic diabetic ulcer there finally healed Jan 2022    History of hyperbaric oxygen therapy 09/21/2021    Hercules 4 diabetic foot    PNA (pneumonia) 03/28/2016    Pressure ulcer of left lateral forefoot, stage 4 ([address] 9/16/2021       Past Surgical History:        Procedure Laterality Date    FOOT DEBRIDEMENT Left 08/31/2021    LEFT FOOT DEBRIDEMENT INCISION AND DRAINAGE performed by Katie Abbasi DPM at [address] Left 09/03/2021    STAGED INCISION AND DEBRIDEMENT LEFT FOOT WITH PARTIAL FIRST RAY AMPUTATION performed by Katie Abbasi DPM at [address] Left 06/19/2017    LEFT HAND DEBRIDEMENT INCISION AND DRAINAGE    NECK SURGERY      30s year    TOE AMPUTATION Left 2009    hallux    WRIST FRACTURE SURGERY         Social History:    Social History     Tobacco Use    Smoking status: Never Smoker    Smokeless tobacco: Never Used   Substance Use Topics    Alcohol use: No     Alcohol/week: 0.0 standard drinks                                Counseling given: Not Answered      Vital Signs (Current):   Vitals:    04/28/22 1929 04/28/22 2131 04/29/22 0038 04/29/22 0800   BP: (!) 170/81 (!) 158/78 139/66 (!) 151/71   Pulse:   68 66   Resp: 18  19 18   Temp: 97.4 °F (36.3 °C)  97.6 °F (36.4 °C) 97 °F (36.1 °C)   TempSrc: Oral  Oral Oral   SpO2: 98%  95% 97%   Weight:   248 lb 1.6 oz (112.5 kg)    Height:                                                  BP Readings from Last 3 Encounters:   04/29/22 (!) 151/71   04/27/22 (!) 145/75   04/20/22 (!) 143/69       NPO Status: Time of last liquid consumption: 2100                        Time of last solid consumption: 2100                        Date of last liquid consumption: 04/28/22                        Date of last solid food consumption: 04/28/22    BMI:   Wt Readings from Last 3 Encounters:   04/29/22 248 lb 1.6 oz (112.5 kg)   04/27/22 241 lb (109.3 kg)   04/20/22 246 lb (111.6 kg)     Body mass index is 32.73 kg/m².     CBC:   Lab Results   Component Value Date    WBC 8.3 04/29/2022    RBC 3.40 04/29/2022    HGB 10.7 04/29/2022    HCT 30.2 04/29/2022    MCV 88.7 04/29/2022    RDW 15.0 04/29/2022     04/29/2022       CMP:   Lab Results   Component Value Date     04/29/2022    K 5.2 04/29/2022     04/29/2022    CO2 22 04/29/2022    BUN 39 04/29/2022    CREATININE 1.6 04/29/2022    GFRAA 53 04/29/2022    AGRATIO 1.4 04/27/2022    LABGLOM 44 04/29/2022    GLUCOSE 114 04/29/2022    PROT 6.7 04/27/2022    CALCIUM 9.1 04/29/2022    BILITOT 0.5 04/27/2022    ALKPHOS 89 04/27/2022    AST 12 04/27/2022    ALT 14 04/27/2022       POC Tests:   Recent Labs     04/29/22  0711   POCGLU 131*       Coags:   Lab Results   Component Value Date    PROTIME 10.7 06/18/2017    INR 0.95 06/18/2017    APTT 33.6 04/06/2016       HCG (If Applicable): No results found for: PREGTESTUR, PREGSERUM, HCG, HCGQUANT     ABGs:   Lab Results   Component Value Date    PHART 7.358 03/31/2016    PO2ART 65.2 03/31/2016    KMY2XKU 37.2 03/31/2016    JLC7IUW 20.4 03/31/2016    BEART -4.4 03/31/2016    P4OZHPMH 92.1 03/31/2016        Type & Screen (If Applicable):  No results found for: LABABO, LABRH    Drug/Infectious Status (If Applicable):  No results found for: HIV, HEPCAB    COVID-19 Screening (If Applicable):   Lab Results   Component Value Date    COVID19 Not Detected 04/27/2022           Anesthesia Evaluation  Patient summary reviewed and Nursing notes reviewed no history of anesthetic complications:   Airway: Mallampati: II  TM distance: >3 FB   Neck ROM: full  Mouth opening: > = 3 FB Dental:          Pulmonary:Negative Pulmonary ROS breath sounds clear to auscultation      (-) COPD, asthma, shortness of breath, recent URI, sleep apnea and not a current smoker          Patient did not smoke on day of surgery. Cardiovascular:    (+) hypertension:, hyperlipidemia    (-) pacemaker, past MI, CAD, CABG/stent, dysrhythmias,  angina and  CHF    ECG reviewed  Rhythm: regular  Rate: normal           Beta Blocker:  Not on Beta Blocker         Neuro/Psych:   (+) neuromuscular disease (neuropathy, feet and hands):,    (-) seizures, TIA, CVA and psychiatric history           GI/Hepatic/Renal:   (+) renal disease (bph / ckd3):,      (-) GERD, liver disease, bowel prep and no morbid obesity       Endo/Other:    (+) DiabetesType II DM, using insulin, blood dyscrasia (anemia): arthritis: OA., no malignancy/cancer. (-) hypothyroidism, hyperthyroidism, no malignancy/cancer               Abdominal:       Abdomen: soft. Vascular: negative vascular ROS. Other Findings:             Anesthesia Plan      general and TIVA     ASA 3       Induction: intravenous. MIPS: Postoperative opioids intended and Prophylactic antiemetics administered. Anesthetic plan and risks discussed with patient. Plan discussed with CRNA. This pre-anesthesia assessment may be used as a history and physical.    DOS STAFF ADDENDUM:    Pt seen and examined, chart reviewed (including anesthesia, drug and allergy history). No interval changes to history and physical examination. Anesthetic plan, risks, benefits, alternatives, and personnel involved discussed with patient. Patient verbalized an understanding and agrees to proceed.       Paty Guaman MD  April 29, 2022  10:35 AM      Paty Guaman MD   4/29/2022

## 2022-04-29 NOTE — PROGRESS NOTES
Pt thoroughly educated on NPO status. Pt verbally acknowledged understanding. All food and drink removed from bedside.

## 2022-04-29 NOTE — PROGRESS NOTES
PROGRESS NOTE    Admit Date:  4/27/2022    Subjective:  59 y.o. male who is seen for evaluation of cellulitis and ulcer on the right foot. Patient has been followed by Dr. Jhonathan Arauz in the [address] [place] [address]. States feeling OK today. No issues with antibiotics. Past Medical History:        Diagnosis Date    Abscess of left hand 06/20/2017    Acute kidney injury superimposed on chronic kidney disease ([address] 08/2021    Acute osteomyelitis of metatarsal bone of left foot ([address] 09/16/2021    MSSA and Anaerococcus    Adhesive capsulitis of shoulder 11/13/2014    Fractures     [facility] 08/31/2021    chronic diabetic ulcer there finally healed Jan 2022    History of hyperbaric oxygen therapy 09/21/2021    Hercules 4 diabetic foot    PNA (pneumonia) 03/28/2016    Pressure ulcer of left lateral forefoot, stage 4 ([address] 9/16/2021       Past Surgical History:        Procedure Laterality Date    FOOT DEBRIDEMENT Left 08/31/2021    LEFT FOOT DEBRIDEMENT INCISION AND DRAINAGE performed by Renetta Loomis DPM at [address] Left 09/03/2021    STAGED INCISION AND DEBRIDEMENT LEFT FOOT WITH PARTIAL FIRST RAY AMPUTATION performed by Renetta Loomis DPM at [facility] HAND SURGERY Left 06/19/2017    LEFT HAND DEBRIDEMENT INCISION AND DRAINAGE    NECK SURGERY      30s year    TOE AMPUTATION Left 2009    hallux    WRIST FRACTURE SURGERY         Current Medications:     clindamycin (CLEOCIN) IV  600 mg IntraVENous Q8H    amLODIPine  10 mg Oral Daily    atorvastatin  40 mg Oral Daily    insulin glargine  42 Units SubCUTAneous Nightly    tamsulosin  0.4 mg Oral Daily    sodium chloride flush  5-40 mL IntraVENous 2 times per day    enoxaparin  30 mg SubCUTAneous BID    insulin lispro  0-6 Units SubCUTAneous TID WC    insulin lispro  0-3 Units SubCUTAneous Nightly    vancomycin  1,250 mg IntraVENous Q24H       Allergies:  Patient has no known allergies.     Social History:    Social History Tobacco Use    Smoking status: Never Smoker    Smokeless tobacco: Never Used   Vaping Use    Vaping Use: Never used   Substance Use Topics    Alcohol use: No     Alcohol/week: 0.0 standard drinks    Drug use: No       Family History:       Problem Relation Age of Onset    Heart Failure Mother     Diabetes Mother     Cancer Father         throat cancer    Asthma Daughter         Sports induced as a child    Emphysema Neg Hx     Hypertension Neg Hx        Review of Systems    CONSTITUTIONAL:  negative  EYES:  negative  HEENT:  negative  RESPIRATORY:  negative  CARDIOVASCULAR:  negative  GASTROINTESTINAL:  negative  GENITOURINARY:  negative  INTEGUMENT/BREAST:  positive for ulcer, redness  MUSCULOSKELETAL:  negative  NEUROLOGICAL:  positive for numbness      Objective:   /66   Pulse 68   Temp 97.6 °F (36.4 °C) (Oral)   Resp 19   Ht 6' 1\" (1.854 m)   Wt 248 lb 1.6 oz (112.5 kg)   SpO2 95%   BMI 32.73 kg/m²     Data:  CBC:   Recent Labs     04/27/22  0929 04/28/22  0518 04/29/22  0425   WBC 9.9 8.6 8.3   HGB 11.4* 11.5* 10.7*   HCT 33.1* 33.1* 30.2*   MCV 89.5 89.7 88.7    282 273     BMP:   Recent Labs     04/27/22  0929 04/28/22  0518 04/29/22  0425    141 142   K 4.7 4.7 5.2*    105 108   CO2 21 21 22   BUN 55* 46* 39*   CREATININE 2.0* 1.7* 1.6*     LIVER PROFILE:   Recent Labs     04/27/22  0929   AST 12*   ALT 14   BILITOT 0.5   ALKPHOS 89     PT/INR:   Recent Labs     04/27/22  0929   PROT 6.7     HgBA1c:  Lab Results   Component Value Date    LABA1C 5.5 04/27/2022       Cultures: wound - in progress    Blood - NGSF    Imaging: xray right toes -   There is deformity from subacute ununited fracture at the base of the 5th   proximal phalanx. There is some angular deformity seen better on the lateral   view. Moderate soft tissue edema is seen involving the 2nd 3rd and 4th   digits. There is no air in the region of the 3rd digit specifically. No   foreign body.   Focus of stated ulceration not identified. Lateral projection   does show moderate soft tissue edema greater at the 3rd digit. In addition   there is deformity of the 4th digit, middle and distal phalanges with medial   angulation. Moderate to severe degenerative change of the 1st MTP joint and   interphalangeal joint. Vascular calcifications are present. Impression   Moderate soft tissue edema greatest involving the 3rd digit, region of stated   ulceration. Ulceration not identified. Recent fracture at the base of the 5th proximal phalanx. Correlation to   history of injury recommended. Significant degenerative change greatest involving the 1st MTP joint and 1st   interphalangeal joint. For concern of osteomyelitis, MRI would be recommended. My read - questionable erosion on the medial aspect of 3rd digit at PIPJ. + calcified vessels in foot. Physical Exam:    General Appearance: alert and oriented to person, place and time, well developed and well- nourished, in no acute distress  Head: normocephalic and atraumatic  Eyes: pupils equal, round  Neck: trachea midline  Pulmonary/Chest: no wheezes  Cardiovascular: normal rate, regular rhythm  Abdomen: soft, non-tender, non-distended    DP/PT palpable bilateral  Ulcer on the lateral aspect right 2nd digit with mild fibrotic tissue. Probes to soft tissue only. Mild serous drainage noted. No malodor noted. Ulcer on the medial aspect right 3rd digit with dark discoloration in central aspect. Mild serous drainage noted. Small area of exposed bone noted. + periwound and erythema of the digit and extending to the forefoot but decreasing intensity and area. Excess motion off the digit at the PIPJ. No increase in skin temperature noted. No malodor noted. No purulence noted. Purple discoloration of the distal aspect of the digit noted. Lysing skin on the dorsal aspect of the digit. Partial left 1st ray amputation noted. Assessment:  Patient Active Problem List   Diagnosis Code    Hypertension I10    BPH (benign prostatic hyperplasia) N40.0    Acute kidney injury superimposed on CKD ([facility]) N17.9, N18.9    Osteoarthritis M19.90    Class 1 obesity due to excess calories with serious comorbidity and body mass index (BMI) of 31.0 to 31.9 in adult E66.09, Z68.31    Diabetic polyneuropathy associated with type 2 diabetes mellitus ([facility]) E11.42    Elevated PSA R97.20    Hyperlipidemia E78.5    Stage 3a chronic kidney disease (HCC) N18.31    MRSA infection A49.02    Chronic low back pain M54.50, G89.29    Dental bridge present Z97.2    Callus of foot L84    Pressure ulcer of toe of right foot, stage 3 (HCC) L89.893    Pressure ulcer of toe of right foot, stage 4 ([facility]) P68.613    Cellulitis of right lower extremity L03.115    Acute osteomyelitis of toe of right foot ([facility]) M86.171    Failure of outpatient treatment Z78.9    Ischemic toe I99.8    Diabetic foot infection ([facility]) E11.628, L08.9     Cellulitis right foot -  improving  diabetic foot ulcers right secondary to peripheral neuropathy   Osteomyelitis right 3rd digit  diabetes mellitus     Plan  Patient examined. Reviewed labs and imaging. Continue IV antibiotics as per Dr. Tiajuana Angelucci. Elevation of the leg to decrease edema. Control glucose levels to prevent future complications. Seen in conjunction with Dr. Tiajuana Angelucci. Cellulitis continues to improve. Discussed risks, complications, alternatives and benefits with patient. Understands chance of nonhealing wound, infection, need for further surgery, loss of limb or life. Questions answered. Plan for partial right foot amputation right, ulcer debridement right foot. Has been NPO.          Electronically signed by Humberto Moore DPM on 4/29/2022 at 8:08 AM.

## 2022-04-29 NOTE — FLOWSHEET NOTE
04/29/22 1244   Vital Signs   Temp 98 °F (36.7 °C)   Temp Source Oral   Pulse 68   Heart Rate Source Monitor   Resp 16   BP (!) 149/68   BP Location Left upper arm   MAP (Calculated) 95   Patient Position Semi fowlers   Level of Consciousness Alert (0)   MEWS Score 1   Oxygen Therapy   SpO2 97 %   Pulse Oximeter Device Mode Intermittent   Pulse Oximeter Device Location Finger   O2 Device None (Room air)     Returned from surgery in stable condition, VS as shown. Pt denies needs, call light within reach.

## 2022-04-29 NOTE — PROGRESS NOTES
[facility] Infectious Disease Progress Note      Del Mccrary     : 1960    DATE OF VISIT:  2022  DATE OF ADMISSION:  2022       Subjective:     Del Mccrary is a 59 y.o. male whom I've been seeing for an acute soft tissue infection and osteo of his right 3rd toe pressure / diabetic ulcer. Since I last saw him, he's been feeling fine. No F/C/D, no foot pain, no side effects from Abx. Mr. Geovanni Mccrary has a past medical history of Abscess of left hand, Acute kidney injury superimposed on chronic kidney disease [facility]), Acute osteomyelitis of metatarsal bone of left foot ([facility] [address] Adhesive capsulitis of shoulder, Fractures, Gas gangrene of foot ([facility] [address] History of hyperbaric oxygen therapy, PNA (pneumonia), and Pressure ulcer of left lateral forefoot, stage 4 ([facility] [address]     Current Facility-Administered Medications: clindamycin (CLEOCIN) 600 mg in dextrose 5 % 50 mL IVPB, 600 mg, IntraVENous, Q8H  meropenem (MERREM) 1,000 mg in sodium chloride 0.9 % 100 mL IVPB (mini-bag), 1,000 mg, IntraVENous, Q8H  amLODIPine (NORVASC) tablet 10 mg, 10 mg, Oral, Daily  atorvastatin (LIPITOR) tablet 40 mg, 40 mg, Oral, Daily  insulin glargine (LANTUS) injection vial 42 Units, 42 Units, SubCUTAneous, Nightly  tamsulosin (FLOMAX) capsule 0.4 mg, 0.4 mg, Oral, Daily  glucose chewable tablet 16 g, 16 g, Oral, PRN  glucagon (rDNA) injection 1 mg, 1 mg, IntraMUSCular, PRN  dextrose 5 % solution, 100 mL/hr, IntraVENous, PRN  sodium chloride flush 0.9 % injection 5-40 mL, 5-40 mL, IntraVENous, 2 times per day  sodium chloride flush 0.9 % injection 5-40 mL, 5-40 mL, IntraVENous, PRN  0.9 % sodium chloride infusion, , IntraVENous, PRN  enoxaparin Sodium (LOVENOX) injection 30 mg, 30 mg, SubCUTAneous, BID  ondansetron (ZOFRAN-ODT) disintegrating tablet 4 mg, 4 mg, Oral, Q8H PRN **OR** ondansetron (ZOFRAN) injection 4 mg, 4 mg, IntraVENous, Q6H PRN  polyethylene glycol (GLYCOLAX) packet 17 g, 17 g, Oral, Daily PRN  acetaminophen (TYLENOL) tablet 650 mg, 650 mg, Oral, Q6H PRN **OR** acetaminophen (TYLENOL) suppository 650 mg, 650 mg, Rectal, Q6H PRN  0.9 % sodium chloride infusion, , IntraVENous, Continuous  insulin lispro (HUMALOG) injection vial 0-6 Units, 0-6 Units, SubCUTAneous, TID WC  insulin lispro (HUMALOG) injection vial 0-3 Units, 0-3 Units, SubCUTAneous, Nightly  dextrose bolus (hypoglycemia) 10% 125 mL, 125 mL, IntraVENous, PRN **OR** dextrose bolus (hypoglycemia) 10% 250 mL, 250 mL, IntraVENous, PRN  vancomycin (VANCOCIN) 1,250 mg in dextrose 5 % 250 mL IVPB, 1,250 mg, IntraVENous, Q24H     This is day 3 of vanco - annalise - clinda. Allergies: Patient has no known allergies. Pertinent items from the review of systems are discussed in the HPI; the remainder of the ROS was reviewed and is negative.      Objective:     Vital signs over the last 24 hours:  Temp  Av.3 °F (36.3 °C)  Min: 97 °F (36.1 °C)  Max: 97.6 °F (36.4 °C)  Pulse  Av.5  Min: 68  Max: 71  Systolic (56ZNV), RXV:579 , Min:132 , XPX:818   Diastolic (06XIG), XDF:39, Min:63, Max:81  Resp  Av.3  Min: 18  Max: 19  SpO2  Av.5 %  Min: 95 %  Max: 98 %    Constitutional:  well-developed, well-nourished, NAD  Cardiovascular:  bilateral pedal pulses palpable, feet warm, brisk cap refill, apart from that right 3rd toe which is purple, dusky, especially distal - it IS warmer than yesterday  Lymphatic:  no inguinal or popliteal adenopathy, but he has a couple of streaks of lymphangitis up the dorsal foot and anterior ankle, and a cellulitis across the dorsal foot, radiating from the 3rd toe - that is a bit better than yesterday  Musculoskeletal:  no clubbing, other cyanosis or petechiae; RLE and LLE with no gross effusions, joint misalignment or acute arthritis  Elicia-ulcer skin: cellulitic, distally ischemic appearing, some epidermal lysis and improved maceration  Ulcer(s):  left lateral foot delicately healed; right 2nd toe looks a bit better, superficial, some fibrinous necrosis and presumed biofilm; 3rd toe looks maybe just a bit less acutely inflamed and drier today, still with that one proximal focus of deeper and darker necrotic tissue of course; no pus or malodor. More serous exudate than a couple of days ago. Photos also saved in electronic chart.   ______________________________    Recent Labs     04/29/22  0425 04/28/22  0518 04/27/22  0929   WBC 8.3 8.6 9.9   HGB 10.7* 11.5* 11.4*   HCT 30.2* 33.1* 33.1*   MCV 88.7 89.7 89.5    282 243     Lab Results   Component Value Date    CREATININE 1.6 (H) 04/29/2022     Lab Results   Component Value Date    LABALBU 3.9 04/27/2022     Lab Results   Component Value Date    ALT 14 04/27/2022    AST 12 (L) 04/27/2022    ALKPHOS 89 04/27/2022    BILITOT 0.5 04/27/2022      Lab Results   Component Value Date    LABA1C 5.5 04/27/2022     Other recent pertinent labs: Anion gap 12. Glucoses 100s - 200s. [address], [address].   ______________________________    Recent pertinent micro results:  BCx negative so far. WCx with Staph aureus, just updated this AM to MRSA.  ______________________________    Recent imaging results (last 7 days):     XR TOE RIGHT (MIN 2 VIEWS)    Result Date: 4/27/2022  Moderate soft tissue edema greatest involving the 3rd digit, region of stated ulceration. Ulceration not identified. Recent fracture at the base of the 5th proximal phalanx. Correlation to history of injury recommended. Significant degenerative change greatest involving the 1st MTP joint and 1st interphalangeal joint. For concern of osteomyelitis, MRI would be recommended.       Assessment:     Patient Active Problem List   Diagnosis Code    Hypertension I10    BPH (benign prostatic hyperplasia) N40.0    Acute kidney injury superimposed on CKD ([facility] [address] N17.9, N18.9    Osteoarthritis M19.90    Class 1 obesity due to excess calories with serious comorbidity and body mass index (BMI) of 31.0 to 31.9 in adult E66.09, Z68.31    Diabetic polyneuropathy associated with type 2 diabetes mellitus ([facility] [address] E11.42    Elevated PSA R97.20    Hyperlipidemia E78.5    Stage 3a chronic kidney disease (HCC) N18.31    Staphylococcus aureus infection A49.01    Chronic low back pain M54.50, G89.29    Dental bridge present Z97.2    Callus of foot L84    Pressure ulcer of toe of right foot, stage 3 ([facility]) L89.893    Pressure ulcer of toe of right foot, stage 4 ([facility]) Y88.205    Cellulitis of right lower extremity L03.115    Acute osteomyelitis of toe of right foot ([facility]) M86.171    Failure of outpatient treatment Z78.9    Ischemic toe I99.8    Diabetic foot infection ([facility]) E11.628, L08.9     Assessment of today's active condition(s):      --          Background of well controlled DM2, neuropathy, no known large-vessel PAD.     --          Hercules 4 left foot last August, that sounds like it started from minor trauma and then skin and soft tissue infection, rapidly spreading to localized gangrene, osteo of the 1st ray, treated with an open partial ray resection, eventually healed after more debridement, completion of IV Abx, a round of HBOT, VAC therapy, additional local care. Now with diabetic shoes and custom orthotics.    --          Also a small but slowly healing left lateral forefoot pressure ulcer, I think starting from a post-op dressing late last year, finally healed just these last couple of weeks.     --          New pressure / neuropathic ulcers of right 2nd and 3rd toes, resulting from a silicone toe separator, which might have gotten bunched up in his prior shoes. Last week with a bit of increased depth at the 3rd toe ulcer, then over the weekend development of cellulitis, now worse cellulitis with lymphangitis, probably acute osteo of the 3rd middle phalanx, and a degree of ischemia in the toe (I think from swelling and small vessel disease, more than proximal large-vessel PAD, since the rest of his foot is so warm. Mild degree of systemic illness with some chills Tuesday night, but a limb-threatening infection.       --          The foot looks just a bit better today; Staph aureus on culture, which would generally make sense, though I'm a bit surprised that things seemed to steadily worsen earlier this week even when he was on doxycycline. Now confirmed as MRSA, though in vitro (S) to doxy. Treatment recs:     Can stop meropenem now. Toe amputation scheduled for later today, hoping for primary closure, but it's not impossible that his met head could be involved, and that the wound might need to be left open. Plan to stop clinda post-op, and keep him on vanco through the weekend. Monday AM, hoping to change the OR dressing, place a bulky dressing, discharge him home on orals, but it depends on how the foot looks on Monday. Keep up the good work with glucoses (labile right now because of infection); stay off the right foot as much as possible. D/W Dr. Mel Altman.    ----------------------------    I was not necessarily going to be in the hospital over the weekend to see Mr. Azra Cabral. Will keep an eye on Epic from home.  Please call or text if there are any urgent concerns over the weekend with his clinical course, test results, etc.    Electronically signed by Katarina Gaxiola MD on 4/29/2022 at 7:00 AM.

## 2022-04-30 LAB
ANION GAP SERPL CALCULATED.3IONS-SCNC: 14 MMOL/L (ref 3–16)
BASOPHILS ABSOLUTE: 0.1 K/UL (ref 0–0.2)
BASOPHILS RELATIVE PERCENT: 1.2 %
BUN BLDV-MCNC: 29 MG/DL (ref 7–20)
CALCIUM SERPL-MCNC: 9.1 MG/DL (ref 8.3–10.6)
CHLORIDE BLD-SCNC: 107 MMOL/L (ref 99–110)
CO2: 21 MMOL/L (ref 21–32)
CREAT SERPL-MCNC: 1.5 MG/DL (ref 0.8–1.3)
EOSINOPHILS ABSOLUTE: 0.4 K/UL (ref 0–0.6)
EOSINOPHILS RELATIVE PERCENT: 4.2 %
GFR AFRICAN AMERICAN: 57
GFR NON-AFRICAN AMERICAN: 47
GLUCOSE BLD-MCNC: 132 MG/DL (ref 70–99)
GLUCOSE BLD-MCNC: 134 MG/DL (ref 70–99)
GLUCOSE BLD-MCNC: 139 MG/DL (ref 70–99)
GLUCOSE BLD-MCNC: 140 MG/DL (ref 70–99)
GLUCOSE BLD-MCNC: 146 MG/DL (ref 70–99)
GLUCOSE BLD-MCNC: 192 MG/DL (ref 70–99)
HCT VFR BLD CALC: 32 % (ref 40.5–52.5)
HEMOGLOBIN: 11 G/DL (ref 13.5–17.5)
LYMPHOCYTES ABSOLUTE: 1.3 K/UL (ref 1–5.1)
LYMPHOCYTES RELATIVE PERCENT: 14.8 %
MCH RBC QN AUTO: 30.5 PG (ref 26–34)
MCHC RBC AUTO-ENTMCNC: 34.3 G/DL (ref 31–36)
MCV RBC AUTO: 89 FL (ref 80–100)
MONOCYTES ABSOLUTE: 0.5 K/UL (ref 0–1.3)
MONOCYTES RELATIVE PERCENT: 6.5 %
NEUTROPHILS ABSOLUTE: 6.2 K/UL (ref 1.7–7.7)
NEUTROPHILS RELATIVE PERCENT: 73.3 %
PDW BLD-RTO: 15 % (ref 12.4–15.4)
PERFORMED ON: ABNORMAL
PLATELET # BLD: 313 K/UL (ref 135–450)
PMV BLD AUTO: 8.9 FL (ref 5–10.5)
POTASSIUM REFLEX MAGNESIUM: 4.8 MMOL/L (ref 3.5–5.1)
RBC # BLD: 3.6 M/UL (ref 4.2–5.9)
SODIUM BLD-SCNC: 142 MMOL/L (ref 136–145)
WBC # BLD: 8.5 K/UL (ref 4–11)

## 2022-04-30 PROCEDURE — 6370000000 HC RX 637 (ALT 250 FOR IP): Performed by: NURSE PRACTITIONER

## 2022-04-30 PROCEDURE — 99233 SBSQ HOSP IP/OBS HIGH 50: CPT | Performed by: INTERNAL MEDICINE

## 2022-04-30 PROCEDURE — 1200000000 HC SEMI PRIVATE

## 2022-04-30 PROCEDURE — 2500000003 HC RX 250 WO HCPCS: Performed by: INTERNAL MEDICINE

## 2022-04-30 PROCEDURE — 6370000000 HC RX 637 (ALT 250 FOR IP): Performed by: INTERNAL MEDICINE

## 2022-04-30 PROCEDURE — 80048 BASIC METABOLIC PNL TOTAL CA: CPT

## 2022-04-30 PROCEDURE — 2580000003 HC RX 258: Performed by: PODIATRIST

## 2022-04-30 PROCEDURE — 6360000002 HC RX W HCPCS: Performed by: INTERNAL MEDICINE

## 2022-04-30 PROCEDURE — 2580000003 HC RX 258: Performed by: INTERNAL MEDICINE

## 2022-04-30 PROCEDURE — 36415 COLL VENOUS BLD VENIPUNCTURE: CPT

## 2022-04-30 PROCEDURE — 85025 COMPLETE CBC W/AUTO DIFF WBC: CPT

## 2022-04-30 PROCEDURE — 6370000000 HC RX 637 (ALT 250 FOR IP): Performed by: PODIATRIST

## 2022-04-30 RX ORDER — HYDRALAZINE HYDROCHLORIDE 20 MG/ML
10 INJECTION INTRAMUSCULAR; INTRAVENOUS EVERY 6 HOURS PRN
Status: DISCONTINUED | OUTPATIENT
Start: 2022-04-30 | End: 2022-05-02 | Stop reason: HOSPADM

## 2022-04-30 RX ORDER — HYDRALAZINE HYDROCHLORIDE 25 MG/1
25 TABLET, FILM COATED ORAL EVERY 8 HOURS SCHEDULED
Status: DISCONTINUED | OUTPATIENT
Start: 2022-04-30 | End: 2022-05-01

## 2022-04-30 RX ADMIN — SODIUM CHLORIDE: 9 INJECTION, SOLUTION INTRAVENOUS at 20:26

## 2022-04-30 RX ADMIN — TAMSULOSIN HYDROCHLORIDE 0.4 MG: 0.4 CAPSULE ORAL at 08:30

## 2022-04-30 RX ADMIN — INSULIN LISPRO 1 UNITS: 100 INJECTION, SOLUTION INTRAVENOUS; SUBCUTANEOUS at 20:01

## 2022-04-30 RX ADMIN — HYDRALAZINE HYDROCHLORIDE 25 MG: 25 TABLET, FILM COATED ORAL at 13:53

## 2022-04-30 RX ADMIN — CLINDAMYCIN IN 5 PERCENT DEXTROSE 600 MG: 12 INJECTION, SOLUTION INTRAVENOUS at 13:53

## 2022-04-30 RX ADMIN — VANCOMYCIN HYDROCHLORIDE 1500 MG: 10 INJECTION, POWDER, LYOPHILIZED, FOR SOLUTION INTRAVENOUS at 16:42

## 2022-04-30 RX ADMIN — ATORVASTATIN CALCIUM 40 MG: 40 TABLET, FILM COATED ORAL at 08:30

## 2022-04-30 RX ADMIN — HYDRALAZINE HYDROCHLORIDE 25 MG: 25 TABLET, FILM COATED ORAL at 20:00

## 2022-04-30 RX ADMIN — SODIUM CHLORIDE: 9 INJECTION, SOLUTION INTRAVENOUS at 02:21

## 2022-04-30 RX ADMIN — INSULIN GLARGINE 42 UNITS: 100 INJECTION, SOLUTION SUBCUTANEOUS at 20:01

## 2022-04-30 RX ADMIN — CLINDAMYCIN IN 5 PERCENT DEXTROSE 600 MG: 12 INJECTION, SOLUTION INTRAVENOUS at 05:07

## 2022-04-30 RX ADMIN — INSULIN LISPRO 1 UNITS: 100 INJECTION, SOLUTION INTRAVENOUS; SUBCUTANEOUS at 12:03

## 2022-04-30 RX ADMIN — AMLODIPINE BESYLATE 10 MG: 5 TABLET ORAL at 08:30

## 2022-04-30 NOTE — PROGRESS NOTES
[facility] Pt currently resting in bed with the call light within reach. Pt denies any other care needs at this time. Pt stable at this time.

## 2022-04-30 NOTE — FLOWSHEET NOTE
04/30/22 0815   Vital Signs   Temp 99 °F (37.2 °C)   Temp Source Oral   Pulse 70   Heart Rate Source Monitor   Resp 18   BP (!) 150/77   BP Location Right upper arm   MAP (Calculated) 101.33   Patient Position Sitting   Level of Consciousness Alert (0)   MEWS Score 1   Oxygen Therapy   SpO2 98 %   Pulse Oximeter Device Mode Intermittent   Pulse Oximeter Device Location Finger   O2 Device None (Room air)     Shift assessment complete - see flow sheet, pt denies needs, call light within reach. [F/U - Hospitalization] : follow-up of a recent hospitalization for [Weight Check] : weight check [Developmental Delay] : developmental delay [Foster Parents/Guardian] : /guardian [FreeTextEntry3] : Former FT infant, HIV mother

## 2022-04-30 NOTE — PROGRESS NOTES
Vancomycin Day # 4  Current dose = 1500 mg q24h  BUN/SRCR 29/1.5      Est CrCl = 67 ml/min  WBC   8.5            Tmax 98.2  Vancomycin level ordered for tomorrow @ 1500. Continue same dose and schedule until then.

## 2022-04-30 NOTE — PROGRESS NOTES
Resting quietly with eyes closed & respirations easy/even on RA. Call in easy reach. Continue to monitor closely.   Nathalia Ying RN

## 2022-05-01 LAB
ANION GAP SERPL CALCULATED.3IONS-SCNC: 12 MMOL/L (ref 3–16)
BASOPHILS ABSOLUTE: 0.1 K/UL (ref 0–0.2)
BASOPHILS RELATIVE PERCENT: 1.3 %
BLOOD CULTURE, ROUTINE: NORMAL
BUN BLDV-MCNC: 29 MG/DL (ref 7–20)
CALCIUM SERPL-MCNC: 9.2 MG/DL (ref 8.3–10.6)
CHLORIDE BLD-SCNC: 106 MMOL/L (ref 99–110)
CO2: 24 MMOL/L (ref 21–32)
CREAT SERPL-MCNC: 1.7 MG/DL (ref 0.8–1.3)
CULTURE, BLOOD 2: NORMAL
EOSINOPHILS ABSOLUTE: 0.5 K/UL (ref 0–0.6)
EOSINOPHILS RELATIVE PERCENT: 5.9 %
GFR AFRICAN AMERICAN: 49
GFR NON-AFRICAN AMERICAN: 41
GLUCOSE BLD-MCNC: 115 MG/DL (ref 70–99)
GLUCOSE BLD-MCNC: 133 MG/DL (ref 70–99)
GLUCOSE BLD-MCNC: 150 MG/DL (ref 70–99)
GLUCOSE BLD-MCNC: 175 MG/DL (ref 70–99)
GLUCOSE BLD-MCNC: 93 MG/DL (ref 70–99)
GLUCOSE BLD-MCNC: 98 MG/DL (ref 70–99)
HCT VFR BLD CALC: 31.5 % (ref 40.5–52.5)
HEMOGLOBIN: 10.9 G/DL (ref 13.5–17.5)
LYMPHOCYTES ABSOLUTE: 1.8 K/UL (ref 1–5.1)
LYMPHOCYTES RELATIVE PERCENT: 21 %
MCH RBC QN AUTO: 30.6 PG (ref 26–34)
MCHC RBC AUTO-ENTMCNC: 34.5 G/DL (ref 31–36)
MCV RBC AUTO: 88.8 FL (ref 80–100)
MONOCYTES ABSOLUTE: 0.6 K/UL (ref 0–1.3)
MONOCYTES RELATIVE PERCENT: 7 %
NEUTROPHILS ABSOLUTE: 5.6 K/UL (ref 1.7–7.7)
NEUTROPHILS RELATIVE PERCENT: 64.8 %
PDW BLD-RTO: 14.9 % (ref 12.4–15.4)
PERFORMED ON: ABNORMAL
PERFORMED ON: NORMAL
PLATELET # BLD: 311 K/UL (ref 135–450)
PMV BLD AUTO: 8.6 FL (ref 5–10.5)
POTASSIUM REFLEX MAGNESIUM: 5.2 MMOL/L (ref 3.5–5.1)
RBC # BLD: 3.55 M/UL (ref 4.2–5.9)
SODIUM BLD-SCNC: 142 MMOL/L (ref 136–145)
VANCOMYCIN TROUGH: 12.1 UG/ML (ref 10–20)
WBC # BLD: 8.7 K/UL (ref 4–11)

## 2022-05-01 PROCEDURE — 99233 SBSQ HOSP IP/OBS HIGH 50: CPT | Performed by: INTERNAL MEDICINE

## 2022-05-01 PROCEDURE — 2580000003 HC RX 258: Performed by: INTERNAL MEDICINE

## 2022-05-01 PROCEDURE — 2580000003 HC RX 258: Performed by: NURSE PRACTITIONER

## 2022-05-01 PROCEDURE — 85025 COMPLETE CBC W/AUTO DIFF WBC: CPT

## 2022-05-01 PROCEDURE — 80202 ASSAY OF VANCOMYCIN: CPT

## 2022-05-01 PROCEDURE — 1200000000 HC SEMI PRIVATE

## 2022-05-01 PROCEDURE — 80048 BASIC METABOLIC PNL TOTAL CA: CPT

## 2022-05-01 PROCEDURE — 6370000000 HC RX 637 (ALT 250 FOR IP): Performed by: NURSE PRACTITIONER

## 2022-05-01 PROCEDURE — 6360000002 HC RX W HCPCS: Performed by: INTERNAL MEDICINE

## 2022-05-01 PROCEDURE — 36415 COLL VENOUS BLD VENIPUNCTURE: CPT

## 2022-05-01 PROCEDURE — 6370000000 HC RX 637 (ALT 250 FOR IP): Performed by: PODIATRIST

## 2022-05-01 PROCEDURE — 6370000000 HC RX 637 (ALT 250 FOR IP): Performed by: INTERNAL MEDICINE

## 2022-05-01 RX ORDER — HYDRALAZINE HYDROCHLORIDE 50 MG/1
50 TABLET, FILM COATED ORAL EVERY 8 HOURS SCHEDULED
Status: DISCONTINUED | OUTPATIENT
Start: 2022-05-01 | End: 2022-05-02 | Stop reason: HOSPADM

## 2022-05-01 RX ADMIN — INSULIN GLARGINE 42 UNITS: 100 INJECTION, SOLUTION SUBCUTANEOUS at 21:22

## 2022-05-01 RX ADMIN — INSULIN LISPRO 1 UNITS: 100 INJECTION, SOLUTION INTRAVENOUS; SUBCUTANEOUS at 21:21

## 2022-05-01 RX ADMIN — HYDRALAZINE HYDROCHLORIDE 25 MG: 25 TABLET, FILM COATED ORAL at 05:05

## 2022-05-01 RX ADMIN — TAMSULOSIN HYDROCHLORIDE 0.4 MG: 0.4 CAPSULE ORAL at 09:24

## 2022-05-01 RX ADMIN — SODIUM CHLORIDE, PRESERVATIVE FREE 10 ML: 5 INJECTION INTRAVENOUS at 09:27

## 2022-05-01 RX ADMIN — ATORVASTATIN CALCIUM 40 MG: 40 TABLET, FILM COATED ORAL at 09:24

## 2022-05-01 RX ADMIN — HYDRALAZINE HYDROCHLORIDE 50 MG: 50 TABLET, FILM COATED ORAL at 21:21

## 2022-05-01 RX ADMIN — HYDRALAZINE HYDROCHLORIDE 50 MG: 50 TABLET, FILM COATED ORAL at 14:32

## 2022-05-01 RX ADMIN — VANCOMYCIN HYDROCHLORIDE 1500 MG: 10 INJECTION, POWDER, LYOPHILIZED, FOR SOLUTION INTRAVENOUS at 16:37

## 2022-05-01 RX ADMIN — AMLODIPINE BESYLATE 10 MG: 5 TABLET ORAL at 09:24

## 2022-05-01 RX ADMIN — INSULIN LISPRO 1 UNITS: 100 INJECTION, SOLUTION INTRAVENOUS; SUBCUTANEOUS at 17:32

## 2022-05-01 RX ADMIN — SODIUM CHLORIDE, PRESERVATIVE FREE 5 ML: 5 INJECTION INTRAVENOUS at 22:09

## 2022-05-01 NOTE — PROGRESS NOTES
Hospitalist Progress Note      PCP: Maye Mcbride MD    Date of Admission: 4/27/2022    Subjective: pain controlled, BS better, BP uncontrolled    Medications:  Reviewed    Infusion Medications    dextrose      sodium chloride      sodium chloride 75 mL/hr at 04/30/22 2026     Scheduled Medications    hydrALAZINE  25 mg Oral 3 times per day    vancomycin  1,500 mg IntraVENous Q24H    amLODIPine  10 mg Oral Daily    atorvastatin  40 mg Oral Daily    insulin glargine  42 Units SubCUTAneous Nightly    tamsulosin  0.4 mg Oral Daily    sodium chloride flush  5-40 mL IntraVENous 2 times per day    enoxaparin  30 mg SubCUTAneous BID    insulin lispro  0-6 Units SubCUTAneous TID WC    insulin lispro  0-3 Units SubCUTAneous Nightly     PRN Meds: hydrALAZINE, HYDROcodone 5 mg - acetaminophen, glucose, glucagon (rDNA), dextrose, sodium chloride flush, sodium chloride, ondansetron **OR** ondansetron, polyethylene glycol, acetaminophen **OR** acetaminophen, dextrose bolus (hypoglycemia) **OR** dextrose bolus (hypoglycemia)      Intake/Output Summary (Last 24 hours) at 4/30/2022 2211  Last data filed at 4/30/2022 2000  Gross per 24 hour   Intake 1306 ml   Output 1440 ml   Net -134 ml       Physical Exam Performed:    BP (!) 157/83 Comment: b/p  Pulse 72   Temp 99.5 °F (37.5 °C) (Oral)   Resp 18   Ht 6' 1\" (1.854 m)   Wt 252 lb 7 oz (114.5 kg)   SpO2 96%   BMI 33.31 kg/m²       Gen: No distress. Alert. Eyes: PERRL. No sclera icterus. No conjunctival injection. ENT: No discharge. Pharynx clear. Neck: Trachea midline. Resp: No accessory muscle use. No crackles. No wheezes. No rhonchi. CV: Regular rate. Regular rhythm. No murmur.  No rub. No edema. Capillary Refill: Brisk,< 3 seconds   Peripheral Pulses: +2 palpable, equal bilaterally   GI: Non-tender. Non-distended. No masses. No organomegaly. Normal bowel sounds. No hernia. Skin: Warm and dry. No nodule on exposed extremities.  Right foot in walking boot and wrapped in clean and dry dressing. See media tab for ulcer.  S/P left great toe amp. M/S: No cyanosis. No joint deformity. No clubbing. Neuro: Awake. Grossly nonfocal    Psych: Oriented x 3. No anxiety or agitation. Labs:   Recent Labs     04/28/22  0518 04/29/22  0425 04/30/22  0449   WBC 8.6 8.3 8.5   HGB 11.5* 10.7* 11.0*   HCT 33.1* 30.2* 32.0*    273 313     Recent Labs     04/28/22  0518 04/29/22  0425 04/30/22  0449    142 142   K 4.7 5.2* 4.8    108 107   CO2 21 22 21   BUN 46* 39* 29*   CREATININE 1.7* 1.6* 1.5*   CALCIUM 9.6 9.1 9.1     No results for input(s): AST, ALT, BILIDIR, BILITOT, ALKPHOS in the last 72 hours. No results for input(s): INR in the last 72 hours. No results for input(s): Moises Clap in the last 72 hours. Urinalysis:      Lab Results   Component Value Date    NITRU Negative 08/31/2021    WBCUA 0-2 08/31/2021    BACTERIA 2+ 08/31/2021    RBCUA 0-2 08/31/2021    BLOODU TRACE-INTACT 08/31/2021    SPECGRAV 1.025 08/31/2021    GLUCOSEU 100 08/31/2021       Radiology:  XR FOOT RIGHT (2 VIEWS)   Final Result   Interval 3rd toe amputation with expected postoperative findings.                  Assessment/Plan:    Active Hospital Problems    Diagnosis     Diabetic foot infection ([facility] [address] [E11.628, L08.9]      Priority: Medium    Cellulitis of right lower extremity [L03.115]      Priority: Medium    Hyperlipidemia [E78.5]     MRSA infection [A49.02]          #DMII with diabetic foot ulcer and surrounding cellulitis; right foot  -BG controlled here  -XR above  -Lantus 42 units nightly  -Low dose SSI  -ID consulted  -Abx per Dr. José Manuel Grace; Cleocin, merrem and vanco  -Podiatry consulted  - s/p Toe amputation 4/29/22  Meropenem dced,.dc clinda after surgery  Re-eval Monday per ID and podiatry      #CAMRYN on CKD IIIa  -Baseline Cr 1.4  -Cr 2.0 on admission--> 1.7 --> 1.6 -->1.5  -IVF and monitor      Mild hyperkalemia   lokelma     #HTN  -BP mildly elevated  -Continue Norvasc  - prn hydralazine IV and start PO hydralazine     #HLD   -Continue statin     #BPH   -Continue flomax       DVT Prophylaxis: Lovenox  Diet: ADULT DIET;  Regular; 5 carb choices (75 gm/meal)  Code Status: Full Code    PT/OT Eval Status: ordered    Julissa Edmonds MD

## 2022-05-01 NOTE — PLAN OF CARE
Problem: Discharge Planning  Goal: Discharge to home or other facility with appropriate resources  Outcome: Progressing     Problem: Chronic Conditions and Co-morbidities  Goal: Patient's chronic conditions and co-morbidity symptoms are monitored and maintained or improved  Outcome: Progressing

## 2022-05-01 NOTE — PROGRESS NOTES
Patient assessed and medications given. PT refused Lovenox. Patient denies pain. No further needs at this time. Call light within reach. Bed in low position.

## 2022-05-01 NOTE — PROGRESS NOTES
Pharmacy Vancomycin Consult     Vancomycin Day: 5  Current Dosinmg q24h  Current indication: Bone and joint      Recent Labs     22  0449 22  0422   BUN 29* 29*   CREATININE 1.5* 1.7*   WBC 8.5 8.7       Intake/Output Summary (Last 24 hours) at 2022 1538  Last data filed at 2022 1249  Gross per 24 hour   Intake 1657 ml   Output 1100 ml   Net 557 ml     Culture Date      Source                       Results      Ht Readings from Last 1 Encounters:   22 6' 1\" (1.854 m)        Wt Readings from Last 1 Encounters:   22 243 lb 1 oz (110.3 kg)       Body mass index is 32.07 kg/m². Estimated Creatinine Clearance: 58 mL/min (A) (based on SCr of 1.7 mg/dL (H)). Trough: 12.1    Assessment/Plan:  Continue current vancomycin regimen of 1500mg q24h  Pharmacy will continue to monitor renal function and reorder levels if necessary.

## 2022-05-01 NOTE — PROGRESS NOTES
Resting quietly with eyes closed & respirations easy/even on RA. No c/o. Call in easy reach. Continue to monitor closely.   Chalo Bowen RN

## 2022-05-02 VITALS
BODY MASS INDEX: 32.2 KG/M2 | SYSTOLIC BLOOD PRESSURE: 136 MMHG | TEMPERATURE: 97.3 F | WEIGHT: 243 LBS | HEIGHT: 73 IN | HEART RATE: 68 BPM | OXYGEN SATURATION: 95 % | RESPIRATION RATE: 16 BRPM | DIASTOLIC BLOOD PRESSURE: 74 MMHG

## 2022-05-02 LAB
ANION GAP SERPL CALCULATED.3IONS-SCNC: 11 MMOL/L (ref 3–16)
BASOPHILS ABSOLUTE: 0.1 K/UL (ref 0–0.2)
BASOPHILS RELATIVE PERCENT: 1.3 %
BUN BLDV-MCNC: 32 MG/DL (ref 7–20)
CALCIUM SERPL-MCNC: 9.2 MG/DL (ref 8.3–10.6)
CHLORIDE BLD-SCNC: 105 MMOL/L (ref 99–110)
CO2: 23 MMOL/L (ref 21–32)
CREAT SERPL-MCNC: 1.4 MG/DL (ref 0.8–1.3)
EOSINOPHILS ABSOLUTE: 0.5 K/UL (ref 0–0.6)
EOSINOPHILS RELATIVE PERCENT: 6.2 %
GFR AFRICAN AMERICAN: >60
GFR NON-AFRICAN AMERICAN: 51
GLUCOSE BLD-MCNC: 125 MG/DL (ref 70–99)
GLUCOSE BLD-MCNC: 126 MG/DL (ref 70–99)
HCT VFR BLD CALC: 32 % (ref 40.5–52.5)
HEMOGLOBIN: 10.9 G/DL (ref 13.5–17.5)
LYMPHOCYTES ABSOLUTE: 1.4 K/UL (ref 1–5.1)
LYMPHOCYTES RELATIVE PERCENT: 18.8 %
MCH RBC QN AUTO: 30.2 PG (ref 26–34)
MCHC RBC AUTO-ENTMCNC: 34.2 G/DL (ref 31–36)
MCV RBC AUTO: 88.4 FL (ref 80–100)
MONOCYTES ABSOLUTE: 0.5 K/UL (ref 0–1.3)
MONOCYTES RELATIVE PERCENT: 6.3 %
NEUTROPHILS ABSOLUTE: 5.2 K/UL (ref 1.7–7.7)
NEUTROPHILS RELATIVE PERCENT: 67.4 %
PDW BLD-RTO: 14.7 % (ref 12.4–15.4)
PERFORMED ON: ABNORMAL
PLATELET # BLD: 340 K/UL (ref 135–450)
PMV BLD AUTO: 8.4 FL (ref 5–10.5)
POTASSIUM REFLEX MAGNESIUM: 4.5 MMOL/L (ref 3.5–5.1)
RBC # BLD: 3.62 M/UL (ref 4.2–5.9)
SODIUM BLD-SCNC: 139 MMOL/L (ref 136–145)
WBC # BLD: 7.7 K/UL (ref 4–11)

## 2022-05-02 PROCEDURE — 6370000000 HC RX 637 (ALT 250 FOR IP): Performed by: NURSE PRACTITIONER

## 2022-05-02 PROCEDURE — 85025 COMPLETE CBC W/AUTO DIFF WBC: CPT

## 2022-05-02 PROCEDURE — 6370000000 HC RX 637 (ALT 250 FOR IP): Performed by: PODIATRIST

## 2022-05-02 PROCEDURE — 99239 HOSP IP/OBS DSCHRG MGMT >30: CPT | Performed by: INTERNAL MEDICINE

## 2022-05-02 PROCEDURE — 2580000003 HC RX 258: Performed by: NURSE PRACTITIONER

## 2022-05-02 PROCEDURE — 99232 SBSQ HOSP IP/OBS MODERATE 35: CPT | Performed by: INTERNAL MEDICINE

## 2022-05-02 PROCEDURE — 36415 COLL VENOUS BLD VENIPUNCTURE: CPT

## 2022-05-02 PROCEDURE — 80048 BASIC METABOLIC PNL TOTAL CA: CPT

## 2022-05-02 PROCEDURE — 6370000000 HC RX 637 (ALT 250 FOR IP): Performed by: INTERNAL MEDICINE

## 2022-05-02 RX ADMIN — SODIUM CHLORIDE, PRESERVATIVE FREE 10 ML: 5 INJECTION INTRAVENOUS at 08:55

## 2022-05-02 RX ADMIN — AMLODIPINE BESYLATE 10 MG: 5 TABLET ORAL at 08:53

## 2022-05-02 RX ADMIN — TAMSULOSIN HYDROCHLORIDE 0.4 MG: 0.4 CAPSULE ORAL at 08:53

## 2022-05-02 RX ADMIN — ATORVASTATIN CALCIUM 40 MG: 40 TABLET, FILM COATED ORAL at 08:53

## 2022-05-02 RX ADMIN — HYDRALAZINE HYDROCHLORIDE 50 MG: 50 TABLET, FILM COATED ORAL at 06:12

## 2022-05-02 ASSESSMENT — PAIN SCALES - GENERAL: PAINLEVEL_OUTOF10: 0

## 2022-05-02 NOTE — PROGRESS NOTES
Patient educated on discharge instructions as well as new medications use, dosage, administration and possible side effects. Patient verified knowledge. IV removed without difficulty and dry dressing in place. Telemetry monitor removed and returned to [facility]. Pt left facility in stable condition to Home with all of their personal belongings.

## 2022-05-02 NOTE — PROGRESS NOTES
Physical Therapy/Occuaptional Therapy    Physical therapy/Occuaptional therapy orders will be discontinued since no acute therapy needs. Patient declined therapy services during current hospitalization. Please reorder therapy services if need arises. No charge.      Cranston Halsted, MS PT, # [id number]  Keith Sarmiento OTR/L #[id number]

## 2022-05-02 NOTE — PROGRESS NOTES
[facility] Infectious Disease Progress Note      Del Recinos     : 1960    DATE OF VISIT:  2022  DATE OF ADMISSION:  2022       Subjective:     Del Recinos is a 59 y.o. male whom I've been seeing for a right diabetic foot infection. Since I last saw him, he had his right 3rd toe amputation with primary closure on Friday. Did well over the weekend, no F/C/D. Tolerating Abx well, no sore throat or mouth, no rash or pruritus, N/V/D, IV pain. Had a bit of an CAMRYN during the admission, but that's improved. Clinda was stopped about 24 hours post-op, just remains on vanco now. Mr. Amaya Recinos has a past medical history of Abscess of left hand, Acute kidney injury superimposed on chronic kidney disease [facility]), Acute osteomyelitis of metatarsal bone of left foot ([facility] [address] Adhesive capsulitis of shoulder, Fractures, Gas gangrene of foot ([facility] [address] History of hyperbaric oxygen therapy, PNA (pneumonia), and Pressure ulcer of left lateral forefoot, stage 4 ([facility] [address]     Current Facility-Administered Medications: hydrALAZINE (APRESOLINE) tablet 50 mg, 50 mg, Oral, 3 times per day  hydrALAZINE (APRESOLINE) injection 10 mg, 10 mg, IntraVENous, Q6H PRN  HYDROcodone-acetaminophen (NORCO) 5-325 MG per tablet 1 tablet, 1 tablet, Oral, Q6H PRN  vancomycin 1500 mg in dextrose 5% 300 mL IVPB, 1,500 mg, IntraVENous, Q24H  amLODIPine (NORVASC) tablet 10 mg, 10 mg, Oral, Daily  atorvastatin (LIPITOR) tablet 40 mg, 40 mg, Oral, Daily  insulin glargine (LANTUS) injection vial 42 Units, 42 Units, SubCUTAneous, Nightly  tamsulosin (FLOMAX) capsule 0.4 mg, 0.4 mg, Oral, Daily  glucose chewable tablet 16 g, 16 g, Oral, PRN  glucagon (rDNA) injection 1 mg, 1 mg, IntraMUSCular, PRN  dextrose 5 % solution, 100 mL/hr, IntraVENous, PRN  sodium chloride flush 0.9 % injection 5-40 mL, 5-40 mL, IntraVENous, 2 times per day  sodium chloride flush 0.9 % injection 5-40 mL, 5-40 mL, IntraVENous, PRN  0.9 % sodium chloride infusion, , IntraVENous, PRN  enoxaparin Sodium (LOVENOX) injection 30 mg, 30 mg, SubCUTAneous, BID  ondansetron (ZOFRAN-ODT) disintegrating tablet 4 mg, 4 mg, Oral, Q8H PRN **OR** ondansetron (ZOFRAN) injection 4 mg, 4 mg, IntraVENous, Q6H PRN  polyethylene glycol (GLYCOLAX) packet 17 g, 17 g, Oral, Daily PRN  acetaminophen (TYLENOL) tablet 650 mg, 650 mg, Oral, Q6H PRN **OR** acetaminophen (TYLENOL) suppository 650 mg, 650 mg, Rectal, Q6H PRN  insulin lispro (HUMALOG) injection vial 0-6 Units, 0-6 Units, SubCUTAneous, TID WC  insulin lispro (HUMALOG) injection vial 0-3 Units, 0-3 Units, SubCUTAneous, Nightly  dextrose bolus (hypoglycemia) 10% 125 mL, 125 mL, IntraVENous, PRN **OR** dextrose bolus (hypoglycemia) 10% 250 mL, 250 mL, IntraVENous, PRN     This is POD 3 of vancomycin. Allergies: Patient has no known allergies. Pertinent items from the review of systems are discussed in the HPI; the remainder of the ROS was reviewed and is negative.      Objective:     Vital signs over the last 24 hours:  Temp  Av.5 °F (36.9 °C)  Min: 98.3 °F (36.8 °C)  Max: 98.7 °F (37.1 °C)  Pulse  Av.6  Min: 66  Max: 71  Systolic (33HIR), SZN:281 , Min:153 , LKE:681   Diastolic (97ZAH), GWO:95, Min:74, Max:89  Resp  Av.5  Min: 16  Max: 18  SpO2  Av.3 %  Min: 94 %  Max: 98 %    Constitutional:  well-developed, well-nourished, NAD  Cardiovascular:  bilateral pedal pulses palpable, feet warm, brisk cap refill  Lymphatic:  no inguinal or popliteal adenopathy, resolving ankle and foot lymphangitis, and forefoot cellulitis  Musculoskeletal:  no clubbing, other cyanosis or petechiae; RLE and LLE with no gross effusions, joint misalignment or acute arthritis  Ulcer(s):  left lateral foot delicately healed; right 2nd toe looks a bit better, superficial, some fibrinous necrosis and presumed biofilm; 3rd toe now amputated, part of the skin edges looks healthy, pink, indurated, one lateral focus a bit macerated, and then some dark changes more proximally, I think a combination of old blood and thin eschar, no pus or malodor. Photos also saved in electronic chart.   ______________________________    Recent Labs     05/02/22  0444 05/01/22  0422 04/30/22  0449   WBC 7.7 8.7 8.5   HGB 10.9* 10.9* 11.0*   HCT 32.0* 31.5* 32.0*   MCV 88.4 88.8 89.0    311 313     Lab Results   Component Value Date    CREATININE 1.4 (H) 05/02/2022     Lab Results   Component Value Date    LABALBU 3.9 04/27/2022     Lab Results   Component Value Date    ALT 14 04/27/2022    AST 12 (L) 04/27/2022    ALKPHOS 89 04/27/2022    BILITOT 0.5 04/27/2022      Lab Results   Component Value Date    LABA1C 5.5 04/27/2022     Other recent pertinent labs:  Glucoses back to the 100s now. 500 Eos.   ______________________________    Recent pertinent micro results:  BCx negative. Bedside WCx MRSA, in vitro (S) to tetra. OR Cx MRSA. OR path still pending.   ______________________________    Recent imaging results (last 7 days):     XR FOOT RIGHT (2 VIEWS)    Result Date: 4/29/2022  Interval 3rd toe amputation with expected postoperative findings. XR TOE RIGHT (MIN 2 VIEWS)    Result Date: 4/27/2022  Moderate soft tissue edema greatest involving the 3rd digit, region of stated ulceration. Ulceration not identified. Recent fracture at the base of the 5th proximal phalanx. Correlation to history of injury recommended. Significant degenerative change greatest involving the 1st MTP joint and 1st interphalangeal joint. For concern of osteomyelitis, MRI would be recommended.       Assessment:     Patient Active Problem List   Diagnosis Code    Hypertension I10    BPH (benign prostatic hyperplasia) N40.0    Acute kidney injury superimposed on CKD ([address] N17.9, N18.9    Osteoarthritis M19.90    Class 1 obesity due to excess calories with serious comorbidity and body mass index (BMI) of 31.0 to 31.9 in adult E66.09, Z68.31    Diabetic polyneuropathy associated with type 2 diabetes mellitus ([facility] E11.42    Elevated PSA R97.20    Hyperlipidemia E78.5    Stage 3a chronic kidney disease (HCC) N18.31    MRSA infection A49.02    Chronic low back pain M54.50, G89.29    Dental bridge present Z97.2    Callus of foot L84    Pressure ulcer of toe of right foot, stage 3 (HCC) L89.893    Pressure ulcer of toe of right foot, stage 4 ([facility]) E09.118    Cellulitis of right lower extremity L03.115    Acute osteomyelitis of toe of right foot ([facility]) M86.171    Failure of outpatient treatment Z78.9    Ischemic toe I99.8    Diabetic foot infection ([facility]) E11.628, L08.9     Assessment of today's active condition(s):      --          Background of well controlled DM2, neuropathy, no known large-vessel PAD.     --          Hercules 4 left foot last August, that sounds like it started from minor trauma and then skin and soft tissue infection, rapidly spreading to localized gangrene, osteo of the 1st ray, treated with an open partial ray resection, eventually healed after more debridement, completion of IV Abx, a round of HBOT, VAC therapy, additional local care. Now with diabetic shoes and custom orthotics.    --          Also a small but slowly healing left lateral forefoot pressure ulcer, I think starting from a post-op dressing late last year, finally healed just these last couple of weeks.     --          New pressure / neuropathic ulcers of right 2nd and 3rd toes, resulting from a silicone toe separator, which might have gotten bunched up in his prior shoes. Two weeks ago with a bit of increased depth at the 3rd toe ulcer, then over that weekend development of cellulitis, then mid-last week worse cellulitis with lymphangitis, probably acute osteo of the 3rd middle phalanx, and a degree of ischemia in the distal part of the toe (I think from swelling and small vessel disease, more than proximal large-vessel PAD, since the rest of his foot has been so warm.  Mild degree of systemic illness with some chills Tuesday night, but a limb-threatening infection.      -- Now POD 3 from 3rd toe amp, with slight distal dehiscence, and probably a bit of superficial necrosis, but signs of infection are much improved. Pathogen confirmed as MRSA, though in vitro (S) to doxy. Not bacteremic. Treatment recs:     I am ok with discharge home today. Foot re-dressed with Versatel, 4x4s, Kerlix, Ace. That dressing can stay in place for the week; keep the foot dry. Keep ambulation to a reasonable minimum; when he must take some steps, use the walker, and keep weight on the heel, on the right side. Resume oral doxycycline when he gets home. F/U with Dr. GUERRA&T Beatriz and me next Monday at 10:45. Call during the week with any questions. D/W his PCU RN.     Electronically signed by Jeffery Marte MD on 5/2/2022 at 7:43 AM.

## 2022-05-02 NOTE — CARE COORDINATION
DISCHARGE ORDER  Date/Time 2022 10:20 AM  Completed by: Janett Ramirez RN, Case Management    Patient Name: Christos Hurst      : 1960  Admitting Diagnosis: Diabetic foot infection ([facility] [address] [Z64.008, L08.9]      Admit order Date and Status:2022  (verify MD's last order for status of admission)      Noted discharge order. If applicable PT/OT recommendation at Discharge: n/a--pt declined PT/OT eval  DME recommendation by PT/OT:n/a  Confirmed discharge plan  (pt): Yes  with whom_____pt__________  If pt confirmed DC plan does family need to be contacted by CM No if yes who____n/a__  Discharge Plan: Reviewed chart. Role of discharge planner explained and patient verbalized understanding. Pt is alert and oriented. Discharge order is noted. Pt is being d/c'd to home today. Pt declined PT/OT eval that was ordered. Pt has oral abx already at home, therefore not prescribed further oral abx. Pt has a f./u appt with Dr. Socorro Thomas on 2022 at 1045am at the [facility]. Pt's O2 sats are 95% on RA. CM offered HC and pt declines HC. Pt's wife to  pt at 11am today. No further discharge needs needed or noted. Reviewed chart. Role of discharge planner explained and patient verbalized understanding. Discharge order is noted. Has Home O2 in place on admit:  No  Informed of need to bring portable home O2 tank on day of discharge for nursing to connect prior to leaving:   Not Indicated  Verbalized agreement/Understanding:   Not Indicated    Discharge timeout done with EFFIE Chung. All discharge needs and concerns addressed.

## 2022-05-02 NOTE — FLOWSHEET NOTE
05/01/22 2015   Vital Signs   Temp 98.5 °F (36.9 °C)   Temp Source Oral   Pulse 68   Heart Rate Source Monitor   Resp 18   BP (!) 156/74   BP Location Left upper arm   MAP (Calculated) 101.33   Patient Position High fowlers   Level of Consciousness Alert (0)   MEWS Score 1   Oxygen Therapy   SpO2 98 %   O2 Device None (Room air)   Pt VSS, resting comfortably in bed, all needs addressed.

## 2022-05-02 NOTE — PROGRESS NOTES
Bedside report given to oncoming RN Fatoumata Dorsey. All questions and concerns answered. Patient handoff complete.

## 2022-05-02 NOTE — DISCHARGE SUMMARY
Hospital Medicine Discharge Summary    Patient ID: Janeal Dance      Patient's PCP: Alana Yanez MD    Admit Date: 4/27/2022     Discharge Date: 5/2/2022      Admitting Provider: Eduardo Mendoza MD     Discharge Provider: Azra Rico MD     Discharge Diagnoses: Active Hospital Problems    Diagnosis     Diabetic foot infection ([address] [E11.628, L08.9]      Priority: Medium    Cellulitis of right lower extremity [L03.115]      Priority: Medium    Hyperlipidemia [E78.5]     MRSA infection [A49.02]        The patient was seen and examined on day of discharge and this discharge summary is in conjunction with any daily progress note from day of discharge. Hospital Course:     #DMII with diabetic foot ulcer and surrounding cellulitis; right foot  Cx with MRSA  -BG controlled here  -XR above  -ID consulted  - Abx per Dr. Andrew Worrell; Cleocin, merrem and vanco - to resume PO doxy on discharge  - Podiatry consulted   - s/p Toe amputation 4/29/22       #CAMRYN on CKD IIIa  -Baseline Cr 1.4  -Cr 2.0 on admission--> 1.7 --> 1.6 -->1.5-->1.7  - Cr 1.4 today       Mild asymptomatic hyperkalemia - resolved  s/p lokelma       #HTN  -BP mildly elevated  Resume PTA regimen.        #HLD   -Continue statin     #BPH   -Continue flomax      Physical Exam Performed:     /74   Pulse 68   Temp 97.3 °F (36.3 °C) (Oral)   Resp 16   Ht 6' 1\" (1.854 m)   Wt 243 lb (110.2 kg)   SpO2 95%   BMI 32.06 kg/m²       Gen: No distress. Alert. Eyes: PERRL. No sclera icterus. No conjunctival injection. ENT: No discharge. Pharynx clear. Neck: Trachea midline. Resp: No accessory muscle use. No crackles. No wheezes. No rhonchi. CV: Regular rate. Regular rhythm. No murmur.  No rub. No edema. Capillary Refill: Brisk,< 3 seconds   Peripheral Pulses: +2 palpable, equal bilaterally   GI: Non-tender. Non-distended. No masses. No organomegaly. Normal bowel sounds. No hernia. Skin: Warm and dry.  No nodule on exposed extremities. Right foot in walking boot and wrapped in clean and dry dressing. See media tab for ulcer.  S/P left great toe amp. M/S: No cyanosis. No joint deformity. No clubbing. Neuro: Awake. Grossly nonfocal    Psych: Oriented x 3. No anxiety or agitation.        Labs: For convenience and continuity at follow-up the following most recent labs are provided:      CBC:    Lab Results   Component Value Date    WBC 7.7 05/02/2022    HGB 10.9 05/02/2022    HCT 32.0 05/02/2022     05/02/2022       Renal:    Lab Results   Component Value Date     05/02/2022    K 4.5 05/02/2022     05/02/2022    CO2 23 05/02/2022    BUN 32 05/02/2022    CREATININE 1.4 05/02/2022    CALCIUM 9.2 05/02/2022    PHOS 2.7 06/21/2017         Significant Diagnostic Studies    Radiology:   XR FOOT RIGHT (2 VIEWS)   Final Result   Interval 3rd toe amputation with expected postoperative findings. Consults:     IP CONSULT TO PODIATRY  PHARMACY TO DOSE VANCOMYCIN  IP CONSULT TO SPIRITUAL SERVICES    Disposition:  home     Condition at Discharge: Stable    Discharge Instructions/Follow-up:  ID and podiatry as arranged.      Code Status:  Full Code     Activity: activity as tolerated    Diet: diabetic diet      Discharge Medications:     Discharge Medication List as of 5/2/2022 10:13 AM           Details   doxycycline hyclate (VIBRAMYCIN) 100 MG capsule Take 100 mg by mouth 2 times dailyHistorical Med      Multiple Vitamins-Minerals (ONE-A-DAY VITACRAVES IMMUNITY PO) Take by mouth dailyHistorical Med      amLODIPine (NORVASC) 10 MG tablet Take 10 mg by mouth daily Historical Med      hydroCHLOROthiazide (HYDRODIURIL) 25 MG tablet Take 1 tablet by mouth daily, Disp-30 tablet, R-3Normal      Dulaglutide (TRULICITY) 1.5 NX/5.8EP SOPN Inject 1.5 mg into the skin once a weekHistorical Med      atorvastatin (LIPITOR) 40 MG tablet Take 40 mg by mouth dailyHistorical Med      insulin glargine (LANTUS) 100 UNIT/ML injection vial Inject 35 Units into the skin nightly, Disp-1 vial, R-3      Omega-3 Fatty Acids (FISH OIL) 1000 MG CAPS Take 3,000 mg by mouth nightly. metFORMIN (GLUCOPHAGE) 500 MG tablet TAKE TWO TABLETS BY MOUTH TWICE A DAY      Multiple Vitamins-Minerals (MULTIVITAMIN PO) Take  by mouth daily. tamsulosin (FLOMAX) 0.4 MG capsule Take 0.4 mg by mouth daily. Time Spent on discharge is more than 30 minutes in the examination, evaluation, counseling and review of medications and discharge plan. Signed:    Byron Brownlee MD   5/2/2022      Thank you Tracy Price MD for the opportunity to be involved in this patient's care. If you have any questions or concerns, please feel free to contact me at [phone].

## 2022-05-02 NOTE — PROGRESS NOTES
Hospitalist Progress Note      PCP: Bhupinder Bauer MD    Date of Admission: 4/27/2022    Subjective: denies any pain, BP and BS controlled    Medications:  Reviewed    Infusion Medications    dextrose      sodium chloride       Scheduled Medications    hydrALAZINE  50 mg Oral 3 times per day    vancomycin  1,500 mg IntraVENous Q24H    amLODIPine  10 mg Oral Daily    atorvastatin  40 mg Oral Daily    insulin glargine  42 Units SubCUTAneous Nightly    tamsulosin  0.4 mg Oral Daily    sodium chloride flush  5-40 mL IntraVENous 2 times per day    enoxaparin  30 mg SubCUTAneous BID    insulin lispro  0-6 Units SubCUTAneous TID WC    insulin lispro  0-3 Units SubCUTAneous Nightly     PRN Meds: hydrALAZINE, HYDROcodone 5 mg - acetaminophen, glucose, glucagon (rDNA), dextrose, sodium chloride flush, sodium chloride, ondansetron **OR** ondansetron, polyethylene glycol, acetaminophen **OR** acetaminophen, dextrose bolus (hypoglycemia) **OR** dextrose bolus (hypoglycemia)      Intake/Output Summary (Last 24 hours) at 5/1/2022 2247  Last data filed at 5/1/2022 1817  Gross per 24 hour   Intake 1157 ml   Output 1300 ml   Net -143 ml       Physical Exam Performed:    BP (!) 156/74   Pulse 68   Temp 98.5 °F (36.9 °C) (Oral)   Resp 18   Ht 6' 1\" (1.854 m)   Wt 243 lb 1 oz (110.3 kg)   SpO2 98%   BMI 32.07 kg/m²       Gen: No distress. Alert. Eyes: PERRL. No sclera icterus. No conjunctival injection. ENT: No discharge. Pharynx clear. Neck: Trachea midline. Resp: No accessory muscle use. No crackles. No wheezes. No rhonchi. CV: Regular rate. Regular rhythm. No murmur.  No rub. No edema. Capillary Refill: Brisk,< 3 seconds   Peripheral Pulses: +2 palpable, equal bilaterally   GI: Non-tender. Non-distended. No masses. No organomegaly. Normal bowel sounds. No hernia. Skin: Warm and dry. No nodule on exposed extremities. Right foot in walking boot and wrapped in clean and dry dressing.  See media tab for ulcer.  S/P left great toe amp. M/S: No cyanosis. No joint deformity. No clubbing. Neuro: Awake. Grossly nonfocal    Psych: Oriented x 3. No anxiety or agitation.        Labs:   Recent Labs     04/29/22 0425 04/30/22 0449 05/01/22 0422   WBC 8.3 8.5 8.7   HGB 10.7* 11.0* 10.9*   HCT 30.2* 32.0* 31.5*    313 311     Recent Labs     04/29/22 0425 04/30/22 0449 05/01/22 0422    142 142   K 5.2* 4.8 5.2*    107 106   CO2 22 21 24   BUN 39* 29* 29*   CREATININE 1.6* 1.5* 1.7*   CALCIUM 9.1 9.1 9.2     No results for input(s): AST, ALT, BILIDIR, BILITOT, ALKPHOS in the last 72 hours. No results for input(s): INR in the last 72 hours. No results for input(s): Abebe Beery in the last 72 hours. Urinalysis:      Lab Results   Component Value Date    NITRU Negative 08/31/2021    WBCUA 0-2 08/31/2021    BACTERIA 2+ 08/31/2021    RBCUA 0-2 08/31/2021    BLOODU TRACE-INTACT 08/31/2021    SPECGRAV 1.025 08/31/2021    GLUCOSEU 100 08/31/2021       Radiology:  XR FOOT RIGHT (2 VIEWS)   Final Result   Interval 3rd toe amputation with expected postoperative findings. Assessment/Plan:    Active Hospital Problems    Diagnosis     Diabetic foot infection ([facility] [address] [E11.628, L08.9]      Priority: Medium    Cellulitis of right lower extremity [L03.115]      Priority: Medium    Hyperlipidemia [E78.5]     MRSA infection [A49.02]            #DMII with diabetic foot ulcer and surrounding cellulitis; right foot  Cx with MRSA  -BG controlled here  -XR above  -Lantus 42 units nightly  -Low dose SSI  -ID consulted  -Abx per Dr. Sari Lee; Cleocin, merrem and vanco  -Podiatry consulted  - s/p Toe amputation 4/29/22  Meropenem dced,.dc clinda after surgery-->now on vanco  Re-eval Monday per ID and podiatry, poss dc on Monday vs Tuesday after ID recs for PO vs IV abx     #CAMRYN on CKD IIIa  -Baseline Cr 1.4  -Cr 2.0 on admission--> 1.7 --> 1.6 -->1.5-->1.7  ? new baseline  -dc IVF and monitor creat in am     Mild hyperkalemia   lokelma     #HTN  -BP mildly elevated  -Continue Norvasc  - prn hydralazine IV and started PO hydralazine     #HLD   -Continue statin     #BPH   -Continue flomax        DVT Prophylaxis: Lovenox  Diet: ADULT DIET;  Regular; 5 carb choices (75 gm/meal)  Code Status: Full Code    PT/OT Eval Status: ordered    Lori Beltre MD

## 2022-05-02 NOTE — PROGRESS NOTES
Vancomycin Day: 6  Current Regimen:1500  mg IV every 24 hours    Patient's labs, cultures, vitals, and vancomycin regimen reviewed.    SCr decreased slightly from 1.7 to 1.4  U/O adequate (> 0.5 to 1.0 mL/kg/hr)  [employer] updated    Plan:   No change today, pt to possibly be discharged today on doxy  Morris Calle Pharm D 5/2/202210:41 AM  .

## 2022-05-02 NOTE — FLOWSHEET NOTE
05/02/22 0847   Vital Signs   Temp 97.3 °F (36.3 °C)   Temp Source Oral   Pulse 68   Heart Rate Source Monitor   Resp 16   /74   BP Location Left upper arm   MAP (Calculated) 94.67   Patient Position Semi fowlers   Level of Consciousness Alert (0)   MEWS Score 1   Pain Assessment   Pain Assessment 0-10   Pain Level 0   Oxygen Therapy   SpO2 95 %   Pulse Oximeter Device Mode Intermittent   Pulse Oximeter Device Location Right;Finger   O2 Device None (Room air)     AM assessment complete. Pt a&o x4. Denies any pain or discomfort at this time. Dressing to R foot changed this morning by Dr Alana Rogers and Dr Andrew Worrell. Boot in place. Ambulates independently. Call light and bedside table within reach. Will continue to monitor.

## 2022-05-04 ENCOUNTER — HOSPITAL ENCOUNTER (OUTPATIENT)
Dept: WOUND CARE | Age: 62
Discharge: HOME | End: 2022-05-04

## 2022-05-04 LAB
ANAEROBIC CULTURE: ABNORMAL
CULTURE SURGICAL: ABNORMAL
GRAM STAIN RESULT: ABNORMAL
ORGANISM: ABNORMAL

## 2022-05-05 NOTE — PROGRESS NOTES
PROGRESS NOTE    Admit Date:  4/27/2022    Subjective:  59 y.o. male who is seen for evaluation of cellulitis and ulcer on the right foot. Patient has been followed by Dr. Narciso Madrid in the [address] [place] [address],[address]. States feeling OK today. No issues with antibiotics. S/P amputation right 3rd digit 4/29/22. Past Medical History:        Diagnosis Date    Abscess of left hand 06/20/2017    Acute kidney injury superimposed on chronic kidney disease ([address] 08/2021    Acute osteomyelitis of metatarsal bone of left foot ([address] 09/16/2021    MSSA and Anaerococcus    Adhesive capsulitis of shoulder 11/13/2014    Fractures     Gas gangrene of foot ([address] 08/31/2021    chronic diabetic ulcer there finally healed Jan 2022    History of hyperbaric oxygen therapy 09/21/2021    Hercules 4 diabetic foot    PNA (pneumonia) 03/28/2016    Pressure ulcer of left lateral forefoot, stage 4 ([address] 9/16/2021       Past Surgical History:        Procedure Laterality Date    FOOT DEBRIDEMENT Left 08/31/2021    LEFT FOOT DEBRIDEMENT INCISION AND DRAINAGE performed by Irene York DPM at [address] Left 09/03/2021    STAGED INCISION AND DEBRIDEMENT LEFT FOOT WITH PARTIAL FIRST RAY AMPUTATION performed by Irene York DPM at [address] [facility] Left 06/19/2017    LEFT HAND DEBRIDEMENT INCISION AND DRAINAGE    NECK SURGERY      30s year    TOE AMPUTATION Left 2009    hallux    TOE AMPUTATION Right 4/29/2022    PARTIAL RIGHT FOOT AMPUTATION, ULCER DEBRIDEMENT RIGHT FOOT performed by Misty Sofia DPM at [address] [facility]          Current Medications: Allergies:  Patient has no known allergies.     Social History:    Social History     Tobacco Use    Smoking status: Never Smoker    Smokeless tobacco: Never Used   Vaping Use    Vaping Use: Never used   Substance Use Topics    Alcohol use: No     Alcohol/week: 0.0 standard drinks    Drug use: No       Family History:       Problem Relation Age of Onset    Heart Failure Mother     Diabetes Mother     Cancer Father         throat cancer    Asthma Daughter         Sports induced as a child    Emphysema Neg Hx     Hypertension Neg Hx        Review of Systems    CONSTITUTIONAL:  negative  EYES:  negative  HEENT:  negative  RESPIRATORY:  negative  CARDIOVASCULAR:  negative  GASTROINTESTINAL:  negative  GENITOURINARY:  negative  INTEGUMENT/BREAST:  positive for ulcer  MUSCULOSKELETAL:  negative  NEUROLOGICAL:  positive for numbness      Objective:   /74   Pulse 68   Temp 97.3 °F (36.3 °C) (Oral)   Resp 16   Ht 6' 1\" (1.854 m)   Wt 243 lb (110.2 kg)   SpO2 95%   BMI 32.06 kg/m²     Data:  CBC:   No results for input(s): WBC, HGB, HCT, MCV, PLT in the last 72 hours. BMP:   No results for input(s): NA, K, CL, CO2, PHOS, BUN, CREATININE, CA in the last 72 hours. LIVER PROFILE:   No results for input(s): AST, ALT, LIPASE, BILIDIR, BILITOT, ALKPHOS in the last 72 hours. Invalid input(s): AMYLASE,  ALB  PT/INR:   No results for input(s): PROT, INR in the last 72 hours. HgBA1c:  Lab Results   Component Value Date    LABA1C 5.5 04/27/2022       Cultures: wound - MRSA    Blood - NGSF    Imaging: xray right  - postop changes noted. My read - questionable erosion on the medial aspect of 3rd digit at PIPJ. + calcified vessels in foot. Physical Exam:    General Appearance: alert and oriented to person, place and time, well developed and well- nourished, in no acute distress  Head: normocephalic and atraumatic  Eyes: pupils equal, round  Neck: trachea midline  Pulmonary/Chest: no wheezes  Cardiovascular: normal rate, regular rhythm  Abdomen: soft, non-tender, non-distended    Dressing clean, dry and intact right foot. DP/PT palpable bilateral  Ulcer on the lateral aspect right 2nd digit with minimal fibrotic tissue. + red granulation tissue noted. Probes to soft tissue only. Mild serous drainage noted. No malodor noted.    Right 3rd digit amputation site with sutures intact. Minimal serous drainage noted. Mild dusky appearance of the periwound tissue noted. Dried heme noted. No increase in skin temperature noted. Mild periwound erythema and edema noted. Partial left 1st ray amputation noted. Assessment:  Patient Active Problem List   Diagnosis Code    Hypertension I10    BPH (benign prostatic hyperplasia) N40.0    Acute kidney injury superimposed on CKD ([facility]) N17.9, N18.9    Osteoarthritis M19.90    Class 1 obesity due to excess calories with serious comorbidity and body mass index (BMI) of 31.0 to 31.9 in adult E66.09, Z68.31    Diabetic polyneuropathy associated with type 2 diabetes mellitus ([facility]) E11.42    Elevated PSA R97.20    Hyperlipidemia E78.5    Stage 3a chronic kidney disease (HCC) N18.31    MRSA infection A49.02    Chronic low back pain M54.50, G89.29    Dental bridge present Z97.2    Callus of foot L84    Pressure ulcer of toe of right foot, stage 3 ([facility]) L89.893    Pressure ulcer of toe of right foot, stage 4 ([facility]) Z70.209    Cellulitis of right lower extremity L03.115    Acute osteomyelitis of toe of right foot ([facility]) M86.171    Failure of outpatient treatment Z78.9    Ischemic toe I99.8    Diabetic foot infection ([facility]) E11.628, L08.9     Cellulitis right foot -  improving  diabetic foot ulcer right secondary to peripheral neuropathy   Osteomyelitis right 3rd digit - S/P amputation 4/29/22  diabetes mellitus     Plan  Patient examined. Reviewed labs and imaging. Elevation of the leg to decrease edema. Control glucose levels to prevent future complications. Seen in conjunction with Dr. Delvis Sepulveda. Antibiotics as per Dr. Delvis Sepulveda. Cellulitis continues to improve. Dressing removed. Applied Mepitel and well padded dry dressing. This can remain intact until seen in the [facility] on Monday. Minimal ambulation and more pressure on heel when he does walk. OK to D/C from Podiatry standpoint. Electronically signed by Sharee Morgan DPM on 5/5/2022 at 9:00 AM.

## 2022-05-05 NOTE — OP NOTE
[address]                 [address] [facility] [address]                                OPERATIVE REPORT    PATIENT NAME: Yudi Rucker                        :        1960  MED REC NO:   [id number]                          ROOM:         ACCOUNT NO:   [de-identified]                           ADMIT DATE: 2022  PROVIDER:     Margret Boss DPM    DATE OF PROCEDURE:  2022    PREOPERATIVE DIAGNOSES:  1. Diabetic foot ulceration, right foot. 2.  Osteomyelitis, right foot. 3.  Diabetes mellitus. POSTOPERATIVE DIAGNOSES:  1. Diabetic foot ulceration, right foot. 2.  Osteomyelitis, right foot. 3.  Diabetes mellitus. OPERATION PERFORMED:  1. Partial right foot amputation. 2.  Ulcer debridement of right foot. SURGEON:  Margret Boss DPM    ANESTHESIA:  Local with MAC. HEMOSTASIS:  Ankle tourniquet at 250 mmHg. ESTIMATED BLOOD LOSS:  Less than 100 mL. INDICATIONS:  Text. REPORT OF OPERATION:  The patient was brought into the operating room,  placed on the operating table in the supine position. Under mild IV  sedation, the right second and third rays were anesthetized with 20 mL  of a 1:1 mixture of 1% lidocaine plain and 0.5% Marcaine plain. Foot  was then scrubbed, prepped, and draped in the usual sterile manner. Esmarch was then utilized to exsanguinate the right foot and the ankle  tourniquet was then inflated to 250 mmHg. Attention was then directed to the right third digit where the  ulceration was identified. Upon compression of this digit, there was  noted to be purulence expressed fro the ulceration. The bone was also  easily palpated and noted to be discolored as well as soft consistent  with his preoperative osteomyelitis or bone infection diagnosis. The  wound was surrounded by periwound erythema consistent with his  preoperative diagnosis of cellulitis or skin infection.   At this time,  an elliptical incision was then made circumferentially just distal to  the base of the third digit. Dissection was carried down in order to  expose the underlying soft tissues. Transection of the extensor and  flexor tendons was then performed. Dissection was carried back in order  to expose the third metatarsophalangeal joint where the soft tissues  were dissected free and the digit was passed off the operative field in  toto. There was noted to be a fairly significant amount of purulence in the  digit itself. However, the third metatarsophalangeal joint did appear  healthy and no discoloration was noted of the third metatarsal head  itself. All bleeders were cauterized as necessary. At this time, we utilized the scalpel to excise all fibrotic, necrotic,  nonviable, and viable tissues excising this portion of the skin and  subcutaneous tissue from the ulceration on the lateral aspect of the  second digit. This excised skin and subcutaneous tissue. Post  debridement measurements were 0.5 x 0.2 x 0.2 cm. Total excised skin  and subcutaneous tissue less than 1 cm2. At this time, surgical sites  were then copiously irrigated with sterile saline via the pulse lavage. Surgical sites were then inspected. No signs of infected or necrotic  tissue was noted. At this time, the third digit amputation site was  then reapproximated with 3-0 nylon in a simple interrupted suture  technique. Surgical sites were then covered with Xeroform, gauze,  fluffs, Kerlix, ABD, and Coban. Tourniquet was then deflated. The patient tolerated the procedure and anesthesia well and transferred  to the recovery room with vital signs stable and vascular status intact. There was evidence for a prompt hyperemic response to the digits of the  right foot. Following a brief period of postoperative monitoring, the  patient will be transferred back to the floor under the care of the  Medical Service.         Josephine Rodriguez DPM    D: 05/05/2022 10:65:16       T: 05/05/2022 10:57:07     [email or web address]  Job#: [id number]     Doc#: [id number]    CC:

## 2022-05-09 ENCOUNTER — HOSPITAL ENCOUNTER (OUTPATIENT)
Dept: WOUND CARE | Age: 62
Discharge: HOME | End: 2022-05-09
Payer: MEDICARE

## 2022-05-09 VITALS
DIASTOLIC BLOOD PRESSURE: 74 MMHG | RESPIRATION RATE: 20 BRPM | HEART RATE: 74 BPM | SYSTOLIC BLOOD PRESSURE: 146 MMHG | WEIGHT: 241 LBS | TEMPERATURE: 97.9 F | HEIGHT: 73 IN | BODY MASS INDEX: 31.94 KG/M2

## 2022-05-09 PROBLEM — I99.8 ISCHEMIC TOE: Status: RESOLVED | Noted: 2022-04-27 | Resolved: 2022-05-09

## 2022-05-09 PROBLEM — A49.02 MRSA INFECTION: Status: RESOLVED | Noted: 2021-09-16 | Resolved: 2022-05-09

## 2022-05-09 PROBLEM — L89.894: Status: RESOLVED | Noted: 2022-04-24 | Resolved: 2022-05-09

## 2022-05-09 PROBLEM — Z78.9 FAILURE OF OUTPATIENT TREATMENT: Status: RESOLVED | Noted: 2022-04-27 | Resolved: 2022-05-09

## 2022-05-09 PROBLEM — L03.115 CELLULITIS OF RIGHT LOWER EXTREMITY: Status: RESOLVED | Noted: 2022-04-27 | Resolved: 2022-05-09

## 2022-05-09 PROBLEM — M86.171 ACUTE OSTEOMYELITIS OF TOE OF RIGHT FOOT (HCC): Status: RESOLVED | Noted: 2022-04-27 | Resolved: 2022-05-09

## 2022-05-09 PROCEDURE — 99213 OFFICE O/P EST LOW 20 MIN: CPT | Performed by: INTERNAL MEDICINE

## 2022-05-09 PROCEDURE — 99213 OFFICE O/P EST LOW 20 MIN: CPT

## 2022-05-09 RX ORDER — LIDOCAINE HYDROCHLORIDE 40 MG/ML
SOLUTION TOPICAL ONCE
Status: CANCELLED | OUTPATIENT
Start: 2022-05-09 | End: 2022-05-09

## 2022-05-09 RX ORDER — BACITRACIN ZINC AND POLYMYXIN B SULFATE 500; 1000 [USP'U]/G; [USP'U]/G
OINTMENT TOPICAL ONCE
Status: CANCELLED | OUTPATIENT
Start: 2022-05-09 | End: 2022-05-09

## 2022-05-09 RX ORDER — LIDOCAINE 40 MG/G
CREAM TOPICAL ONCE
Status: CANCELLED | OUTPATIENT
Start: 2022-05-09 | End: 2022-05-09

## 2022-05-09 RX ORDER — LIDOCAINE 50 MG/G
OINTMENT TOPICAL ONCE
Status: CANCELLED | OUTPATIENT
Start: 2022-05-09 | End: 2022-05-09

## 2022-05-09 ASSESSMENT — PAIN SCALES - GENERAL: PAINLEVEL_OUTOF10: 0

## 2022-05-09 NOTE — PLAN OF CARE
Pt to the [facility] for follow up appointment. Pt had surgery on 4/29/2022. Sutures intact. Pt to have weekly dressing placed today. Pt has completed oral antibiotics and does not need any further antibiotics at this time per Dr Jai Peng. Pt to continue to off load with offloading shoe. Pt to follow up in the [facility] in 1 week with Dr Jill Rader and Dr Jai Peng. Discharge instructions reviewed with patient, all questions answered, copy given to patient. Dressings were applied to all wounds per M.D. Instructions at this visit.

## 2022-05-09 NOTE — PROGRESS NOTES
[address] [facility] Progress Note      Del Chicas     : 1960    DATE OF VISIT:  2022    Subjective:     Del Chicas is a 59 y.o. male who has a chief complaint of a diabetic ulcer located on the right foot. States doing well since D/C from [facility]. No issues with antibiotics. Mr. Ryan Chicas has a past medical history of Abscess of left hand, Acute kidney injury superimposed on chronic kidney disease [facility]), Acute osteomyelitis of metatarsal bone of left foot ([facility] Adhesive capsulitis of shoulder, Fractures, Gas gangrene of foot ([facility] History of hyperbaric oxygen therapy, PNA (pneumonia), and Pressure ulcer of left lateral forefoot, stage 4 ([facility] He has a past surgical history that includes Wrist fracture surgery; Toe amputation (Left, ); Neck surgery; Hand surgery (Left, 2017); Foot Debridement (Left, 2021); Foot Debridement (Left, 2021); and Toe amputation (Right, 2022). His family history includes Asthma in his daughter; Cancer in his father; Diabetes in his mother; Heart Failure in his mother. Mr. Ryan Chicas reports that he has never smoked. He has never used smokeless tobacco. He reports that he does not drink alcohol and does not use drugs. His current medication list consists of Dulaglutide, Multiple Vitamin, Multiple Vitamins-Minerals, amLODIPine, atorvastatin, doxycycline hyclate, fish oil, hydroCHLOROthiazide, insulin glargine, metFORMIN, and tamsulosin. Allergies: Patient has no known allergies. Pertinent items from the review of systems are discussed in the HPI; the remainder of the ROS was reviewed and is negative.        Objective:     BP (!) 146/74   Pulse 74   Temp 97.9 °F (36.6 °C) (Oral)   Resp 20   Ht 6' 1\" (1.854 m)   Wt 241 lb (109.3 kg)   BMI 31.80 kg/m²   General Appearance: alert and oriented to person, place and time, well developed and well- nourished, in no acute distress  Head: normocephalic and atraumatic  Eyes: pupils equal, round, and reactive to light, extraocular eye movements intact, conjunctivae normal  ENT: tympanic membrane, external ear and ear canal normal bilaterally, nose without deformity, nasal mucosa and turbinates normal without polyps  Neck: supple and non-tender without mass, no thyromegaly or thyroid nodules, no cervical lymphadenopathy  Pulmonary/Chest: clear to auscultation bilaterally- no wheezes, rales or rhonchi, normal air movement, no respiratory distress  Cardiovascular: normal rate, regular rhythm, normal S1 and S2, no murmurs, rubs, clicks, or gallops, distal pulses intact, no carotid bruits  Abdomen: soft, non-tender, non-distended, normal bowel sounds, no masses or organomegaly. Dorsalis pedis pulse left palpable  Posterior tibial pulse left palpable  Dorsalis pedis pulse right palpable  Posterior tibial pulse right palpable  Protective sensation absent bilateral LE      Ulcer on the lateral aspect right 2nd digit with thin epithelial tissue noted. No active drainage noted. Sutures intact right 3rd digit amputation site with mild maceration noted. Mild fibrotic tissue noted. Mild serous drainage noted. Minimal periwound erythema or edema. No increase in skin temperature noted. Today's ulcer measurements are in the wound documentation flowsheet.      Wound measurements:  [REMOVED] Wound 04/06/22 #5 right 2nd toe, pressure, stage 3, 3/2022-Wound Length (cm): 0 cm  Wound 05/09/22 #6, Right 3rd Toe Amp Site, Diabetic Ulcer, Hercules 1, Onset 4/29/22-Wound Length (cm): 0.3 cm    [REMOVED] Wound 04/06/22 #5 right 2nd toe, pressure, stage 3, 3/2022-Wound Width (cm): 0 cm  Wound 05/09/22 #6, Right 3rd Toe Amp Site, Diabetic Ulcer, Hercules 1, Onset 4/29/22-Wound Width (cm): 0.4 cm    [REMOVED] Wound 04/06/22 #5 right 2nd toe, pressure, stage 3, 3/2022-Wound Depth (cm): 0 cm  Wound 05/09/22 #6, Right 3rd Toe Amp Site, Diabetic Ulcer, Hercules 1, Onset 4/29/22-Wound Depth (cm): 0.1 cm    LABS  Lab Results   Component Value Date    LABA1C 5.5 04/27/2022         Assessment:     Patient Active Problem List   Diagnosis Code    Hypertension I10    BPH (benign prostatic hyperplasia) N40.0    Acute kidney injury superimposed on CKD ([facility] N17.9, N18.9    Osteoarthritis M19.90    Class 1 obesity due to excess calories with serious comorbidity and body mass index (BMI) of 31.0 to 31.9 in adult E66.09, Z68.31    Diabetic polyneuropathy associated with type 2 diabetes mellitus (HCC) E11.42    Elevated PSA R97.20    Hyperlipidemia E78.5    Stage 3a chronic kidney disease (HCC) N18.31    MRSA infection A49.02    Chronic low back pain M54.50, G89.29    Dental bridge present Z97.2    Callus of foot L84    Pressure ulcer of toe of right foot, stage 3 (HCC) L89.893    Pressure ulcer of toe of right foot, stage 4 (HCC) L89.894    Cellulitis of right lower extremity L03.115    Acute osteomyelitis of toe of right foot ([facility]) M86.171    Failure of outpatient treatment Z78.9    Ischemic toe I99.8    Diabetic foot infection ([facility]) E11.628, L08.9       Assessment of today's active condition(s): diabetic foot ulcers right, S/P amputation right 3rd digit for osteomyelitis, diabetes mellitus. Factors contributing to occurrence and/or persistence of the chronic ulcer include diabetes. Sharp debridement is not indicated today, based upon the exam findings in the ulcer(s) above. Discharge plan:     Treatment in the wound care center today: Wound 05/09/22 #6, Right 3rd Toe Amp Site, Diabetic Ulcer, Hercules 1, Onset 4/29/22-Dressing/Treatment: Other (comment) (Betadine,4x4's,ABD,kerlix,ace wrap-no compression). Home treatment: good handwashing before and after any dressing changes. Cleanse ulcer with saline or soap & water before dressing change. May use Vaseline (petrolatum), Aquaphor, Aveeno, CeraVe, Cetaphil, Eucerin, Lubriderm, etc for dry skin.      Dressing type for home:  Betadine and well padded dry dressing applied to remain intact for 1 week. Written discharge instructions given to patient. Offload ulcer(s) as directed. Elevate leg(s) as directed. Follow up in 1 week. Minimal ambulation. Wear postop shoe when ambulating.          Electronically signed by Sherol Scheuermann, DPM on 5/9/2022 at 2:14 PM.

## 2022-05-09 NOTE — PROGRESS NOTES
[address] [facility] ([facility] Progress Note    Del Nguyễn     : 1960    DATE OF VISIT:  2022    Subjective:     Del Nguyễn is a 59 y.o. male who has a diabetic ulcer located on the right, 3rd toe. Current complaint of pain in this ulcer? yes. Quality of pain: aching and burning  Timing: intermittent and decreasing in frequency  Severity: mild  Associated Signs/Symptoms: drainage (light, bloody) and numbness  Other significant symptoms or pertinent ulcer history: feeling well overall, very little pain, no F/C/D. Tolerated doxycycline well, no sore throat or mouth, no N/V/D, rash or pruritus. Additional ulcer(s) noted? no. That small adjacent 2nd toe ulcer looks basically healed. Mr. Flaca Nguyễn has a past medical history of Abscess of left hand, Acute kidney injury superimposed on chronic kidney disease ([address] Acute osteomyelitis of metatarsal bone of left foot ([address] Adhesive capsulitis of shoulder, Fractures, Gas gangrene of foot ([address] History of hyperbaric oxygen therapy, Ischemic toe (right middle), PNA (pneumonia), and Pressure ulcer of left lateral forefoot, stage 4 ([address] He has a past surgical history that includes Wrist fracture surgery; Toe amputation (Left, ); Neck surgery; Hand surgery (Left, 2017); Foot Debridement (Left, 2021); Foot Debridement (Left, 2021); and Toe amputation (Right, 2022). His family history includes Asthma in his daughter; Cancer in his father; Diabetes in his mother; Heart Failure in his mother. Mr. Flaca Nguyễn reports that he has never smoked. He has never used smokeless tobacco. He reports that he does not drink alcohol and does not use drugs. His current medication list consists of Dulaglutide, Multiple Vitamin, Multiple Vitamins-Minerals, amLODIPine, atorvastatin, doxycycline hyclate, fish oil, hydroCHLOROthiazide, insulin glargine, metFORMIN, and tamsulosin.  He actually just finished the last of his doxycycline yesterday. Allergies: Patient has no known allergies. Pertinent items from the review of systems are discussed in the HPI; the remainder of the ROS was reviewed and is negative.      Objective:     Vitals:    05/09/22 1047   BP: (!) 146/74   Pulse: 74   Resp: 20   Temp: 97.9 °F (36.6 °C)   TempSrc: Oral   Weight: 241 lb (109.3 kg)   Height: 6' 1\" (1.854 m)       Constitutional:  well-developed, well-nourished, NAD   Cardiovascular:  bilateral pedal pulses palpable, feet warm, brisk cap refill  Lymphatic:  no inguinal or popliteal adenopathy, resolved ankle and foot lymphangitis, and forefoot cellulitis  Musculoskeletal:  no clubbing, other cyanosis or petechiae; RLE and LLE with no gross effusions, joint misalignment or acute arthritis  Ulcer(s):  left lateral foot delicately healed; right 2nd toe looks probably thinly healed; 3rd toe now amputated, with the distal part of the surgical site with mild swelling, centrally a bit of dehiscence and probably moist epidermal necrosis + fibrin + biofilm, and then proximally looking good, just pink, indurated and intact, overall improved from a week ago. Photos also saved in electronic chart.     Today's Wound Measurements, per RN documentation:  [REMOVED] Wound 04/06/22 #5 right 2nd toe, pressure, stage 3, 3/2022-Wound Length (cm): 0 cm  Wound 05/09/22 #6, Right 3rd Toe Amp Site, Diabetic Ulcer, Hercules 1, Onset 4/29/22-Wound Length (cm): 0.3 cm    [REMOVED] Wound 04/06/22 #5 right 2nd toe, pressure, stage 3, 3/2022-Wound Width (cm): 0 cm  Wound 05/09/22 #6, Right 3rd Toe Amp Site, Diabetic Ulcer, Hercules 1, Onset 4/29/22-Wound Width (cm): 0.4 cm    [REMOVED] Wound 04/06/22 #5 right 2nd toe, pressure, stage 3, 3/2022-Wound Depth (cm): 0 cm  Wound 05/09/22 #6, Right 3rd Toe Amp Site, Diabetic Ulcer, Hercules 1, Onset 4/29/22-Wound Depth (cm): 0.1 cm  ______________________________    Lab Results   Component Value Date    CORNELIUS 3.9 04/27/2022     Lab Results Component Value Date    CREATININE 1.4 (H) 05/02/2022     Lab Results   Component Value Date    HGB 10.9 (L) 05/02/2022     Lab Results   Component Value Date    LABA1C 5.5 04/27/2022     Assessment:     Patient Active Problem List   Diagnosis Code    Hypertension I10    BPH (benign prostatic hyperplasia) N40.0    Acute kidney injury superimposed on CKD ([facility] N17.9, N18.9    Osteoarthritis M19.90    Class 1 obesity due to excess calories with serious comorbidity and body mass index (BMI) of 31.0 to 31.9 in adult E66.09, Z68.31    Diabetic polyneuropathy associated with type 2 diabetes mellitus ([facility] E11.42    Elevated PSA R97.20    Hyperlipidemia E78.5    Stage 3a chronic kidney disease (HCC) N18.31    MRSA infection A49.02    Chronic low back pain M54.50, G89.29    Dental bridge present Z97.2    Callus of foot L84    Pressure ulcer of toe of right foot, stage 3 ([facility]) W19.504    Cellulitis of right lower extremity L03.115    Acute osteomyelitis of toe of right foot ([facility]) M86.171       Assessment of today's active condition(s): well controlled DM2, neuropathy, no large-vessel PAD; healed Hercules 4 left diabetic foot, and a stage 4 left lateral foot pressure ulcer. Then more recently a pressure / neuropathic injury of the right 2nd and 3rd toes from a silicone toe separator; 2nd toe didn't do badly, but 3rd toe developed deeper necrosis, soft tissue infection, acute osteo, amputated about 10 days ago; soft tissue infection basically resolved, and only a minor degree of superficial dehiscence noted; osteo was in the toe only, so resolved with surgery. Factors contributing to occurrence and/or persistence of the chronic ulcer include diabetes, chronic pressure and shear force. Medical necessity of today's visit is shown by the above documentation. Sharp debridement is not indicated today, based upon the exam findings in the wound(s) above.      Discharge plan:     Treatment in the wound care center today, per RN documentation: Wound 05/09/22 #6, Right 3rd Toe Amp Site, Diabetic Ulcer, Hercules 1, Onset 4/29/22-Dressing/Treatment: Other (comment) (Betadine,4x4's,ABD,kerlix,ace wrap-no compression). Debridement and local care per Dr. Alana Rogers. No additional Abx needed. Keep up the good work with glucose control and offloading (surgical shoe and a padded dressing this week, but then once healed, he can get back into his diabetic shoes and custom orthotics). Written discharge instructions given to patient. Follow up in 1 week with Dr. Alana Rogers.     Electronically signed by Vinnie Gann MD on 5/9/2022 at 3:41 PM.

## 2022-05-11 RX ORDER — HYDROCHLOROTHIAZIDE 25 MG/1
TABLET ORAL
Qty: 30 TABLET | Refills: 3 | OUTPATIENT
Start: 2022-05-11

## 2022-05-15 RX ORDER — HYDROCHLOROTHIAZIDE 25 MG/1
TABLET ORAL
Qty: 30 TABLET | Refills: 3 | OUTPATIENT
Start: 2022-05-15

## 2022-05-16 ENCOUNTER — HOSPITAL ENCOUNTER (OUTPATIENT)
Dept: WOUND CARE | Age: 62
Discharge: HOME | End: 2022-05-16
Payer: MEDICARE

## 2022-05-16 VITALS
DIASTOLIC BLOOD PRESSURE: 65 MMHG | RESPIRATION RATE: 18 BRPM | SYSTOLIC BLOOD PRESSURE: 155 MMHG | BODY MASS INDEX: 32.58 KG/M2 | HEIGHT: 73 IN | TEMPERATURE: 97.7 F | WEIGHT: 245.8 LBS | HEART RATE: 60 BPM

## 2022-05-16 DIAGNOSIS — L97.514 DIABETIC ULCER OF OTHER PART OF RIGHT FOOT ASSOCIATED WITH TYPE 2 DIABETES MELLITUS, WITH NECROSIS OF BONE (HCC): Primary | ICD-10-CM

## 2022-05-16 DIAGNOSIS — E11.621 DIABETIC ULCER OF OTHER PART OF RIGHT FOOT ASSOCIATED WITH TYPE 2 DIABETES MELLITUS, WITH NECROSIS OF BONE (HCC): Primary | ICD-10-CM

## 2022-05-16 PROCEDURE — 99213 OFFICE O/P EST LOW 20 MIN: CPT

## 2022-05-16 RX ORDER — LIDOCAINE 40 MG/G
CREAM TOPICAL ONCE
Status: DISCONTINUED | OUTPATIENT
Start: 2022-05-16 | End: 2022-05-17 | Stop reason: HOSPADM

## 2022-05-16 RX ORDER — LIDOCAINE 40 MG/G
CREAM TOPICAL ONCE
Status: CANCELLED | OUTPATIENT
Start: 2022-05-16 | End: 2022-05-16

## 2022-05-16 RX ORDER — BACITRACIN ZINC AND POLYMYXIN B SULFATE 500; 1000 [USP'U]/G; [USP'U]/G
OINTMENT TOPICAL ONCE
Status: CANCELLED | OUTPATIENT
Start: 2022-05-16 | End: 2022-05-16

## 2022-05-16 RX ORDER — LIDOCAINE HYDROCHLORIDE 40 MG/ML
SOLUTION TOPICAL ONCE
Status: CANCELLED | OUTPATIENT
Start: 2022-05-16 | End: 2022-05-16

## 2022-05-16 RX ORDER — LIDOCAINE 50 MG/G
OINTMENT TOPICAL ONCE
Status: CANCELLED | OUTPATIENT
Start: 2022-05-16 | End: 2022-05-16

## 2022-05-16 ASSESSMENT — PAIN SCALES - GENERAL: PAINLEVEL_OUTOF10: 0

## 2022-05-16 NOTE — PLAN OF CARE
Pt to the [address] [place] [address],[address] [facility] for follow up appointment. Dr Alicia Barone removed a few sutures today. Pt to continue with weekly dressing and to stay off of foot as much as possible. Pt to follow up in the [address] [place] [address],[address] [place] [address] in 1 week. Discharge instructions reviewed with patient, all questions answered, copy given to patient. Dressings were applied to all wounds per M.D. Instructions at this visit.

## 2022-05-19 NOTE — PROGRESS NOTES
[facility] Progress Note      Del Brown     : 1960    DATE OF VISIT:  2022    Subjective:     Del Brown is a 59 y.o. male who has a chief complaint of a diabetic ulcer located on the right foot. States doing well. No pain in the foot. Mr. Malia Brown has a past medical history of Abscess of left hand, Acute kidney injury superimposed on chronic kidney disease [facility]), Acute osteomyelitis of metatarsal bone of left foot ([address] Acute osteomyelitis of toe of right foot ([address] Adhesive capsulitis of shoulder, Cellulitis of right foot, Fractures, Gas gangrene of foot ([address] History of hyperbaric oxygen therapy, Ischemic toe (right middle), PNA (pneumonia), and Pressure ulcer of left lateral forefoot, stage 4 ([address] He has a past surgical history that includes Wrist fracture surgery; Toe amputation (Left, ); Neck surgery; Hand surgery (Left, 2017); Foot Debridement (Left, 2021); Foot Debridement (Left, 2021); and Toe amputation (Right, 2022). His family history includes Asthma in his daughter; Cancer in his father; Diabetes in his mother; Heart Failure in his mother. Mr. Malia Brown reports that he has never smoked. He has never used smokeless tobacco. He reports that he does not drink alcohol and does not use drugs. His current medication list consists of Multiple Vitamin, Multiple Vitamins-Minerals, amLODIPine, atorvastatin, fish oil, hydroCHLOROthiazide, insulin glargine, metFORMIN, and tamsulosin. Allergies: Patient has no known allergies. Pertinent items from the review of systems are discussed in the HPI; the remainder of the ROS was reviewed and is negative.        Objective:     BP (!) 155/65   Pulse 60   Temp 97.7 °F (36.5 °C) (Oral)   Resp 18   Ht 6' 1\" (1.854 m)   Wt 245 lb 12.8 oz (111.5 kg)   BMI 32.43 kg/m²   General Appearance: alert and oriented to person, place and time, well developed and well- nourished, in no acute distress  Head: normocephalic and atraumatic  Eyes: pupils equal, round, and reactive to light, extraocular eye movements intact, conjunctivae normal  ENT: tympanic membrane, external ear and ear canal normal bilaterally, nose without deformity, nasal mucosa and turbinates normal without polyps  Neck: supple and non-tender without mass, no thyromegaly or thyroid nodules, no cervical lymphadenopathy  Pulmonary/Chest: clear to auscultation bilaterally- no wheezes, rales or rhonchi, normal air movement, no respiratory distress  Cardiovascular: normal rate, regular rhythm, normal S1 and S2, no murmurs, rubs, clicks, or gallops, distal pulses intact, no carotid bruits  Abdomen: soft, non-tender, non-distended, normal bowel sounds, no masses or organomegaly. Dorsalis pedis pulse left palpable  Posterior tibial pulse left palpable  Dorsalis pedis pulse right palpable  Posterior tibial pulse right palpable  Protective sensation absent bilateral LE      Sutures intact right 3rd digit amputation site with mild maceration noted. Mild fibrotic tissue noted. Mild serous drainage noted. Minimal periwound erythema or edema. No increase in skin temperature noted. Today's ulcer measurements are in the wound documentation flowsheet.      Wound measurements:  [REMOVED] Wound 05/09/22 #6, Right 3rd Toe Amp Site, Diabetic Ulcer, Hercules 1, Onset 4/29/22-Wound Length (cm): 0 cm    [REMOVED] Wound 05/09/22 #6, Right 3rd Toe Amp Site, Diabetic Ulcer, Hercules 1, Onset 4/29/22-Wound Width (cm): 0 cm    [REMOVED] Wound 05/09/22 #6, Right 3rd Toe Amp Site, Diabetic Ulcer, Hercules 1, Onset 4/29/22-Wound Depth (cm): 0 cm    LABS  Lab Results   Component Value Date    LABA1C 5.5 04/27/2022         Assessment:     Patient Active Problem List   Diagnosis Code    Hypertension I10    BPH (benign prostatic hyperplasia) N40.0    Acute kidney injury superimposed on CKD ([facility] N17.9, N18.9    Osteoarthritis M19.90    Class 1 obesity due to excess calories with serious comorbidity and body mass index (BMI) of 31.0 to 31.9 in adult E66.09, Z68.31    Diabetic polyneuropathy associated with type 2 diabetes mellitus (HCC) E11.42    Elevated PSA R97.20    Hyperlipidemia E78.5    Stage 3a chronic kidney disease (HCC) N18.31    Chronic low back pain M54.50, G89.29    Dental bridge present Z97.2    Callus of foot L84    Pressure ulcer of toe of right foot, stage 3 ([facility]) F27.316    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 Factors contributing to occurrence and/or persistence of the chronic ulcer include diabetes. Sharp debridement is not indicated today, based upon the exam findings in the ulcer(s) above. Discharge plan:     Treatment in the wound care center today: [REMOVED] Wound 05/09/22 #6, Right 3rd Toe Amp Site, Diabetic Ulcer, Hercules 1, Onset 4/29/22-Dressing/Treatment:  (Gauze,ABD,Kerlix, ace wrap). Home treatment: good handwashing before and after any dressing changes. Cleanse ulcer with saline or soap & water before dressing change. May use Vaseline (petrolatum), Aquaphor, Aveeno, CeraVe, Cetaphil, Eucerin, Lubriderm, etc for dry skin. Dressing type for home:  Betadine and well padded dry dressing applied to remain intact for 1 week. Written discharge instructions given to patient. Offload ulcer(s) as directed. Elevate leg(s) as directed. Follow up in 1 week. Minimal ambulation. Wear postop shoe when ambulating. Portion of sutures removed.        Electronically signed by Mya Abad DPM on 5/19/2022 at 8:51 AM.

## 2022-05-23 ENCOUNTER — HOSPITAL ENCOUNTER (OUTPATIENT)
Dept: WOUND CARE | Age: 62
Discharge: HOME | End: 2022-05-23
Payer: MEDICARE

## 2022-05-23 VITALS
HEIGHT: 73 IN | TEMPERATURE: 97.1 F | BODY MASS INDEX: 32.07 KG/M2 | RESPIRATION RATE: 20 BRPM | HEART RATE: 58 BPM | WEIGHT: 242 LBS | DIASTOLIC BLOOD PRESSURE: 72 MMHG | SYSTOLIC BLOOD PRESSURE: 142 MMHG

## 2022-05-23 DIAGNOSIS — E11.621 DIABETIC ULCER OF OTHER PART OF RIGHT FOOT ASSOCIATED WITH TYPE 2 DIABETES MELLITUS, WITH NECROSIS OF BONE (HCC): Primary | ICD-10-CM

## 2022-05-23 DIAGNOSIS — L97.514 DIABETIC ULCER OF OTHER PART OF RIGHT FOOT ASSOCIATED WITH TYPE 2 DIABETES MELLITUS, WITH NECROSIS OF BONE (HCC): Primary | ICD-10-CM

## 2022-05-23 PROCEDURE — 11042 DBRDMT SUBQ TIS 1ST 20SQCM/<: CPT

## 2022-05-23 RX ORDER — LIDOCAINE 40 MG/G
CREAM TOPICAL ONCE
Status: DISCONTINUED | OUTPATIENT
Start: 2022-05-23 | End: 2022-05-24 | Stop reason: HOSPADM

## 2022-05-23 RX ORDER — LIDOCAINE HYDROCHLORIDE 40 MG/ML
SOLUTION TOPICAL ONCE
Status: CANCELLED | OUTPATIENT
Start: 2022-05-23 | End: 2022-05-23

## 2022-05-23 RX ORDER — LIDOCAINE 50 MG/G
OINTMENT TOPICAL ONCE
Status: CANCELLED | OUTPATIENT
Start: 2022-05-23 | End: 2022-05-23

## 2022-05-23 RX ORDER — LIDOCAINE 40 MG/G
CREAM TOPICAL ONCE
Status: CANCELLED | OUTPATIENT
Start: 2022-05-23 | End: 2022-05-23

## 2022-05-23 RX ORDER — BACITRACIN ZINC AND POLYMYXIN B SULFATE 500; 1000 [USP'U]/G; [USP'U]/G
OINTMENT TOPICAL ONCE
Status: CANCELLED | OUTPATIENT
Start: 2022-05-23 | End: 2022-05-23

## 2022-05-23 NOTE — PROGRESS NOTES
[facility] Progress Note      Del Campos     : 1960    DATE OF VISIT:  2022    Subjective:     Macrina Thomas is a 59 y.o. male who has a chief complaint of a diabetic ulcer located on the right foot. No pain in the foot. Feeling well. Mr. Maryam Campos has a past medical history of Abscess of left hand, Acute kidney injury superimposed on chronic kidney disease [facility]), Acute osteomyelitis of metatarsal bone of left foot ([address] Acute osteomyelitis of toe of right foot ([address] Adhesive capsulitis of shoulder, Cellulitis of right foot, Fractures, Gas gangrene of foot ([address] History of hyperbaric oxygen therapy, Ischemic toe (right middle), PNA (pneumonia), and Pressure ulcer of left lateral forefoot, stage 4 ([address] He has a past surgical history that includes Wrist fracture surgery; Toe amputation (Left, ); Neck surgery; Hand surgery (Left, 2017); Foot Debridement (Left, 2021); Foot Debridement (Left, 2021); and Toe amputation (Right, 2022). His family history includes Asthma in his daughter; Cancer in his father; Diabetes in his mother; Heart Failure in his mother. Mr. Maryam Campos reports that he has never smoked. He has never used smokeless tobacco. He reports that he does not drink alcohol and does not use drugs. His current medication list consists of Multiple Vitamin, Multiple Vitamins-Minerals, amLODIPine, atorvastatin, fish oil, hydroCHLOROthiazide, insulin glargine, metFORMIN, and tamsulosin. Allergies: Patient has no known allergies. Pertinent items from the review of systems are discussed in the HPI; the remainder of the ROS was reviewed and is negative.        Objective:     BP (!) 142/72   Pulse 58   Temp 97.1 °F (36.2 °C) (Oral)   Resp 20   Ht 6' 1\" (1.854 m)   Wt 242 lb (109.8 kg)   BMI 31.93 kg/m²   General Appearance: alert and oriented to person, place and time, well developed and well- nourished, in no acute distress  Head: normocephalic and atraumatic  Eyes: pupils equal, round, and reactive to light, extraocular eye movements intact, conjunctivae normal  ENT: tympanic membrane, external ear and ear canal normal bilaterally, nose without deformity, nasal mucosa and turbinates normal without polyps  Neck: supple and non-tender without mass, no thyromegaly or thyroid nodules, no cervical lymphadenopathy  Pulmonary/Chest: clear to auscultation bilaterally- no wheezes, rales or rhonchi, normal air movement, no respiratory distress  Cardiovascular: normal rate, regular rhythm, normal S1 and S2, no murmurs, rubs, clicks, or gallops, distal pulses intact, no carotid bruits  Abdomen: soft, non-tender, non-distended, normal bowel sounds, no masses or organomegaly. Dorsalis pedis pulse left palpable  Posterior tibial pulse left palpable  Dorsalis pedis pulse right palpable  Posterior tibial pulse right palpable  Protective sensation absent bilateral LE      Sutures intact right 3rd digit amputation site. Ulcer on the right 3rd digit amputation site with mild fibrotic tissue, mild red granulation tissue, mild serous drainage, no hyperkeratotic rim, no undermining, no tunneling, no purulence, no malodor, lysing eschar,  no periwound maceration, minimal periwound erythema, minimal edema, no crepitus, no increase in skin temperature, ulcer probes to soft tissue only     Today's ulcer measurements are in the wound documentation flowsheet.      Wound measurements:  Wound 05/23/22 Wound #7 diabetic, jenkins 3, 5/23/2022-Wound Length (cm): 1.4 cm    Wound 05/23/22 Wound #7 diabetic, jenkins 3, 5/23/2022-Wound Width (cm): 0.7 cm    Wound 05/23/22 Wound #7 diabetic, jenkins 3, 5/23/2022-Wound Depth (cm): 0.3 cm    LABS  Lab Results   Component Value Date    LABA1C 5.5 04/27/2022         Assessment:     Patient Active Problem List   Diagnosis Code    Hypertension I10    BPH (benign prostatic hyperplasia) N40.0    Acute kidney injury superimposed on CKD ([facility]) N17.9, N18.9    Osteoarthritis M19.90    Class 1 obesity due to excess calories with serious comorbidity and body mass index (BMI) of 31.0 to 31.9 in adult E66.09, Z68.31    Diabetic polyneuropathy associated with type 2 diabetes mellitus ([facility]) E11.42    Elevated PSA R97.20    Hyperlipidemia E78.5    Stage 3a chronic kidney disease (HCC) N18.31    Chronic low back pain M54.50, G89.29    Dental bridge present Z97.2    Callus of foot L84    Pressure ulcer of toe of right foot, stage 3 ([facility]) T08.397    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 Factors contributing to occurrence and/or persistence of the chronic ulcer include diabetes. Sharp debridement is indicated today, based upon the exam findings in the ulcer(s) above. Procedure note:     Consent obtained. Time out taken. Anesthetic  Anesthetic:  (post debridement)     After application of topical 4% lidocaine plain to the ulcer, the ulcer was debrided of all fibrotic, necrotic, hyperkeratotic tissue, nonviable and viable tissue through the subcutaneous tissue. This excised skin and subcutaneous tissue with a scalpel. Total Surface Area Debrided:  1 sq cm. The ulcer(s) were then irrigated with normal saline solution. The procedure was completed with a small amount of bleeding, and hemostasis was by pressure. The patient tolerated the procedure well, with no significant complications. The patient's level of pain during and after the procedure was monitored, and is noted in the wound documentation flowsheet. Discharge plan:     Treatment in the wound care center today:  . Home treatment: good handwashing before and after any dressing changes.  Cleanse ulcer with saline or soap & water before dressing change. May use Vaseline (petrolatum), Aquaphor, Aveeno, CeraVe, Cetaphil, Eucerin, Lubriderm, etc for dry skin. Dressing type for home:  Betadine and well padded dry dressing applied to remain intact for 1 week. Written discharge instructions given to patient. Offload ulcer(s) as directed. Elevate leg(s) as directed. Follow up in 1 week with Dr. Yaz Payton due to holiday. Minimal ambulation. Wear postop shoe when ambulating. Remainder of sutures removed. Seen in conjunction with Dr. Yaz Payton. Silver nitrate applied to distal aspect of ulcer site.        Electronically signed by Ileana Key DPM on 5/23/2022 at 12:48 PM.

## 2022-05-23 NOTE — PLAN OF CARE
Pt to the [facility] for follow up appointment. Sutures removed per Dr Jazmine Huerta and wound debrided today per Dr Jazmine Huerta. Pt to place santyl and dry dressing to wound. Pt to follow up in the [facility] in 1 week with Dr Rhonda Leonard and in 2 weeks with Dr Jazmine Huerta and Dr Rhonda Leonard. Discharge instructions reviewed with patient, all questions answered, copy given to patient. Dressings were applied to all wounds per M.D. Instructions at this visit.

## 2022-05-28 RX ORDER — HYDROCHLOROTHIAZIDE 25 MG/1
TABLET ORAL
Qty: 30 TABLET | Refills: 3 | OUTPATIENT
Start: 2022-05-28

## 2022-05-31 ENCOUNTER — HOSPITAL ENCOUNTER (OUTPATIENT)
Dept: WOUND CARE | Age: 62
Discharge: HOME | End: 2022-05-31

## 2022-06-01 ENCOUNTER — HOSPITAL ENCOUNTER (OUTPATIENT)
Dept: WOUND CARE | Age: 62
Discharge: HOME | End: 2022-06-01
Payer: MEDICARE

## 2022-06-01 VITALS
HEART RATE: 62 BPM | SYSTOLIC BLOOD PRESSURE: 167 MMHG | RESPIRATION RATE: 20 BRPM | TEMPERATURE: 97.9 F | WEIGHT: 243.6 LBS | DIASTOLIC BLOOD PRESSURE: 71 MMHG | BODY MASS INDEX: 32.29 KG/M2 | HEIGHT: 73 IN

## 2022-06-01 DIAGNOSIS — E11.621 DIABETIC ULCER OF OTHER PART OF RIGHT FOOT ASSOCIATED WITH TYPE 2 DIABETES MELLITUS, WITH NECROSIS OF BONE (HCC): Primary | ICD-10-CM

## 2022-06-01 DIAGNOSIS — L97.514 DIABETIC ULCER OF OTHER PART OF RIGHT FOOT ASSOCIATED WITH TYPE 2 DIABETES MELLITUS, WITH NECROSIS OF BONE (HCC): Primary | ICD-10-CM

## 2022-06-01 PROCEDURE — 99212 OFFICE O/P EST SF 10 MIN: CPT | Performed by: INTERNAL MEDICINE

## 2022-06-01 PROCEDURE — 99212 OFFICE O/P EST SF 10 MIN: CPT

## 2022-06-01 RX ORDER — LIDOCAINE 40 MG/G
CREAM TOPICAL ONCE
Status: CANCELLED | OUTPATIENT
Start: 2022-06-01 | End: 2022-06-01

## 2022-06-01 RX ORDER — LIDOCAINE 50 MG/G
OINTMENT TOPICAL ONCE
Status: CANCELLED | OUTPATIENT
Start: 2022-06-01 | End: 2022-06-01

## 2022-06-01 RX ORDER — LIDOCAINE 40 MG/G
CREAM TOPICAL ONCE
Status: DISCONTINUED | OUTPATIENT
Start: 2022-06-01 | End: 2022-06-01

## 2022-06-01 RX ORDER — LIDOCAINE HYDROCHLORIDE 40 MG/ML
SOLUTION TOPICAL ONCE
Status: CANCELLED | OUTPATIENT
Start: 2022-06-01 | End: 2022-06-01

## 2022-06-01 RX ORDER — LIDOCAINE 50 MG/G
OINTMENT TOPICAL ONCE
Status: DISCONTINUED | OUTPATIENT
Start: 2022-06-01 | End: 2022-06-01

## 2022-06-01 RX ORDER — BACITRACIN ZINC AND POLYMYXIN B SULFATE 500; 1000 [USP'U]/G; [USP'U]/G
OINTMENT TOPICAL ONCE
Status: DISCONTINUED | OUTPATIENT
Start: 2022-06-01 | End: 2022-06-01

## 2022-06-01 RX ORDER — BACITRACIN ZINC AND POLYMYXIN B SULFATE 500; 1000 [USP'U]/G; [USP'U]/G
OINTMENT TOPICAL ONCE
Status: CANCELLED | OUTPATIENT
Start: 2022-06-01 | End: 2022-06-01

## 2022-06-01 RX ORDER — LIDOCAINE HYDROCHLORIDE 40 MG/ML
SOLUTION TOPICAL ONCE
Status: DISCONTINUED | OUTPATIENT
Start: 2022-06-01 | End: 2022-06-01

## 2022-06-01 ASSESSMENT — PAIN SCALES - GENERAL: PAINLEVEL_OUTOF10: 0

## 2022-06-01 NOTE — PLAN OF CARE
Amp. Site healed, no dressing needed at this time. Pt. Instructed to wear his diabetic shoes & inserts at all times when walking & to monitor feet closely. Pt. To contact [address] when new shoes needed. Fungal rash noted to left dorsal foot. Dr Manuel Carvalho advised pt. To purchase OTC antifungal cream & apply as directed. No further f/u in [address] [place] [address] needed at this time. Discharge instructions reviewed with patient & wife, all questions answered, copy given to patient.

## 2022-06-06 ENCOUNTER — HOSPITAL ENCOUNTER (OUTPATIENT)
Dept: WOUND CARE | Age: 62
Discharge: HOME | End: 2022-06-06

## 2022-06-07 PROBLEM — E11.621 DIABETIC ULCER OF RIGHT FOOT ASSOCIATED WITH TYPE 2 DIABETES MELLITUS, WITH NECROSIS OF BONE (HCC): Status: RESOLVED | Noted: 2022-05-16 | Resolved: 2022-06-07

## 2022-06-07 PROBLEM — L89.893 PRESSURE ULCER OF TOE OF RIGHT FOOT, STAGE 3 (HCC): Status: RESOLVED | Noted: 2022-04-12 | Resolved: 2022-06-07

## 2022-06-07 PROBLEM — L97.514 DIABETIC ULCER OF RIGHT FOOT ASSOCIATED WITH TYPE 2 DIABETES MELLITUS, WITH NECROSIS OF BONE (HCC): Status: RESOLVED | Noted: 2022-05-16 | Resolved: 2022-06-07

## 2022-06-07 NOTE — PROGRESS NOTES
88 [facility] Progress Note    Del Hope     : 1960    DATE OF VISIT:  2022    Subjective:     Del Hope is a 57 y.o. male who has a diabetic and  dehisced surgical ulcer located on the right, 3rd toe. Current complaint of pain in this ulcer? yes. Quality of pain: aching and dull  Timing: intermittent and stable  Severity: mild  Associated Signs/Symptoms: numbness and tingling  Other significant symptoms or pertinent ulcer history: feeling well overall, no fever or chills, no definite wound drainage, no pruritus, mild swelling. Additional ulcer(s) noted? no.      Mr. 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 He has a past surgical history that includes Wrist fracture surgery; Toe amputation (Left, ); Neck surgery; Hand surgery (Left, 2017); Foot Debridement (Left, 2021); Foot Debridement (Left, 2021); and Toe amputation (Right, 2022). His family history includes Asthma in his daughter; Cancer in his father; Diabetes in his mother; Heart Failure in his mother. Mr. Ira Hope reports that he has never smoked. He has never used smokeless tobacco. He reports that he does not drink alcohol and does not use drugs.     His current medication list consists of Multiple Vitamin, Multiple Vitamins-Minerals, amLODIPine, atorvastatin, fish oil, hydroCHLOROthiazide, insulin glargine, metFORMIN, and tamsulosin. Allergies: Patient has no known allergies. Pertinent items from the review of systems are discussed in the HPI; the remainder of the ROS was reviewed and is negative.      Objective:     Vitals:    06/01/22 0804   BP: (!) 167/71   Pulse: 62   Resp: 20   Temp: 97.9 °F (36.6 °C)   TempSrc: Oral   Weight: 243 lb 9.6 oz (110.5 kg)   Height: 6' 1\" (1.854 m)       Constitutional:  well-developed, well-nourished, NAD   Cardiovascular:  bilateral pedal pulses palpable, feet warm, brisk cap refill  Lymphatic:  no inguinal or popliteal adenopathy, resolved ankle and foot lymphangitis, and forefoot cellulitis  Musculoskeletal:  no clubbing, other cyanosis or petechiae; RLE and LLE with no gross effusions, joint misalignment or acute arthritis  Ulcer(s):  left lateral foot delicately healed; right 2nd toe looks healed; 3rd toe amp site also delicately healed this week, a bit indurated, very thin epithelium, no residual sutures, no signs of infection. Photos also saved in electronic chart.     Today's Wound Measurements, per RN documentation:  [REMOVED] Wound 05/23/22 #7 Right 3rd toe amp. site, Diomedes KENNEY 3, 5/23/2022-Wound Length (cm): 0 cm    [REMOVED] Wound 05/23/22 #7 Right 3rd toe amp. site, Diomedes KENNEY 3, 5/23/2022-Wound Width (cm): 0 cm    [REMOVED] Wound 05/23/22 #7 Right 3rd toe amp. site, Diomedes KENNEY 3, 5/23/2022-Wound Depth (cm): 0 cm  ______________________________    Lab Results   Component Value Date    LABALBU 3.9 04/27/2022     Lab Results   Component Value Date    CREATININE 1.4 (H) 05/02/2022     Lab Results   Component Value Date    HGB 10.9 (L) 05/02/2022     Lab Results   Component Value Date    LABA1C 5.5 04/27/2022     Assessment:     Patient Active Problem List   Diagnosis Code    Hypertension I10    BPH (benign prostatic hyperplasia) N40.0    Osteoarthritis M19.90    Class 1 obesity due to excess calories with serious comorbidity and body mass index (BMI) of 31.0 to 31.9 in adult E66.09, Z68.31    Diabetic polyneuropathy associated with type 2 diabetes mellitus (HCC) E11.42    Elevated PSA R97.20    Hyperlipidemia E78.5    Stage 3a chronic kidney disease (HCC) N18.31    Chronic low back pain M54.50, G89.29    Dental bridge present Z97.2    Callus of foot L84    Diabetic 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 At risk of future ulcers, but if he stays well offloaded and follows up with podiatry, he should do well. Factors contributing to occurrence and/or persistence of the chronic ulcer include diabetes and shear force. Medical necessity of today's visit is shown by the above documentation. Sharp debridement is not indicated today, based upon the exam findings in the wound(s) above. Discharge plan:     Treatment in the wound care center today, per RN documentation: nothing. Could apply a dry dressing if desired, but I think not necessarily mandatory. I reminded the patient of the importance of (a) having a long-term podiatrist and  for regular F/U and preventive care, (b) daily foot inspection, with notification of his medical doctor or podiatrist of any changes, (c) daily cleansing with soap and warm water, followed by thorough drying, and application of moisturizer to all but interdigital areas, (d) not cutting nails, corns or calluses himself, and (e) wearing proper footwear at all times. We gave the patient a copy of our \"Foot Care with Diabetes\" education sheet as well.  For him specifically, he should see Dr. Ash Medrano for podiatric follow-up in month or two, and should plan on getting yearly shoes and inserts with [address] [address]. D/W Dr. Jazmine Huerta. Written discharge instructions given to patient. Follow up here PRN.     Electronically signed by Carlo Rowell MD on 6/7/2022 at 7:14 PM.

## 2023-09-20 ENCOUNTER — HOSPITAL ENCOUNTER (INPATIENT)
Age: 63
LOS: 3 days | Discharge: HOME | DRG: 517 | End: 2023-09-23
Attending: EMERGENCY MEDICINE | Admitting: INTERNAL MEDICINE
Payer: MEDICARE

## 2023-09-20 ENCOUNTER — APPOINTMENT (OUTPATIENT)
Dept: GENERAL RADIOLOGY | Age: 63
DRG: 517 | End: 2023-09-20
Payer: MEDICARE

## 2023-09-20 DIAGNOSIS — N17.9 AKI (ACUTE KIDNEY INJURY) (HCC): ICD-10-CM

## 2023-09-20 DIAGNOSIS — S82.032A CLOSED DISPLACED TRANSVERSE FRACTURE OF LEFT PATELLA, INITIAL ENCOUNTER: Primary | ICD-10-CM

## 2023-09-20 PROBLEM — S82.092K: Status: ACTIVE | Noted: 2023-09-20

## 2023-09-20 LAB
ABO + RH BLD: NORMAL
ALBUMIN SERPL-MCNC: 4.2 G/DL (ref 3.4–5)
ALBUMIN/GLOB SERPL: 1.4 {RATIO} (ref 1.1–2.2)
ALP SERPL-CCNC: 115 U/L (ref 40–129)
ALT SERPL-CCNC: 20 U/L (ref 10–40)
ANION GAP SERPL CALCULATED.3IONS-SCNC: 12 MMOL/L (ref 3–16)
AST SERPL-CCNC: 18 U/L (ref 15–37)
BASOPHILS # BLD: 0.1 K/UL (ref 0–0.2)
BASOPHILS NFR BLD: 1 %
BILIRUB SERPL-MCNC: 0.4 MG/DL (ref 0–1)
BLD GP AB SCN SERPL QL: NORMAL
BUN SERPL-MCNC: 46 MG/DL (ref 7–20)
CALCIUM SERPL-MCNC: 9.3 MG/DL (ref 8.3–10.6)
CHLORIDE SERPL-SCNC: 102 MMOL/L (ref 99–110)
CO2 SERPL-SCNC: 23 MMOL/L (ref 21–32)
CREAT SERPL-MCNC: 2.5 MG/DL (ref 0.8–1.3)
DEPRECATED RDW RBC AUTO: 15.5 % (ref 12.4–15.4)
EOSINOPHIL # BLD: 0.4 K/UL (ref 0–0.6)
EOSINOPHIL NFR BLD: 4.3 %
GFR SERPLBLD CREATININE-BSD FMLA CKD-EPI: 28 ML/MIN/{1.73_M2}
GLUCOSE BLD-MCNC: 175 MG/DL (ref 70–99)
GLUCOSE SERPL-MCNC: 251 MG/DL (ref 70–99)
HCT VFR BLD AUTO: 39.7 % (ref 40.5–52.5)
HGB BLD-MCNC: 13.8 G/DL (ref 13.5–17.5)
LYMPHOCYTES # BLD: 1.2 K/UL (ref 1–5.1)
LYMPHOCYTES NFR BLD: 11.5 %
MCH RBC QN AUTO: 31.7 PG (ref 26–34)
MCHC RBC AUTO-ENTMCNC: 34.7 G/DL (ref 31–36)
MCV RBC AUTO: 91.2 FL (ref 80–100)
MONOCYTES # BLD: 0.5 K/UL (ref 0–1.3)
MONOCYTES NFR BLD: 4.6 %
NEUTROPHILS # BLD: 8.1 K/UL (ref 1.7–7.7)
NEUTROPHILS NFR BLD: 78.6 %
NT-PROBNP SERPL-MCNC: 186 PG/ML (ref 0–124)
PERFORMED ON: ABNORMAL
PLATELET # BLD AUTO: 227 K/UL (ref 135–450)
PMV BLD AUTO: 10.2 FL (ref 5–10.5)
POTASSIUM SERPL-SCNC: 5.6 MMOL/L (ref 3.5–5.1)
PROT SERPL-MCNC: 7.3 G/DL (ref 6.4–8.2)
RBC # BLD AUTO: 4.35 M/UL (ref 4.2–5.9)
SODIUM SERPL-SCNC: 137 MMOL/L (ref 136–145)
WBC # BLD AUTO: 10.3 K/UL (ref 4–11)

## 2023-09-20 PROCEDURE — 6370000000 HC RX 637 (ALT 250 FOR IP): Performed by: NURSE PRACTITIONER

## 2023-09-20 PROCEDURE — 96374 THER/PROPH/DIAG INJ IV PUSH: CPT

## 2023-09-20 PROCEDURE — 6360000002 HC RX W HCPCS: Performed by: PHYSICIAN ASSISTANT

## 2023-09-20 PROCEDURE — 86901 BLOOD TYPING SEROLOGIC RH(D): CPT

## 2023-09-20 PROCEDURE — 96375 TX/PRO/DX INJ NEW DRUG ADDON: CPT

## 2023-09-20 PROCEDURE — 86900 BLOOD TYPING SEROLOGIC ABO: CPT

## 2023-09-20 PROCEDURE — 80053 COMPREHEN METABOLIC PANEL: CPT

## 2023-09-20 PROCEDURE — 85025 COMPLETE CBC W/AUTO DIFF WBC: CPT

## 2023-09-20 PROCEDURE — 99285 EMERGENCY DEPT VISIT HI MDM: CPT

## 2023-09-20 PROCEDURE — 2580000003 HC RX 258: Performed by: PHYSICIAN ASSISTANT

## 2023-09-20 PROCEDURE — 1200000000 HC SEMI PRIVATE

## 2023-09-20 PROCEDURE — 93005 ELECTROCARDIOGRAM TRACING: CPT | Performed by: PHYSICIAN ASSISTANT

## 2023-09-20 PROCEDURE — 6360000002 HC RX W HCPCS: Performed by: NURSE PRACTITIONER

## 2023-09-20 PROCEDURE — 71046 X-RAY EXAM CHEST 2 VIEWS: CPT

## 2023-09-20 PROCEDURE — 6370000000 HC RX 637 (ALT 250 FOR IP): Performed by: PHYSICIAN ASSISTANT

## 2023-09-20 PROCEDURE — 86850 RBC ANTIBODY SCREEN: CPT

## 2023-09-20 PROCEDURE — 83880 ASSAY OF NATRIURETIC PEPTIDE: CPT

## 2023-09-20 PROCEDURE — 73562 X-RAY EXAM OF KNEE 3: CPT

## 2023-09-20 RX ORDER — ATORVASTATIN CALCIUM 40 MG/1
40 TABLET, FILM COATED ORAL NIGHTLY
Status: DISCONTINUED | OUTPATIENT
Start: 2023-09-20 | End: 2023-09-23 | Stop reason: HOSPADM

## 2023-09-20 RX ORDER — INSULIN GLARGINE 100 [IU]/ML
42 INJECTION, SOLUTION SUBCUTANEOUS NIGHTLY
Status: DISCONTINUED | OUTPATIENT
Start: 2023-09-20 | End: 2023-09-23 | Stop reason: HOSPADM

## 2023-09-20 RX ORDER — SODIUM CHLORIDE 0.9 % (FLUSH) 0.9 %
5-40 SYRINGE (ML) INJECTION EVERY 12 HOURS SCHEDULED
Status: DISCONTINUED | OUTPATIENT
Start: 2023-09-20 | End: 2023-09-23 | Stop reason: HOSPADM

## 2023-09-20 RX ORDER — TAMSULOSIN HYDROCHLORIDE 0.4 MG/1
0.4 CAPSULE ORAL DAILY
Status: DISCONTINUED | OUTPATIENT
Start: 2023-09-21 | End: 2023-09-23 | Stop reason: HOSPADM

## 2023-09-20 RX ORDER — SODIUM CHLORIDE 9 MG/ML
INJECTION, SOLUTION INTRAVENOUS CONTINUOUS
Status: DISCONTINUED | OUTPATIENT
Start: 2023-09-20 | End: 2023-09-23 | Stop reason: HOSPADM

## 2023-09-20 RX ORDER — POLYETHYLENE GLYCOL 3350 17 G/17G
17 POWDER, FOR SOLUTION ORAL DAILY PRN
Status: DISCONTINUED | OUTPATIENT
Start: 2023-09-20 | End: 2023-09-22

## 2023-09-20 RX ORDER — ACETAMINOPHEN 650 MG/1
650 SUPPOSITORY RECTAL EVERY 6 HOURS PRN
Status: DISCONTINUED | OUTPATIENT
Start: 2023-09-20 | End: 2023-09-23 | Stop reason: HOSPADM

## 2023-09-20 RX ORDER — MORPHINE SULFATE 4 MG/ML
4 INJECTION, SOLUTION INTRAMUSCULAR; INTRAVENOUS ONCE
Status: COMPLETED | OUTPATIENT
Start: 2023-09-20 | End: 2023-09-20

## 2023-09-20 RX ORDER — SODIUM CHLORIDE 0.9 % (FLUSH) 0.9 %
5-40 SYRINGE (ML) INJECTION PRN
Status: DISCONTINUED | OUTPATIENT
Start: 2023-09-20 | End: 2023-09-23 | Stop reason: HOSPADM

## 2023-09-20 RX ORDER — ONDANSETRON 4 MG/1
4 TABLET, ORALLY DISINTEGRATING ORAL EVERY 8 HOURS PRN
Status: DISCONTINUED | OUTPATIENT
Start: 2023-09-20 | End: 2023-09-22

## 2023-09-20 RX ORDER — KETOROLAC TROMETHAMINE 30 MG/ML
15 INJECTION, SOLUTION INTRAMUSCULAR; INTRAVENOUS ONCE
Status: COMPLETED | OUTPATIENT
Start: 2023-09-20 | End: 2023-09-20

## 2023-09-20 RX ORDER — ONDANSETRON 2 MG/ML
4 INJECTION INTRAMUSCULAR; INTRAVENOUS ONCE
Status: COMPLETED | OUTPATIENT
Start: 2023-09-20 | End: 2023-09-20

## 2023-09-20 RX ORDER — ACETAMINOPHEN 500 MG
1000 TABLET ORAL ONCE
Status: COMPLETED | OUTPATIENT
Start: 2023-09-20 | End: 2023-09-20

## 2023-09-20 RX ORDER — DEXTROSE MONOHYDRATE 100 MG/ML
INJECTION, SOLUTION INTRAVENOUS CONTINUOUS PRN
Status: DISCONTINUED | OUTPATIENT
Start: 2023-09-20 | End: 2023-09-23 | Stop reason: HOSPADM

## 2023-09-20 RX ORDER — OXYCODONE AND ACETAMINOPHEN 7.5; 325 MG/1; MG/1
1 TABLET ORAL EVERY 4 HOURS PRN
Status: DISCONTINUED | OUTPATIENT
Start: 2023-09-20 | End: 2023-09-22

## 2023-09-20 RX ORDER — ACETAMINOPHEN 325 MG/1
650 TABLET ORAL EVERY 6 HOURS PRN
Status: DISCONTINUED | OUTPATIENT
Start: 2023-09-20 | End: 2023-09-22

## 2023-09-20 RX ORDER — INSULIN LISPRO 100 [IU]/ML
0-8 INJECTION, SOLUTION INTRAVENOUS; SUBCUTANEOUS
Status: DISCONTINUED | OUTPATIENT
Start: 2023-09-21 | End: 2023-09-23 | Stop reason: HOSPADM

## 2023-09-20 RX ORDER — SODIUM CHLORIDE 9 MG/ML
INJECTION, SOLUTION INTRAVENOUS PRN
Status: DISCONTINUED | OUTPATIENT
Start: 2023-09-20 | End: 2023-09-23 | Stop reason: HOSPADM

## 2023-09-20 RX ORDER — MORPHINE SULFATE 2 MG/ML
2 INJECTION, SOLUTION INTRAMUSCULAR; INTRAVENOUS EVERY 4 HOURS PRN
Status: DISCONTINUED | OUTPATIENT
Start: 2023-09-20 | End: 2023-09-22

## 2023-09-20 RX ORDER — LABETALOL HYDROCHLORIDE 5 MG/ML
10 INJECTION, SOLUTION INTRAVENOUS EVERY 4 HOURS PRN
Status: DISCONTINUED | OUTPATIENT
Start: 2023-09-20 | End: 2023-09-23 | Stop reason: HOSPADM

## 2023-09-20 RX ORDER — AMLODIPINE BESYLATE 5 MG/1
10 TABLET ORAL DAILY
Status: DISCONTINUED | OUTPATIENT
Start: 2023-09-21 | End: 2023-09-23 | Stop reason: HOSPADM

## 2023-09-20 RX ORDER — SODIUM CHLORIDE, SODIUM LACTATE, POTASSIUM CHLORIDE, AND CALCIUM CHLORIDE .6; .31; .03; .02 G/100ML; G/100ML; G/100ML; G/100ML
1000 INJECTION, SOLUTION INTRAVENOUS ONCE
Status: COMPLETED | OUTPATIENT
Start: 2023-09-20 | End: 2023-09-21

## 2023-09-20 RX ORDER — INSULIN LISPRO 100 [IU]/ML
0-4 INJECTION, SOLUTION INTRAVENOUS; SUBCUTANEOUS NIGHTLY
Status: DISCONTINUED | OUTPATIENT
Start: 2023-09-20 | End: 2023-09-23 | Stop reason: HOSPADM

## 2023-09-20 RX ORDER — ONDANSETRON 2 MG/ML
4 INJECTION INTRAMUSCULAR; INTRAVENOUS EVERY 6 HOURS PRN
Status: DISCONTINUED | OUTPATIENT
Start: 2023-09-20 | End: 2023-09-22

## 2023-09-20 RX ADMIN — INSULIN GLARGINE 42 UNITS: 100 INJECTION, SOLUTION SUBCUTANEOUS at 23:11

## 2023-09-20 RX ADMIN — LABETALOL HYDROCHLORIDE 10 MG: 5 INJECTION INTRAVENOUS at 22:56

## 2023-09-20 RX ADMIN — ONDANSETRON 4 MG: 2 INJECTION INTRAMUSCULAR; INTRAVENOUS at 18:37

## 2023-09-20 RX ADMIN — ACETAMINOPHEN 1000 MG: 500 TABLET ORAL at 18:17

## 2023-09-20 RX ADMIN — SODIUM CHLORIDE, POTASSIUM CHLORIDE, SODIUM LACTATE AND CALCIUM CHLORIDE 1000 ML: 600; 310; 30; 20 INJECTION, SOLUTION INTRAVENOUS at 23:01

## 2023-09-20 RX ADMIN — MORPHINE SULFATE 2 MG: 2 INJECTION, SOLUTION INTRAMUSCULAR; INTRAVENOUS at 23:02

## 2023-09-20 RX ADMIN — MORPHINE SULFATE 4 MG: 4 INJECTION, SOLUTION INTRAMUSCULAR; INTRAVENOUS at 18:37

## 2023-09-20 RX ADMIN — KETOROLAC TROMETHAMINE 15 MG: 30 INJECTION, SOLUTION INTRAMUSCULAR; INTRAVENOUS at 18:17

## 2023-09-20 ASSESSMENT — PAIN DESCRIPTION - PAIN TYPE
TYPE: ACUTE PAIN

## 2023-09-20 ASSESSMENT — PAIN DESCRIPTION - ORIENTATION
ORIENTATION: LEFT

## 2023-09-20 ASSESSMENT — PAIN SCALES - GENERAL
PAINLEVEL_OUTOF10: 7
PAINLEVEL_OUTOF10: 1
PAINLEVEL_OUTOF10: 6
PAINLEVEL_OUTOF10: 6
PAINLEVEL_OUTOF10: 0
PAINLEVEL_OUTOF10: 7
PAINLEVEL_OUTOF10: 6

## 2023-09-20 ASSESSMENT — PAIN DESCRIPTION - DESCRIPTORS
DESCRIPTORS: THROBBING

## 2023-09-20 ASSESSMENT — PAIN DESCRIPTION - LOCATION
LOCATION: KNEE

## 2023-09-20 ASSESSMENT — PAIN - FUNCTIONAL ASSESSMENT: PAIN_FUNCTIONAL_ASSESSMENT: 0-10

## 2023-09-20 NOTE — ED NOTES
Applied a 6\" ACE wrap and a 20\" velcro knee immobilizer to the patient's injured left knee. Checked CMS before and after application of immobilizer; remained unchanged. Gave patient instructions for splint. Patient verbalized understanding of all instructions, and had no additional questions or concerns.      Anaya Rowell  09/20/23 2538

## 2023-09-20 NOTE — PLAN OF CARE
Ortho Plan of Care    Patient seen to have displaced patella fracture on XR after a fall. Plan  - IM admission and risk stratification for surgery. Patient has several co-morbidities  - Knee immobilizer  - NWB left lower extremity  - NPO after midnight  - Plan for surgical fixation tomorrow with Dr. Karina Taylor. Timing per OR availability.     Andrew Corona,

## 2023-09-20 NOTE — H&P
Hospital Medicine History & Physical        Date of Service: 09/20/2023    Time of Service: 1935    Disposition:    [x]Admitted to inpatient status with expected LOS greater than two midnights due to medical therapy. []Placed in observation status. Historian: Self, Chart Review, Care Everywhere    Chief Admission Complaint:  Left knee pain after fall today    Presenting Admission History:      Esthela Dawn is a/an 70 y.o. male with a significant past medical history of hypertension, hyperlipidemia, and type 2 diabetes who presents to [facility] emergency department with complaint of left knee pain secondary to fall at home today. He notes that he was working in his garage, and his slip on shoes became twisted on his left foot as he was stepping forward causing him to invert his left foot and falling towards the left side. He states he landed on both knees, but left knee was more painful, was unable to get up immediately after. Wife was home and was able to assist and bring him here. His evaluation here included laboratory studies, EKG, chest x-ray, and left knee x-ray. Left knee x-ray showed transverse fracture of the patella with distraction. Chest x-ray is negative for acute findings. Pertinent laboratory findings include an elevated potassium at 5.6, BUN 46, creatinine 2.5, GFR 28, blood sugar 251, hemoglobin 13.8, and platelets 882. Urinalysis is pending. Dr. Kavitha Balbuena with orthopedic surgery was consulted, recommended knee immobilizer, nonweightbearing to left leg, n.p.o. after midnight, and anticipate surgical fixation tomorrow. Hospital team was consulted admit this patient for left patella fracture secondary to fall at home.     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 Kavitha Balbuena consulted, anticipate surgery tomorrow with Dr. Tha Zee  - cm distraction. There is associated joint effusion. The femur, and proximal tibia and fibula appear normal.     Transverse fracture of the patella with distraction. PCP: Verenice Gutierrez MD    Past Medical History:        Diagnosis Date    Abscess of left hand 06/20/2017    Acute kidney injury superimposed on chronic kidney disease ([address]) 08/2021    Acute osteomyelitis of metatarsal bone of left foot ([address]) 09/16/2021    MSSA and Anaerococcus    Acute osteomyelitis of toe of right foot ([address]) 04/27/2022    MRSA    Adhesive capsulitis of shoulder 11/13/2014    Cellulitis of right foot 04/27/2022    MRSA    Diabetic ulcer of right foot associated with type 2 diabetes mellitus, with necrosis of bone ([address]) 5/16/2022    Fractures     Gas gangrene of foot ([address]) 08/31/2021    chronic diabetic ulcer there finally healed Jan 2022    History of hyperbaric oxygen therapy 09/21/2021    Hercules 4 diabetic foot    Ischemic toe (right middle) 04/27/2022    amputated    PNA (pneumonia) 03/28/2016    Pressure ulcer of left lateral forefoot, stage 4 ([address]) 09/16/2021    Pressure ulcer of toe of right foot, stage 3 ([address]) 4/12/2022       Past Surgical History:        Procedure Laterality Date    FOOT DEBRIDEMENT Left 08/31/2021    LEFT FOOT DEBRIDEMENT INCISION AND DRAINAGE performed by Farhad Trejo DPM at [address] [place] [address] Left 09/03/2021    STAGED INCISION AND DEBRIDEMENT LEFT FOOT WITH PARTIAL FIRST RAY AMPUTATION performed by Farhad Trejo DPM at [address] Left 06/19/2017    LEFT HAND DEBRIDEMENT INCISION AND DRAINAGE    NECK SURGERY      30s year    TOE AMPUTATION Left 2009    hallux    TOE AMPUTATION Right 4/29/2022    PARTIAL RIGHT FOOT AMPUTATION, ULCER DEBRIDEMENT RIGHT FOOT performed by Ramya Rosales DPM at [address]         Medications Prior to Admission:   Prior to Admission medications    Medication Sig Start Date End Date Taking?  Authorizing Provider Multiple Vitamins-Minerals (ONE-A-DAY VITACRAVES IMMUNITY PO) Take by mouth daily    Historical Provider, MD   amLODIPine (NORVASC) 10 MG tablet Take 10 mg by mouth daily     Historical Provider, MD   hydroCHLOROthiazide (HYDRODIURIL) 25 MG tablet Take 1 tablet by mouth daily 10/5/21   Aleena Stoll MD   atorvastatin (LIPITOR) 40 MG tablet Take 40 mg by mouth daily    Historical Provider, MD   insulin glargine (LANTUS) 100 UNIT/ML injection vial Inject 35 Units into the skin nightly  Patient taking differently: Inject 42 Units into the skin nightly  4/6/16   Deneen Kay MD   Omega-3 Fatty Acids (FISH OIL) 1000 MG CAPS Take 3,000 mg by mouth nightly. Historical Provider, MD   metFORMIN (GLUCOPHAGE) 500 MG tablet TAKE TWO TABLETS BY MOUTH TWICE A DAY 10/7/13   Historical Provider, MD   Multiple Vitamins-Minerals (MULTIVITAMIN PO) Take  by mouth daily. Historical Provider, MD   tamsulosin (FLOMAX) 0.4 MG capsule Take 0.4 mg by mouth daily. 11/6/12   Historical Provider, MD       Labs: Personally reviewed and interpreted for clinical significance.    Recent Labs     09/20/23  1757   WBC 10.3   HGB 13.8   HCT 39.7*        Recent Labs     09/20/23  1757      K 5.6*      CO2 23   BUN 46*   CREATININE 2.5*   CALCIUM 9.3     Recent Labs     09/20/23  1757   AST 18   ALT 20   BILITOT 0.4   ALKPHOS 115     Anticipated co-signer, Dr. Marck Butt, APRN - CNP

## 2023-09-21 ENCOUNTER — ANESTHESIA EVENT (OUTPATIENT)
Dept: OPERATING ROOM | Age: 63
End: 2023-09-21
Payer: MEDICARE

## 2023-09-21 ENCOUNTER — APPOINTMENT (OUTPATIENT)
Dept: GENERAL RADIOLOGY | Age: 63
DRG: 517 | End: 2023-09-21
Payer: MEDICARE

## 2023-09-21 ENCOUNTER — ANESTHESIA (OUTPATIENT)
Dept: OPERATING ROOM | Age: 63
End: 2023-09-21
Payer: MEDICARE

## 2023-09-21 LAB
ANION GAP SERPL CALCULATED.3IONS-SCNC: 12 MMOL/L (ref 3–16)
BASOPHILS # BLD: 0.1 K/UL (ref 0–0.2)
BASOPHILS NFR BLD: 1.2 %
BUN SERPL-MCNC: 45 MG/DL (ref 7–20)
CALCIUM SERPL-MCNC: 8.7 MG/DL (ref 8.3–10.6)
CHLORIDE SERPL-SCNC: 105 MMOL/L (ref 99–110)
CO2 SERPL-SCNC: 22 MMOL/L (ref 21–32)
CREAT SERPL-MCNC: 2.3 MG/DL (ref 0.8–1.3)
DEPRECATED RDW RBC AUTO: 15.8 % (ref 12.4–15.4)
EKG ATRIAL RATE: 72 BPM
EKG DIAGNOSIS: NORMAL
EKG P AXIS: 61 DEGREES
EKG P-R INTERVAL: 174 MS
EKG Q-T INTERVAL: 364 MS
EKG QRS DURATION: 96 MS
EKG QTC CALCULATION (BAZETT): 398 MS
EKG R AXIS: -27 DEGREES
EKG T AXIS: 49 DEGREES
EKG VENTRICULAR RATE: 72 BPM
EOSINOPHIL # BLD: 0.6 K/UL (ref 0–0.6)
EOSINOPHIL NFR BLD: 7.3 %
GFR SERPLBLD CREATININE-BSD FMLA CKD-EPI: 31 ML/MIN/{1.73_M2}
GLUCOSE BLD-MCNC: 146 MG/DL (ref 70–99)
GLUCOSE BLD-MCNC: 152 MG/DL (ref 70–99)
GLUCOSE BLD-MCNC: 171 MG/DL (ref 70–99)
GLUCOSE BLD-MCNC: 210 MG/DL (ref 70–99)
GLUCOSE SERPL-MCNC: 178 MG/DL (ref 70–99)
HCT VFR BLD AUTO: 35.6 % (ref 40.5–52.5)
HGB BLD-MCNC: 12.1 G/DL (ref 13.5–17.5)
LYMPHOCYTES # BLD: 1.7 K/UL (ref 1–5.1)
LYMPHOCYTES NFR BLD: 21.7 %
MAGNESIUM SERPL-MCNC: 1.6 MG/DL (ref 1.8–2.4)
MCH RBC QN AUTO: 31.4 PG (ref 26–34)
MCHC RBC AUTO-ENTMCNC: 34.1 G/DL (ref 31–36)
MCV RBC AUTO: 92 FL (ref 80–100)
MONOCYTES # BLD: 0.5 K/UL (ref 0–1.3)
MONOCYTES NFR BLD: 6.6 %
NEUTROPHILS # BLD: 5.1 K/UL (ref 1.7–7.7)
NEUTROPHILS NFR BLD: 63.2 %
PERFORMED ON: ABNORMAL
PLATELET # BLD AUTO: 200 K/UL (ref 135–450)
PMV BLD AUTO: 10.2 FL (ref 5–10.5)
POTASSIUM SERPL-SCNC: 4.4 MMOL/L (ref 3.5–5.1)
RBC # BLD AUTO: 3.87 M/UL (ref 4.2–5.9)
SODIUM SERPL-SCNC: 139 MMOL/L (ref 136–145)
WBC # BLD AUTO: 8 K/UL (ref 4–11)

## 2023-09-21 PROCEDURE — 85025 COMPLETE CBC W/AUTO DIFF WBC: CPT

## 2023-09-21 PROCEDURE — 6370000000 HC RX 637 (ALT 250 FOR IP): Performed by: NURSE PRACTITIONER

## 2023-09-21 PROCEDURE — 6360000002 HC RX W HCPCS: Performed by: INTERNAL MEDICINE

## 2023-09-21 PROCEDURE — 0QSF04Z REPOSITION LEFT PATELLA WITH INTERNAL FIXATION DEVICE, OPEN APPROACH: ICD-10-PCS | Performed by: ORTHOPAEDIC SURGERY

## 2023-09-21 PROCEDURE — 1200000000 HC SEMI PRIVATE

## 2023-09-21 PROCEDURE — 2500000003 HC RX 250 WO HCPCS: Performed by: ORTHOPAEDIC SURGERY

## 2023-09-21 PROCEDURE — 6370000000 HC RX 637 (ALT 250 FOR IP): Performed by: ANESTHESIOLOGY

## 2023-09-21 PROCEDURE — C1769 GUIDE WIRE: HCPCS | Performed by: ORTHOPAEDIC SURGERY

## 2023-09-21 PROCEDURE — 6360000002 HC RX W HCPCS: Performed by: ANESTHESIOLOGY

## 2023-09-21 PROCEDURE — 7100000000 HC PACU RECOVERY - FIRST 15 MIN: Performed by: ORTHOPAEDIC SURGERY

## 2023-09-21 PROCEDURE — 6370000000 HC RX 637 (ALT 250 FOR IP): Performed by: ORTHOPAEDIC SURGERY

## 2023-09-21 PROCEDURE — 99221 1ST HOSP IP/OBS SF/LOW 40: CPT | Performed by: ORTHOPAEDIC SURGERY

## 2023-09-21 PROCEDURE — 6360000002 HC RX W HCPCS: Performed by: STUDENT IN AN ORGANIZED HEALTH CARE EDUCATION/TRAINING PROGRAM

## 2023-09-21 PROCEDURE — 6360000002 HC RX W HCPCS: Performed by: NURSE ANESTHETIST, CERTIFIED REGISTERED

## 2023-09-21 PROCEDURE — 6360000002 HC RX W HCPCS: Performed by: SPECIALIST/TECHNOLOGIST

## 2023-09-21 PROCEDURE — 2700000000 HC OXYGEN THERAPY PER DAY

## 2023-09-21 PROCEDURE — 93010 ELECTROCARDIOGRAM REPORT: CPT | Performed by: INTERNAL MEDICINE

## 2023-09-21 PROCEDURE — 2709999900 HC NON-CHARGEABLE SUPPLY: Performed by: ORTHOPAEDIC SURGERY

## 2023-09-21 PROCEDURE — 2720000010 HC SURG SUPPLY STERILE: Performed by: ORTHOPAEDIC SURGERY

## 2023-09-21 PROCEDURE — 3600000014 HC SURGERY LEVEL 4 ADDTL 15MIN: Performed by: ORTHOPAEDIC SURGERY

## 2023-09-21 PROCEDURE — 83735 ASSAY OF MAGNESIUM: CPT

## 2023-09-21 PROCEDURE — 7100000001 HC PACU RECOVERY - ADDTL 15 MIN: Performed by: ORTHOPAEDIC SURGERY

## 2023-09-21 PROCEDURE — 6360000002 HC RX W HCPCS: Performed by: ORTHOPAEDIC SURGERY

## 2023-09-21 PROCEDURE — 36415 COLL VENOUS BLD VENIPUNCTURE: CPT

## 2023-09-21 PROCEDURE — 3700000001 HC ADD 15 MINUTES (ANESTHESIA): Performed by: ORTHOPAEDIC SURGERY

## 2023-09-21 PROCEDURE — 0LQR0ZZ REPAIR LEFT KNEE TENDON, OPEN APPROACH: ICD-10-PCS | Performed by: ORTHOPAEDIC SURGERY

## 2023-09-21 PROCEDURE — 2580000003 HC RX 258: Performed by: NURSE PRACTITIONER

## 2023-09-21 PROCEDURE — 2500000003 HC RX 250 WO HCPCS: Performed by: NURSE ANESTHETIST, CERTIFIED REGISTERED

## 2023-09-21 PROCEDURE — 73560 X-RAY EXAM OF KNEE 1 OR 2: CPT

## 2023-09-21 PROCEDURE — 80048 BASIC METABOLIC PNL TOTAL CA: CPT

## 2023-09-21 PROCEDURE — 3700000000 HC ANESTHESIA ATTENDED CARE: Performed by: ORTHOPAEDIC SURGERY

## 2023-09-21 PROCEDURE — 3600000004 HC SURGERY LEVEL 4 BASE: Performed by: ORTHOPAEDIC SURGERY

## 2023-09-21 PROCEDURE — C1713 ANCHOR/SCREW BN/BN,TIS/BN: HCPCS | Performed by: ORTHOPAEDIC SURGERY

## 2023-09-21 PROCEDURE — 2580000003 HC RX 258: Performed by: STUDENT IN AN ORGANIZED HEALTH CARE EDUCATION/TRAINING PROGRAM

## 2023-09-21 PROCEDURE — C9399 UNCLASSIFIED DRUGS OR BIOLOG: HCPCS | Performed by: NURSE ANESTHETIST, CERTIFIED REGISTERED

## 2023-09-21 PROCEDURE — 94761 N-INVAS EAR/PLS OXIMETRY MLT: CPT

## 2023-09-21 PROCEDURE — 2580000003 HC RX 258: Performed by: ORTHOPAEDIC SURGERY

## 2023-09-21 DEVICE — IMPLANTABLE DEVICE: Type: IMPLANTABLE DEVICE | Site: KNEE | Status: FUNCTIONAL

## 2023-09-21 DEVICE — WASHER ORTH DIA7MM S STL: Type: IMPLANTABLE DEVICE | Site: KNEE | Status: FUNCTIONAL

## 2023-09-21 RX ORDER — OXYCODONE HYDROCHLORIDE 5 MG/1
10 TABLET ORAL PRN
Status: DISCONTINUED | OUTPATIENT
Start: 2023-09-21 | End: 2023-09-21 | Stop reason: HOSPADM

## 2023-09-21 RX ORDER — LIDOCAINE HYDROCHLORIDE 20 MG/ML
INJECTION, SOLUTION INFILTRATION; PERINEURAL PRN
Status: DISCONTINUED | OUTPATIENT
Start: 2023-09-21 | End: 2023-09-21 | Stop reason: SDUPTHER

## 2023-09-21 RX ORDER — PROPOFOL 10 MG/ML
INJECTION, EMULSION INTRAVENOUS PRN
Status: DISCONTINUED | OUTPATIENT
Start: 2023-09-21 | End: 2023-09-21 | Stop reason: SDUPTHER

## 2023-09-21 RX ORDER — PHENYLEPHRINE HCL IN 0.9% NACL 1 MG/10 ML
SYRINGE (ML) INTRAVENOUS PRN
Status: DISCONTINUED | OUTPATIENT
Start: 2023-09-21 | End: 2023-09-21 | Stop reason: SDUPTHER

## 2023-09-21 RX ORDER — SODIUM CHLORIDE 0.9 % (FLUSH) 0.9 %
5-40 SYRINGE (ML) INJECTION PRN
Status: DISCONTINUED | OUTPATIENT
Start: 2023-09-21 | End: 2023-09-21 | Stop reason: HOSPADM

## 2023-09-21 RX ORDER — DEXAMETHASONE SODIUM PHOSPHATE 4 MG/ML
INJECTION, SOLUTION INTRA-ARTICULAR; INTRALESIONAL; INTRAMUSCULAR; INTRAVENOUS; SOFT TISSUE PRN
Status: DISCONTINUED | OUTPATIENT
Start: 2023-09-21 | End: 2023-09-21 | Stop reason: SDUPTHER

## 2023-09-21 RX ORDER — GLYCOPYRROLATE 0.2 MG/ML
INJECTION INTRAMUSCULAR; INTRAVENOUS PRN
Status: DISCONTINUED | OUTPATIENT
Start: 2023-09-21 | End: 2023-09-21 | Stop reason: SDUPTHER

## 2023-09-21 RX ORDER — MEPERIDINE HYDROCHLORIDE 50 MG/ML
12.5 INJECTION INTRAMUSCULAR; INTRAVENOUS; SUBCUTANEOUS EVERY 5 MIN PRN
Status: DISCONTINUED | OUTPATIENT
Start: 2023-09-21 | End: 2023-09-21 | Stop reason: HOSPADM

## 2023-09-21 RX ORDER — IPRATROPIUM BROMIDE AND ALBUTEROL SULFATE 2.5; .5 MG/3ML; MG/3ML
1 SOLUTION RESPIRATORY (INHALATION)
Status: DISCONTINUED | OUTPATIENT
Start: 2023-09-21 | End: 2023-09-21 | Stop reason: HOSPADM

## 2023-09-21 RX ORDER — CEFAZOLIN SODIUM IN 0.9 % NACL 2 G/100 ML
2000 PLASTIC BAG, INJECTION (ML) INTRAVENOUS
Status: COMPLETED | OUTPATIENT
Start: 2023-09-21 | End: 2023-09-21

## 2023-09-21 RX ORDER — FENTANYL CITRATE 50 UG/ML
INJECTION, SOLUTION INTRAMUSCULAR; INTRAVENOUS PRN
Status: DISCONTINUED | OUTPATIENT
Start: 2023-09-21 | End: 2023-09-21 | Stop reason: SDUPTHER

## 2023-09-21 RX ORDER — ONDANSETRON 4 MG/1
4 TABLET, ORALLY DISINTEGRATING ORAL EVERY 8 HOURS PRN
Status: DISCONTINUED | OUTPATIENT
Start: 2023-09-21 | End: 2023-09-23 | Stop reason: HOSPADM

## 2023-09-21 RX ORDER — BUPIVACAINE HYDROCHLORIDE AND EPINEPHRINE 5; 5 MG/ML; UG/ML
INJECTION, SOLUTION PERINEURAL PRN
Status: DISCONTINUED | OUTPATIENT
Start: 2023-09-21 | End: 2023-09-21 | Stop reason: ALTCHOICE

## 2023-09-21 RX ORDER — OXYCODONE HYDROCHLORIDE 5 MG/1
5 TABLET ORAL
Status: DISCONTINUED | OUTPATIENT
Start: 2023-09-21 | End: 2023-09-21 | Stop reason: HOSPADM

## 2023-09-21 RX ORDER — SODIUM CHLORIDE 9 MG/ML
INJECTION, SOLUTION INTRAVENOUS PRN
Status: DISCONTINUED | OUTPATIENT
Start: 2023-09-21 | End: 2023-09-21 | Stop reason: HOSPADM

## 2023-09-21 RX ORDER — ONDANSETRON 2 MG/ML
INJECTION INTRAMUSCULAR; INTRAVENOUS PRN
Status: DISCONTINUED | OUTPATIENT
Start: 2023-09-21 | End: 2023-09-21 | Stop reason: SDUPTHER

## 2023-09-21 RX ORDER — PROCHLORPERAZINE EDISYLATE 5 MG/ML
5 INJECTION INTRAMUSCULAR; INTRAVENOUS
Status: DISCONTINUED | OUTPATIENT
Start: 2023-09-21 | End: 2023-09-21 | Stop reason: HOSPADM

## 2023-09-21 RX ORDER — HYDRALAZINE HYDROCHLORIDE 20 MG/ML
INJECTION INTRAMUSCULAR; INTRAVENOUS
Status: COMPLETED
Start: 2023-09-21 | End: 2023-09-21

## 2023-09-21 RX ORDER — CEFAZOLIN SODIUM IN 0.9 % NACL 2 G/100 ML
2000 PLASTIC BAG, INJECTION (ML) INTRAVENOUS EVERY 8 HOURS
Status: COMPLETED | OUTPATIENT
Start: 2023-09-22 | End: 2023-09-22

## 2023-09-21 RX ORDER — SODIUM CHLORIDE 0.9 % (FLUSH) 0.9 %
5-40 SYRINGE (ML) INJECTION EVERY 12 HOURS SCHEDULED
Status: DISCONTINUED | OUTPATIENT
Start: 2023-09-21 | End: 2023-09-21 | Stop reason: HOSPADM

## 2023-09-21 RX ORDER — ONDANSETRON 2 MG/ML
4 INJECTION INTRAMUSCULAR; INTRAVENOUS EVERY 6 HOURS PRN
Status: DISCONTINUED | OUTPATIENT
Start: 2023-09-21 | End: 2023-09-23 | Stop reason: HOSPADM

## 2023-09-21 RX ORDER — HYDRALAZINE HYDROCHLORIDE 20 MG/ML
10 INJECTION INTRAMUSCULAR; INTRAVENOUS EVERY 10 MIN PRN
Status: DISCONTINUED | OUTPATIENT
Start: 2023-09-21 | End: 2023-09-21 | Stop reason: HOSPADM

## 2023-09-21 RX ORDER — ONDANSETRON 2 MG/ML
4 INJECTION INTRAMUSCULAR; INTRAVENOUS
Status: COMPLETED | OUTPATIENT
Start: 2023-09-21 | End: 2023-09-21

## 2023-09-21 RX ORDER — SODIUM CHLORIDE 0.9 % (FLUSH) 0.9 %
5-40 SYRINGE (ML) INJECTION PRN
Status: DISCONTINUED | OUTPATIENT
Start: 2023-09-21 | End: 2023-09-23 | Stop reason: HOSPADM

## 2023-09-21 RX ORDER — MAGNESIUM SULFATE 1 G/100ML
1000 INJECTION INTRAVENOUS ONCE
Status: COMPLETED | OUTPATIENT
Start: 2023-09-21 | End: 2023-09-21

## 2023-09-21 RX ORDER — ROCURONIUM BROMIDE 10 MG/ML
INJECTION, SOLUTION INTRAVENOUS PRN
Status: DISCONTINUED | OUTPATIENT
Start: 2023-09-21 | End: 2023-09-21 | Stop reason: SDUPTHER

## 2023-09-21 RX ORDER — ACETAMINOPHEN 500 MG
1000 TABLET ORAL ONCE
Status: COMPLETED | OUTPATIENT
Start: 2023-09-21 | End: 2023-09-21

## 2023-09-21 RX ORDER — SODIUM CHLORIDE 0.9 % (FLUSH) 0.9 %
5-40 SYRINGE (ML) INJECTION EVERY 12 HOURS SCHEDULED
Status: DISCONTINUED | OUTPATIENT
Start: 2023-09-21 | End: 2023-09-23 | Stop reason: HOSPADM

## 2023-09-21 RX ORDER — SODIUM CHLORIDE 9 MG/ML
INJECTION, SOLUTION INTRAVENOUS PRN
Status: DISCONTINUED | OUTPATIENT
Start: 2023-09-21 | End: 2023-09-23 | Stop reason: HOSPADM

## 2023-09-21 RX ORDER — ENOXAPARIN SODIUM 100 MG/ML
40 INJECTION SUBCUTANEOUS DAILY
Status: DISCONTINUED | OUTPATIENT
Start: 2023-09-22 | End: 2023-09-22

## 2023-09-21 RX ADMIN — HYDRALAZINE HYDROCHLORIDE 10 MG: 20 INJECTION, SOLUTION INTRAMUSCULAR; INTRAVENOUS at 18:19

## 2023-09-21 RX ADMIN — INSULIN GLARGINE 42 UNITS: 100 INJECTION, SOLUTION SUBCUTANEOUS at 20:52

## 2023-09-21 RX ADMIN — Medication 2000 MG: at 15:40

## 2023-09-21 RX ADMIN — SUGAMMADEX 200 MG: 100 INJECTION, SOLUTION INTRAVENOUS at 17:26

## 2023-09-21 RX ADMIN — MAGNESIUM SULFATE IN DEXTROSE 1000 MG: 10 INJECTION, SOLUTION INTRAVENOUS at 06:34

## 2023-09-21 RX ADMIN — ONDANSETRON 4 MG: 2 INJECTION INTRAMUSCULAR; INTRAVENOUS at 18:13

## 2023-09-21 RX ADMIN — SODIUM CHLORIDE: 9 INJECTION, SOLUTION INTRAVENOUS at 00:13

## 2023-09-21 RX ADMIN — OXYCODONE HYDROCHLORIDE 5 MG: 5 TABLET ORAL at 18:07

## 2023-09-21 RX ADMIN — Medication 100 MCG: at 16:27

## 2023-09-21 RX ADMIN — Medication 100 MCG: at 16:04

## 2023-09-21 RX ADMIN — DEXAMETHASONE SODIUM PHOSPHATE 8 MG: 4 INJECTION, SOLUTION INTRAMUSCULAR; INTRAVENOUS at 15:44

## 2023-09-21 RX ADMIN — PROPOFOL 50 MG: 10 INJECTION, EMULSION INTRAVENOUS at 17:26

## 2023-09-21 RX ADMIN — FENTANYL CITRATE 100 MCG: 50 INJECTION, SOLUTION INTRAMUSCULAR; INTRAVENOUS at 15:40

## 2023-09-21 RX ADMIN — ACETAMINOPHEN 1000 MG: 500 TABLET ORAL at 13:17

## 2023-09-21 RX ADMIN — AMLODIPINE BESYLATE 10 MG: 5 TABLET ORAL at 09:08

## 2023-09-21 RX ADMIN — Medication 100 MCG: at 15:55

## 2023-09-21 RX ADMIN — Medication 100 MCG: at 15:59

## 2023-09-21 RX ADMIN — ROCURONIUM BROMIDE 50 MG: 50 INJECTION, SOLUTION INTRAVENOUS at 15:40

## 2023-09-21 RX ADMIN — OXYCODONE AND ACETAMINOPHEN 1 TABLET: 7.5; 325 TABLET ORAL at 06:31

## 2023-09-21 RX ADMIN — Medication 100 MCG: at 16:55

## 2023-09-21 RX ADMIN — PROPOFOL 100 MG: 10 INJECTION, EMULSION INTRAVENOUS at 15:40

## 2023-09-21 RX ADMIN — SODIUM CHLORIDE: 9 INJECTION, SOLUTION INTRAVENOUS at 10:53

## 2023-09-21 RX ADMIN — MORPHINE SULFATE 2 MG: 2 INJECTION, SOLUTION INTRAMUSCULAR; INTRAVENOUS at 20:50

## 2023-09-21 RX ADMIN — MAGNESIUM SULFATE HEPTAHYDRATE 1000 MG: 1 INJECTION, SOLUTION INTRAVENOUS at 12:06

## 2023-09-21 RX ADMIN — GLYCOPYRROLATE 0.2 MG: 0.2 INJECTION, SOLUTION INTRAMUSCULAR; INTRAVENOUS at 16:02

## 2023-09-21 RX ADMIN — ATORVASTATIN CALCIUM 40 MG: 40 TABLET, FILM COATED ORAL at 20:51

## 2023-09-21 RX ADMIN — HYDRALAZINE HYDROCHLORIDE 10 MG: 20 INJECTION, SOLUTION INTRAMUSCULAR; INTRAVENOUS at 18:40

## 2023-09-21 RX ADMIN — SODIUM CHLORIDE: 9 INJECTION, SOLUTION INTRAVENOUS at 20:55

## 2023-09-21 RX ADMIN — LIDOCAINE HYDROCHLORIDE 40 MG: 20 INJECTION, SOLUTION INFILTRATION; PERINEURAL at 15:40

## 2023-09-21 RX ADMIN — ONDANSETRON 4 MG: 2 INJECTION INTRAMUSCULAR; INTRAVENOUS at 15:44

## 2023-09-21 RX ADMIN — ROCURONIUM BROMIDE 20 MG: 50 INJECTION, SOLUTION INTRAVENOUS at 16:55

## 2023-09-21 RX ADMIN — Medication 2000 MG: at 23:35

## 2023-09-21 ASSESSMENT — PAIN DESCRIPTION - ORIENTATION
ORIENTATION: LEFT

## 2023-09-21 ASSESSMENT — PAIN DESCRIPTION - LOCATION
LOCATION: KNEE
LOCATION: LEG
LOCATION: KNEE

## 2023-09-21 ASSESSMENT — PAIN DESCRIPTION - ONSET: ONSET: GRADUAL

## 2023-09-21 ASSESSMENT — PAIN - FUNCTIONAL ASSESSMENT
PAIN_FUNCTIONAL_ASSESSMENT: 0-10
PAIN_FUNCTIONAL_ASSESSMENT: ACTIVITIES ARE NOT PREVENTED

## 2023-09-21 ASSESSMENT — PAIN SCALES - GENERAL
PAINLEVEL_OUTOF10: 7
PAINLEVEL_OUTOF10: 6
PAINLEVEL_OUTOF10: 0
PAINLEVEL_OUTOF10: 6
PAINLEVEL_OUTOF10: 6
PAINLEVEL_OUTOF10: 4
PAINLEVEL_OUTOF10: 5

## 2023-09-21 ASSESSMENT — PAIN DESCRIPTION - DESCRIPTORS
DESCRIPTORS: THROBBING
DESCRIPTORS: ACHING
DESCRIPTORS: THROBBING
DESCRIPTORS: ACHING

## 2023-09-21 ASSESSMENT — PAIN DESCRIPTION - FREQUENCY: FREQUENCY: INTERMITTENT

## 2023-09-21 ASSESSMENT — PAIN DESCRIPTION - PAIN TYPE: TYPE: SURGICAL PAIN

## 2023-09-21 NOTE — ANESTHESIA POSTPROCEDURE EVALUATION
Department of Anesthesiology  Postprocedure Note    Patient: Tejinder James  MRN: [id number]  YOB: 1960  Date of evaluation: 9/21/2023      Procedure Summary     Date: 09/21/23 Room / Location: [address] [place] [address] / [address] [place] [address]    Anesthesia Start: 6822 Anesthesia Stop: 1745    Procedure: OPEN REDUCTION INTERNAL FIXATION LEFT PATELLA FRACTURE (Left: Knee) Diagnosis:       Other closed fracture of left patella, initial encounter      (Other closed fracture of left patella, initial encounter [B12.811V])    Surgeons: Amari Taveras MD Responsible Provider: Marian Teague MD    Anesthesia Type: general ASA Status: 3          Anesthesia Type: No value filed.     Jon Phase I: Jon Score: 8    Jon Phase II:        Anesthesia Post Evaluation    Patient location during evaluation: PACU  Patient participation: complete - patient participated  Level of consciousness: awake and alert  Airway patency: patent  Nausea & Vomiting: no nausea and no vomiting  Complications: no  Cardiovascular status: hemodynamically stable  Respiratory status: acceptable  Hydration status: euvolemic  Pain management: adequate

## 2023-09-21 NOTE — PROGRESS NOTES
D: Patient here from OR via bed, taken to bay 5 in PACU, all current drips, treatments, skin issues, and plan of care were reviewed by both RN's, patient transferred in stable condition,, patient is awake, alert, and oriented x 4, call light is in reach. A: Assessment completed and documented, discussed plan of care with patient who agreed.

## 2023-09-21 NOTE — OP NOTE
Operative Note      Patient: Tarah Young  YOB: 1960  MRN: [id number]    Date of Procedure: 9/21/2023    Pre-Op Diagnosis Codes:     * Other closed fracture of left patella, initial encounter [S82.092A]    Post-Op Diagnosis: Same       Procedure(s):  OPEN REDUCTION INTERNAL FIXATION LEFT PATELLA FRACTURE    Surgeon(s):  Salas Littlejohn MD    Assistant:   Surgical Assistant: Anjel Wallace    Anesthesia: General    Estimated Blood Loss (mL): less than 522     Complications: None    Specimens:   * No specimens in log *    Implants:  Implant Name Type Inv. Item Serial No.  Lot No. LRB No. Used Action   SCREW BNE L50MM OD4MM LO PROF SUSIE BLNT TIP - [id number]  SCREW BNE L50MM OD4MM LO PROF SUSIE BLNT TIP  [employer]  Left 1 Implanted   WASHER ORTH DIA7MM S STL - [id number]  WASHER ORTH DIA7MM S STL  [employer]  Left 1 Implanted       Drains: * No LDAs found *    Findings: Displaced transverse inferior pole patella fracture with comminution of the medial patella, patellar tendon tearing. Detailed Description of Procedure:   Patient was positively identified in the preoperative holding area by attending surgeon. Informed consent was verified and placed on chart. The left lower extremity was marked appropriately. The patient was taken the operating room by the anesthesia team.  The general anesthesia was induced. He was positioned supine with all bony prominences well-padded. A bump was placed under the ipsilateral hip to properly rotate the limb. A nonsterile tourniquet was placed high on the left thigh but not used during the procedure. At this point, the limb was examined. There is a 5 degree flexion contracture. There is a palpable gap at the patella. There was a large hemarthrosis. At this point, the limb was sterilely prepped and draped. A timeout was held to verify the correct patient, operative site, laterality, and procedure. Everyone in the room was in agreement.

## 2023-09-21 NOTE — PROGRESS NOTES
4 Eyes Skin Assessment     NAME:  Del Escobar  YOB: 1960  MEDICAL RECORD NUMBER:  [id number]    The patient is being assessed for  Admission    I agree that at least one RN has performed a thorough Head to Toe Skin Assessment on the patient. ALL assessment sites listed below have been assessed. Areas assessed by both nurses:    Head, Face, Ears, Shoulders, Back, Chest, Arms, Elbows, Hands, Sacrum. Buttock, Coccyx, Ischium, Legs. Feet and Heels, Under Medical Devices , and Other          Does the Patient have a Wound?  No noted wound(s)       John Prevention initiated by RN: No  Wound Care Orders initiated by RN: No    Pressure Injury (Stage 3,4, Unstageable, DTI, NWPT, and Complex wounds) if present, place Wound referral order by RN under : No    New Ostomies, if present place, Ostomy referral order under : No     Nurse 1 eSignature: Electronically signed by Francheska Oden RN on 9/21/23 at 3:41 AM EDT    **SHARE this note so that the co-signing nurse can place an eSignature**    Nurse 2 eSignature: Electronically signed by Linette Coleman RN on 9/21/23 at 3:46 AM EDT

## 2023-09-21 NOTE — CARE COORDINATION
Case Management Assessment  Initial Evaluation    Date/Time of Evaluation: 9/21/2023 12:19 PM  Assessment Completed by: Barbie Martin RN    If patient is discharged prior to next notation, then this note serves as note for discharge by case management. 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 Yes    Chart Reviewed: Yes      History Provided by: Patient  Patient Orientation: Alert and Oriented, Person, Place, Situation, Self    Patient Cognition: Alert    Hospitalization in the last 30 days (Readmission):  No    If yes, Readmission Assessment in  Navigator will be completed. Advance Directives:      Code Status: Full Code   Patient's Primary Decision Maker is: Legal Next of Kin    Primary Decision Maker: Carlitos Co - Spouse - [phone]    Discharge Planning:    Patient lives with: Spouse/Significant Other Type of Home: House  Primary Care Giver: Self  Patient Support Systems include: Spouse/Significant Other   Current Financial resources: Medicare  Current community resources: None  Current services prior to admission: None            Current DME:              Type of Home Care services:  None    ADLS  Prior functional level: Independent in ADLs/IADLs  Current functional level: Assistance with the following:, Mobility, Toileting    PT AM-PAC:   /24  OT AM-PAC:   /24    Family can provide assistance at DC:  Yes  Would you like Case Management to discuss the discharge plan with any other family members/significant others, and if so, who? No  Plans to Return to Present Housing: Yes  Other Identified Issues/Barriers to RETURNING to current housing: none  Potential Assistance needed at discharge: N/A            Potential DME:    Patient expects to discharge to: [address] [place] [address] for transportation at discharge:  self      Financial    Payor: Amirah Mejia / Plan: Josh Ross HMO / Product Type: *No Product type* /     Does insurance require precert for SNF: Yes    Potential assistance Purchasing Medications: No  Meds-to-Beds request: Yes      Misty Mcclendon [id number] - [address], [place]  [address]  [address] [place] [address]  Phone: [phone] Fax: [phone]      Notes:    Factors facilitating achievement of predicted outcomes: Family support, Motivated, Cooperative, Pleasant, and Good insight into deficits    Barriers to discharge: Pain and Lower extremity weakness    Additional Case Management Notes: SPoke to pt. Lives at home with wife 1 story home. Pt fell accident fx patella. Surgery today, will eval PT/OT for possible HHC or outpt therapy post op. IPTA. No DME . Anticipate DC home. [facility]  [facility] if needed post op. Pt has had elevated Kidney functions PCP been managing. 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  Freedom of choice list with basic dialogue that supports the patient's individualized plan of care/goals and shares the quality data associated with the providers was provided to:     Patient Representative Name:       The Patient and/or Patient Representative Agree with the Discharge Plan?       Sheron Walton RN  Case Management Department

## 2023-09-21 NOTE — CONSULTS
ORTHOPAEDIC SURGERY CONSULT NOTE    Chief Complaint   Patient presents with    Knee Pain     Patient slipped on shoes, fell onto left knee. Unable to bear any weight. Patient states he tried to stand up and knee bent backward. HISTORY OF PRESENT ILLNESS:  68yo male presents for evaluation of left knee injury. He sustained a mechanical fall while working in his garage. He reports his shoe got caught. He fell directly onto the anterior knee. He had difficulty weightbearing. He presented to the ER where xrays demonstrated displaced inferior pole patella fracture. He was admitted for operative intervention.     Past Medical History:   Diagnosis Date    Abscess of left hand 06/20/2017    Acute kidney injury superimposed on chronic kidney disease ([address]) 08/2021    Acute osteomyelitis of metatarsal bone of left foot ([address]) 09/16/2021    MSSA and Anaerococcus    Acute osteomyelitis of toe of right foot ([address]) 04/27/2022    MRSA    Adhesive capsulitis of shoulder 11/13/2014    Cellulitis of right foot 04/27/2022    MRSA    Diabetic ulcer of right foot associated with type 2 diabetes mellitus, with necrosis of bone ([address]) 5/16/2022    Fractures     Gas gangrene of foot ([address]) 08/31/2021    chronic diabetic ulcer there finally healed Jan 2022    History of hyperbaric oxygen therapy 09/21/2021    Hercules 4 diabetic foot    Ischemic toe (right middle) 04/27/2022    amputated    PNA (pneumonia) 03/28/2016    Pressure ulcer of left lateral forefoot, stage 4 ([address]) 09/16/2021    Pressure ulcer of toe of right foot, stage 3 ([address]) 4/12/2022       Current Facility-Administered Medications   Medication Dose Route Frequency Provider Last Rate Last Admin    ceFAZolin (ANCEF) 2000 mg in 0.9% sodium chloride 100 mL IVPB  2,000 mg IntraVENous On Call to [address], [place]        labetalol (NORMODYNE;TRANDATE) injection 10 mg  10 mg IntraVENous Q4H PRN SHAILESH Armas - CNP   10 mg at 09/20/23 8521    amLODIPine ([place]) injection 2 mg  2 mg IntraVENous Q4H PRN SHAILESH Henriquez CNP   2 mg at 09/20/23 2302    insulin lispro (HUMALOG) injection vial 0-8 Units  0-8 Units SubCUTAneous TID SHAILESH Delgado CNP        insulin lispro (HUMALOG) injection vial 0-4 Units  0-4 Units SubCUTAneous Nightly SHAILESH Henriquez CNP            Past Surgical History:   Procedure Laterality Date    FOOT DEBRIDEMENT Left 08/31/2021    LEFT FOOT DEBRIDEMENT INCISION AND DRAINAGE performed by Alex Meza DPM at [address] [place] [address] Left 09/03/2021    STAGED INCISION AND DEBRIDEMENT LEFT FOOT WITH PARTIAL FIRST RAY AMPUTATION performed by Alex Meza DPM at [address] Left 06/19/2017    LEFT HAND DEBRIDEMENT INCISION AND DRAINAGE    NECK SURGERY      30s year    TOE AMPUTATION Left 2009    hallux    TOE AMPUTATION Right 4/29/2022    PARTIAL RIGHT FOOT AMPUTATION, ULCER DEBRIDEMENT RIGHT FOOT performed by Ene Matthews DPM at [address]         No Known Allergies    Family History   Problem Relation Age of Onset    Heart Failure Mother     Diabetes Mother     Cancer Father         throat cancer    Asthma Daughter         Sports induced as a child    Emphysema Neg Hx     Hypertension Neg Hx        Social History     Socioeconomic History    Marital status:      Spouse name: Not on file    Number of children: Not on file    Years of education: Not on file    Highest education level: Not on file   Occupational History    Not on file   Tobacco Use    Smoking status: Never    Smokeless tobacco: Never   Vaping Use    Vaping Use: Never used   Substance and Sexual Activity    Alcohol use: No     Alcohol/week: 0.0 standard drinks of alcohol    Drug use: No    Sexual activity: Not Currently     Partners: Female   Other Topics Concern    Not on file   Social History Narrative    Not on file     Social Determinants of Health     Financial Resource Strain: Not on file   Food Insecurity: IMPRESSION:  Transverse fracture of the patella with distraction. ASSESSMENT AND PLAN    70 y.o. male with the following orthopaedic surgery problems:    Left knee patella fracture, transverse inferior pole    Recommend surgical intervention in the form of ORIF. Risks, benefits, alternatives discussed. Informed consent obtained. Surgical site marked. Anticipate WBAT LLE in KI postop. Standard postop course described. Ancef IV on call to OR.     Shankar Mathew MD

## 2023-09-21 NOTE — PROGRESS NOTES
D: Patient's S BP is elevated at 180, 185, and 202, also C/O of nausea. A: Spoke with Dr. Kathy Brito who is on the unit, see new orders, medicated per doctors order, see MAR.

## 2023-09-22 LAB
ANION GAP SERPL CALCULATED.3IONS-SCNC: 12 MMOL/L (ref 3–16)
BACTERIA URNS QL MICRO: ABNORMAL /HPF
BILIRUB UR QL STRIP.AUTO: NEGATIVE
BUN SERPL-MCNC: 40 MG/DL (ref 7–20)
CALCIUM SERPL-MCNC: 8.5 MG/DL (ref 8.3–10.6)
CHLORIDE SERPL-SCNC: 107 MMOL/L (ref 99–110)
CLARITY UR: CLEAR
CO2 SERPL-SCNC: 20 MMOL/L (ref 21–32)
COLOR UR: YELLOW
CREAT SERPL-MCNC: 2.2 MG/DL (ref 0.8–1.3)
DEPRECATED RDW RBC AUTO: 16.1 % (ref 12.4–15.4)
GFR SERPLBLD CREATININE-BSD FMLA CKD-EPI: 32 ML/MIN/{1.73_M2}
GLUCOSE BLD-MCNC: 188 MG/DL (ref 70–99)
GLUCOSE BLD-MCNC: 205 MG/DL (ref 70–99)
GLUCOSE BLD-MCNC: 214 MG/DL (ref 70–99)
GLUCOSE BLD-MCNC: 215 MG/DL (ref 70–99)
GLUCOSE SERPL-MCNC: 199 MG/DL (ref 70–99)
GLUCOSE UR STRIP.AUTO-MCNC: 100 MG/DL
HCT VFR BLD AUTO: 34.3 % (ref 40.5–52.5)
HGB BLD-MCNC: 11.8 G/DL (ref 13.5–17.5)
HGB UR QL STRIP.AUTO: NEGATIVE
KETONES UR STRIP.AUTO-MCNC: NEGATIVE MG/DL
LEUKOCYTE ESTERASE UR QL STRIP.AUTO: NEGATIVE
MCH RBC QN AUTO: 31.7 PG (ref 26–34)
MCHC RBC AUTO-ENTMCNC: 34.5 G/DL (ref 31–36)
MCV RBC AUTO: 91.8 FL (ref 80–100)
NITRITE UR QL STRIP.AUTO: POSITIVE
PERFORMED ON: ABNORMAL
PH UR STRIP.AUTO: 6 [PH] (ref 5–8)
PLATELET # BLD AUTO: 211 K/UL (ref 135–450)
PMV BLD AUTO: 10.2 FL (ref 5–10.5)
POTASSIUM SERPL-SCNC: 5.2 MMOL/L (ref 3.5–5.1)
PROT UR STRIP.AUTO-MCNC: 100 MG/DL
RBC # BLD AUTO: 3.74 M/UL (ref 4.2–5.9)
RBC #/AREA URNS HPF: ABNORMAL /HPF (ref 0–4)
SODIUM SERPL-SCNC: 139 MMOL/L (ref 136–145)
SP GR UR STRIP.AUTO: 1.02 (ref 1–1.03)
UA COMPLETE W REFLEX CULTURE PNL UR: ABNORMAL
UA DIPSTICK W REFLEX MICRO PNL UR: YES
URN SPEC COLLECT METH UR: ABNORMAL
UROBILINOGEN UR STRIP-ACNC: 0.2 E.U./DL
WBC # BLD AUTO: 9.7 K/UL (ref 4–11)
WBC #/AREA URNS HPF: ABNORMAL /HPF (ref 0–5)

## 2023-09-22 PROCEDURE — 81001 URINALYSIS AUTO W/SCOPE: CPT

## 2023-09-22 PROCEDURE — 6360000002 HC RX W HCPCS: Performed by: NURSE PRACTITIONER

## 2023-09-22 PROCEDURE — 97116 GAIT TRAINING THERAPY: CPT

## 2023-09-22 PROCEDURE — 6370000000 HC RX 637 (ALT 250 FOR IP): Performed by: STUDENT IN AN ORGANIZED HEALTH CARE EDUCATION/TRAINING PROGRAM

## 2023-09-22 PROCEDURE — 97161 PT EVAL LOW COMPLEX 20 MIN: CPT

## 2023-09-22 PROCEDURE — 6370000000 HC RX 637 (ALT 250 FOR IP): Performed by: SPECIALIST/TECHNOLOGIST

## 2023-09-22 PROCEDURE — 80048 BASIC METABOLIC PNL TOTAL CA: CPT

## 2023-09-22 PROCEDURE — 2580000003 HC RX 258: Performed by: ORTHOPAEDIC SURGERY

## 2023-09-22 PROCEDURE — 97166 OT EVAL MOD COMPLEX 45 MIN: CPT

## 2023-09-22 PROCEDURE — 6360000002 HC RX W HCPCS: Performed by: ORTHOPAEDIC SURGERY

## 2023-09-22 PROCEDURE — 6370000000 HC RX 637 (ALT 250 FOR IP): Performed by: ORTHOPAEDIC SURGERY

## 2023-09-22 PROCEDURE — 97530 THERAPEUTIC ACTIVITIES: CPT

## 2023-09-22 PROCEDURE — 1200000000 HC SEMI PRIVATE

## 2023-09-22 PROCEDURE — 27524 TREAT KNEECAP FRACTURE: CPT | Performed by: ORTHOPAEDIC SURGERY

## 2023-09-22 PROCEDURE — 85027 COMPLETE CBC AUTOMATED: CPT

## 2023-09-22 PROCEDURE — 36415 COLL VENOUS BLD VENIPUNCTURE: CPT

## 2023-09-22 RX ORDER — METHOCARBAMOL 500 MG/1
500 TABLET, FILM COATED ORAL 3 TIMES DAILY
Qty: 30 TABLET | Refills: 0 | Status: SHIPPED | OUTPATIENT
Start: 2023-09-22 | End: 2023-10-02

## 2023-09-22 RX ORDER — POLYETHYLENE GLYCOL 3350 17 G/17G
17 POWDER, FOR SOLUTION ORAL DAILY
Status: DISCONTINUED | OUTPATIENT
Start: 2023-09-22 | End: 2023-09-23 | Stop reason: HOSPADM

## 2023-09-22 RX ORDER — DOXYCYCLINE HYCLATE 100 MG
100 TABLET ORAL EVERY 12 HOURS SCHEDULED
Qty: 14 TABLET | Refills: 0 | Status: SHIPPED | OUTPATIENT
Start: 2023-09-22 | End: 2023-09-29

## 2023-09-22 RX ORDER — POLYETHYLENE GLYCOL 3350 17 G/17G
17 POWDER, FOR SOLUTION ORAL DAILY
Qty: 10 PACKET | Refills: 0 | COMMUNITY
Start: 2023-09-23 | End: 2023-10-03

## 2023-09-22 RX ORDER — ACETAMINOPHEN 500 MG
1000 TABLET ORAL EVERY 8 HOURS SCHEDULED
Status: DISCONTINUED | OUTPATIENT
Start: 2023-09-22 | End: 2023-09-23 | Stop reason: HOSPADM

## 2023-09-22 RX ORDER — DOXYCYCLINE HYCLATE 100 MG
100 TABLET ORAL EVERY 12 HOURS SCHEDULED
Status: DISCONTINUED | OUTPATIENT
Start: 2023-09-22 | End: 2023-09-23 | Stop reason: HOSPADM

## 2023-09-22 RX ORDER — ACETAMINOPHEN 325 MG/1
650 TABLET ORAL EVERY 6 HOURS
Status: DISCONTINUED | OUTPATIENT
Start: 2023-09-22 | End: 2023-09-22

## 2023-09-22 RX ORDER — OXYCODONE HYDROCHLORIDE 5 MG/1
5 TABLET ORAL EVERY 6 HOURS PRN
Qty: 28 TABLET | Refills: 0 | Status: SHIPPED | OUTPATIENT
Start: 2023-09-22 | End: 2023-10-04 | Stop reason: SDUPTHER

## 2023-09-22 RX ORDER — ASPIRIN 325 MG
325 TABLET, DELAYED RELEASE (ENTERIC COATED) ORAL DAILY
Status: DISCONTINUED | OUTPATIENT
Start: 2023-09-22 | End: 2023-09-23 | Stop reason: HOSPADM

## 2023-09-22 RX ORDER — ASPIRIN 325 MG
325 TABLET, DELAYED RELEASE (ENTERIC COATED) ORAL DAILY
Qty: 42 TABLET | Refills: 0 | Status: SHIPPED | OUTPATIENT
Start: 2023-09-22 | End: 2023-11-03

## 2023-09-22 RX ORDER — MORPHINE SULFATE 4 MG/ML
4 INJECTION, SOLUTION INTRAMUSCULAR; INTRAVENOUS ONCE
Status: COMPLETED | OUTPATIENT
Start: 2023-09-22 | End: 2023-09-22

## 2023-09-22 RX ORDER — ACETAMINOPHEN 325 MG/1
650 TABLET ORAL EVERY 6 HOURS
Qty: 240 TABLET | Refills: 0 | COMMUNITY
Start: 2023-09-22 | End: 2023-10-22

## 2023-09-22 RX ORDER — OXYCODONE HYDROCHLORIDE 5 MG/1
10 TABLET ORAL EVERY 4 HOURS PRN
Status: DISCONTINUED | OUTPATIENT
Start: 2023-09-22 | End: 2023-09-23 | Stop reason: HOSPADM

## 2023-09-22 RX ORDER — METHOCARBAMOL 500 MG/1
500 TABLET, FILM COATED ORAL 3 TIMES DAILY
Status: DISCONTINUED | OUTPATIENT
Start: 2023-09-22 | End: 2023-09-23 | Stop reason: HOSPADM

## 2023-09-22 RX ORDER — OXYCODONE HYDROCHLORIDE 5 MG/1
5 TABLET ORAL EVERY 4 HOURS PRN
Status: DISCONTINUED | OUTPATIENT
Start: 2023-09-22 | End: 2023-09-23 | Stop reason: HOSPADM

## 2023-09-22 RX ADMIN — Medication 10 ML: at 20:05

## 2023-09-22 RX ADMIN — INSULIN GLARGINE 42 UNITS: 100 INJECTION, SOLUTION SUBCUTANEOUS at 20:01

## 2023-09-22 RX ADMIN — AMLODIPINE BESYLATE 10 MG: 5 TABLET ORAL at 10:05

## 2023-09-22 RX ADMIN — OXYCODONE HYDROCHLORIDE 10 MG: 5 TABLET ORAL at 10:07

## 2023-09-22 RX ADMIN — METHOCARBAMOL 500 MG: 500 TABLET ORAL at 16:24

## 2023-09-22 RX ADMIN — INSULIN LISPRO 2 UNITS: 100 INJECTION, SOLUTION INTRAVENOUS; SUBCUTANEOUS at 18:52

## 2023-09-22 RX ADMIN — Medication 2000 MG: at 10:13

## 2023-09-22 RX ADMIN — OXYCODONE HYDROCHLORIDE 10 MG: 5 TABLET ORAL at 15:08

## 2023-09-22 RX ADMIN — Medication 10 ML: at 10:06

## 2023-09-22 RX ADMIN — TAMSULOSIN HYDROCHLORIDE 0.4 MG: 0.4 CAPSULE ORAL at 10:05

## 2023-09-22 RX ADMIN — LABETALOL HYDROCHLORIDE 10 MG: 5 INJECTION INTRAVENOUS at 13:10

## 2023-09-22 RX ADMIN — MORPHINE SULFATE 4 MG: 4 INJECTION, SOLUTION INTRAMUSCULAR; INTRAVENOUS at 21:14

## 2023-09-22 RX ADMIN — LABETALOL HYDROCHLORIDE 10 MG: 5 INJECTION INTRAVENOUS at 22:49

## 2023-09-22 RX ADMIN — LABETALOL HYDROCHLORIDE 10 MG: 5 INJECTION INTRAVENOUS at 18:41

## 2023-09-22 RX ADMIN — SODIUM CHLORIDE: 9 INJECTION, SOLUTION INTRAVENOUS at 05:41

## 2023-09-22 RX ADMIN — OXYCODONE HYDROCHLORIDE 5 MG: 5 TABLET ORAL at 20:01

## 2023-09-22 RX ADMIN — OXYCODONE AND ACETAMINOPHEN 1 TABLET: 7.5; 325 TABLET ORAL at 01:58

## 2023-09-22 RX ADMIN — ACETAMINOPHEN 1000 MG: 500 TABLET ORAL at 21:13

## 2023-09-22 RX ADMIN — POLYETHYLENE GLYCOL 3350 17 G: 17 POWDER, FOR SOLUTION ORAL at 10:10

## 2023-09-22 RX ADMIN — ATORVASTATIN CALCIUM 40 MG: 40 TABLET, FILM COATED ORAL at 20:01

## 2023-09-22 RX ADMIN — METHOCARBAMOL 500 MG: 500 TABLET ORAL at 20:01

## 2023-09-22 RX ADMIN — ACETAMINOPHEN 650 MG: 325 TABLET ORAL at 16:24

## 2023-09-22 RX ADMIN — DOXYCYCLINE HYCLATE 100 MG: 100 TABLET, COATED ORAL at 10:05

## 2023-09-22 RX ADMIN — DOXYCYCLINE HYCLATE 100 MG: 100 TABLET, COATED ORAL at 20:01

## 2023-09-22 RX ADMIN — ACETAMINOPHEN 650 MG: 325 TABLET ORAL at 10:11

## 2023-09-22 RX ADMIN — INSULIN LISPRO 2 UNITS: 100 INJECTION, SOLUTION INTRAVENOUS; SUBCUTANEOUS at 13:03

## 2023-09-22 RX ADMIN — MORPHINE SULFATE 2 MG: 2 INJECTION, SOLUTION INTRAMUSCULAR; INTRAVENOUS at 05:49

## 2023-09-22 RX ADMIN — ASPIRIN 325 MG: 325 TABLET, COATED ORAL at 13:42

## 2023-09-22 ASSESSMENT — PAIN SCALES - GENERAL
PAINLEVEL_OUTOF10: 7
PAINLEVEL_OUTOF10: 6
PAINLEVEL_OUTOF10: 8
PAINLEVEL_OUTOF10: 5
PAINLEVEL_OUTOF10: 0
PAINLEVEL_OUTOF10: 8
PAINLEVEL_OUTOF10: 8
PAINLEVEL_OUTOF10: 6
PAINLEVEL_OUTOF10: 7
PAINLEVEL_OUTOF10: 8

## 2023-09-22 ASSESSMENT — PAIN DESCRIPTION - ORIENTATION
ORIENTATION: LEFT;MID
ORIENTATION: LEFT

## 2023-09-22 ASSESSMENT — PAIN DESCRIPTION - LOCATION
LOCATION: HIP
LOCATION: KNEE
LOCATION: KNEE
LOCATION: KNEE;HEAD
LOCATION: KNEE
LOCATION: KNEE

## 2023-09-22 ASSESSMENT — PAIN DESCRIPTION - DESCRIPTORS
DESCRIPTORS: ACHING

## 2023-09-22 ASSESSMENT — PAIN DESCRIPTION - PAIN TYPE
TYPE: SURGICAL PAIN;ACUTE PAIN
TYPE: SURGICAL PAIN

## 2023-09-22 ASSESSMENT — PAIN DESCRIPTION - FREQUENCY
FREQUENCY: INTERMITTENT
FREQUENCY: INTERMITTENT

## 2023-09-22 NOTE — DISCHARGE INSTRUCTIONS
Discharge instructions  Weightbearing as tolerated to the left leg with knee straight in brace. OK to remove post op ace wrap on POD 2. Use ace wrap as needed for swelling reduction. Do not pull too tight when applying, if your toes start to tingle or feel cold, your ace wrap may be too tight and you should remove it immediately. Use walker to assist.  Use pain as a guide to activity. Use ice to limit swelling. Take pain medications as prescribed. Leave bandaid dressing in place x 7 days postop. Apply new mepilex dressing. May change sooner if saturation. May shower with dressing in place. Allow water to run over dressing. No tub baths. Do not submerge. Look out for worsening pain, increasing redness, fevers/chills, numbness, chest pain, shortness of breath. Call the office at [phone] if you have questions/concerns. Can also call/text Dr. Tanya Jernigan at [phone], his personal cell phone number. Followup in 2 weeks for wound check.

## 2023-09-22 NOTE — PROGRESS NOTES
Writer PS Dr. Joyce Olson pt bp has been consistently elevated. Pain meds and labetalol given can pt be placed on tele? Writer received a call from MD. Yahir Odom to placed pt on tele. Order was placed.

## 2023-09-22 NOTE — PROGRESS NOTES
Physical Therapy  Facility/Department: [place] [address] - MED SURG/ORTHO  Physical Therapy Initial Assessment    Name: Nitesh Quinonez  : 1960  MRN: [id number]  Date of Service: 2023    Discharge Recommendations:  24 hour supervision or assist, Outpatient PT (pending MD preference for continued PT at d/c)   PT Equipment Recommendations  Equipment Needed: Yes  Mobility Devices: Campos [place]: Rolling  Other: Pt will require a RW      Patient Diagnosis(es): The primary encounter diagnosis was Closed displaced transverse fracture of left patella, initial encounter. A diagnosis of CAMRYN (acute kidney injury) ([address]) was also pertinent to this visit. 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 Past Surgical History:  has a past surgical history that includes Wrist fracture surgery; Toe amputation (Left, ); Neck surgery; Hand surgery (Left, 2017); Foot Debridement (Left, 2021); Foot Debridement (Left, 2021); and Toe amputation (Right, 2022). Assessment   Body Structures, Functions, Activity Limitations Requiring Skilled Therapeutic Intervention: Decreased functional mobility ; Decreased ROM; Decreased ADL status; Decreased strength;Decreased safe awareness;Decreased endurance;Decreased balance;Decreased high-level IADLs; Increased pain;Decreased posture  Assessment: Patient seen for PT evaluation, transfers, exercises to progress strength and balance by 9/24. Patient Goals   Patient Goals : \"go home\"       Education  Patient Education  Education Given To: Patient  Education Provided: Role of Therapy;Orientation;Plan of Care;Transfer Training;Equipment; Energy Conservation;Precautions  Education Provided Comments: role of PT, KI, restrictions, importance of progressive mobility, need for safety, car transfer technique  Education Method: Demonstration;Verbal  Barriers to Learning: None  Education Outcome: Verbalized understanding      Therapy Time   Individual Concurrent Group Co-treatment   Time In 0910         Time Out 0936         Minutes 26         Timed Code Treatment Minutes: 11 Minutes (15 min eval)       Simón Weeks PT     If pt is unable to be seen after this session, please let this note serve as discharge summary. Please see case management note for discharge disposition. Thank you.

## 2023-09-22 NOTE — PROGRESS NOTES
Occupational Therapy  Facility/Department: [place] [address] - MED SURG/ORTHO  Occupational Therapy Initial Assessment and Treatment Note    Name: Chiquis Disla  : 1960  MRN: [id number]  Date of Service: 2023    Discharge Recommendations:  24 hour supervision or assist  OT Equipment Recommendations  Equipment Needed: Yes  Mobility Devices: ADL Assistive Devices  ADL Assistive Devices: Transfer Tub Bench     If pt is unable to be seen after this session, please let this note serve as discharge summary. Please see case management note for discharge disposition. Thank you. Patient Diagnosis(es): The primary encounter diagnosis was Closed displaced transverse fracture of left patella, initial encounter. A diagnosis of CAMRYN (acute kidney injury) ([address]) was also pertinent to this visit. 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 Past Surgical History:  has a past surgical history that includes Wrist fracture surgery; Toe amputation (Left, ); Neck surgery; Hand surgery (Left, 2017); Foot Debridement (Left, 2021); Foot Debridement (Left, 2021); and Toe amputation (Right, 2022). Assessment   Performance deficits / Impairments: Decreased functional mobility ; Decreased ADL status; Decreased strength;Decreased balance;Decreased posture  Assessment: Pt was admitted to [facility] for patellar fracture and is s/p ORIF on 9/21. Pt is WBAT with knee immobilizer on LLE. PTA, pt was IND with ADLs, IADLs, funcitonal mobility, and functional transfers. Today, pt required CGA for bed mobility, CGA for funcitonal transfers, and CGA for funcitonal mobility with RW. Pt required modA for LB dressing and declined all other ADLs. He is functioning below his baseline secondary to the deficits listed above and will benefit from continued skilled acute OT to maximize safety and IND with ADL tasks. Home with 24/7 SPV is recommended at d/c. Co-tx collaboration this date to safely meet goals and will have better occupational performance outcomes with in a co-treatment than 1:1 session. Prognosis: Good  Decision Making: Medium Complexity  REQUIRES OT FOLLOW-UP: Yes  Activity Tolerance  Activity Tolerance: Patient Tolerated treatment well        Plan   Occupational Therapy Plan  Times Per Week: 4-6x/wk  Current Treatment Recommendations: Strengthening, Balance training, Functional mobility training, Endurance training, Safety education & training, Self-Care / ADL     Restrictions  Restrictions/Precautions  Restrictions/Precautions: Weight Bearing, Fall Risk, Contact Precautions  Required Braces or Orthoses?: Yes  Lower Extremity Weight Bearing Restrictions  Right Lower Extremity Weight Bearing: Weight Bearing As Tolerated  Required Braces or Orthoses  Left Lower Extremity Brace: Knee Immobilizer  Position Activity Restriction  Other position/activity restrictions: Full weightbearing as tolerated left lower extremity with knee in knee immobilizer locked in extension. contact isolation for MRSA. progressive ambulation.     Subjective   General  Chart Reviewed: Yes  Patient assessed for rehabilitation services?: Yes  Family / Caregiver Present: No  Referring Practitioner: Catracho Neal MD  Diagnosis: closed patellar sleeve fracture of L knee with nonunion  3/10 pain in L knee    Social/Functional History  Social/Functional History  Lives With: Spouse  Type of Home: House  Home Layout: One level  Home Access: Level entry  Bathroom Shower/Tub: Tub/Shower unit  Bathroom Toilet: Standard  Bathroom Equipment: Grab bars in [address]: TwitJump  Has the patient had two or more falls in the past year or any fall with injury in the past year?: Yes (1 fall that resulted in this hospitalization, no other falls)  ADL Assistance: [address] Independent  Homemaking Responsibilities: Yes  Meal Prep Responsibility: Primary  Laundry Responsibility: Secondary  Cleaning Responsibility: Secondary  Bill Paying/Finance Responsibility: Secondary  Shopping Responsibility: Secondary  Health Care Management: Secondary  Ambulation Assistance: Independent  Transfer Assistance: Independent  Active : Yes  Occupation: Retired  Type of Occupation: X-ray technician  Leisure & Hobbies: \"mess around in garage\"       Objective   Pulse: 72  [address]: Monitor  BP: (!) 176/75  BP Location: Left upper arm  BP Method: Automatic  Patient Position: Sitting;Up in chair  MAP (Calculated): 109  Respirations: 17  SpO2: 91 %  O2 Device: None (Room air)            Observation/Palpation  Posture: Fair  Observation: ace wrap and knee immobilizer on LLE  Safety Devices  Type of Devices: All fall risk precautions in place;Call light within reach; Chair alarm in place;Gait belt;Patient at risk for falls; Left in chair;Nurse notified  Restraints  Restraints Initially in Place: No    Bed Mobility Training  Bed Mobility Training: Yes  Interventions: Safety awareness training;Verbal cues; Tactile cues  Supine to Sit: Contact-guard assistance; Adaptive equipment; Additional time (HOB elevated)  Sit to Supine: Other (comment) (pt in chair at end of session)  Balance  Sitting: Intact  Standing: Impaired  Standing - Static: Fair;Constant support  Standing - Dynamic: Constant support; Fair  Transfer Training  Transfer Training: Yes  Interventions: Safety awareness training;Verbal cues; Weight shifting training/pressure relief  Sit to Stand: Contact-guard assistance; Adaptive equipment; Additional time (from EOB with RW)  Stand to Sit: Contact-guard assistance; Additional time; Adaptive equipment (to chair with RW)       AROM: Within functional limits  Strength: Within functional limits  Coordination: Within functional limits  Tone: Normal  Sensation: Impaired (neuropathy)    ADL  UE Dressing: Minimal assistance  UE Dressing Skilled Clinical Factors: gown  LE Dressing: Moderate assistance  LE Dressing Skilled Clinical Factors: assist to don shorts over feet and knees, pt able to don pants over hips with modA for standing balance  Additional Comments: Pt declined further ADL needs     Activity Tolerance  Activity Tolerance: Patient tolerated evaluation without incident;Patient limited by pain; Patient limited by endurance        Vision  Vision: Impaired  Vision Exceptions: Wears glasses for reading  Hearing  Hearing: Exceptions to [facility]  Hearing Exceptions: Hard of hearing/hearing concerns    Cognition  Overall Cognitive Status: WFL  Orientation  Overall Orientation Status: Within Functional Limits  Orientation Level: Oriented X4                  Education Given To: Patient  Education Provided: Role of Therapy;Plan of Care;ADL Adaptive Strategies;Transfer Training; Fall Prevention Strategies  Education Method: Demonstration;Verbal  Barriers to Learning: None  Education Outcome: Verbalized understanding;Continued education needed  Disease Specific Education: Pt educated on weight bearing status, post-op precautions, appropriate DME, and safe mobility with AD.  Pt verbalized understanding       AM-PAC Score        AM-[facility] Inpatient Daily Activity Raw Score: 17 (09/22/23 1128)  AM-PAC Inpatient ADL T-Scale Score : 37.26 (09/22/23 1128)  ADL Inpatient CMS 0-100% Score: 50.11 (09/22/23 1128)  ADL Inpatient CMS G-Code Modifier : CK (09/22/23 1128)      Goals  Short Term

## 2023-09-22 NOTE — CARE COORDINATION
CASE MANAGEMENT DISCHARGE SUMMARY    Writer met with pt and wife bedside to discuss discharge plan. Pt will do outpatient therapy, denies need for home care. Pt agrees with recs for rolling walker, writer delivered [address] walker to bedside. OT recommending tub transfer bench, writer confirmed with Carli/Randell that insurance will not cover and it will be less expensive to get on own. Pt/wife will look into TTB from [facility], another pharmacy or [facility]. Pt denies any other needs, states pain is tolerable. Wife agrees with plan for home, denies any other needs, has good support system.   RITA Vázquez-RN

## 2023-09-22 NOTE — PROGRESS NOTES
09/22/23 1104   Encounter Summary   Encounter Overview/Reason  Initial Encounter   Service Provided For: Patient   Referral/Consult From: [facility]   Support System Spouse; Children;Family members   Last Encounter  09/22/23  ([address] [place] [address] visited; provided pastoral support and encouragement)   Assessment/Intervention/Outcome   Assessment Coping; Hopeful

## 2023-09-23 VITALS
HEIGHT: 73 IN | DIASTOLIC BLOOD PRESSURE: 79 MMHG | HEART RATE: 73 BPM | OXYGEN SATURATION: 91 % | SYSTOLIC BLOOD PRESSURE: 183 MMHG | TEMPERATURE: 98.2 F | WEIGHT: 255 LBS | RESPIRATION RATE: 16 BRPM | BODY MASS INDEX: 33.8 KG/M2

## 2023-09-23 LAB
GLUCOSE BLD-MCNC: 192 MG/DL (ref 70–99)
GLUCOSE BLD-MCNC: 221 MG/DL (ref 70–99)
GLUCOSE BLD-MCNC: 223 MG/DL (ref 70–99)
PERFORMED ON: ABNORMAL

## 2023-09-23 PROCEDURE — 2580000003 HC RX 258: Performed by: ORTHOPAEDIC SURGERY

## 2023-09-23 PROCEDURE — 97110 THERAPEUTIC EXERCISES: CPT

## 2023-09-23 PROCEDURE — 97530 THERAPEUTIC ACTIVITIES: CPT

## 2023-09-23 PROCEDURE — 6370000000 HC RX 637 (ALT 250 FOR IP): Performed by: STUDENT IN AN ORGANIZED HEALTH CARE EDUCATION/TRAINING PROGRAM

## 2023-09-23 PROCEDURE — 6370000000 HC RX 637 (ALT 250 FOR IP): Performed by: ORTHOPAEDIC SURGERY

## 2023-09-23 PROCEDURE — 6360000002 HC RX W HCPCS: Performed by: ORTHOPAEDIC SURGERY

## 2023-09-23 PROCEDURE — 97116 GAIT TRAINING THERAPY: CPT

## 2023-09-23 PROCEDURE — 6370000000 HC RX 637 (ALT 250 FOR IP): Performed by: SPECIALIST/TECHNOLOGIST

## 2023-09-23 RX ORDER — INSULIN GLARGINE 100 [IU]/ML
42 INJECTION, SOLUTION SUBCUTANEOUS NIGHTLY
Qty: 10 ML | Refills: 3 | Status: SHIPPED | OUTPATIENT
Start: 2023-09-23

## 2023-09-23 RX ORDER — HYDROCHLOROTHIAZIDE 25 MG/1
25 TABLET ORAL DAILY
Status: DISCONTINUED | OUTPATIENT
Start: 2023-09-23 | End: 2023-09-23 | Stop reason: HOSPADM

## 2023-09-23 RX ADMIN — INSULIN LISPRO 2 UNITS: 100 INJECTION, SOLUTION INTRAVENOUS; SUBCUTANEOUS at 11:41

## 2023-09-23 RX ADMIN — AMLODIPINE BESYLATE 10 MG: 5 TABLET ORAL at 09:29

## 2023-09-23 RX ADMIN — DOXYCYCLINE HYCLATE 100 MG: 100 TABLET, COATED ORAL at 09:29

## 2023-09-23 RX ADMIN — ACETAMINOPHEN 1000 MG: 500 TABLET ORAL at 13:44

## 2023-09-23 RX ADMIN — INSULIN LISPRO 2 UNITS: 100 INJECTION, SOLUTION INTRAVENOUS; SUBCUTANEOUS at 17:19

## 2023-09-23 RX ADMIN — OXYCODONE HYDROCHLORIDE 10 MG: 5 TABLET ORAL at 14:28

## 2023-09-23 RX ADMIN — ACETAMINOPHEN 1000 MG: 500 TABLET ORAL at 06:08

## 2023-09-23 RX ADMIN — Medication 10 ML: at 09:31

## 2023-09-23 RX ADMIN — TAMSULOSIN HYDROCHLORIDE 0.4 MG: 0.4 CAPSULE ORAL at 09:28

## 2023-09-23 RX ADMIN — HYDROCHLOROTHIAZIDE 25 MG: 25 TABLET ORAL at 17:25

## 2023-09-23 RX ADMIN — LABETALOL HYDROCHLORIDE 10 MG: 5 INJECTION INTRAVENOUS at 11:41

## 2023-09-23 RX ADMIN — ASPIRIN 325 MG: 325 TABLET, COATED ORAL at 09:29

## 2023-09-23 RX ADMIN — METHOCARBAMOL 500 MG: 500 TABLET ORAL at 09:29

## 2023-09-23 ASSESSMENT — PAIN DESCRIPTION - LOCATION
LOCATION: KNEE
LOCATION: KNEE

## 2023-09-23 ASSESSMENT — PAIN SCALES - GENERAL
PAINLEVEL_OUTOF10: 0
PAINLEVEL_OUTOF10: 7
PAINLEVEL_OUTOF10: 0
PAINLEVEL_OUTOF10: 3

## 2023-09-23 ASSESSMENT — PAIN DESCRIPTION - ORIENTATION
ORIENTATION: LEFT
ORIENTATION: LEFT

## 2023-09-23 ASSESSMENT — PAIN DESCRIPTION - DESCRIPTORS
DESCRIPTORS: ACHING
DESCRIPTORS: ACHING

## 2023-09-23 ASSESSMENT — PAIN - FUNCTIONAL ASSESSMENT: PAIN_FUNCTIONAL_ASSESSMENT: PREVENTS OR INTERFERES SOME ACTIVE ACTIVITIES AND ADLS

## 2023-09-23 NOTE — PROGRESS NOTES
Physical Therapy  Facility/Department: [facility] - MED SURG/ORTHO  Daily Treatment Note  NAME: Francisco Salter  : 1960  MRN: [id number]    Date of Service: 2023    Discharge Recommendations:  24 hour supervision or assist, Outpatient PT   PT Equipment Recommendations  Equipment Needed: Yes  Mobility Devices: Charla Mar: Rolling  Other: Pt will require a RW to improve mobility, balance, and functional capacity. Patient Diagnosis(es): The primary encounter diagnosis was Closed displaced transverse fracture of left patella, initial encounter. A diagnosis of CAMRYN (acute kidney injury) ([address]) was also pertinent to this visit. Assessment   Assessment: pt found in bed, agreeable to PT treatment. pt demonstrates SBA for bed mobility, SBA for functional transfers with RW, CGA fading to SBA for ambulation up to 30' with RW and knee immobilizer. pt demonstrates increased pain with weight bearing. pt requries increased time for trasnfers, bed mobility, and gait. pt continues to demonstrate decreased strength, endurance, mobiltiy, activity tolerance, and balance and would continue to benefit from skilled PT to address the above stated deficits during his stay in the acute care setting. continue to recommend DC home with 24 hour assist and RW. Activity Tolerance: Patient tolerated evaluation without incident;Patient limited by pain; Patient limited by endurance  Equipment Needed: Yes  Mobility Devices: Kiko Mixer  Other: Pt will require a RW to improve mobility, balance, and functional capacity. Plan    Physcial Therapy Plan  General Plan: 1 time a day 7 days a week  Current Treatment Recommendations: Strengthening;Balance training;Functional mobility training;Transfer training; Endurance training;Gait training; Safety education & training;Home exercise program;Patient/Caregiver education & training;Equipment evaluation, education, & procurement; Therapeutic activities assistance;Stand-by assistance; Additional time; Adaptive equipment (initially CGA fading to SBA.)  Interventions: Verbal cues; Safety awareness training  Base of Support: Widened  Speed/Clover: Slow;Pace decreased (< 100 feet/min)  Step Length: Right shortened;Left shortened  Gait Abnormalities: Antalgic; Step to gait; Decreased step clearance  Distance (ft): 30 Feet  Assistive Device: Walker, rolling;Gait belt     PT Exercises  Exercise Treatment: AP, QS, GS, 1X15 each in supine. Safety Devices  Type of Devices: All fall risk precautions in place;Call light within reach; Chair alarm in place;Gait belt;Patient at risk for falls; Left in chair;Nurse notified  Restraints  Restraints Initially in Place: No       Goals  Short Term Goals  Time Frame for Short Term Goals: 9/25/23  Short Term Goal 1: Pt will complete supine>sit with mod I.  -9/23 GOAL NOT MET. Short Term Goal 2: Pt will complete STS with RW and Mod I.  -9/23 GOAL NOT MET. Short Term Goal 3: Pt will ambulate 50 ft with RW and mod I.  -9/23 GOAL NOT MET. Short Term Goal 4: Pt will tolerate 10-15 reps of LE exercises to progress strength and balance by 9/24.  -9/23 GOAL MET, continues to progress. Patient Goals   Patient Goals : \"go home\"    Education  Patient Education  Education Given To: Patient; Family (wife.)  Education Provided: Role of Therapy;Plan of Care;Transfer Training;Equipment;Precautions  Education Provided Comments: role of PT, KI, restrictions, importance of progressive mobility, need for safety, car transfer technique  Education Method: Demonstration;Verbal  Barriers to Learning: None  Education Outcome: Verbalized understanding    [facility] 6 Clicks Inpatient Mobility:  AM-PAC Basic Mobility - Inpatient   How much help is needed turning from your back to your side while in a flat bed without using bedrails?: None  How much help is needed moving from lying on your back to sitting on the side of a flat bed without using bedrails?: None  How

## 2023-09-23 NOTE — PLAN OF CARE
Bed in lowest position, wheels locked, 2/4 side rails up, nonskid footwear on. Bed/ chair check alarm in place, call light within reach. Pt instructed to call out when needing assistance. Pt stated understanding. Nurse will continue to monitor.       Problem: Safety - Adult  Goal: Free from fall injury  Outcome: Progressing

## 2023-09-23 NOTE — PROGRESS NOTES
Hospital Medicine Progress Note      Date of Admission: 9/20/2023  Hospital Day: 4    Chief Admission Complaint:  l knee pain      Subjective:  pain well controlled overnight, ready to dc     Presenting Admission History:       Florence Benton is a/an 70 y.o. male with a significant past medical history of hypertension, hyperlipidemia, and type 2 diabetes who presents to [facility] emergency department with complaint of left knee pain secondary to fall at home today. He notes that he was working in his garage, and his slip on shoes became twisted on his left foot as he was stepping forward causing him to invert his left foot and falling towards the left side. He states he landed on both knees, but left knee was more painful, was unable to get up immediately after. Wife was home and was able to assist and bring him here. His evaluation here included laboratory studies, EKG, chest x-ray, and left knee x-ray. Left knee x-ray showed transverse fracture of the patella with distraction. Chest x-ray is negative for acute findings. Pertinent laboratory findings include an elevated potassium at 5.6, BUN 46, creatinine 2.5, GFR 28, blood sugar 251, hemoglobin 13.8, and platelets 960. Urinalysis is pending. Dr. Mihaela Butt with orthopedic surgery was consulted, recommended knee immobilizer, nonweightbearing to left leg, n.p.o. after midnight, and anticipate surgical fixation tomorrow. Hospital team was consulted admit this patient for left patella fracture secondary to fall at home.     Assessment/Plan:      Current Principal Problem:  Closed patellar sleeve fracture of left knee with nonunion    Patellar fx- POD #2,pain well controlled, will get outpatient PT, dc today      Hyperkalemia- resolved      T2DM- continue current regimen while pt is NPO      CKD stage 3b- 2.3 this am, stable, continue to monitor      HTN- continue current regimen      Hx of MRSA infections        Physical Exam Performed: (any 3)  [] Collateral history obtained from:    [x] All available Consultant notes from yesterday/today were reviewed  [x] All current labs were reviewed and interpreted for clinical significance   [x] Appropriate follow-up labs were ordered    Medications:  Personally reviewed in detail in conjunction w/ labs as documented for evidence of drug toxicity. Infusion Medications    sodium chloride      dextrose      sodium chloride      sodium chloride 125 mL/hr at 09/22/23 0541     Scheduled Medications    doxycycline hyclate  100 mg Oral 2 times per day    polyethylene glycol  17 g Oral Daily    aspirin  325 mg Oral Daily    methocarbamol  500 mg Oral TID    acetaminophen  1,000 mg Oral 3 times per day    sodium chloride flush  5-40 mL IntraVENous 2 times per day    amLODIPine  10 mg Oral Daily    atorvastatin  40 mg Oral Nightly    insulin glargine  42 Units SubCUTAneous Nightly    tamsulosin  0.4 mg Oral Daily    sodium chloride flush  5-40 mL IntraVENous 2 times per day    insulin lispro  0-8 Units SubCUTAneous TID WC    insulin lispro  0-4 Units SubCUTAneous Nightly     PRN Meds: benzocaine-menthol, oxyCODONE **OR** oxyCODONE, sodium chloride flush, sodium chloride, ondansetron **OR** ondansetron, labetalol, glucose, dextrose bolus **OR** dextrose bolus, glucagon (rDNA), dextrose, sodium chloride flush, sodium chloride, [DISCONTINUED] acetaminophen **OR** acetaminophen     Labs:  Personally reviewed and interpreted for clinical significance.      Recent Labs     09/20/23 1757 09/21/23  0434 09/22/23  0626   WBC 10.3 8.0 9.7   HGB 13.8 12.1* 11.8*   HCT 39.7* 35.6* 34.3*    200 211     Recent Labs     09/20/23 1757 09/21/23  0434 09/22/23  0626    139 139   K 5.6* 4.4 5.2*    105 107   CO2 23 22 20*   BUN 46* 45* 40*   CREATININE 2.5* 2.3* 2.2*   CALCIUM 9.3 8.7 8.5   MG  --  1.60*  --      Recent Labs     09/20/23  1757   PROBNP 186*     No results for input(s): \"LABA1C\" in the last 72 hours. Recent Labs     09/20/23  1757   AST 18   ALT 20   BILITOT 0.4   ALKPHOS 115     No results for input(s): \"INR\", \"LACTA\", \"TSH\" in the last 72 hours. Urine Cultures: No results found for: \"LABURIN\"  Blood Cultures:   Lab Results   Component Value Date/Time    BC No Growth after 4 days of incubation. 04/27/2022 09:23 AM     Lab Results   Component Value Date/Time    BLOODCULT2 No Growth after 4 days of incubation.  04/27/2022 09:27 AM     Organism:   Lab Results   Component Value Date/Time    ORG Staph aureus MRSA 04/29/2022 11:27 AM         Maxx Willson DO

## 2023-09-24 NOTE — PROGRESS NOTES
Pt discharged from facility. IV removed, tele box removed and returned. Education provided to pt and spouse. All questions answered. Pt provided with extra mepliex dressings.     Adrienne Tineo RN

## 2023-09-25 ENCOUNTER — TELEPHONE (OUTPATIENT)
Dept: ORTHOPEDIC SURGERY | Age: 63
End: 2023-09-25

## 2023-09-25 NOTE — TELEPHONE ENCOUNTER
Appointment Request     Patient requesting earlier appointment: Yes  Appointment offered to patient: 10-  Patient Contact Number: [phone]    PT WIFE CALLING IN TO [address] [employer]. PT FIRST POST OP FOR HIS LT KNEE. PT HAD SX ON 8- AND WAS INSTRUCTED TO RETURN FOR A FOLLOW UP 2 WEEKS OUT AND I DID NOT SEE ANYTHING AVAIL. PLEASE CALL BACK PT WIFE AT THE NUMBER ABOVE.

## 2023-09-29 ENCOUNTER — TELEPHONE (OUTPATIENT)
Dept: ORTHOPEDIC SURGERY | Age: 63
End: 2023-09-29

## 2023-09-29 NOTE — TELEPHONE ENCOUNTER
General Question     Subject: Patient spouse Lux Douglas is calling requesting a calll back to confirm if they still need to change the dressing on his incision if patients incision isnt draining or a mess  Patient and /or Facility Request: Zak Souza Number: [phone]

## 2023-09-29 NOTE — TELEPHONE ENCOUNTER
Spoke with Yosi Quinones. I advised her to still changed Gene's bandage even though it doesn't look soiled. She was instructed that while changing it, she should not put any lotions/creams/wound /etc on or near the incision, just simply changed the dressing.

## 2023-10-04 ENCOUNTER — OFFICE VISIT (OUTPATIENT)
Dept: ORTHOPEDIC SURGERY | Age: 63
End: 2023-10-04

## 2023-10-04 VITALS — BODY MASS INDEX: 33.8 KG/M2 | WEIGHT: 255 LBS | HEIGHT: 73 IN

## 2023-10-04 DIAGNOSIS — Z47.89 ORTHOPEDIC AFTERCARE: ICD-10-CM

## 2023-10-04 DIAGNOSIS — S82.032A CLOSED DISPLACED TRANSVERSE FRACTURE OF LEFT PATELLA, INITIAL ENCOUNTER: Primary | ICD-10-CM

## 2023-10-04 RX ORDER — OXYCODONE HYDROCHLORIDE 5 MG/1
5 TABLET ORAL EVERY 6 HOURS PRN
Qty: 28 TABLET | Refills: 0 | Status: SHIPPED | OUTPATIENT
Start: 2023-10-04 | End: 2023-10-11

## 2023-10-04 NOTE — PROGRESS NOTES
extremity with hinged knee brace locked in full extension. Transitioned to hinged knee brace today. Would allow passive range of motion 0 to 40 degrees at this time. This will be passive motion only. Plan to advance to 0 to 70 degrees passive motion in 2 weeks from today's visit. He was instructed to lock in full extension while weightbearing. Incision site may be left open to air at this time point. May shower but not soak or submerge incision site. He was provided with a referral to physical therapy to facilitate this. He will remain on full dose aspirin for DVT prevention x6 weeks postop. Return to clinic in 4 weeks for repeat x-rays.     Melina Shahid MD

## 2023-10-17 ENCOUNTER — HOSPITAL ENCOUNTER (OUTPATIENT)
Dept: PHYSICAL THERAPY | Age: 63
Setting detail: THERAPIES SERIES
Discharge: HOME | End: 2023-10-17
Payer: MEDICARE

## 2023-10-17 PROCEDURE — 97110 THERAPEUTIC EXERCISES: CPT | Performed by: PHYSICAL THERAPIST

## 2023-10-17 PROCEDURE — 97161 PT EVAL LOW COMPLEX 20 MIN: CPT | Performed by: PHYSICAL THERAPIST

## 2023-10-17 NOTE — FLOWSHEET NOTE
Adjusted  Improve  knee AROM  Flexion to 120 degrees or  better to allow for proper joint functioning as indicated by patients functional deficits. Status: [] Progressing: [] Met: [] Not Met: [] Adjusted  Pt to improve strength by 2 muscle grades or better of quadriceps, gastroc/soleus, and hamstrings to work toward proper functional mobility and improving technique of ADLs. Status: [] Progressing: [] Met: [] Not Met: [] Adjusted  Patient will return to Usual work, housework or activities, Usual recreational activities, walking around house, putting shoes/socks on, lifting an object from the floor, light home activities, getting in/out of a car, walk 2 blocks, and walking on uneven ground without increased symptoms or restriction to work towards return to prior level of function. Status: [] Progressing: [] Met: [] Not Met: [] Adjusted  Patient will increase LE function to allow independence in all self-care activities. Patient will increase core/lumbopelvic function to allow independence in all self-care activities. Patient will resume normal work/leisure activities without pain. Status: [] Progressing: [] Met: [] Not Met: [] Adjusted     Overall Progression Towards Functional goals/ Treatment Progress Update:  [] Patient is progressing as expected towards functional goals listed. [] Progression is slowed due to complexities/Impairments listed. [] Progression has been slowed due to co-morbidities.   [x] Plan just implemented, too soon (<30days) to assess goals progression   [] Goals require adjustment due to lack of progress  [] Patient is not progressing as expected and requires additional follow up with physician  [] Other:     CHARGE CAPTURE     CHARGE GRID   CPT Code (TIMED) minutes # CPT Code (UNTIMED) #     [x] Therex (97580)  15 1  [x] EVAL:LOW (15366 - Typically 20 minutes

## 2023-10-26 ENCOUNTER — HOSPITAL ENCOUNTER (OUTPATIENT)
Dept: PHYSICAL THERAPY | Age: 63
Setting detail: THERAPIES SERIES
Discharge: HOME | End: 2023-10-26
Payer: MEDICARE

## 2023-10-26 PROCEDURE — 97140 MANUAL THERAPY 1/> REGIONS: CPT | Performed by: PHYSICAL THERAPIST

## 2023-10-26 PROCEDURE — 97110 THERAPEUTIC EXERCISES: CPT | Performed by: PHYSICAL THERAPIST

## 2023-10-26 NOTE — FLOWSHEET NOTE
progression   [] Goals require adjustment due to lack of progress  [] Patient is not progressing as expected and requires additional follow up with physician  [] Other:     CHARGE CAPTURE     CHARGE GRID   CPT Code (TIMED) minutes # CPT Code (UNTIMED) #     [x] Therex (18047)  21 1  [] EVAL:LOW (65462 - Typically 20 minutes face-to-face) 20    [] Neuromusc. Re-ed (99319)    [] Re-Eval (07791)     [x] Manual (90144) 9 1  [] Estim Unattended (93185)     [] Ther. Act (23151)    [] Ioana Cordova. Traction (80520)     [] Gait (06355)    [] Dry Needle 1-2 muscle (57021)     [] Aquatic Therex (17052)    [] Dry Needle 3+ muscle (46259)     [] Iontophoresis (15536)    [] VASO (41546)     [] Ultrasound (96318)    [] Group Therapy (28281)     [] Estim Attended (81018)    [] Other: Total Timed Code Tx Minutes 30 2       Total Treatment Minutes 30        Charge Justification:  (99177) THERAPEUTIC EXERCISE - Provided verbal/tactile cueing for activities related to strengthening, flexibility, endurance, ROM performed to prevent loss of range of motion, maintain or improve muscular strength or increase flexibility, following either an injury or surgery. (90726) [facility]- Reviewed/Progressed HEP activities related to strengthening, flexibility, endurance, ROM performed to prevent loss of range of motion, maintain or improve muscular strength or increase flexibility, following either an injury or surgery.   (03010) MANUAL THERAPY-  Manual therapy techniques, 1 or more regions, each 15 minutes (Mobilization/manipulation, manual lymphatic drainage, manual traction) for the purpose of modulating pain, promoting relaxation,  increasing ROM, reducing/eliminating soft tissue swelling/inflammation/restriction, improving soft tissue extensibility and allowing for proper ROM for normal function with self care, mobility, lifting and ambulation    TREATMENT PLAN   Plan: Cont POC- Continue emphasis/focus on improving proper muscle

## 2023-10-31 ENCOUNTER — HOSPITAL ENCOUNTER (OUTPATIENT)
Dept: PHYSICAL THERAPY | Age: 63
Setting detail: THERAPIES SERIES
Discharge: HOME | End: 2023-10-31
Payer: MEDICARE

## 2023-10-31 PROCEDURE — 97110 THERAPEUTIC EXERCISES: CPT | Performed by: PHYSICAL THERAPIST

## 2023-10-31 PROCEDURE — 97140 MANUAL THERAPY 1/> REGIONS: CPT | Performed by: PHYSICAL THERAPIST

## 2023-10-31 NOTE — FLOWSHEET NOTE
recruitment and activation/motor control patterns. Next visit plan to progress reps     Electronically Signed by Misael York, PT              Date: 10/31/2023     Note: If patient does not return for scheduled/recommended follow up visits, this note will serve as a discharge from care along with the most recent update on progress.

## 2023-11-07 ENCOUNTER — OFFICE VISIT (OUTPATIENT)
Dept: ORTHOPEDIC SURGERY | Age: 63
End: 2023-11-07

## 2023-11-07 ENCOUNTER — HOSPITAL ENCOUNTER (OUTPATIENT)
Dept: PHYSICAL THERAPY | Age: 63
Setting detail: THERAPIES SERIES
Discharge: HOME | End: 2023-11-07
Payer: MEDICARE

## 2023-11-07 VITALS — HEIGHT: 73 IN | WEIGHT: 255 LBS | BODY MASS INDEX: 33.8 KG/M2

## 2023-11-07 DIAGNOSIS — Z47.89 ORTHOPEDIC AFTERCARE: Primary | ICD-10-CM

## 2023-11-07 PROCEDURE — 97110 THERAPEUTIC EXERCISES: CPT | Performed by: PHYSICAL THERAPIST

## 2023-11-07 PROCEDURE — 97140 MANUAL THERAPY 1/> REGIONS: CPT | Performed by: PHYSICAL THERAPIST

## 2023-11-07 PROCEDURE — 99024 POSTOP FOLLOW-UP VISIT: CPT | Performed by: ORTHOPAEDIC SURGERY

## 2023-11-07 NOTE — FLOWSHEET NOTE
improvement in movement, function, and ADLs as indicated by functional deficits. Status: [] Progressing: [] Met: [] Not Met: [] Adjusted     Long Term Goals: To be achieved in: 10 weeks  Disability index score of 40% or less for the [facility] to assist with return top prior level of function. Status: [] Progressing: [] Met: [] Not Met: [] Adjusted  Improve  knee AROM  Flexion to 120 degrees or  better to allow for proper joint functioning as indicated by patients functional deficits. Status: [] Progressing: [] Met: [] Not Met: [] Adjusted  Pt to improve strength by 2 muscle grades or better of quadriceps, gastroc/soleus, and hamstrings to work toward proper functional mobility and improving technique of ADLs. Status: [] Progressing: [] Met: [] Not Met: [] Adjusted  Patient will return to Usual work, housework or activities, Usual recreational activities, walking around house, putting shoes/socks on, lifting an object from the floor, light home activities, getting in/out of a car, walk 2 blocks, and walking on uneven ground without increased symptoms or restriction to work towards return to prior level of function. Status: [] Progressing: [] Met: [] Not Met: [] Adjusted  Patient will increase LE function to allow independence in all self-care activities. Patient will increase core/lumbopelvic function to allow independence in all self-care activities. Patient will resume normal work/leisure activities without pain. Status: [] Progressing: [] Met: [] Not Met: [] Adjusted     Overall Progression Towards Functional goals/ Treatment Progress Update:  [] Patient is progressing as expected towards functional goals listed. [] Progression is slowed due to complexities/Impairments listed. [] Progression has been slowed due to co-morbidities.   [x] Plan just

## 2023-11-07 NOTE — PROGRESS NOTES
ORTHOPAEDIC SURGERY FOLLOWUP VISIT     CHIEF COMPLAINT: Left patella fracture follow-up     DATE OF INJURY: 9/20/2023  DIAGNOSIS: Left patella transverse inferior pole fracture  DATE OF SURGERY: 9/21/2023, ORIF left patella fracture     HISTORY OF PRESENT ILLNESS:  78-year-old male presents for evaluation 7 weeks status post ORIF left inferior pole patella fracture. Overall he is doing well. He reports that his pain is minimal.  He is using OTC medications. He is weaned completely off the narcotics. He has been ambulatory with the knee immobilizer in full extension. He has been attending PT. He denies wound healing problems. No fever, chills, night sweats. PHYSICAL EXAM:  General: Well-appearing. No distress. Left knee: Anterior midline surgical scar is pristinely healed. There is no signs of dehiscence. No open areas. No surrounding erythema. Mild anterior knee swelling is present. Minimal tenderness to palpation. Knee range of motion was not assessed today. Sensation is intact to light touch in deep peroneal, superficial peroneal, tibial, sural, and saphenous nerve distributions. Motor function is intact to EHL, FHL, tibialis anterior, and gastroc. There is brisk capillary refill to the toes and a strong palpable dorsalis pedis pulse. Compartments are soft and compressible. There is no calf tenderness and a negative Homans' sign. RADIOGRAPHIC EXAM:  AP and lateral projections of the left knee demonstrate anatomically positioned transverse inferior pole patella fracture. There is near-anatomic alignment and appropriate hardware positioning. There is evidence of endosteal callus formation posterior to internal fixation screw. There is minor residual fracture line at the anterior cortex. No evidence of hardware failure, loss of fixation, lucency.      ASSESSMENT AND PLAN:  7 weeks status post ORIF left inferior pole patella fracture     Continue full weightbearing as tolerated left

## 2023-11-10 ENCOUNTER — HOSPITAL ENCOUNTER (OUTPATIENT)
Dept: PHYSICAL THERAPY | Age: 63
Setting detail: THERAPIES SERIES
Discharge: HOME | End: 2023-11-10
Payer: MEDICARE

## 2023-11-10 PROCEDURE — 97140 MANUAL THERAPY 1/> REGIONS: CPT | Performed by: PHYSICAL THERAPIST

## 2023-11-10 PROCEDURE — 97110 THERAPEUTIC EXERCISES: CPT | Performed by: PHYSICAL THERAPIST

## 2023-11-10 NOTE — FLOWSHEET NOTE
[address] [facility] Therapy  [address] office: [phone] fax: [phone]      Physical Therapy: TREATMENT/PROGRESS NOTE   Patient: Flavio Prather (54 y.o. male)   Treatment Date: 11/10/2023   :  1960 MRN: [id number]   Visit #: 5   Insurance Allowable Auth Needed   20  23 [x]Yes    []No    Insurance: Payor: Annavirgilio Hastings / Plan: [address] [place] [address] HMO / Product Type: *No Product type* /   $40 copay  Insurance ID: [id number] - (Medicare Managed)  Secondary Insurance (if applicable):    Treatment Diagnosis:   Knee pain M25.562,  difficulty walking R26.2   Medical Diagnosis:    Orthopedic aftercare [Z47.89]  Closed displaced transverse fracture of left patella, [L73.650B]   DOS:  23   Referring Physician: Eric Hopkins MD  PCP: Radha Montoya MD                             Plan of care signed (Y/N):     Date of Patient follow up with Physician:      Progress Report/POC: NO  POC update due: 2023 (OR 10 visits /OR [address], whichever is less)    PROM ONLY 0 - 70. WBAT with brace locked in ext. Preferred Language for Healthcare:   [x]English       []other:    SUBJECTIVE EXAMINATION     Patient Report/Comments: Pt reports that his pain is not bad with exercises, but he hurts 3 hours later. He is not icing. Test used Initial score  10/17/23 11/10/2023   Pain Summary VAS 2-8/10    Functional questionnaire WOMAC 78/96    Other: Knee flex  109    Knee ext  5       OBJECTIVE EXAMINATION     Observation:     Test measurements: see eval    Exercises/Interventions:     Therapeutic Ex (80743)  resistance Sets/time Reps Notes/Cues/Progressions   Bike  rocking 5 min     Quad set  :05 X 10 hep   hep   Assisted SLR in brace   X 15 Hep, wife instructed on giving assistance.   Knee to remain in full ext   Supine hip abd/add iso  :05 X 20     Seated HS s/  seated gastroc s  :20 3x hep   hep   SL hip abd  2 x 10

## 2023-11-14 ENCOUNTER — HOSPITAL ENCOUNTER (OUTPATIENT)
Dept: PHYSICAL THERAPY | Age: 63
Setting detail: THERAPIES SERIES
Discharge: HOME | End: 2023-11-14
Payer: MEDICARE

## 2023-11-14 PROCEDURE — 97110 THERAPEUTIC EXERCISES: CPT | Performed by: PHYSICAL THERAPIST

## 2023-11-14 PROCEDURE — 97140 MANUAL THERAPY 1/> REGIONS: CPT | Performed by: PHYSICAL THERAPIST

## 2023-11-14 NOTE — FLOWSHEET NOTE
proper ROM for normal function with self care, mobility, lifting and ambulation    TREATMENT PLAN   Plan: Cont POC- Continue emphasis/focus on improving proper muscle recruitment and activation/motor control patterns. Next visit plan to progress reps     Electronically Signed by Perez Thornton PT              Date: 11/14/2023     Note: If patient does not return for scheduled/recommended follow up visits, this note will serve as a discharge from care along with the most recent update on progress.

## 2023-11-17 ENCOUNTER — HOSPITAL ENCOUNTER (OUTPATIENT)
Dept: PHYSICAL THERAPY | Age: 63
Setting detail: THERAPIES SERIES
Discharge: HOME | End: 2023-11-17
Payer: MEDICARE

## 2023-11-17 PROCEDURE — 97110 THERAPEUTIC EXERCISES: CPT | Performed by: PHYSICAL THERAPIST

## 2023-11-17 PROCEDURE — 97140 MANUAL THERAPY 1/> REGIONS: CPT | Performed by: PHYSICAL THERAPIST

## 2023-11-17 NOTE — FLOWSHEET NOTE
[address] [facility]  [address] office: [phone] fax: [phone]      Physical Therapy: TREATMENT/PROGRESS NOTE   Patient: Suhas Shore (44 y.o. male)   Treatment Date: 2023   :  1960 MRN: [id number]   Visit #: 7   Insurance Allowable Auth Needed   20  23 [x]Yes    []No    Insurance: Payor: Magalys Bazan / Plan: [employer] HMO / Product Type: *No Product type* /   $40 copay  Insurance ID: [id number] - (Medicare Managed)  Secondary Insurance (if applicable):    Treatment Diagnosis:   Knee pain M25.562,  difficulty walking R26.2   Medical Diagnosis:    Orthopedic aftercare [Z47.89]  Closed displaced transverse fracture of left patella, [F93.008O]   DOS:  23   Referring Physician: Cari Causey MD  PCP: Shilpi Chen MD                             Plan of care signed (Y/N):     Date of Patient follow up with Physician:      Progress Report/POC: NO  POC update due: 2023 (OR 10 visits /OR [address], whichever is less)    PROM ONLY 0 - 70. WBAT with brace locked in ext. Preferred Language for Healthcare:   [x]English       []other:    SUBJECTIVE EXAMINATION     Patient Report/Comments: Pt reports soreness in feet for a couple days after surgery. Pt reports it is easier to transfer from sit to stand. Pt has driven once. Test used Initial score  10/17/23 2023   Pain Summary VAS 2-8/10    Functional questionnaire WOMAC 78/96    Other: Knee flex  109    Knee ext  5       OBJECTIVE EXAMINATION     Observation:     Test measurements: see eval    Exercises/Interventions:     Therapeutic Ex (74777)  resistance Sets/time Reps Notes/Cues/Progressions   Bike   6 min     hep   hep   Assisted SLR in brace 2 x 10    Hep, wife instructed on giving assistance.   Knee to remain in full ext   Supine hip abd/add iso  :05 X 20     Seated HS s/  seated gastroc s  :20 3x hep   Bridges  X 15

## 2023-11-21 ENCOUNTER — HOSPITAL ENCOUNTER (OUTPATIENT)
Dept: PHYSICAL THERAPY | Age: 63
Setting detail: THERAPIES SERIES
Discharge: HOME | End: 2023-11-21
Payer: MEDICARE

## 2023-11-21 NOTE — FLOWSHEET NOTE
[address] [facility], [address] [place] [address]  [address] [place] [address], [address] [place] [address], [address] [place] [address]        Physical Therapy  Cancellation/No-show Note  Patient Name:  Chanel Nair  :  1960   Date:  2023  Cancelled visits to date: 1  No-shows to date: 0    For today's appointment patient:  [x]  Cancelled  []  Rescheduled appointment  []  No-show     Reason given by patient:  [x]  Patient ill  []  Conflicting appointment  []  No transportation    []  Conflict with work  []  No reason given  []  Other:     Comments:      Electronically signed by:  Jane Hughes PT

## 2023-11-24 ENCOUNTER — APPOINTMENT (OUTPATIENT)
Dept: PHYSICAL THERAPY | Age: 63
End: 2023-11-24
Payer: MEDICARE

## 2023-11-26 NOTE — PROGRESS NOTES
RN HYPERBARIC OXYGEN THERAPY RISK ASSESSMENT TOOL   [facility]     [facility]  MEDICAL RECORD NUMBER:  [id number]  AGE: 59 y.o.    GENDER: male  : 1960  EPISODE DATE:  2021       PAST MEDICAL HISTORY      Diagnosis Date    Abscess of left hand 2017    Acute kidney injury superimposed on chronic kidney disease ([facility] [facility] 2021    Adhesive capsulitis of shoulder 2014    Fractures     [facility]    PNA (pneumonia) 2016       PAST SURGICAL HISTORY  Past Surgical History:   Procedure Laterality Date    FOOT DEBRIDEMENT Left 2021    LEFT FOOT DEBRIDEMENT INCISION AND DRAINAGE performed by Kurt Hoff DPM at [address] [place] [address] Left 2021    STAGED INCISION AND DEBRIDEMENT LEFT FOOT WITH PARTIAL FIRST RAY AMPUTATION performed by Kurt Hoff DPM at [facility] HAND SURGERY Left 2017    LEFT HAND DEBRIDEMENT INCISION AND DRAINAGE    NECK SURGERY      35s year    TOE AMPUTATION Left     hallux    WRIST FRACTURE SURGERY         FAMILY HISTORY  Family History   Problem Relation Age of Onset    Heart Failure Mother     Diabetes Mother     Cancer Father         throat cancer    Asthma Daughter         Sports induced as a child    Emphysema Neg Hx     Hypertension Neg Hx        SOCIAL HISTORY  Social History     Tobacco Use    Smoking status: Never Smoker    Smokeless tobacco: Never Used   Vaping Use    Vaping Use: Never used   Substance Use Topics    Alcohol use: No     Alcohol/week: 0.0 standard drinks    Drug use: No       ALLERGIES  No Known Allergies    MEDICATIONS  Current Outpatient Medications on File Prior to Encounter   Medication Sig Dispense Refill    cefTRIAXone sodium 2 g SOLR Infuse intravenously      metroNIDAZOLE (FLAGYL) 500 MG tablet Take 1 tablet by mouth 3 times daily 90 tablet 0    lisinopril (PRINIVIL;ZESTRIL) 10 MG tablet Take 5 mg by mouth daily      Dulaglutide (TRULICITY) 1.5 CV/7.6RX SOPN Inject 1.5 mg into the skin once a week      atorvastatin (LIPITOR) 40 MG tablet Take 40 mg by mouth daily      insulin glargine (LANTUS) 100 UNIT/ML injection vial Inject 35 Units into the skin nightly (Patient taking differently: Inject 45 Units into the skin nightly ) 1 vial 3    Omega-3 Fatty Acids (FISH OIL) 1000 MG CAPS Take 3,000 mg by mouth nightly.  metFORMIN (GLUCOPHAGE) 500 MG tablet TAKE TWO TABLETS BY MOUTH TWICE A DAY      Multiple Vitamins-Minerals (MULTIVITAMIN PO) Take  by mouth daily.  tamsulosin (FLOMAX) 0.4 MG capsule Take 0.4 mg by mouth daily. No current facility-administered medications on file prior to encounter. LABS  HgBA1c:    Lab Results   Component Value Date    LABA1C 6.4 09/01/2021            Please add comments to any \"yes\" answers. Do you have a history of: Comments   Seizure No   Congenital spherocytosis No   Optic Neuritis No   Cataracts No   Eye Surgery No   Ear problems no   Ear reconstructive surgery No     Sinus problems no   Asthma No   Bronchitis no   Emphysema No   Pneumothorax No   Tuberculosis No   Other lung problems No   Hypertension No   Pacemaker/AICD No                                              Congestive heart failure    EF% >30% Yes  55  3/2016   Any implanted device [place] in teeth                                              Dialysis no   Current pregnancy n/a   Claustrophobia no   Diabetes yes   Currently using these medications:     a. Disulfiram (Antabuse®) no    b. Mafenide acetate        (Sulfamylon®) no    c.  Diuretics for CHF no    d. Amiodarone dose > 400mg/day no    e. Lanoxin/Digoxin no    f. Current Steroid use     no   Cancer:  no    a. Surgery for Cancer no    b. Radiation therapy no    c. Chemotherapy no    If yes, did you receive:     a. Doxorubicin (Adriamycin®) no    b.   Cisplatin (Platinol AQ®) no    c.  Bleomycin (Blenoxane®) no     The above was reviewed with: Dr Perico aJmes    Electronically signed by Margy Reyes RN on 9/21/2021 at 12:07 PM 176.3

## 2023-11-28 ENCOUNTER — HOSPITAL ENCOUNTER (OUTPATIENT)
Dept: PHYSICAL THERAPY | Age: 63
Setting detail: THERAPIES SERIES
Discharge: HOME | End: 2023-11-28
Payer: MEDICARE

## 2023-11-28 PROCEDURE — 97140 MANUAL THERAPY 1/> REGIONS: CPT | Performed by: PHYSICAL THERAPIST

## 2023-11-28 PROCEDURE — 97110 THERAPEUTIC EXERCISES: CPT | Performed by: PHYSICAL THERAPIST

## 2023-11-28 NOTE — FLOWSHEET NOTE
[address] [facility] Therapy  [address] office: [phone] fax: [phone]      Physical Therapy: TREATMENT/PROGRESS NOTE   Patient: Renee Aragon (34 y.o. male)   Treatment Date: 2023   :  1960 MRN: [id number]   Visit #: 8   Insurance Allowable Auth Needed   20  23 [x]Yes    []No    Insurance: Payor: Jose Dubon / Plan: [employer] HMO / Product Type: *No Product type* /   $40 copay  Insurance ID: [id number] - (Medicare Managed)  Secondary Insurance (if applicable):    Treatment Diagnosis:   Knee pain M25.562,  difficulty walking R26.2   Medical Diagnosis:    Orthopedic aftercare [Z47.89]  Closed displaced transverse fracture of left patella, [T70.403L]   DOS:  23   Referring Physician: Tamela Smith., MD  PCP: Cesar Bullock MD                             Plan of care signed (Y/N):     Date of Patient follow up with Physician:      Progress Report/POC: YES  POC update due: 2023 (OR 10 visits /OR [address], whichever is less)    PROM ONLY 0 - 70. WBAT with brace locked in ext. Preferred Language for Healthcare:   [x]English       []other:    SUBJECTIVE EXAMINATION     Patient Report/Comments: Pt feels his gait in improving, but balance remains a challenge. Pt is amb without assistive device. Test used Initial score  10/17/23 2023   Pain Summary VAS 2-8/10 1/10   Functional questionnaire WOMAC 78/96 31/96 = 33%   Other: Knee flex  121    Knee ext  0       OBJECTIVE EXAMINATION     Observation:     Test measurements: see eval    Exercises/Interventions:     Therapeutic Ex (72014)  resistance Sets/time Reps Notes/Cues/Progressions   Bike   5 min     hep   hep   Assisted SLR in brace Hep, wife instructed on giving assistance.   Knee to remain in full ext   Supine hip abd/add iso    Seated HS s/  seated gastroc s hep   Bridges  X 20  hep          Incline s  :20 3x    Lateral side

## 2023-12-01 ENCOUNTER — HOSPITAL ENCOUNTER (OUTPATIENT)
Dept: PHYSICAL THERAPY | Age: 63
Setting detail: THERAPIES SERIES
Discharge: HOME | End: 2023-12-01
Payer: MEDICARE

## 2023-12-01 NOTE — FLOWSHEET NOTE
[address] [facility], [address] [place] [address]  [address] [place] [address]        Physical Therapy  Cancellation/No-show Note  Patient Name:  Jaky Pearson  :  1960   Date:  2023  Cancelled visits to date: 2  No-shows to date: 0    For today's appointment patient:  [x]  Cancelled  []  Rescheduled appointment  []  No-show     Reason given by patient:  [x]  Patient ill  []  Conflicting appointment  []  No transportation    []  Conflict with work  []  No reason given  []  Other:     Comments:      Electronically signed by:  Salma Bee, PT

## 2023-12-05 ENCOUNTER — HOSPITAL ENCOUNTER (OUTPATIENT)
Dept: PHYSICAL THERAPY | Age: 63
Setting detail: THERAPIES SERIES
Discharge: HOME | End: 2023-12-05
Payer: MEDICARE

## 2023-12-05 PROCEDURE — 97140 MANUAL THERAPY 1/> REGIONS: CPT | Performed by: PHYSICAL THERAPIST

## 2023-12-05 PROCEDURE — 97110 THERAPEUTIC EXERCISES: CPT | Performed by: PHYSICAL THERAPIST

## 2023-12-05 NOTE — FLOWSHEET NOTE
[address] [facility] Therapy  [address] office: [phone] fax: [phone]      Physical Therapy: TREATMENT/PROGRESS NOTE   Patient: Francisco Salter (89 y.o. male)   Treatment Date: 2023   :  1960 MRN: [id number]   Visit #: 9   Insurance Allowable Auth Needed   20  23 [x]Yes    []No    Insurance: Payor: emililamonte Kraig / Plan: [address] [place] [address] HMO / Product Type: *No Product type* /   $40 copay  Insurance ID: [id number] - (Medicare Managed)  Secondary Insurance (if applicable):    Treatment Diagnosis:   Knee pain M25.562,  difficulty walking R26.2   Medical Diagnosis:    Orthopedic aftercare [Z47.89]  Closed displaced transverse fracture of left patella, [H29.717C]   DOS:  23   Referring Physician: Kathrin Rivera MD  PCP: Graciela Medrano MD                             Plan of care signed (Y/N):     Date of Patient follow up with Physician: 23     Progress Report/POC: NO  POC update due: 2023 (OR 10 visits /OR John Moreno, whichever is less)    PROM ONLY 0 - 70. WBAT with brace locked in ext. Preferred Language for Healthcare:   [x]English       []other:    SUBJECTIVE EXAMINATION     Patient Report/Comments: [place] for doc apptment. Pt's wife feels his gait is the same or maybe even better than fall. He was able to pick something up from floor. Pt amb without cane. No c/o buckling or giving way. Pt has swelling occasionally in knee. Pt is not taking any meds for his knee.      Test used Initial score  10/17/23 2023   Pain Summary VAS 2-8/10 1/10   Functional questionnaire WOMAC 78/96 31/96 = 33%   Other: Knee flex  121    Knee ext  0       OBJECTIVE EXAMINATION     Observation:     Test measurements: see eval    Exercises/Interventions:     Therapeutic Ex (69301)  resistance Sets/time Reps Notes/Cues/Progressions   Bike   5 min     hep   hep   SLR in brace 2 x 10  bilat Hep, wife

## 2023-12-07 ENCOUNTER — HOSPITAL ENCOUNTER (OUTPATIENT)
Dept: PHYSICAL THERAPY | Age: 63
Setting detail: THERAPIES SERIES
Discharge: HOME | End: 2023-12-07
Payer: MEDICARE

## 2023-12-07 PROCEDURE — 97140 MANUAL THERAPY 1/> REGIONS: CPT | Performed by: PHYSICAL THERAPIST

## 2023-12-07 PROCEDURE — 97110 THERAPEUTIC EXERCISES: CPT | Performed by: PHYSICAL THERAPIST

## 2023-12-07 NOTE — FLOWSHEET NOTE
Bike   5 min     hep   Supine add/ abd iso  :05 X 20 hep   SLR in brace 2 x 10  bilat Hep, wife instructed on giving assistance. Knee to remain in full ext   Seated HS s/  seated gastroc s hep   Bridges  X 20  hep   Supine heel slides  X 10      Incline s  :20 3x    Lateral side stepping orange 2 laps     Mini squats  2 x 10                  PAUL 8 inch 2 X 10 bilat            Leg Press 120# 3 x 10     Seated HS curl 50# 2 x 10      Manual Intervention (16091)  TIME     PROM  0 - tolerance  4 min  Brace unlocked. Man HS s/  benny ITB/  man gastroc/ piriformis  4 min                          NMR re-education (38514) resistance Sets/time Reps CUES NEEDED       Tandem stance on  foam  2 x 1 min                          Therapeutic Activity (78253)  Sets/time                                          Modalities:    Cold Pack    Education/Home Exercise Program: CEGALPRY      ASSESSMENT     Today's Assessment:  Pt tolerated increase in resistance. Pt does need cueing to avoid ER of left leg. Pt is tentative to let go for balance exercises. Medical Necessity Documentation:  I certify that this patient meets the below criteria necessary for medical necessity for care and/or justification of therapy services: The patient has a musculoskeletal condition(s) with a corresponding ICD-10 code that is of complexity and severity that require skilled therapeutic intervention. This has a direct and significant impact on the need for therapy and significantly impacts the rate of recovery. Treatment/Activity Tolerance:  [x] Patient tolerated treatment well [] Patient limited by fatique  [] Patient limited by pain  [] Patient limited by other medical complications  [] Other:     Return to Play: NA    Prognosis for POC: [x] Good [] Fair  [] Poor    Patient requires continued skilled intervention: [x] Yes  [] No      GOALS        Patient stated goal: become more mobile.   Heal  Status: [] Progressing: [] Met: [] Not Met: []

## 2023-12-12 ENCOUNTER — OFFICE VISIT (OUTPATIENT)
Dept: ORTHOPEDIC SURGERY | Age: 63
End: 2023-12-12

## 2023-12-12 VITALS — BODY MASS INDEX: 33.8 KG/M2 | HEIGHT: 73 IN | WEIGHT: 255 LBS

## 2023-12-12 DIAGNOSIS — Z47.89 ORTHOPEDIC AFTERCARE: Primary | ICD-10-CM

## 2023-12-12 PROCEDURE — 99024 POSTOP FOLLOW-UP VISIT: CPT | Performed by: ORTHOPAEDIC SURGERY

## 2023-12-12 NOTE — PROGRESS NOTES
ORTHOPAEDIC SURGERY FOLLOWUP VISIT     CHIEF COMPLAINT: Left patella fracture follow-up     DATE OF INJURY: 9/20/2023  DIAGNOSIS: Left patella transverse inferior pole fracture  DATE OF SURGERY: 9/21/2023, ORIF left patella fracture     HISTORY OF PRESENT ILLNESS:  78-year-old male presents for evaluation 12 weeks status post ORIF left inferior pole patella fracture. Overall he is doing well. He denies pain today. Pain level 0 out of 10. Overall, he reports \"feeling good\". He has been attending PT and has been making excellent gains in range of motion. He has weaned from the hinged knee brace at this time point and is walking unassisted. PHYSICAL EXAM:  General: Well-appearing. No distress. Left knee: Anterior midline surgical scar is pristinely healed. There is no signs of dehiscence. No open areas. No surrounding erythema. Mild anterior knee swelling is present. No tenderness to palpation over the anterior patella. Knee range of motion actively is full extension to 130 degrees of flexion without difficulty. There is no extensor lag. There is no crepitus during range of motion. Patella tracking is appropriate. The patient demonstrates reasonable quad strength. Sensation is intact to light touch in deep peroneal, superficial peroneal, tibial, sural, and saphenous nerve distributions. Motor function is intact to EHL, FHL, tibialis anterior, and gastroc. There is brisk capillary refill to the toes and a strong palpable dorsalis pedis pulse. Compartments are soft and compressible. There is no calf tenderness and a negative Homans' sign. RADIOGRAPHIC EXAM:  AP and lateral projections of the left knee demonstrate anatomically positioned transverse inferior pole patella fracture. There is near-anatomic alignment and appropriate hardware positioning. There is evidence of endosteal callus formation anterior and posterior to internal fixation screw.   There is minor residual fracture line at

## (undated) DEVICE — SUTURE VCRL + SZ 2-0 L18IN ABSRB UD CT1 L36MM 1/2 CIR VCP839D

## (undated) DEVICE — HANDPIECE SET WITH HIGH FLOW TIP AND SUCTION TUBE: Brand: INTERPULSE

## (undated) DEVICE — TRAY PREP DRY W/ PREM GLV 2 APPL 6 SPNG 2 UNDPD 1 OVERWRAP

## (undated) DEVICE — GLOVE ORANGE PI 7 1/2   MSG9075

## (undated) DEVICE — 1010 S-DRAPE TOWEL DRAPE 10/BX: Brand: STERI-DRAPE™

## (undated) DEVICE — BANDAGE COMPR W4INXL12FT E DISP ESMARCH EVEN

## (undated) DEVICE — GOWN SIRUS NONREIN XL W/TWL: Brand: MEDLINE INDUSTRIES, INC.

## (undated) DEVICE — PACK EXTREMITY XR

## (undated) DEVICE — X-RAY CASSETTE COVER: Brand: CONVERTORS

## (undated) DEVICE — INTENDED FOR TISSUE SEPARATION, AND OTHER PROCEDURES THAT REQUIRE A SHARP SURGICAL BLADE TO PUNCTURE OR CUT.: Brand: BARD-PARKER ® STAINLESS STEEL BLADES

## (undated) DEVICE — COVER,MAYO STAND,XL,STERILE: Brand: MEDLINE

## (undated) DEVICE — Device

## (undated) DEVICE — DRESSING PETRO W3XL8IN N ADH OIL EMUL GZ CURAD

## (undated) DEVICE — NEEDLE HYPO 25GA L1.5IN BLU POLYPR HUB S STL REG BVL STR

## (undated) DEVICE — APPLICATOR MEDICATED 26 CC SOLUTION HI LT ORNG CHLORAPREP

## (undated) DEVICE — GLOVE SURG SZ 75 L12IN FNGR THK79MIL GRN LTX FREE

## (undated) DEVICE — C-ARMOR C-ARM EQUIPMENT COVERS CLEAR STERILE UNIVERSAL FIT 12 PER CASE: Brand: C-ARMOR

## (undated) DEVICE — 3M™ COBAN™ SELF-ADHERENT WRAP, 1586S, STERILE, 6 IN X 5 YD (15 CM X 4,5 M), 12 ROLLS/CASE: Brand: 3M™ COBAN™

## (undated) DEVICE — LOWER EXTREMITY: Brand: MEDLINE INDUSTRIES, INC.

## (undated) DEVICE — SHOE, POST-OPERATIVE: Brand: DEROYAL

## (undated) DEVICE — PACK ORTH LO EXT VI

## (undated) DEVICE — PADDING CAST W4INXL4YD ST COT RAYON MICROPLEATED HIGHLY

## (undated) DEVICE — STERILE PVP: Brand: MEDLINE INDUSTRIES, INC.

## (undated) DEVICE — DRESSING FOAM W4XL10IN SIL RECT ADH WTRPRF FLM BK W/ BORD

## (undated) DEVICE — DRAPE C ARM W54XL84IN MINI FOR OEC 6800

## (undated) DEVICE — GLOVE ORANGE PI 8   MSG9080

## (undated) DEVICE — NEEDLE HYPO 20GA L1.5IN YEL POLYPR HUB S STL REG BVL STR

## (undated) DEVICE — KIT INSTR W/ 2.4MM GUIDEPIN SUT PASS WIRE NO2 FIBERWIRE

## (undated) DEVICE — COTTON UNDERCAST PADDING,CRIMPED FINISH: Brand: WEBRIL

## (undated) DEVICE — SOLUTION IV IRRIG 500ML 0.9% SODIUM CHL 2F7123

## (undated) DEVICE — DRESSING,GAUZE,XEROFORM,CURAD,1"X8",ST: Brand: CURAD

## (undated) DEVICE — SUTURE ETHLN SZ 3-0 L30IN NONABSORBABLE BLK FSL L30MM 3/8 1671H

## (undated) DEVICE — COVER XR CASS W20XL41IN UNIV ADH STRP

## (undated) DEVICE — GLOVE ORTHO 7   MSG9470

## (undated) DEVICE — ELECTRODE PT RET AD L9FT HI MOIST COND ADH HYDRGEL CORDED

## (undated) DEVICE — PENCIL SMK EVAC ALL IN 1 DSGN ENH VISIBILITY IMPROVED AIR

## (undated) DEVICE — BANDAGE,GAUZE,BULKEE II,4.5"X4.1YD,STRL: Brand: MEDLINE

## (undated) DEVICE — SUTURE MCRYL + SZ 4-0 L18IN ABSRB UD L19MM PS-2 3/8 CIR MCP496G

## (undated) DEVICE — PRECISION THIN (9.0 X 0.38 X 25.0MM)

## (undated) DEVICE — SUTURE FIBERWIRE SZ 2 W/ TAPERED NEEDLE BLUE L38IN NONABSORB BLU L26.5MM 1/2 CIRCLE AR7200

## (undated) DEVICE — 19 IN KNEE IMMOBILIZER: Brand: DEROYAL

## (undated) DEVICE — SUTURE SURGLON SZ 1 L18IN NONABSORBABLE BLK GS 21 L37MM 1 2 8886196272

## (undated) DEVICE — BANDAGE COMPR W3INXL15FT BGE E SGL LAYERED CLP CLSR

## (undated) DEVICE — SUTURE FIBERTAPE FIBERWIRE SZ 2-0 30IN NONABSORB BLU AR72377

## (undated) DEVICE — GAUZE,SPONGE,4"X4",8PLY,STRL,LF,10/TRAY: Brand: MEDLINE

## (undated) DEVICE — MAJOR SET UP PK

## (undated) DEVICE — BIT DRL DIA2.6MM CANN FOR ANK FRAC MGMT SYS

## (undated) DEVICE — SUTURE VCRL + SZ 1 L18IN ABSRB VLT L36MM CT-1 1/2 CIR VCP741D

## (undated) DEVICE — ADHESIVE SKIN CLOSURE WND 8.661X1.5 IN 22 CM LIQUIBAND SECUR

## (undated) DEVICE — SYRINGE MED 10ML LUERLOCK TIP W/O SFTY DISP

## (undated) DEVICE — 3M™ COBAN™ NL STERILE NON-LATEX SELF-ADHERENT WRAP, 2084S, 4 IN X 5 YD (10 CM X 4,5 M), 18 ROLLS/CASE: Brand: 3M™ COBAN™

## (undated) DEVICE — CONTAINER,SPECIMEN,OR STERILE,4OZ: Brand: MEDLINE

## (undated) DEVICE — PAD,ABDOMINAL,8"X10",ST,LF: Brand: MEDLINE

## (undated) DEVICE — DRESSING FOAM W4XL12IN SIL RECT ADH WTRPRF FLM BK W/ BORD

## (undated) DEVICE — SUTURE VCRL + SZ 3-0 L18IN ABSRB VLT L36MM CT-1 1/2 CIR VCP738D

## (undated) DEVICE — 3M™ IOBAN™ 2 ANTIMICROBIAL INCISE DRAPE 6650EZ: Brand: IOBAN™ 2

## (undated) DEVICE — TIP SUCT DIA12FR W STYL CTRL VENT DISPOSABLE FRAZ